# Patient Record
Sex: MALE | Race: WHITE | NOT HISPANIC OR LATINO | Employment: OTHER | URBAN - NONMETROPOLITAN AREA
[De-identification: names, ages, dates, MRNs, and addresses within clinical notes are randomized per-mention and may not be internally consistent; named-entity substitution may affect disease eponyms.]

---

## 2018-04-11 ENCOUNTER — OFFICE VISIT CONVERTED (OUTPATIENT)
Dept: FAMILY MEDICINE CLINIC | Age: 73
End: 2018-04-11
Attending: NURSE PRACTITIONER

## 2018-10-24 ENCOUNTER — OFFICE VISIT CONVERTED (OUTPATIENT)
Dept: FAMILY MEDICINE CLINIC | Age: 73
End: 2018-10-24
Attending: NURSE PRACTITIONER

## 2018-11-09 ENCOUNTER — CONVERSION ENCOUNTER (OUTPATIENT)
Dept: CARDIOLOGY | Facility: CLINIC | Age: 73
End: 2018-11-09

## 2018-11-09 ENCOUNTER — OFFICE VISIT CONVERTED (OUTPATIENT)
Dept: CARDIOLOGY | Facility: CLINIC | Age: 73
End: 2018-11-09
Attending: INTERNAL MEDICINE

## 2018-11-28 ENCOUNTER — CONVERSION ENCOUNTER (OUTPATIENT)
Dept: CARDIOLOGY | Facility: CLINIC | Age: 73
End: 2018-11-28
Attending: INTERNAL MEDICINE

## 2019-03-13 ENCOUNTER — HOSPITAL ENCOUNTER (OUTPATIENT)
Dept: OTHER | Facility: HOSPITAL | Age: 74
Discharge: HOME | End: 2019-03-13
Attending: NURSE PRACTITIONER

## 2019-03-13 LAB
ALBUMIN SERPL-MCNC: 4.2 G/DL (ref 3.5–5)
ALBUMIN/GLOB SERPL: 1.6 {RATIO} (ref 1.4–2.6)
ALP SERPL-CCNC: 75 U/L (ref 56–155)
ALT SERPL-CCNC: 28 U/L (ref 10–40)
ANION GAP SERPL CALC-SCNC: 16 MMOL/L (ref 8–19)
AST SERPL-CCNC: 18 U/L (ref 15–50)
BILIRUB SERPL-MCNC: 0.3 MG/DL (ref 0.2–1.3)
BUN SERPL-MCNC: 14 MG/DL (ref 5–25)
BUN/CREAT SERPL: 13 {RATIO} (ref 6–20)
CALCIUM SERPL-MCNC: 9.7 MG/DL (ref 8.7–10.4)
CHLORIDE SERPL-SCNC: 106 MMOL/L (ref 99–111)
CHOLEST SERPL-MCNC: 110 MG/DL (ref 107–200)
CHOLEST/HDLC SERPL: 3.3 {RATIO} (ref 3–6)
CONV CO2: 26 MMOL/L (ref 22–32)
CONV TOTAL PROTEIN: 6.9 G/DL (ref 6.3–8.2)
CREAT UR-MCNC: 1.05 MG/DL (ref 0.7–1.2)
EST. AVERAGE GLUCOSE BLD GHB EST-MCNC: 180 MG/DL
GFR SERPLBLD BASED ON 1.73 SQ M-ARVRAT: >60 ML/MIN/{1.73_M2}
GLOBULIN UR ELPH-MCNC: 2.7 G/DL (ref 2–3.5)
GLUCOSE SERPL-MCNC: 173 MG/DL (ref 70–99)
HBA1C MFR BLD: 7.9 % (ref 3.5–5.7)
HDLC SERPL-MCNC: 33 MG/DL (ref 40–60)
LDLC SERPL CALC-MCNC: 51 MG/DL (ref 70–100)
OSMOLALITY SERPL CALC.SUM OF ELEC: 301 MOSM/KG (ref 273–304)
POTASSIUM SERPL-SCNC: 4.7 MMOL/L (ref 3.5–5.3)
SODIUM SERPL-SCNC: 143 MMOL/L (ref 135–147)
TRIGL SERPL-MCNC: 131 MG/DL (ref 40–150)
VLDLC SERPL-MCNC: 26 MG/DL (ref 5–37)

## 2019-03-27 ENCOUNTER — OFFICE VISIT CONVERTED (OUTPATIENT)
Dept: FAMILY MEDICINE CLINIC | Age: 74
End: 2019-03-27
Attending: NURSE PRACTITIONER

## 2019-03-27 ENCOUNTER — HOSPITAL ENCOUNTER (OUTPATIENT)
Dept: OTHER | Facility: HOSPITAL | Age: 74
Discharge: HOME | End: 2019-03-27
Attending: NURSE PRACTITIONER

## 2019-03-27 LAB — PSA SERPL-MCNC: 1.05 NG/ML (ref 0–4)

## 2019-07-01 ENCOUNTER — HOSPITAL ENCOUNTER (OUTPATIENT)
Dept: OTHER | Facility: HOSPITAL | Age: 74
Discharge: HOME | End: 2019-07-01
Attending: NURSE PRACTITIONER

## 2019-07-01 LAB
EST. AVERAGE GLUCOSE BLD GHB EST-MCNC: 166 MG/DL
HBA1C MFR BLD: 7.4 % (ref 3.5–5.7)

## 2019-10-02 ENCOUNTER — HOSPITAL ENCOUNTER (OUTPATIENT)
Dept: OTHER | Facility: HOSPITAL | Age: 74
Discharge: HOME | End: 2019-10-02
Attending: NURSE PRACTITIONER

## 2019-10-02 LAB
ALBUMIN SERPL-MCNC: 4.2 G/DL (ref 3.5–5)
ALBUMIN/GLOB SERPL: 1.4 {RATIO} (ref 1.4–2.6)
ALP SERPL-CCNC: 72 U/L (ref 56–155)
ALT SERPL-CCNC: 23 U/L (ref 10–40)
ANION GAP SERPL CALC-SCNC: 17 MMOL/L (ref 8–19)
AST SERPL-CCNC: 20 U/L (ref 15–50)
BILIRUB SERPL-MCNC: 0.36 MG/DL (ref 0.2–1.3)
BUN SERPL-MCNC: 16 MG/DL (ref 5–25)
BUN/CREAT SERPL: 15 {RATIO} (ref 6–20)
CALCIUM SERPL-MCNC: 10.2 MG/DL (ref 8.7–10.4)
CHLORIDE SERPL-SCNC: 106 MMOL/L (ref 99–111)
CHOLEST SERPL-MCNC: 108 MG/DL (ref 107–200)
CHOLEST/HDLC SERPL: 3.2 {RATIO} (ref 3–6)
CONV CO2: 25 MMOL/L (ref 22–32)
CONV CREATININE URINE, RANDOM: 51.5 MG/DL (ref 10–300)
CONV MICROALBUM.,U,RANDOM: <12 MG/L (ref 0–20)
CONV TOTAL PROTEIN: 7.3 G/DL (ref 6.3–8.2)
CREAT UR-MCNC: 1.07 MG/DL (ref 0.7–1.2)
EST. AVERAGE GLUCOSE BLD GHB EST-MCNC: 163 MG/DL
GFR SERPLBLD BASED ON 1.73 SQ M-ARVRAT: >60 ML/MIN/{1.73_M2}
GLOBULIN UR ELPH-MCNC: 3.1 G/DL (ref 2–3.5)
GLUCOSE SERPL-MCNC: 144 MG/DL (ref 70–99)
HBA1C MFR BLD: 7.3 % (ref 3.5–5.7)
HDLC SERPL-MCNC: 34 MG/DL (ref 40–60)
LDLC SERPL CALC-MCNC: 51 MG/DL (ref 70–100)
MICROALBUMIN/CREAT UR: 23.3 MG/G{CRE} (ref 0–25)
OSMOLALITY SERPL CALC.SUM OF ELEC: 300 MOSM/KG (ref 273–304)
POTASSIUM SERPL-SCNC: 5 MMOL/L (ref 3.5–5.3)
SODIUM SERPL-SCNC: 143 MMOL/L (ref 135–147)
TRIGL SERPL-MCNC: 115 MG/DL (ref 40–150)
VLDLC SERPL-MCNC: 23 MG/DL (ref 5–37)

## 2019-10-09 ENCOUNTER — OFFICE VISIT CONVERTED (OUTPATIENT)
Dept: FAMILY MEDICINE CLINIC | Age: 74
End: 2019-10-09
Attending: NURSE PRACTITIONER

## 2019-10-09 ENCOUNTER — HOSPITAL ENCOUNTER (OUTPATIENT)
Dept: OTHER | Facility: HOSPITAL | Age: 74
Discharge: HOME | End: 2019-10-09
Attending: NURSE PRACTITIONER

## 2019-10-09 LAB
BASOPHILS # BLD AUTO: 0.04 10*3/UL (ref 0–0.2)
BASOPHILS NFR BLD AUTO: 0.4 % (ref 0–3)
CONV ABS IMM GRAN: 0.04 10*3/UL (ref 0–0.2)
CONV IMMATURE GRAN: 0.4 % (ref 0–1.8)
DEPRECATED RDW RBC AUTO: 47.8 FL (ref 35.1–43.9)
EOSINOPHIL # BLD AUTO: 0.42 10*3/UL (ref 0–0.7)
EOSINOPHIL # BLD AUTO: 4.1 % (ref 0–7)
ERYTHROCYTE [DISTWIDTH] IN BLOOD BY AUTOMATED COUNT: 14.4 % (ref 11.6–14.4)
HCT VFR BLD AUTO: 44.2 % (ref 42–52)
HGB BLD-MCNC: 13.5 G/DL (ref 14–18)
LYMPHOCYTES # BLD AUTO: 1.8 10*3/UL (ref 1–5)
LYMPHOCYTES NFR BLD AUTO: 17.6 % (ref 20–45)
MCH RBC QN AUTO: 27.8 PG (ref 27–31)
MCHC RBC AUTO-ENTMCNC: 30.5 G/DL (ref 33–37)
MCV RBC AUTO: 91.1 FL (ref 80–96)
MONOCYTES # BLD AUTO: 0.64 10*3/UL (ref 0.2–1.2)
MONOCYTES NFR BLD AUTO: 6.3 % (ref 3–10)
NEUTROPHILS # BLD AUTO: 7.26 10*3/UL (ref 2–8)
NEUTROPHILS NFR BLD AUTO: 71.2 % (ref 30–85)
NRBC CBCN: 0 % (ref 0–0.7)
PLATELET # BLD AUTO: 293 10*3/UL (ref 130–400)
PMV BLD AUTO: 11.8 FL (ref 9.4–12.4)
RBC # BLD AUTO: 4.85 10*6/UL (ref 4.7–6.1)
TSH SERPL-ACNC: 2.12 M[IU]/L (ref 0.27–4.2)
WBC # BLD AUTO: 10.2 10*3/UL (ref 4.8–10.8)

## 2019-10-14 ENCOUNTER — CONVERSION ENCOUNTER (OUTPATIENT)
Dept: CARDIOLOGY | Facility: CLINIC | Age: 74
End: 2019-10-14
Attending: INTERNAL MEDICINE

## 2019-10-23 ENCOUNTER — OFFICE VISIT CONVERTED (OUTPATIENT)
Dept: CARDIOLOGY | Facility: CLINIC | Age: 74
End: 2019-10-23
Attending: INTERNAL MEDICINE

## 2019-11-07 ENCOUNTER — OFFICE VISIT CONVERTED (OUTPATIENT)
Dept: PODIATRY | Facility: CLINIC | Age: 74
End: 2019-11-07
Attending: PODIATRIST

## 2019-12-04 ENCOUNTER — CONVERSION ENCOUNTER (OUTPATIENT)
Dept: OTHER | Facility: HOSPITAL | Age: 74
End: 2019-12-04

## 2019-12-04 ENCOUNTER — OFFICE VISIT CONVERTED (OUTPATIENT)
Dept: CARDIOLOGY | Facility: CLINIC | Age: 74
End: 2019-12-04
Attending: INTERNAL MEDICINE

## 2020-02-06 ENCOUNTER — PROCEDURE VISIT CONVERTED (OUTPATIENT)
Dept: PODIATRY | Facility: CLINIC | Age: 75
End: 2020-02-06
Attending: PODIATRIST

## 2020-04-01 ENCOUNTER — OFFICE VISIT CONVERTED (OUTPATIENT)
Dept: FAMILY MEDICINE CLINIC | Age: 75
End: 2020-04-01
Attending: NURSE PRACTITIONER

## 2020-04-01 ENCOUNTER — HOSPITAL ENCOUNTER (OUTPATIENT)
Dept: OTHER | Facility: HOSPITAL | Age: 75
Discharge: HOME | End: 2020-04-01
Attending: NURSE PRACTITIONER

## 2020-05-04 ENCOUNTER — HOSPITAL ENCOUNTER (OUTPATIENT)
Dept: OTHER | Facility: HOSPITAL | Age: 75
Discharge: HOME | End: 2020-05-04
Attending: FAMILY MEDICINE

## 2020-05-04 ENCOUNTER — OFFICE VISIT CONVERTED (OUTPATIENT)
Dept: FAMILY MEDICINE CLINIC | Age: 75
End: 2020-05-04
Attending: FAMILY MEDICINE

## 2020-05-04 LAB
ALBUMIN SERPL-MCNC: 4.1 G/DL (ref 3.5–5)
ALBUMIN/GLOB SERPL: 1.3 {RATIO} (ref 1.4–2.6)
ALP SERPL-CCNC: 74 U/L (ref 56–155)
ALT SERPL-CCNC: 16 U/L (ref 10–40)
ANION GAP SERPL CALC-SCNC: 20 MMOL/L (ref 8–19)
AST SERPL-CCNC: 11 U/L (ref 15–50)
BASOPHILS # BLD MANUAL: 0.02 10*3/UL (ref 0–0.2)
BASOPHILS NFR BLD MANUAL: 0.2 % (ref 0–3)
BILIRUB SERPL-MCNC: 0.65 MG/DL (ref 0.2–1.3)
BUN SERPL-MCNC: 16 MG/DL (ref 5–25)
BUN/CREAT SERPL: 15 {RATIO} (ref 6–20)
CALCIUM SERPL-MCNC: 9.8 MG/DL (ref 8.7–10.4)
CHLORIDE SERPL-SCNC: 98 MMOL/L (ref 99–111)
CONV CO2: 24 MMOL/L (ref 22–32)
CONV TOTAL PROTEIN: 7.2 G/DL (ref 6.3–8.2)
CREAT UR-MCNC: 1.06 MG/DL (ref 0.7–1.2)
DEPRECATED RDW RBC AUTO: 45.1 FL
EOSINOPHIL # BLD MANUAL: 0.06 10*3/UL (ref 0–0.7)
EOSINOPHIL NFR BLD MANUAL: 0.5 % (ref 0–7)
ERYTHROCYTE [DISTWIDTH] IN BLOOD BY AUTOMATED COUNT: 14 % (ref 11.5–14.5)
EST. AVERAGE GLUCOSE BLD GHB EST-MCNC: 174 MG/DL
GFR SERPLBLD BASED ON 1.73 SQ M-ARVRAT: >60 ML/MIN/{1.73_M2}
GLOBULIN UR ELPH-MCNC: 3.1 G/DL (ref 2–3.5)
GLUCOSE SERPL-MCNC: 181 MG/DL (ref 70–99)
GRANS (ABSOLUTE): 9.08 10*3/UL (ref 2–8)
GRANS: 80.1 % (ref 30–85)
HBA1C MFR BLD: 13.7 G/DL (ref 14–18)
HBA1C MFR BLD: 7.7 % (ref 3.5–5.7)
HCT VFR BLD AUTO: 42 % (ref 42–52)
IMM GRANULOCYTES # BLD: 0.02 10*3/UL (ref 0–0.54)
IMM GRANULOCYTES NFR BLD: 0.2 % (ref 0–0.43)
LYMPHOCYTES # BLD MANUAL: 1.03 10*3/UL (ref 1–5)
LYMPHOCYTES NFR BLD MANUAL: 9.9 % (ref 3–10)
MCH RBC QN AUTO: 28.4 PG (ref 27–31)
MCHC RBC AUTO-ENTMCNC: 32.6 G/DL (ref 33–37)
MCV RBC AUTO: 87 FL (ref 80–96)
MONOCYTES # BLD AUTO: 1.12 10*3/UL (ref 0.2–1.2)
OSMOLALITY SERPL CALC.SUM OF ELEC: 290 MOSM/KG (ref 273–304)
PLATELET # BLD AUTO: 218 10*3/UL (ref 130–400)
PMV BLD AUTO: 10.4 FL (ref 7.4–10.4)
POTASSIUM SERPL-SCNC: 4.6 MMOL/L (ref 3.5–5.3)
RBC # BLD AUTO: 4.83 10*6/UL (ref 4.7–6.1)
SODIUM SERPL-SCNC: 137 MMOL/L (ref 135–147)
TSH SERPL-ACNC: 0.78 M[IU]/L (ref 0.27–4.2)
VARIANT LYMPHS NFR BLD MANUAL: 9.1 % (ref 20–45)
WBC # BLD AUTO: 11.33 10*3/UL (ref 4.8–10.8)

## 2020-05-06 ENCOUNTER — HOSPITAL ENCOUNTER (OUTPATIENT)
Dept: OTHER | Facility: HOSPITAL | Age: 75
Discharge: HOME | End: 2020-05-06
Attending: FAMILY MEDICINE

## 2020-05-06 LAB
B BURGDOR IGG+IGM SER-ACNC: <0.91 ISR (ref 0–0.9)
SARS-COV-2 RNA SPEC QL NAA+PROBE: NOT DETECTED

## 2020-05-07 LAB
ASO AB SERPL-ACNC: 20 [IU]/ML (ref 0–200)
CONV RHEUMATOID FACTOR IGM: 14.7 [IU]/ML (ref 0–14)
CRP SERPL-MCNC: 79 MG/L (ref 0–5)
DSDNA AB SER-ACNC: NEGATIVE [IU]/ML
ENA AB SER IA-ACNC: NEGATIVE {RATIO}
ERYTHROCYTE [SEDIMENTATION RATE] IN BLOOD: 34 MM/H (ref 0–20)
PHOSPHATE SERPL-MCNC: 2.4 MG/DL (ref 2.4–4.5)
R RICKETTSI IGM TITR SER: 0.25 INDEX (ref 0–0.89)
URATE SERPL-MCNC: 4.3 MG/DL (ref 3.5–8.5)

## 2020-05-09 LAB — CONV ANTI GALACTOSE ALPHA 1,3 IGE: 0.16 KU/L

## 2020-06-03 ENCOUNTER — CONVERSION ENCOUNTER (OUTPATIENT)
Dept: CARDIOLOGY | Facility: CLINIC | Age: 75
End: 2020-06-03

## 2020-06-03 ENCOUNTER — OFFICE VISIT CONVERTED (OUTPATIENT)
Dept: CARDIOLOGY | Facility: CLINIC | Age: 75
End: 2020-06-03
Attending: INTERNAL MEDICINE

## 2020-07-10 ENCOUNTER — HOSPITAL ENCOUNTER (OUTPATIENT)
Dept: OTHER | Facility: HOSPITAL | Age: 75
Discharge: HOME | End: 2020-07-10
Attending: INTERNAL MEDICINE

## 2020-07-10 LAB
INR PPP: 1.32 (ref 2–3)
PROTHROMBIN TIME: 13.6 S (ref 9.4–12)

## 2020-07-20 ENCOUNTER — HOSPITAL ENCOUNTER (OUTPATIENT)
Dept: OTHER | Facility: HOSPITAL | Age: 75
Discharge: HOME | End: 2020-07-20
Attending: INTERNAL MEDICINE

## 2020-07-20 LAB
INR PPP: 4.11 (ref 2–3)
PROTHROMBIN TIME: 39.9 S (ref 9.4–12)

## 2020-07-28 ENCOUNTER — HOSPITAL ENCOUNTER (OUTPATIENT)
Dept: OTHER | Facility: HOSPITAL | Age: 75
Discharge: HOME | End: 2020-07-28
Attending: INTERNAL MEDICINE

## 2020-07-28 LAB
INR PPP: 2.48 (ref 2–3)
PROTHROMBIN TIME: 24.2 S (ref 9.4–12)

## 2020-08-12 ENCOUNTER — HOSPITAL ENCOUNTER (OUTPATIENT)
Dept: OTHER | Facility: HOSPITAL | Age: 75
Discharge: HOME | End: 2020-08-12
Attending: INTERNAL MEDICINE

## 2020-08-12 LAB
INR PPP: 4 (ref 2–3)
PROTHROMBIN TIME: 38.7 S (ref 9.4–12)

## 2020-08-19 ENCOUNTER — HOSPITAL ENCOUNTER (OUTPATIENT)
Dept: OTHER | Facility: HOSPITAL | Age: 75
Discharge: HOME | End: 2020-08-19
Attending: INTERNAL MEDICINE

## 2020-08-19 LAB
INR PPP: 1.85 (ref 2–3)
PROTHROMBIN TIME: 18.4 S (ref 9.4–12)

## 2020-08-27 ENCOUNTER — HOSPITAL ENCOUNTER (OUTPATIENT)
Dept: OTHER | Facility: HOSPITAL | Age: 75
Discharge: HOME | End: 2020-08-27
Attending: INTERNAL MEDICINE

## 2020-08-27 LAB
INR PPP: 3.96 (ref 2–3)
PROTHROMBIN TIME: 38.4 S (ref 9.4–12)

## 2020-09-03 ENCOUNTER — HOSPITAL ENCOUNTER (OUTPATIENT)
Dept: OTHER | Facility: HOSPITAL | Age: 75
Discharge: HOME | End: 2020-09-03
Attending: INTERNAL MEDICINE

## 2020-09-03 LAB
INR PPP: 2.25 (ref 2–3)
PROTHROMBIN TIME: 22.1 S (ref 9.4–12)

## 2020-09-14 ENCOUNTER — HOSPITAL ENCOUNTER (OUTPATIENT)
Dept: OTHER | Facility: HOSPITAL | Age: 75
Discharge: HOME | End: 2020-09-14
Attending: NURSE PRACTITIONER

## 2020-09-14 LAB
ALBUMIN SERPL-MCNC: 4 G/DL (ref 3.5–5)
ALBUMIN/GLOB SERPL: 1.4 {RATIO} (ref 1.4–2.6)
ALP SERPL-CCNC: 67 U/L (ref 56–155)
ALT SERPL-CCNC: 19 U/L (ref 10–40)
ANION GAP SERPL CALC-SCNC: 20 MMOL/L (ref 8–19)
AST SERPL-CCNC: 20 U/L (ref 15–50)
BILIRUB SERPL-MCNC: 0.33 MG/DL (ref 0.2–1.3)
BUN SERPL-MCNC: 18 MG/DL (ref 5–25)
BUN/CREAT SERPL: 17 {RATIO} (ref 6–20)
CALCIUM SERPL-MCNC: 9.6 MG/DL (ref 8.7–10.4)
CHLORIDE SERPL-SCNC: 104 MMOL/L (ref 99–111)
CHOLEST SERPL-MCNC: 134 MG/DL (ref 107–200)
CHOLEST/HDLC SERPL: 4.1 {RATIO} (ref 3–6)
CONV CO2: 22 MMOL/L (ref 22–32)
CONV TOTAL PROTEIN: 6.8 G/DL (ref 6.3–8.2)
CREAT UR-MCNC: 1.07 MG/DL (ref 0.7–1.2)
EST. AVERAGE GLUCOSE BLD GHB EST-MCNC: 171 MG/DL
GFR SERPLBLD BASED ON 1.73 SQ M-ARVRAT: >60 ML/MIN/{1.73_M2}
GLOBULIN UR ELPH-MCNC: 2.8 G/DL (ref 2–3.5)
GLUCOSE SERPL-MCNC: 150 MG/DL (ref 70–99)
HBA1C MFR BLD: 7.6 % (ref 3.5–5.7)
HDLC SERPL-MCNC: 33 MG/DL (ref 40–60)
LDLC SERPL CALC-MCNC: 63 MG/DL (ref 70–100)
OSMOLALITY SERPL CALC.SUM OF ELEC: 297 MOSM/KG (ref 273–304)
POTASSIUM SERPL-SCNC: 4.8 MMOL/L (ref 3.5–5.3)
SODIUM SERPL-SCNC: 141 MMOL/L (ref 135–147)
TRIGL SERPL-MCNC: 190 MG/DL (ref 40–150)
VLDLC SERPL-MCNC: 38 MG/DL (ref 5–37)

## 2020-09-17 ENCOUNTER — HOSPITAL ENCOUNTER (OUTPATIENT)
Dept: OTHER | Facility: HOSPITAL | Age: 75
Discharge: HOME | End: 2020-09-17
Attending: INTERNAL MEDICINE

## 2020-09-17 LAB
INR PPP: 3.07 (ref 2–3)
PROTHROMBIN TIME: 29.8 S (ref 9.4–12)

## 2020-09-23 ENCOUNTER — OFFICE VISIT CONVERTED (OUTPATIENT)
Dept: FAMILY MEDICINE CLINIC | Age: 75
End: 2020-09-23
Attending: NURSE PRACTITIONER

## 2020-10-01 ENCOUNTER — HOSPITAL ENCOUNTER (OUTPATIENT)
Dept: OTHER | Facility: HOSPITAL | Age: 75
Discharge: HOME | End: 2020-10-01
Attending: INTERNAL MEDICINE

## 2020-10-01 LAB
INR PPP: 3.05 (ref 2–3)
PROTHROMBIN TIME: 29.6 S (ref 9.4–12)

## 2020-10-08 ENCOUNTER — HOSPITAL ENCOUNTER (OUTPATIENT)
Dept: OTHER | Facility: HOSPITAL | Age: 75
Discharge: HOME | End: 2020-10-08
Attending: INTERNAL MEDICINE

## 2020-10-08 LAB
INR PPP: 2.2 (ref 2–3)
PROTHROMBIN TIME: 21.7 S (ref 9.4–12)

## 2020-10-15 ENCOUNTER — HOSPITAL ENCOUNTER (OUTPATIENT)
Dept: OTHER | Facility: HOSPITAL | Age: 75
Discharge: HOME | End: 2020-10-15
Attending: FAMILY MEDICINE

## 2020-10-15 ENCOUNTER — CONVERSION ENCOUNTER (OUTPATIENT)
Dept: FAMILY MEDICINE CLINIC | Age: 75
End: 2020-10-15

## 2020-10-19 ENCOUNTER — HOSPITAL ENCOUNTER (OUTPATIENT)
Dept: OTHER | Facility: HOSPITAL | Age: 75
Discharge: HOME | End: 2020-10-19
Attending: NURSE PRACTITIONER

## 2020-10-19 ENCOUNTER — OFFICE VISIT CONVERTED (OUTPATIENT)
Dept: FAMILY MEDICINE CLINIC | Age: 75
End: 2020-10-19

## 2020-10-19 LAB — SARS-COV-2 RNA SPEC QL NAA+PROBE: DETECTED

## 2020-10-29 ENCOUNTER — HOSPITAL ENCOUNTER (OUTPATIENT)
Dept: OTHER | Facility: HOSPITAL | Age: 75
Discharge: HOME | End: 2020-10-29
Attending: INTERNAL MEDICINE

## 2020-10-29 LAB
INR PPP: 5.72 (ref 2–3)
PROTHROMBIN TIME: 55.7 S (ref 9.4–12)

## 2020-11-03 ENCOUNTER — OFFICE VISIT CONVERTED (OUTPATIENT)
Dept: FAMILY MEDICINE CLINIC | Age: 75
End: 2020-11-03

## 2020-11-05 ENCOUNTER — HOSPITAL ENCOUNTER (OUTPATIENT)
Dept: OTHER | Facility: HOSPITAL | Age: 75
Discharge: HOME | End: 2020-11-05
Attending: INTERNAL MEDICINE

## 2020-11-05 LAB
INR PPP: 1.57 (ref 2–3)
PROTHROMBIN TIME: 15.9 S (ref 9.4–12)

## 2020-11-12 ENCOUNTER — HOSPITAL ENCOUNTER (OUTPATIENT)
Dept: OTHER | Facility: HOSPITAL | Age: 75
Discharge: HOME | End: 2020-11-12
Attending: INTERNAL MEDICINE

## 2020-11-12 LAB
INR PPP: 1.8 (ref 2–3)
PROTHROMBIN TIME: 18 S (ref 9.4–12)

## 2020-11-18 ENCOUNTER — OFFICE VISIT CONVERTED (OUTPATIENT)
Dept: FAMILY MEDICINE CLINIC | Age: 75
End: 2020-11-18
Attending: NURSE PRACTITIONER

## 2020-11-18 ENCOUNTER — HOSPITAL ENCOUNTER (OUTPATIENT)
Dept: OTHER | Facility: HOSPITAL | Age: 75
Discharge: HOME | End: 2020-11-18
Attending: NURSE PRACTITIONER

## 2020-11-18 LAB
ALBUMIN SERPL-MCNC: 4 G/DL (ref 3.5–5)
ALBUMIN/GLOB SERPL: 1.4 {RATIO} (ref 1.4–2.6)
ALP SERPL-CCNC: 73 U/L (ref 56–155)
ALT SERPL-CCNC: 15 U/L (ref 10–40)
ANION GAP SERPL CALC-SCNC: 22 MMOL/L (ref 8–19)
AST SERPL-CCNC: 14 U/L (ref 15–50)
BASOPHILS # BLD MANUAL: 0.02 10*3/UL (ref 0–0.2)
BASOPHILS NFR BLD MANUAL: 0.3 % (ref 0–3)
BILIRUB SERPL-MCNC: 0.35 MG/DL (ref 0.2–1.3)
BUN SERPL-MCNC: 18 MG/DL (ref 5–25)
BUN/CREAT SERPL: 17 {RATIO} (ref 6–20)
CALCIUM SERPL-MCNC: 9.8 MG/DL (ref 8.7–10.4)
CHLORIDE SERPL-SCNC: 106 MMOL/L (ref 99–111)
CONV CO2: 21 MMOL/L (ref 22–32)
CONV TOTAL PROTEIN: 6.9 G/DL (ref 6.3–8.2)
CREAT UR-MCNC: 1.08 MG/DL (ref 0.7–1.2)
DEPRECATED RDW RBC AUTO: 52.4 FL
EOSINOPHIL # BLD MANUAL: 0.35 10*3/UL (ref 0–0.7)
EOSINOPHIL NFR BLD MANUAL: 5.2 % (ref 0–7)
ERYTHROCYTE [DISTWIDTH] IN BLOOD BY AUTOMATED COUNT: 16.1 % (ref 11.5–14.5)
GFR SERPLBLD BASED ON 1.73 SQ M-ARVRAT: >60 ML/MIN/{1.73_M2}
GLOBULIN UR ELPH-MCNC: 2.9 G/DL (ref 2–3.5)
GLUCOSE SERPL-MCNC: 187 MG/DL (ref 70–99)
GRANS (ABSOLUTE): 4.3 10*3/UL (ref 2–8)
GRANS: 63.6 % (ref 30–85)
HBA1C MFR BLD: 13.3 G/DL (ref 14–18)
HCT VFR BLD AUTO: 41.8 % (ref 42–52)
IMM GRANULOCYTES # BLD: 0.03 10*3/UL (ref 0–0.54)
IMM GRANULOCYTES NFR BLD: 0.4 % (ref 0–0.43)
LYMPHOCYTES # BLD MANUAL: 1.41 10*3/UL (ref 1–5)
LYMPHOCYTES NFR BLD MANUAL: 9.6 % (ref 3–10)
MCH RBC QN AUTO: 28.4 PG (ref 27–31)
MCHC RBC AUTO-ENTMCNC: 31.8 G/DL (ref 33–37)
MCV RBC AUTO: 89.1 FL (ref 80–96)
MONOCYTES # BLD AUTO: 0.65 10*3/UL (ref 0.2–1.2)
OSMOLALITY SERPL CALC.SUM OF ELEC: 305 MOSM/KG (ref 273–304)
PLATELET # BLD AUTO: 240 10*3/UL (ref 130–400)
PMV BLD AUTO: 9.6 FL (ref 7.4–10.4)
POTASSIUM SERPL-SCNC: 5.1 MMOL/L (ref 3.5–5.3)
PSA SERPL-MCNC: 1.34 NG/ML (ref 0–4)
RBC # BLD AUTO: 4.69 10*6/UL (ref 4.7–6.1)
SODIUM SERPL-SCNC: 144 MMOL/L (ref 135–147)
TSH SERPL-ACNC: 0.89 M[IU]/L (ref 0.27–4.2)
VARIANT LYMPHS NFR BLD MANUAL: 20.9 % (ref 20–45)
WBC # BLD AUTO: 6.76 10*3/UL (ref 4.8–10.8)

## 2020-11-24 ENCOUNTER — HOSPITAL ENCOUNTER (OUTPATIENT)
Dept: OTHER | Facility: HOSPITAL | Age: 75
Discharge: HOME | End: 2020-11-24
Attending: INTERNAL MEDICINE

## 2020-11-24 LAB
INR PPP: 2.04 (ref 2–3)
PROTHROMBIN TIME: 20.2 S (ref 9.4–12)

## 2020-12-03 ENCOUNTER — HOSPITAL ENCOUNTER (OUTPATIENT)
Dept: OTHER | Facility: HOSPITAL | Age: 75
Discharge: HOME | End: 2020-12-03
Attending: INTERNAL MEDICINE

## 2020-12-03 LAB
INR PPP: 2.82 (ref 2–3)
PROTHROMBIN TIME: 27.5 S (ref 9.4–12)

## 2020-12-23 ENCOUNTER — HOSPITAL ENCOUNTER (OUTPATIENT)
Dept: OTHER | Facility: HOSPITAL | Age: 75
Discharge: HOME | End: 2020-12-23
Attending: INTERNAL MEDICINE

## 2020-12-23 LAB
ALBUMIN SERPL-MCNC: 3.9 G/DL (ref 3.5–5)
ALBUMIN/GLOB SERPL: 1.4 {RATIO} (ref 1.4–2.6)
ALP SERPL-CCNC: 75 U/L (ref 56–155)
ALT SERPL-CCNC: 12 U/L (ref 10–40)
ANION GAP SERPL CALC-SCNC: 17 MMOL/L (ref 8–19)
AST SERPL-CCNC: 13 U/L (ref 15–50)
BASOPHILS # BLD MANUAL: 0.04 10*3/UL (ref 0–0.2)
BASOPHILS NFR BLD MANUAL: 0.4 % (ref 0–3)
BILIRUB SERPL-MCNC: 0.31 MG/DL (ref 0.2–1.3)
BUN SERPL-MCNC: 20 MG/DL (ref 5–25)
BUN/CREAT SERPL: 15 {RATIO} (ref 6–20)
CALCIUM SERPL-MCNC: 9.9 MG/DL (ref 8.7–10.4)
CHLORIDE SERPL-SCNC: 103 MMOL/L (ref 99–111)
CHOLEST SERPL-MCNC: 125 MG/DL (ref 107–200)
CHOLEST/HDLC SERPL: 3.8 {RATIO} (ref 3–6)
CONV CO2: 23 MMOL/L (ref 22–32)
CONV TOTAL PROTEIN: 6.7 G/DL (ref 6.3–8.2)
CREAT UR-MCNC: 1.35 MG/DL (ref 0.7–1.2)
DEPRECATED RDW RBC AUTO: 46.1 FL
EOSINOPHIL # BLD MANUAL: 0.61 10*3/UL (ref 0–0.7)
EOSINOPHIL NFR BLD MANUAL: 6.7 % (ref 0–7)
ERYTHROCYTE [DISTWIDTH] IN BLOOD BY AUTOMATED COUNT: 14.3 % (ref 11.5–14.5)
EST. AVERAGE GLUCOSE BLD GHB EST-MCNC: 189 MG/DL
GFR SERPLBLD BASED ON 1.73 SQ M-ARVRAT: 51 ML/MIN/{1.73_M2}
GLOBULIN UR ELPH-MCNC: 2.8 G/DL (ref 2–3.5)
GLUCOSE SERPL-MCNC: 194 MG/DL (ref 70–99)
GRANS (ABSOLUTE): 5.75 10*3/UL (ref 2–8)
GRANS: 63.5 % (ref 30–85)
HBA1C MFR BLD: 12.6 G/DL (ref 14–18)
HBA1C MFR BLD: 8.2 % (ref 3.5–5.7)
HCT VFR BLD AUTO: 39.1 % (ref 42–52)
HDLC SERPL-MCNC: 33 MG/DL (ref 40–60)
IMM GRANULOCYTES # BLD: 0.02 10*3/UL (ref 0–0.54)
IMM GRANULOCYTES NFR BLD: 0.2 % (ref 0–0.43)
INR PPP: 2.05 (ref 2–3)
LDLC SERPL CALC-MCNC: 55 MG/DL (ref 70–100)
LYMPHOCYTES # BLD MANUAL: 1.92 10*3/UL (ref 1–5)
LYMPHOCYTES NFR BLD MANUAL: 8 % (ref 3–10)
MCH RBC QN AUTO: 27.7 PG (ref 27–31)
MCHC RBC AUTO-ENTMCNC: 32.2 G/DL (ref 33–37)
MCV RBC AUTO: 85.9 FL (ref 80–96)
MONOCYTES # BLD AUTO: 0.73 10*3/UL (ref 0.2–1.2)
OSMOLALITY SERPL CALC.SUM OF ELEC: 294 MOSM/KG (ref 273–304)
PLATELET # BLD AUTO: 292 10*3/UL (ref 130–400)
PMV BLD AUTO: 10.2 FL (ref 7.4–10.4)
POTASSIUM SERPL-SCNC: 4.7 MMOL/L (ref 3.5–5.3)
PROTHROMBIN TIME: 20.2 S (ref 9.4–12)
RBC # BLD AUTO: 4.55 10*6/UL (ref 4.7–6.1)
SODIUM SERPL-SCNC: 138 MMOL/L (ref 135–147)
TRIGL SERPL-MCNC: 184 MG/DL (ref 40–150)
VARIANT LYMPHS NFR BLD MANUAL: 21.2 % (ref 20–45)
VLDLC SERPL-MCNC: 37 MG/DL (ref 5–37)
WBC # BLD AUTO: 9.07 10*3/UL (ref 4.8–10.8)

## 2020-12-28 ENCOUNTER — HOSPITAL ENCOUNTER (OUTPATIENT)
Dept: OTHER | Facility: HOSPITAL | Age: 75
Discharge: HOME | End: 2020-12-28
Attending: NURSE PRACTITIONER

## 2020-12-28 LAB
CONV CREATININE URINE, RANDOM: 138.2 MG/DL (ref 10–300)
CONV MICROALBUM.,U,RANDOM: 17.1 MG/L (ref 0–20)
MICROALBUMIN/CREAT UR: 12.4 MG/G{CRE} (ref 0–25)

## 2021-01-20 ENCOUNTER — OFFICE VISIT CONVERTED (OUTPATIENT)
Dept: FAMILY MEDICINE CLINIC | Age: 76
End: 2021-01-20
Attending: NURSE PRACTITIONER

## 2021-01-28 ENCOUNTER — HOSPITAL ENCOUNTER (OUTPATIENT)
Dept: OTHER | Facility: HOSPITAL | Age: 76
Discharge: HOME | End: 2021-01-28
Attending: INTERNAL MEDICINE

## 2021-01-28 LAB
INR PPP: 2.48 (ref 2–3)
PROTHROMBIN TIME: 24.2 S (ref 9.4–12)

## 2021-02-25 ENCOUNTER — HOSPITAL ENCOUNTER (OUTPATIENT)
Dept: OTHER | Facility: HOSPITAL | Age: 76
Discharge: HOME | End: 2021-02-25
Attending: INTERNAL MEDICINE

## 2021-02-25 LAB
INR PPP: 1.78 (ref 2–3)
PROTHROMBIN TIME: 18.2 S (ref 9.4–12)

## 2021-03-11 ENCOUNTER — HOSPITAL ENCOUNTER (OUTPATIENT)
Dept: OTHER | Facility: HOSPITAL | Age: 76
Discharge: HOME | End: 2021-03-11
Attending: INTERNAL MEDICINE

## 2021-03-11 LAB
INR PPP: 2.03 (ref 2–3)
PROTHROMBIN TIME: 20.6 S (ref 9.4–12)

## 2021-04-01 ENCOUNTER — HOSPITAL ENCOUNTER (OUTPATIENT)
Dept: OTHER | Facility: HOSPITAL | Age: 76
Discharge: HOME | End: 2021-04-01
Attending: INTERNAL MEDICINE

## 2021-04-01 LAB
INR PPP: 2.25 (ref 2–3)
PROTHROMBIN TIME: 22.7 S (ref 9.4–12)

## 2021-04-23 ENCOUNTER — HOSPITAL ENCOUNTER (OUTPATIENT)
Dept: OTHER | Facility: HOSPITAL | Age: 76
Discharge: HOME | End: 2021-04-23
Attending: NURSE PRACTITIONER

## 2021-04-23 LAB
ALBUMIN SERPL-MCNC: 4.1 G/DL (ref 3.5–5)
ALBUMIN/GLOB SERPL: 1.6 {RATIO} (ref 1.4–2.6)
ALP SERPL-CCNC: 77 U/L (ref 56–155)
ALT SERPL-CCNC: 19 U/L (ref 10–40)
ANION GAP SERPL CALC-SCNC: 16 MMOL/L (ref 8–19)
AST SERPL-CCNC: 15 U/L (ref 15–50)
BASOPHILS # BLD MANUAL: 0.03 10*3/UL (ref 0–0.2)
BASOPHILS NFR BLD MANUAL: 0.4 % (ref 0–3)
BILIRUB SERPL-MCNC: 0.32 MG/DL (ref 0.2–1.3)
BUN SERPL-MCNC: 20 MG/DL (ref 5–25)
BUN/CREAT SERPL: 18 {RATIO} (ref 6–20)
CALCIUM SERPL-MCNC: 9.6 MG/DL (ref 8.7–10.4)
CHLORIDE SERPL-SCNC: 105 MMOL/L (ref 99–111)
CHOLEST SERPL-MCNC: 120 MG/DL (ref 107–200)
CHOLEST/HDLC SERPL: 3.8 {RATIO} (ref 3–6)
CONV CO2: 25 MMOL/L (ref 22–32)
CONV TOTAL PROTEIN: 6.7 G/DL (ref 6.3–8.2)
CREAT UR-MCNC: 1.1 MG/DL (ref 0.7–1.2)
DEPRECATED RDW RBC AUTO: 48.1 FL
EOSINOPHIL # BLD MANUAL: 0.53 10*3/UL (ref 0–0.7)
EOSINOPHIL NFR BLD MANUAL: 6.6 % (ref 0–7)
ERYTHROCYTE [DISTWIDTH] IN BLOOD BY AUTOMATED COUNT: 15.8 % (ref 11.5–14.5)
EST. AVERAGE GLUCOSE BLD GHB EST-MCNC: 203 MG/DL
GFR SERPLBLD BASED ON 1.73 SQ M-ARVRAT: >60 ML/MIN/{1.73_M2}
GLOBULIN UR ELPH-MCNC: 2.6 G/DL (ref 2–3.5)
GLUCOSE SERPL-MCNC: 155 MG/DL (ref 70–99)
GRANS (ABSOLUTE): 5.35 10*3/UL (ref 2–8)
GRANS: 66.2 % (ref 30–85)
HBA1C MFR BLD: 13.1 G/DL (ref 14–18)
HBA1C MFR BLD: 8.7 % (ref 3.5–5.7)
HCT VFR BLD AUTO: 41.9 % (ref 42–52)
HDLC SERPL-MCNC: 32 MG/DL (ref 40–60)
IMM GRANULOCYTES # BLD: 0.02 10*3/UL (ref 0–0.54)
IMM GRANULOCYTES NFR BLD: 0.2 % (ref 0–0.43)
INR PPP: 2.11 (ref 2–3)
LDLC SERPL CALC-MCNC: 43 MG/DL (ref 70–100)
LYMPHOCYTES # BLD MANUAL: 1.49 10*3/UL (ref 1–5)
LYMPHOCYTES NFR BLD MANUAL: 8.1 % (ref 3–10)
MCH RBC QN AUTO: 26.1 PG (ref 27–31)
MCHC RBC AUTO-ENTMCNC: 31.3 G/DL (ref 33–37)
MCV RBC AUTO: 83.5 FL (ref 80–96)
MONOCYTES # BLD AUTO: 0.65 10*3/UL (ref 0.2–1.2)
OSMOLALITY SERPL CALC.SUM OF ELEC: 298 MOSM/KG (ref 273–304)
PLATELET # BLD AUTO: 277 10*3/UL (ref 130–400)
PMV BLD AUTO: 11.4 FL (ref 7.4–10.4)
POTASSIUM SERPL-SCNC: 4.8 MMOL/L (ref 3.5–5.3)
PROTHROMBIN TIME: 21.4 S (ref 9.4–12)
RBC # BLD AUTO: 5.02 10*6/UL (ref 4.7–6.1)
SODIUM SERPL-SCNC: 141 MMOL/L (ref 135–147)
TRIGL SERPL-MCNC: 225 MG/DL (ref 40–150)
VARIANT LYMPHS NFR BLD MANUAL: 18.5 % (ref 20–45)
VLDLC SERPL-MCNC: 45 MG/DL (ref 5–37)
WBC # BLD AUTO: 8.07 10*3/UL (ref 4.8–10.8)

## 2021-05-05 ENCOUNTER — OFFICE VISIT CONVERTED (OUTPATIENT)
Dept: FAMILY MEDICINE CLINIC | Age: 76
End: 2021-05-05
Attending: NURSE PRACTITIONER

## 2021-05-13 NOTE — PROGRESS NOTES
Progress Note      Patient Name: Zachariah Cruz   Patient ID: [id number]   Sex: Male   YOB: 1945    Primary Care Provider: Nelia GRISSOM   Referring Provider: Nelia GRISSOM    Visit Date: Kayla 3, 2020    Provider: Vishal Denton MD   Location: [facility]   Location Address: [address] [place] [address]  [address] [place] [address]  [id number]   Location Phone: [phone]          Chief Complaint     Followup visit for coronary artery disease and atrial flutter.       History Of Present Illness  REFERRING CARE PROVIDER: Nelia GRISSOM   Zachariah Cruz is a 75 year old /White male coronary artery disease, previous CABG, hypertension, hyperlipidemia, diabetes mellitus, and paroxysmal atrial flutter who is here for a followup visit. Today, patient reports feeling fine and denies having any chest pain, shortness of breath, or palpitations. Blood pressure has been high during recent checking at home with systolic mostly in the 180s and 190s.   PAST MEDICAL HISTORY: 1) Coronary artery disease status post 3-vessel coronary artery bypass grafting on 05/21/2010 by Dr. Garcia at [facility] (LIMA graft to the LAD, SVG graft to OM, SVG graft to posterior descending branch of RCA); 2) Diabetes mellitus; 3) Hypertension; 4) Hyperlipidemia; 5) Gout; 6) Obstructive sleep apnea, on CPAP; 7) Paroxysmal atrial flutter, detected on 24-hour Holter monitor study done in October 2019.   PSYCHOSOCIAL HISTORY: Previously smoked, but quit. Moderate alcohol consumption. Denies mood changes or depression. Walks approximately 2 miles daily.   CURRENT MEDICATIONS: Aspirin 81 mg every other day; metformin 500 mg q.i.d.; metoprolol 25 mg b.i.d.; lisinopril 20 mg b.i.d.; amlodipine 5 mg daily; glimepiride 2 mg daily; simvastatin 40 mg daily; allopurinol 100 mg daily; Eliquis 5 mg b.i.d.; amoxicillin 500 mg daily; meloxicam 15 mg every other day. Dosage and frequency  "of the medications reviewed with the patient.       Review of Systems  · Cardiovascular  o Admits  o : swelling (feet, ankles, hands)  o Denies  o : palpitations (fast, fluttering, or skipping beats), shortness of breath while walking or lying flat, chest pain or angina pectoris   · Respiratory  o Denies  o : chronic or frequent cough, asthma or wheezing      Vitals  Date Time BP Position Site L\R Cuff Size HR RR TEMP (F) WT  HT  BMI kg/m2 BSA m2 O2 Sat        06/03/2020 09:42 /95 Sitting    77 - R   233lbs 16oz 5'  10\" 33.58 2.29     06/03/2020 09:42 /106 Sitting                     Physical Examination  · Respiratory  o Auscultation of Lungs  o : Clear to auscultation bilaterally. No crackles or rhonchi.  · Cardiovascular  o Heart  o : S1, S2 normally heard. No S3. No murmur, rubs, or gallops.  · Gastrointestinal  o Abdominal Examination  o : Soft, nontender, nondistended. No free fluid. Bowel sounds heard in all four quadrants.  · Extremities  o Extremities  o : Warm and well perfused. No pitting pedal edema. Distal pulses present.          Assessment     ASSESSMENT & PLAN:    1.  Hypertension, blood pressure uncontrolled with systolic in the 190s at home and also office visit.  Will        increase the amlodipine dose to 10 mg daily and continue lisinopril and metoprolol as they are.  Patient will        keep a blood pressure log and contact us back in the next 2-3 weeks if blood pressure remains high.    2.  Atrial fibrillation, currently no symptoms.  Patient is not able to afford Eliquis anymore.  Pradaxa is even        costlier per his insurance plan.  Will change to warfarin.  Patient has one more month's supply of Eliquis        left and he will call us back a week before he runs out of the medicines.  He will start warfarin 5 mg daily        with INR check one week from then.    3.  Hyperlipidemia, on simvastatin.  We will get the lipid panel from his PCP's office.  Continue the same.  4.  " Follow up in 4 months.      MD SLAVA Dover/vm             Electronically Signed by: Tamara Arias-, Other -Author on Kayla 10, 2020 10:53:01 AM  Electronically Co-signed by: Vishal Denton MD -Reviewer on June 18, 2020 03:25:05 PM

## 2021-05-15 VITALS
SYSTOLIC BLOOD PRESSURE: 162 MMHG | DIASTOLIC BLOOD PRESSURE: 77 MMHG | HEART RATE: 62 BPM | WEIGHT: 240 LBS | HEIGHT: 70 IN | BODY MASS INDEX: 34.36 KG/M2

## 2021-05-15 VITALS
OXYGEN SATURATION: 97 % | SYSTOLIC BLOOD PRESSURE: 153 MMHG | DIASTOLIC BLOOD PRESSURE: 68 MMHG | HEART RATE: 66 BPM | HEIGHT: 70 IN | WEIGHT: 239 LBS | BODY MASS INDEX: 34.22 KG/M2

## 2021-05-15 VITALS
HEART RATE: 86 BPM | SYSTOLIC BLOOD PRESSURE: 173 MMHG | HEIGHT: 70 IN | DIASTOLIC BLOOD PRESSURE: 111 MMHG | WEIGHT: 235 LBS | BODY MASS INDEX: 33.64 KG/M2

## 2021-05-15 VITALS
OXYGEN SATURATION: 97 % | SYSTOLIC BLOOD PRESSURE: 139 MMHG | WEIGHT: 236 LBS | HEIGHT: 70 IN | HEART RATE: 63 BPM | DIASTOLIC BLOOD PRESSURE: 60 MMHG | BODY MASS INDEX: 33.79 KG/M2

## 2021-05-15 VITALS
WEIGHT: 234 LBS | HEART RATE: 77 BPM | HEIGHT: 70 IN | SYSTOLIC BLOOD PRESSURE: 200 MMHG | BODY MASS INDEX: 33.5 KG/M2 | DIASTOLIC BLOOD PRESSURE: 95 MMHG

## 2021-05-16 VITALS
HEART RATE: 75 BPM | DIASTOLIC BLOOD PRESSURE: 84 MMHG | BODY MASS INDEX: 33.64 KG/M2 | HEIGHT: 70 IN | SYSTOLIC BLOOD PRESSURE: 172 MMHG | WEIGHT: 235 LBS

## 2021-05-18 NOTE — PROGRESS NOTES
Zachariah Cruz 1945     Office/Outpatient Visit    Visit Date: Wed, Oct 24, 2018 09:08 am    Provider: Nelia Frost N.P. (Assistant: Ivette Frost MA)    Location: [facility]        Electronically signed by Nelia Frost N.P. on  10/24/2018 12:41:02 PM                             SUBJECTIVE:        CC:     Keith is a 73 year old White male.  Patient is here for routine check up and medication refills.  Wants to see about going off any rx.          HPI:         Patient to be evaluated for type 2 diabetes.  Current meds include an oral hypoglycemic ( Amaryl and Glucophage XR ).  He reports home blood glucose readings have been fairly good, with average fasting glucoses running in the 120-150 mg/dL range.  Most recent lab results include Hemoglobin A1c:  7.5 (%) (10/09/2018), LDL:  64 (mg/dL) (10/09/2018), HDL:  34 (mg/dL) (10/09/2018), Triglycerides:  97 (mg/dL) (10/09/2018), Dilated Eye Exam by date:  12/04/2017 (04/11/2018), Foot Exam (Annual):  04/11/2018 (04/11/2018).          Concerning essential hypertension, benign, his current cardiac medication regimen includes a beta-blocker ( Toprol-XL ), an ACE inhibitor ( Zestril ), and a calcium channel blocker ( Norvasc ).  He is tolerating the medication well without side effects.  Compliance with treatment has been good; he takes his medication as directed.  Concurrent health problems include CAD.          Taking Allopurinol daily, not had a flare in years.          Additionally, he presents with history of hypercholesterolemia.  current treatment includes Zocor.  Compliance with treatment has been good; he takes his medication as directed.  He denies experiencing any hypercholesterolemia related symptoms.      ROS:     CONSTITUTIONAL:  Negative for chills, fatigue, fever, and weight change.      CARDIOVASCULAR:  Negative for chest pain, palpitations, tachycardia, orthopnea, and edema.      RESPIRATORY:  Negative for cough, dyspnea,  and hemoptysis.      NEUROLOGICAL:  Negative for dizziness, headaches, paresthesias, and weakness.          PMH/FMH/SH:     Last Reviewed on 10/24/2018 09:10 AM by Ivette Frost    Past Medical History:             PAST MEDICAL HISTORY         Sleep Apnea: uses CPAP;     Renal Stones: he has undergone ECSW lithotripsy and laser;         CURRENT MEDICAL PROVIDERS:    Orthopedist    Rheumatologist: Dr. Arvizu         PREVENTIVE HEALTH MAINTENANCE             Hepatitis C Medicare Screening: was last done ; negative         Surgical History:         Appendectomy    Coronary Artery Bypass Graft: ;      Transurethral Resection of Prostate: ;     Cataract Removal: right; ;         Family History:     Father:  at age 91; Cause of death was CAD;  Coronary Artery Disease     Mother:  at age 88; Cause of death was CVA;  Hypertension     Paternal Grandfather: Cerebrovascular Accident         Social History:     Occupation:.   (Self-Employed) [employer]     Marital Status:      Children: 3 children         Tobacco/Alcohol/Supplements:     Last Reviewed on 10/24/2018 09:10 AM by Ivette Frost    Tobacco: He has a past history of cigarette smoking; quit date:  .          Alcohol: Frequency:    couple daily;         Substance Abuse History:     Last Reviewed on 10/24/2018 09:10 AM by Ivette Frost        Mental Health History:     Last Reviewed on 10/24/2018 09:10 AM by Ivette Frost        Communicable Diseases (eg STDs):     Last Reviewed on 10/24/2018 09:10 AM by Ivette Frost            Immunizations:     None        Allergies:     Last Reviewed on 10/24/2018 09:10 AM by Ivette Frost      No Known Drug Allergies.         Current Medications:     Last Reviewed on 10/24/2018 09:10 AM by Ivette Frost    Allopurinol 300mg Tablet 1 tab daily     Amlodipine  2.5mg Tablet Take 1 tablet(s) by mouth daily     Glimepiride 2mg Tablet one every am     Glucophage  XR 500mg Tablets, Extended Release Take 4 tablet(s) by mouth daily     Lisinopril 20mg Tablet 1 tab bid     Metoprolol Succinate 25mg Tablets, Extended Release 1 tab PO daily     Simvastatin 40mg Tablet Take 1 tablet(s) by mouth daily     Glucose Reagent Blood Test Strips  Reagent Strips Check 1-2 times daily using One Touch Ultra 2 Meter, DX: E11.9     bee pollen 1 tsp daily     vitamin d     ASA  81 MG QD         OBJECTIVE:        Vitals:         Current: 10/24/2018 9:09:23 AM    Ht:  5 ft, 10 in;  Wt: 235.8 lbs;  BMI: 33.8    T: 98.5 F (oral);  BP: 172/77 mm Hg (left arm, sitting);  P: 70 bpm (left arm (BP Cuff), sitting);  sCr: 1.02 mg/dL;  GFR: 72.57        Repeat:     9:47:22 AM     BP:   142/86mm Hg (left arm, sitting)         Exams:     PHYSICAL EXAM:     GENERAL: Vitals recorded well developed, well nourished;  well groomed;  no apparent distress;     NECK: carotid exam reveals no bruits;     RESPIRATORY: normal respiratory rate and pattern with no distress; normal breath sounds with no rales, rhonchi, wheezes or rubs;     CARDIOVASCULAR: normal rate; rhythm is regular;  no systolic murmur; no edema;     PSYCHIATRIC:  appropriate affect and demeanor; normal speech pattern; grossly normal memory;         ASSESSMENT           250.00   E11.21  Type 2 diabetes              DDx:     401.1   I10  Essential hypertension, benign              DDx:     274.9   M10.9  Gout, unspecified              DDx:     272.0   E78.5  Hypercholesterolemia              DDx:     414.01   I25.810  CAD              DDx:     V76.41   Z12.12  Screening for rectal cancer              DDx:         ORDERS:         Meds Prescribed:       Refill of: Allopurinol 100mg Tablet 1 tab daily  #90 (Ninety) tablet(s) Refills: 1       Refill of: Amlodipine  2.5mg Tablet Take 1 tablet(s) by mouth daily  #90 (Ninety) tablet(s) Refills: 1       Refill of: Glimepiride 2mg Tablet one every am  #90 (Ninety) tablet(s) Refills: 1       Refill of: Glucophage  XR (Metformin HCl) 500mg Tablets, Extended Release Take 4 tablet(s) by mouth daily  #360 ([address]) tablet(s) Refills: 1       Refill of: Lisinopril 20mg Tablet 1 tab bid  #180 ([address]) tablet(s) Refills: 1       Refill of: Metoprolol Succinate 25mg Tablets, Extended Release 1 tab PO daily  #90 (Ninety) tablet(s) Refills: 1       Refill of: Simvastatin 40mg Tablet Take 1 tablet(s) by mouth daily  #90 (Ninety) tablet(s) Refills: 1         Procedures Ordered:       REFER  Referral to Specialist or Other Facility  (Send-Out)           Other Orders:         Calculated BMI above the upper parameter and a follow-up plan was documented in the medical record  (In-House)                   PLAN:          Type 2 diabetes reviewed labs with patient, he will work on diet, weight loss and exercise, he will stay on current rx's, and will call him in 3 months /repeat labs /he declines flu vaccine and hep a vaccines     MIPS     BMI Elevated - Follow-Up Plan: He was provided education on weight loss strategies     RECOMMENDATIONS: work on diet, weight loss and regular exercise, check sugars, continue rx.      FOLLOW-UP: Schedule a follow-up visit in 3 months. labs           Prescriptions:       Refill of: Glimepiride 2mg Tablet one every am  #90 (Ninety) tablet(s) Refills: 1       Refill of: Glucophage XR (Metformin HCl) 500mg Tablets, Extended Release Take 4 tablet(s) by mouth daily  #360 ([address]) tablet(s) Refills: 1           Orders:         Calculated BMI above the upper parameter and a follow-up plan was documented in the medical record  (In-House)            Essential hypertension, benign           Prescriptions:       Refill of: Amlodipine  2.5mg Tablet Take 1 tablet(s) by mouth daily  #90 (Ninety) tablet(s) Refills: 1       Refill of: Lisinopril 20mg Tablet 1 tab bid  #180 ([address]) tablet(s) Refills: 1          Gout, unspecified will try lowering his  dose of rx and see how he does            Prescriptions:       Refill of: Allopurinol 100mg Tablet 1 tab daily  #90 (Ninety) tablet(s) Refills: 1          Hypercholesterolemia           Prescriptions:       Refill of: Simvastatin 40mg Tablet Take 1 tablet(s) by mouth daily  #90 (Ninety) tablet(s) Refills: 1          CAD he has not seen a cardiologist for years, will send him for a consult         REFERRALS:  Referral initiated to a cardiologist ( Dr. Vishal Denton, [facility] ).            Prescriptions:       Refill of: Metoprolol Succinate 25mg Tablets, Extended Release 1 tab PO daily  #90 (Ninety) tablet(s) Refills: 1           Orders:       REFER  Referral to Specialist or Other Facility  (Send-Out)            Screening for rectal cancer ask about cologuard /discussed this, he is considering         RECOMMENDATIONS given include: Screening Colonoscopy declines.            Patient Education Handouts:       Colonoscopy              Patient Recommendations:        For  Type 2 diabetes:     Schedule a follow-up visit in 3 months.              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              Orders:          99119   Office/outpatient visit; established patient, level 4  (In-House)                Calculated BMI above the upper parameter and a follow-up plan was documented in the medical record  (In-House)           401.1 Essential hypertension, benign            I10    Essential (primary) hypertension    274.9 Gout, unspecified            M10.9    Gout, unspecified    272.0 Hypercholesterolemia            E78.5    Hyperlipidemia, unspecified    414.01 CAD            I25.810    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     V76.41 Screening for rectal cancer            Z12.12    Encounter for screening for malignant neoplasm of rectum        ADDENDUMS:      ____________________________________    Addendum: 10/24/2018 04:37 PM - Grecia Cuellar         Visit Note Faxed to:        Vishal Denton  (Cardiology); Number [phone]

## 2021-05-18 NOTE — PROGRESS NOTES
NancyZachariah VESTA  1945     Office/Outpatient Visit    Visit Date: Wed, Jan 20, 2021 09:29 am    Provider: Nelia Frost N.P. (Assistant: Kristy Bean MA)    Location: [facility]        Electronically signed by Nelia Frost N.P. on  01/20/2021 11:43:31 AM                             Subjective:        CC: Keith is a 75 year old White male.  This is a follow-up visit.          HPI:           Patient presents with type 2 diabetes mellitus with unspecified complications.  Compliance with treatment has been fair; he taking his rx but not eating healthy and not exercising.  Current meds include an oral hypoglycemic ( glucophage and amaryl ).  Most recent lab results include Weight (lb):  230.0 (11/18/2020), Hemoglobin A1c:  8.2 (%) (12/23/2020), LDL:  55 (mg/dL) (12/23/2020), HDL:  33 (mg/dL) (12/23/2020), Triglycerides:  184 (mg/dL) (12/23/2020), Microalbuminuria:  12.4 (mg/g creat) (12/28/2020), Dilated Eye Exam by date:  12/14/2020 (11/18/2020), Foot Exam (Annual):  09/23/2020 (09/23/2020).            Other fatigue details; other details: Improving, has had since his COVID dg and hospitalization in October 2020.            Dx with essential (primary) hypertension; his current cardiac medication regimen includes an ACE inhibitor ( Prinivil ) and a calcium channel blocker ( Norvasc ).  Compliance with treatment has been good; he takes his medication as directed.      ROS:     CONSTITUTIONAL:  Negative for fever.      CARDIOVASCULAR:  Negative for chest pain, palpitations, tachycardia, orthopnea, and edema.      RESPIRATORY:  Negative for recent cough and dyspnea.      GASTROINTESTINAL:  Negative for melena.      MUSCULOSKELETAL:  Negative for arthralgias.      INTEGUMENTARY:  Positive for pruritis (a few areas of itchy skin since covid, no rash).      NEUROLOGICAL:  Negative for dizziness, headaches, paresthesias, and weakness.      HEMATOLOGIC/LYMPHATIC:  Negative for easy bruising  and excessive bleeding.          Past Medical History / Family History / Social History:         Last Reviewed on 2021 09:50 AM by Nelia Frost    Past Medical History:             PAST MEDICAL HISTORY         Sleep Apnea: uses CPAP;     Renal Stones: he has undergone ECSW lithotripsy and laser;     Hospitalizations:    Pneumonia admitted once on 10-20-20 (COVID +)         CURRENT MEDICAL PROVIDERS:    Orthopedist    Rheumatologist: Dr. Arvizu         PREVENTIVE HEALTH MAINTENANCE             COLORECTAL CANCER SCREENING: Up to date (colonoscopy q10y; sigmoidoscopy q5y; Cologuard q3y) was last done 2020, Results are in chart; Cologuard normal     EYE EXAM: was last done 17     Hepatitis C Medicare Screening: was last done ; negative     PSA: was last done 14 with normal results         Surgical History:         Appendectomy    Coronary Artery Bypass Graft: ;     Transurethral Resection of Prostate: ;     Cataract Removal: right; ;         Family History:     Father:  at age 91; Cause of death was CAD;  Coronary Artery Disease     Mother:  at age 88; Cause of death was CVA;  Hypertension     Paternal Grandfather: Cerebrovascular Accident         Social History:     Occupation:.   (Self-Employed) [employer]     Marital Status:      Children: 3 children         Tobacco/Alcohol/Supplements:     Last Reviewed on 2021 09:36 AM by Kristy Bean    Tobacco: He has a past history of cigarette smoking; quit date:  .          Alcohol: Frequency:    couple daily;         Substance Abuse History:     Last Reviewed on 10/24/2018 09:10 AM by Ivette Frost        Mental Health History:     Last Reviewed on 10/24/2018 09:10 AM by Ivette Frost        Communicable Diseases (eg STDs):     Last Reviewed on 10/24/2018 09:10 AM by Ivette Frost        Immunizations:     None        Allergies:     Last Reviewed on 2021 09:34 AM by Vikas  Kristy    No Known Allergies.        Current Medications:     Last Reviewed on 1/20/2021 09:36 AM by Kristy Bean    amLODIPine 10 mg oral tablet [take 1 tablet (10 mg) by oral route once daily]    Glucophage  mg oral Tablet, Extended Release 24 hr [Take 4 tablet(s) by mouth daily]    allopurinoL 100 mg oral tablet [Take 1 tablet by mouth once daily]    OneTouch Ultra Blue Test Strip [USE TO CHECK BLOOD SUGAR ONCE TO TWICE DAILY dx. e11.8]    lisinopriL 20 mg oral tablet [Take 1 tablet by mouth twice daily]    ASA  81 MG QD     glimepiride 2 mg oral tablet [TAKE 1  and 1/2 TABLET BY MOUTH ONCE DAILY IN THE MORNING]    Simvastatin 40 mg oral tablet [Take 1 tablet(s) by mouth daily]    vitamin d     bee pollen 1 tsp daily      metoprolol succinate 25mg Tablets, Extended Release [1 tab PO twice daily]    WARFARIN 1MG (ONE)    TAB  [TAKE 1 TABLET BY MOUTH ONCE DAILY AS DIRECTED]    albuterol sulfate 90 mcg/actuation Inhalation HFA Aerosol Inhaler [inhale 1 - 2 puffs (90 - 180 mcg) by inhalation route every 4 hours as needed]        Objective:        Vitals:         Current: 1/20/2021 9:38:04 AM    Ht:  5 ft, 10 in;  Wt: 240.8 lbs;  BMI: 34.6T: 97.5 F (temporal);  BP: 158/74 mm Hg (left arm, sitting);  P: 73 bpm (left arm (BP Cuff), sitting);  sCr: 1.35 mg/dL;  GFR: 53.77        Repeat:     9:58:20 AM  BP:   138/81mm Hg (left arm, sitting, P. 138/81)     Exams:     PHYSICAL EXAM:     GENERAL: Vitals recorded well developed, well nourished;  well groomed;  no apparent distress;     NECK: carotid exam reveals no bruits;     RESPIRATORY: normal respiratory rate and pattern with no distress; normal breath sounds with no rales, rhonchi, wheezes or rubs;     CARDIOVASCULAR: normal rate; rhythm is regular;  no systolic murmur; no edema;     PSYCHIATRIC:  appropriate affect and demeanor; normal speech pattern; grossly normal memory;         Assessment:         E11.8   Type 2 diabetes mellitus with unspecified  complications       R53.83   Other fatigue       I10   Essential (primary) hypertension           ORDERS:         Lab Orders:       FUTURE  Future order to be done at patients convenience  (Send-Out)            56458  DIAB - [facility] LIPID,CMP, A1C: 73311, 95453, 43132  (Send-Out)            FUTURE  Future order to be done at patients convenience  (Send-Out)            50757  [facility] CBC with 3 part diff  (Send-Out)                      Plan:         Type 2 diabetes mellitus with unspecified complicationsdiscussed COVID vaccines, advised he get; he checks his oxygen at home and it it running around 95/ copy of labs given to patient and reviewed, he will work on diet and exercise, will recheck lab in 3 months, continue current rx's        FOLLOW-UP TESTING #1: FOLLOW-UP LABORATORY:  Labs to be scheduled in the future include Diabetes Panel 1; CMP, Lipid, A1C.   Patient to schedule in 3 months.            Orders:       FUTURE  Future order to be done at patients convenience  (Send-Out)            63080  DIAB - [facility] LIPID,CMP, A1C: 62187, 88941, 70975  (Send-Out)              Other fatiguereviewed labs, will have him take OTC vitamins and then recheck CBC 3 months  (cologuard was negative )        FOLLOW-UP TESTING #1: FOLLOW-UP LABORATORY:  Labs to be scheduled in the future include CBC.   Patient to schedule in 3 months.            Orders:       FUTURE  Future order to be done at patients convenience  (Send-Out)            21576  [facility] CBC with 3 part diff  (Send-Out)              Essential (primary) hypertension        RECOMMENDATIONS given include: perform routine monitoring of blood pressure with home blood pressure cuff.              Patient Recommendations:        For  Type 2 diabetes mellitus with unspecified complications:            The following laboratory testing has been ordered: Schedule the above testing in 3 months.          For  Other fatigue:            The following laboratory testing  has been ordered: [facility] Schedule the above testing in 3 months.          For  Essential (primary) hypertension:    Begin monitoring your blood pressure by brief nurse visits at our office, a home blood pressure monitor, or by checking on the machines in pharmacies or stores.  Keep a log of the readings.              Charge Capture:         Primary Diagnosis:     E11.8  Type 2 diabetes mellitus with unspecified complications           Orders:      55290  Office/outpatient visit; established patient, level 4 (19 minutes)  (In-House)              R53.83  Other fatigue     I10  Essential (primary) hypertension

## 2021-05-18 NOTE — PROGRESS NOTES
Zachariah Cruz 1945     Office/Outpatient Visit    Visit Date: Wed, Apr 11, 2018 08:58 am    Provider: Nelia Frost N.P. (Assistant: Telma Vale MA)    Location: [facility]        Electronically signed by Nelia Frost N.P. on  04/11/2018 12:48:36 PM                             SUBJECTIVE:        CC:     Keith is a 73 year old White male.  This is a follow-up visit.  med refills         HPI:         Patient presents with essential hypertension, benign.  His current cardiac medication regimen includes a beta-blocker ( Toprol-XL ), an ACE inhibitor ( Zestril ), and a calcium channel blocker ( Norvasc ).  He did not bring his blood pressure diary, but says that typical readings show systolics in the 140-150's and diastolics in the 70-80 range.  He is tolerating the medication well without side effects.  Compliance with treatment has been good; he takes his medication as directed.          With regard to the type 2 diabetes, current meds include an oral hypoglycemic ( Amaryl and Glucophage XR ).  He reports home blood glucose readings have been fairly good, with average fasting glucoses running in the 120-150 mg/dL range.  Most recent lab results include Weight (lb):  238.8 (04/11/2018), Systolic BP:  164 (04/11/2018), Diastolic BP:  76 (04/11/2018), Hemoglobin A1c:  7.3 (%) (03/30/2018), LDL:  56 (mg/dL) (03/30/2018), HDL:  37 (mg/dL) (03/30/2018), Triglycerides:  113 (mg/dL) (03/30/2018), Microalbuminuria:  16.7 (mg/g creat) (03/30/2018).          Additionally, he presents with history of hypercholesterolemia.  current treatment includes Pravachol.  Compliance with treatment has been good; he takes his medication as directed.  He denies experiencing any hypercholesterolemia related symptoms.      ROS:     CONSTITUTIONAL:  Negative for chills, fatigue, fever, and weight change.      CARDIOVASCULAR:  Negative for chest pain, palpitations, tachycardia, orthopnea, and edema.       RESPIRATORY:  Negative for cough, dyspnea, and hemoptysis.      GASTROINTESTINAL:  Positive for dysphagia ( occ with dry food ) and diarrhea ( intermittent issue ).   Negative for melena.  2 years or more of trouble swallowing, intermittent, seems to be increasing     MUSCULOSKELETAL:  Positive for joint stiffness.  needs a refill of gout rx     NEUROLOGICAL:  Negative for dizziness, headaches, paresthesias, and weakness.          PMH/FMH/SH:     Last Reviewed on 2018 09:36 AM by Nelia Frost    Past Medical History:             PAST MEDICAL HISTORY         Sleep Apnea: uses CPAP;     Renal Stones: he has undergone ECSW lithotripsy and laser;         CURRENT MEDICAL PROVIDERS:    Orthopedist    Rheumatologist: Dr. Arvizu         PREVENTIVE HEALTH MAINTENANCE             Hepatitis C Medicare Screening: was last done ; negative         Surgical History:         Appendectomy    Coronary Artery Bypass Graft: ;      Transurethral Resection of Prostate: ;     Cataract Removal: right; ;         Family History:     Father:  at age 91; Cause of death was CAD;  Coronary Artery Disease     Mother:  at age 88; Cause of death was CVA;  Hypertension     Paternal Grandfather: Cerebrovascular Accident         Social History:     Occupation:.   (Self-Employed) [employer]     Marital Status:      Children: 3 children         Tobacco/Alcohol/Supplements:     Last Reviewed on 2018 09:01 AM by Telma Vale    Tobacco: He has a past history of cigarette smoking; quit date:  .          Alcohol: Frequency:    couple daily;         Substance Abuse History:     Last Reviewed on 2014 10:12 AM by Tyler Marti        Mental Health History:     Last Reviewed on 2014 09:34 AM by Tyler Marti            Immunizations:     None        Allergies:     Last Reviewed on 2018 09:01 AM by Telma Vale      No Known Drug Allergies.          Current Medications:     Last Reviewed on 4/11/2018 09:02 AM by Telma Vale    Allopurinol 300mg Tablet 1 tab daily     Amlodipine  2.5mg Tablet Take 1 tablet(s) by mouth daily     Glimepiride 2mg Tablet one every am     Glucophage XR 500mg Tablets, Extended Release Take 4 tablet(s) by mouth daily     Lisinopril 20mg Tablet 1 tab bid     Metoprolol Succinate 25mg Tablets, Extended Release 1 tab PO daily     Simvastatin 40mg Tablet Take 1 tablet(s) by mouth daily     Glucose Reagent Blood Test Strips  Reagent Strips Check 1-2 times daily using One Touch Ultra 2 Meter, DX: E11.9     bee pollen 1 tsp daily     vitamin d     ASA  81 MG QD         OBJECTIVE:        Vitals:         Current: 4/11/2018 9:00:41 AM    Ht:  5 ft, 10 in;  Wt: 238.8 lbs;  BMI: 34.3    T: 97.2 F (oral);  BP: 164/76 mm Hg (left arm, sitting);  P: 65 bpm (left arm (BP Cuff), sitting);  sCr: 1.04 mg/dL;  GFR: 71.55        Exams:     PHYSICAL EXAM:     GENERAL: Vitals recorded well developed, well nourished;  well groomed;  no apparent distress;     NECK: carotid exam reveals no bruits;     RESPIRATORY: normal respiratory rate and pattern with no distress; normal breath sounds with no rales, rhonchi, wheezes or rubs;     CARDIOVASCULAR: normal rate; rhythm is regular;  no systolic murmur;    Peripheral Pulses: posterior tibial: equal bilaterally;  no edema;     SKIN: skin of feet and toenails intact bilaterally; left great toenail thickened,     NEUROLOGIC: sensation intact to monofilament bilaterally;     PSYCHIATRIC:  appropriate affect and demeanor; normal speech pattern; grossly normal memory;         ASSESSMENT           401.1   I10  Essential hypertension, benign              DDx:     250.00   E11.21  Type 2 diabetes              DDx:     787.29   R13.19  Other dysphagia              DDx:     272.0   E78.5  Hypercholesterolemia              DDx:     274.9   M10.9  Gout, unspecified              DDx:         ORDERS:         Meds  Prescribed:       Refill of: Amlodipine  2.5mg Tablet Take 1 tablet(s) by mouth daily  #90 (Ninety) tablet(s) Refills: 1       Refill of: Lisinopril 20mg Tablet 1 tab bid  #180 (One Warren and Eighty) tablet(s) Refills: 1       Refill of: Metoprolol Succinate 25mg Tablets, Extended Release 1 tab PO daily  #90 (Ninety) tablet(s) Refills: 1       Refill of: Glimepiride 2mg Tablet one every am  #90 (Ninety) tablet(s) Refills: 1       Refill of: Glucophage XR (Metformin HCl) 500mg Tablets, Extended Release Take 4 tablet(s) by mouth daily  #360 (Three Warren and Sixty) tablet(s) Refills: 1       Refill of: Simvastatin 40mg Tablet Take 1 tablet(s) by mouth daily  #90 (Ninety) tablet(s) Refills: 1       Refill of: Allopurinol 300mg Tablet 1 tab daily  #90 (Ninety) tablet(s) Refills: 1                 PLAN:          Essential hypertension, benign reviewed labs, copy to patient, continue current rx and efforts with diet, weight loss and exercise (he actually wants to look into weight loss surgery with a surgeon at [facility])         FOLLOW-UP: Schedule a follow-up visit in 6 months.            Prescriptions:       Refill of: Amlodipine  2.5mg Tablet Take 1 tablet(s) by mouth daily  #90 (Ninety) tablet(s) Refills: 1       Refill of: Lisinopril 20mg Tablet 1 tab bid  #180 (One Warren and Eighty) tablet(s) Refills: 1       Refill of: Metoprolol Succinate 25mg Tablets, Extended Release 1 tab PO daily  #90 (Ninety) tablet(s) Refills: 1          Type 2 diabetes had a recent eye exam at [facility]/send for that exam, updated diabetes flow sheet           Prescriptions:       Refill of: Glimepiride 2mg Tablet one every am  #90 (Ninety) tablet(s) Refills: 1       Refill of: Glucophage XR (Metformin HCl) 500mg Tablets, Extended Release Take 4 tablet(s) by mouth daily  #360 (Three Warren and Sixty) tablet(s) Refills: 1          Other dysphagia I recommend upper endoscopy/ and he needs colon screen /discussed the importance of getting  an evaluation, he will consider and get back with me         RECOMMENDATIONS given include: Screening Colonoscopy declines.           Hypercholesterolemia reviewed labs with patient         RECOMMENDATIONS given include: exercise, low cholesterol/low fat diet, and weight loss.            Prescriptions:       Refill of: Simvastatin 40mg Tablet Take 1 tablet(s) by mouth daily  #90 (Ninety) tablet(s) Refills: 1          Gout, unspecified           Prescriptions:       Refill of: Allopurinol 300mg Tablet 1 tab daily  #90 (Ninety) tablet(s) Refills: 1             Patient Recommendations:        For  Essential hypertension, benign:     Schedule a follow-up visit in 6 months.          For  Hypercholesterolemia:     Maintain a regular exercise program. Reduce the amount of cholesterol and saturated fat in your diet. Try to lose some weight; even modest weight reduction can improve your blood pressure.              

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
## 2021-05-18 NOTE — PROGRESS NOTES
"Zachariah Cruz  1945     Office/Outpatient Visit    Visit Date: Mon, May 4, 2020 10:52 am    Provider: Keith Palencia MD (Assistant: Naty Fernando MA)    Location: [facility]        Electronically signed by Keith Palencia MD on  05/04/2020 06:22:52 PM                             Subjective:        CC: Keith is a 75 year old White male.  He presents with joint pain, cough, fatigue onset 2 days.          HPI:       A1c was 7.3 on 10/2/19. He is on metformin 500 mg 2 tabs BID and glimepiride 2 mg qd.      This morning very fatigued, joints and back hurt, headache, ringing in ears, temp of 101 or 102. 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 No recent tick bites or mosquito bites. He had a problem with his right leg a few weeks ago, x-ray was normal and pt was referred to Dr. Crawford. Sx started 2 days ago, Saturday morning feeling \"swimmy headed\". Very tired and joint aches yesterday. He has a mild cough, no diarrhea, no shortness of breath. He has pain in his right knee (just below and lateral), lower back, fingers with swelling), shoulders (stiffness). He's never had episodes like this before. He does have gout although this has not been a problem for years.     ROS:     CONSTITUTIONAL:  Negative for fatigue and fever.      EYES:  Negative for blurred vision.      E/N/T:  Negative for diminished hearing and nasal congestion.      CARDIOVASCULAR:  Negative for chest pain and palpitations.      RESPIRATORY:  Negative for recent cough and dyspnea.      GASTROINTESTINAL:  Negative for abdominal pain, constipation, diarrhea, nausea and vomiting.      GENITOURINARY:  Negative for dysuria.      MUSCULOSKELETAL:  Negative for arthralgias and myalgias.      INTEGUMENTARY:  Negative for atypical mole(s) and rash.      NEUROLOGICAL:  Negative for paresthesias and weakness.      PSYCHIATRIC:  Negative for anxiety, depression and sleep disturbance.          Past Medical History / " Family History / Social History:         Last Reviewed on 2020 10:11 AM by Nelia Frost    Past Medical History:             PAST MEDICAL HISTORY         Sleep Apnea: uses CPAP;     Renal Stones: he has undergone ECSW lithotripsy and laser;         CURRENT MEDICAL PROVIDERS:    Orthopedist    Rheumatologist: Dr. Arvizu         PREVENTIVE HEALTH MAINTENANCE             COLORECTAL CANCER SCREENING: declines     EYE EXAM: was last done 17     Hepatitis C Medicare Screening: was last done ; negative     PSA: was last done 14 with normal results         Surgical History:         Appendectomy    Coronary Artery Bypass Graft: ;     Transurethral Resection of Prostate: ;     Cataract Removal: right; ;         Family History:     Father:  at age 91; Cause of death was CAD;  Coronary Artery Disease     Mother:  at age 88; Cause of death was CVA;  Hypertension     Paternal Grandfather: Cerebrovascular Accident         Social History:     Occupation:.   (Self-Employed) [employer]     Marital Status:      Children: 3 children         Tobacco/Alcohol/Supplements:     Last Reviewed on 2020 09:36 AM by Naty Fernando    Tobacco: He has a past history of cigarette smoking; quit date:  .          Alcohol: Frequency:    couple daily;         Substance Abuse History:     Last Reviewed on 10/24/2018 09:10 AM by Ivette Frost        Mental Health History:     Last Reviewed on 10/24/2018 09:10 AM by Ivette Frost        Communicable Diseases (eg STDs):     Last Reviewed on 10/24/2018 09:10 AM by Ivette Frost        Current Problems:     Last Reviewed on 10/24/2018 09:10 AM by Ivette Frost    Gout, unspecified    Obstructive sleep apnea (adult) (pediatric)    Essential hypertension, benign    Type 2 diabetes    Type 2 diabetes mellitus with diabetic nephropathy    Essential (primary) hypertension    Gout, unspecified    Hip pain     Hyperlipidemia, unspecified    Hypercholesterolemia    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    CAD    Lipoma, of skin and subcutaneous tissue    Screening for rectal cancer    Encounter for screening for malignant neoplasm of rectum    Dizziness    Other hyperlipidemia    Other hyperlipidemia    Screening for other and unspecified endocrine, nutritional, metabolic and immune disorder    Encounter for screening for other suspected endocrine disorder    Pain in right lower leg        Immunizations:     None        Allergies:     Last Reviewed on 4/01/2020 09:36 AM by Naty Fernando    No Known Allergies.        Current Medications:     Last Reviewed on 4/01/2020 09:36 AM by Naty Fernando    Glucophage  mg oral Tablet, Extended Release 24 hr [Take 4 tablet(s) by mouth daily]    allopurinoL 100 mg oral tablet [Take 1 tablet by mouth once daily]    Glucose Reagent Blood Test Strips  Reagent Strips [Check 1-2 times daily using One Touch Ultra 2 Meter, DX: E11.9]    Lisinopril 20 mg oral tablet [1 tab bid]    ASA  81 MG QD     Amlodipine  2.5mg Tablet [Take 1 tablet(s) by mouth daily]    glimepiride 2 mg oral tablet [TAKE 1 TABLET BY MOUTH ONCE DAILY IN THE MORNING]    Simvastatin 40 mg oral tablet [Take 1 tablet(s) by mouth daily]    simvastatin 40 mg oral tablet [Take 1 tablet by mouth once daily]    vitamin d     bee pollen 1 tsp daily      metoprolol succinate 25mg Tablets, Extended Release [1 tab PO twice daily]    ELIQUIS 5MG         TAB  [TAKE 1 TABLET BY MOUTH TWICE DAILY]        Objective:        Exams:     PHYSICAL EXAM:     GENERAL: well developed, well nourished;  well groomed;  no apparent distress;     EYES: extraocular movements intact;     RESPIRATORY: normal respiratory rate and pattern with no distress;     CARDIOVASCULAR: no cyanosis;     MUSCULOSKELETAL: normal gait;     NEUROLOGIC: mental status: alert and oriented x 3; GROSSLY INTACT     PSYCHIATRIC:   appropriate affect and demeanor; normal speech pattern; grossly normal memory;         Assessment:         E11.21   Type 2 diabetes mellitus with diabetic nephropathy       M12.89   Other specific arthropathies, not elsewhere classified, multiple sites       J15.9   Unspecified bacterial pneumonia           ORDERS:         Meds Prescribed:       [New Rx] Augmentin 875-125 mg oral tablet [take 1 tablet by oral route every 12 hours], #14 (fourteen) tablets, Refills: 0 (zero)       [New Rx] azithromycin 250 mg oral tablet [take 2 tablets today, then 1 tablet (250 mg) by oral route once daily], #6 (six) tablets, Refills: 0 (zero)         Radiology/Test Orders:       [id number]  COVID 19 Testing  (Send-Out)            [id number]  Radiologic exam chest 2 views  (Send-Out)              Lab Orders:       [id number]  [facility] CBC with 3 part diff  (Send-Out)            [id number]  COMP [facility] Comp. Metabolic Panel  (Send-Out)            01363  A1CEG - [facility] Hemoglobin A1C  (Send-Out)            18191  TSH - [facility] TSH  (Send-Out)            83757  RAPII - [facility] Arthritis Profile  (Send-Out)            63494  ABGAL - [facility] ALPHA13 GLACTOSE TEST ALPHA-GAL  (Send-Out)            [id number]  LYSCR - [facility] Lyme Ab/Western Blot Reflex  (Send-Out)            [id number]  RMSFM - [facility] [place] Spotted Fever, IgM  (Send-Out)                      Plan:         Type 2 diabetes mellitus with diabetic nephropathyWill recheck A1c today. For now cont metformin 500 mg 2 tabs BID and glimepiride 2 mg qd. Will adjust if needed or direct patient to f/u with PCP.     LABORATORY:  Labs ordered to be performed today include CBC, Comprehensive metabolic panel, HgbA1C, and TSH.            Orders:       [id number]  [facility] CBC with 3 part diff  (Send-Out)            [id number]  COMP [facility] Comp. Metabolic Panel  (Send-Out)            57668  A1CEG - [facility] Hemoglobin A1C  (Send-Out)            12669  TSH - [facility] TSH  (Send-Out)              Other specific arthropathies, not elsewhere classified,  multiple sitesGiven polyarthropathy an arthritis profile, alpha gal, lyme disease, and RMSF labs are ordered.    LABORATORY:  Labs ordered to be performed today include ALPHA-GAL, Arthritis Profile, and Lyme Ab/Western Blot Reflex.            Orders:       [id number]  RAPII - [facility] Arthritis Profile  (Send-Out)            28891  ABGAL - [facility] ALPHA13 GLACTOSE TEST ALPHA-GAL  (Send-Out)            67186  LYSCR - [facility] Lyme Ab/Western Blot Reflex  (Send-Out)            55006  RMSFM - [facility] [place] Spotted Fever, IgM  (Send-Out)              Unspecified bacterial pneumoniaGiven fever and cough a CXR is ordered today and shows left perihilar density consistent with pneumonia.  Augmentin and azith are prescribed. During video visit patient is overall well appearing.    LABORATORY:  Labs ordered to be performed today include COVID 19 Testing.      RADIOLOGY:  I have ordered a chest x-ray (PA and lateral) to be done today.            Prescriptions:       [New Rx] Augmentin 875-125 mg oral tablet [take 1 tablet by oral route every 12 hours], #14 (fourteen) tablets, Refills: 0 (zero)       [New Rx] azithromycin 250 mg oral tablet [take 2 tablets today, then 1 tablet (250 mg) by oral route once daily], #6 (six) tablets, Refills: 0 (zero)           Orders:       [id number]  COVID 19 Testing  (Send-Out)            78758  Radiologic exam chest 2 views  (Send-Out)                  Patient Recommendations:        For  Other specific arthropathies, not elsewhere classified, multiple sites:    I also recommend ^.              Charge Capture:         Primary Diagnosis:     E11.21  Type 2 diabetes mellitus with diabetic nephropathy           Orders:      [id number]  Office/outpatient visit; established patient, level 4  (In-House)              M12.89  Other specific arthropathies, not elsewhere classified, multiple sites     J15.9  Unspecified bacterial pneumonia         ADDENDUMS:      ____________________________________    Addendum: 05/07/2020  01:35 PM - Keith Palencia        Telehealth: Verbal consent obtained for visit to occur via televideo conferencing; Staff, other than provider, present during telephone visit include Naty Fernando

## 2021-05-18 NOTE — PROGRESS NOTES
Zachariah Cruz  1945     Office/Outpatient Visit    Visit Date: Wed, Nov 18, 2020 08:59 am    Provider: Nelia Frost N.P. (Assistant: Lyudmila Uriarte MA)    Location: [facility]        Electronically signed by Nelia Frost N.P. on  11/18/2020 12:43:47 PM                             Subjective:        CC: Keith is a 75 year old White male.  Medicare wellness         HPI:           Keith is here for a Medicare wellness visit.  The required HRA questions are integrated within this visit note. Family medical history and individual medical/surgical history were reviewed and updated.  A current height, weight, BMI, blood pressure, and pulse were recorded in the vitals section of the note and have been reviewed. Patient's medications, including supplements, were recorded in the chart and reviewed.  Current providers and suppliers were reviewed and updated.          Self-Assessment of Health: He rates his health as good. He rates his confidence of being able to control/manage most of his health problems as very confident. His physical/emotional health has limited his social activites quite a bit.  A review of cognitive impairment was performed, including ability to drive a car, manage finances, and any memory changes, and was found to be negative.  A review of functional ability, including bathing, dressing, walking, and urine/bowel continence as well as level of safety was performed and was found to be negative.  He denies having trouble hearing the TV/radio when others do not, having to strain to hear or understand conversations and wearing hearing aid(s).  Concerning home safety, he reports that at home he DOES have adequate lighting, a skid resistant shower/tub, grab bars in the bath, functioning smoke alarms and absence of throw rugs, but not handrails on stairs.          Immunization Status: unknown last date of tetanus; Physical Activity: He exercises for at least 20 minutes 3  or more days/week.; Type of diet patient normally eats is described as well-balanced with fruits and vegetables Tobacco: Former smoker Preventative Health updated today.            PHQ-9 Depression Screening: Completed form scanned and in chart; Total Score 3           Additionally, he presents with history of type 2 diabetes mellitus with unspecified complications.  current meds include an oral hypoglycemic ( Amaryl ( 3 mg ) and Glucophage XR ( 2000 mg QD ) ).  He reports home blood glucose readings have averaged fasting readings in the 109-140 mg/dL range.  Most recent lab results include Weight (lb):  224.6 (11/03/2020), Hemoglobin A1c:  7.6 (%) (09/14/2020), LDL:  63 (mg/dL) (09/14/2020), HDL:  33 (mg/dL) (09/14/2020), Triglycerides:  190 (mg/dL) (09/14/2020), Microalbuminuria:  23.3 (mg/g creat) (10/02/2019), Foot Exam (Annual):  09/23/2020 (09/23/2020).            Dx with essential (primary) hypertension; his current cardiac medication regimen includes a beta-blocker ( Lopressor ), an ACE inhibitor ( Zestril ), and a calcium channel blocker ( Norvasc ).  He did not bring his blood pressure diary, but says that typical readings show systolics in the 140s and diastolics in the 70s.  He is tolerating the medication well without side effects.  Compliance with treatment has been good; he takes his medication as directed.  He sees cardiology.  When in hospital he was started on norvasc. cardiology recently refilled rx    ROS:     CONSTITUTIONAL:  Positive for fatigue.      EYES:  Negative for blurred vision, eye pain, and photophobia.      E/N/T:  Negative for hearing problems, E/N/T pain, congestion, rhinorrhea, epistaxis, hoarseness, and dental problems.      CARDIOVASCULAR:  Positive for pedal edema ( improving ).   Negative for chest pain.  left lower leg     RESPIRATORY:  Positive for hospitalized for 3 days with COVID 10-20-20.   Negative for recent cough or dyspnea.  did go home with oxygen, no longer needed,  oxygen runs 95-98    GASTROINTESTINAL:  Positive for constipation ( improving ).   Negative for abdominal pain.      GENITOURINARY:  Negative for dysuria, genital lesions, hematuria, impotence, polyuria, and changes in urine stream.      MUSCULOSKELETAL:  Negative for arthralgias, back pain, and myalgias.      INTEGUMENTARY:  Negative for atypical moles, dry skin, pruritis, and rashes.      NEUROLOGICAL:  Positive for imblance issues.      HEMATOLOGIC/LYMPHATIC:  Negative for easy bruising, bleeding, and lymphadenopathy.      ENDOCRINE:  Negative for hair loss, heat/cold intolerance, polydipsia, and polyphagia.      PSYCHIATRIC:  Negative for anxiety, depression, and sleep disturbances.          Past Medical History / Family History / Social History:         Last Reviewed on 2020 09:27 AM by Nelia Frost    Past Medical History:             PAST MEDICAL HISTORY         Sleep Apnea: uses CPAP;     Renal Stones: he has undergone ECSW lithotripsy and laser;     Hospitalizations:    Pneumonia admitted once on 10-20-20 (COVID +)         CURRENT MEDICAL PROVIDERS:    Orthopedist    Rheumatologist: Dr. Arvizu         PREVENTIVE HEALTH MAINTENANCE             COLORECTAL CANCER SCREENING: declines     EYE EXAM: was last done 17     Hepatitis C Medicare Screening: was last done ; negative     PSA: was last done 14 with normal results         Surgical History:         Appendectomy    Coronary Artery Bypass Graft: ;     Transurethral Resection of Prostate: ;     Cataract Removal: right; ;         Family History:     Father:  at age 91; Cause of death was CAD;  Coronary Artery Disease     Mother:  at age 88; Cause of death was CVA;  Hypertension     Paternal Grandfather: Cerebrovascular Accident         Social History:     Occupation:.   (Self-Employed) [employer]     Marital Status:      Children: 3 children         Tobacco/Alcohol/Supplements:     Last  Reviewed on 11/03/2020 09:47 AM by Maureen Cuellar    Tobacco: He has a past history of cigarette smoking; quit date:  1970's.          Alcohol: Frequency:    couple daily;         Substance Abuse History:     Last Reviewed on 10/24/2018 09:10 AM by Ivette Frost        Mental Health History:     Last Reviewed on 10/24/2018 09:10 AM by Ivette Frost        Communicable Diseases (eg STDs):     Last Reviewed on 10/24/2018 09:10 AM by Ivette Frost        Immunizations:     None        Allergies:     Last Reviewed on 11/18/2020 12:32 PM by Nelia Frost    No Known Allergies.        Current Medications:     Last Reviewed on 11/18/2020 12:43 PM by Nelia Frost    Glucophage  mg oral Tablet, Extended Release 24 hr [Take 4 tablet(s) by mouth daily]    allopurinoL 100 mg oral tablet [Take 1 tablet by mouth once daily]    Glucose Reagent Blood Test Strips  Reagent Strips [Check 1-2 times daily using One Touch Ultra 2 Meter, DX: E11.9]    lisinopriL 20 mg oral tablet [Take 1 tablet by mouth twice daily]    ASA  81 MG QD     glimepiride 2 mg oral tablet [TAKE 1  and 1/2 TABLET BY MOUTH ONCE DAILY IN THE MORNING]    Simvastatin 40 mg oral tablet [Take 1 tablet(s) by mouth daily]    vitamin d     bee pollen 1 tsp daily      metoprolol succinate 25mg Tablets, Extended Release [1 tab PO twice daily]    WARFARIN 1MG (ONE)    TAB  [TAKE 1 TABLET BY MOUTH ONCE DAILY AS DIRECTED]    benzonatate 100 mg oral capsule [take 1 capsule (100 mg) by oral route 3 times per day]    albuterol sulfate 90 mcg/actuation Inhalation HFA Aerosol Inhaler [inhale 1 - 2 puffs (90 - 180 mcg) by inhalation route every 4 hours as needed]    METFORMIN ER 500MG  TAB  [TAKE 4 TABLETS BY MOUTH ONCE DAILY]    amLODIPine 10 mg oral tablet [take 1 tablet (10 mg) by oral route once daily]        Objective:        Vitals:         Current: 11/18/2020 9:06:40 AM    Ht:  5 ft, 10 in;  Wt: 230 lbs;  BMI: 33.0T: 97.1 F (temporal);  BP: 153/89  mm Hg (left arm, sitting);  P: 98 bpm (left arm (BP Cuff), sitting);  sCr: 1.07 mg/dL;  GFR: 66.53VA: 20/20 OD, 20/30 OS (near, with correction)        Repeat:     10:4:45 AM  BP:   138/68mm Hg (left arm, sitting, pulse-74)     Exams:     PHYSICAL EXAM:     GENERAL: Vitals recorded well developed, well nourished;  well groomed;  no apparent distress;     EYES: lids and lacrimal system are normal in appearance; extraocular movements intact; conjunctiva and cornea are normal; PERRLA;     E/N/T:  normal EACs, TMs, nasal/oral mucosa, teeth, gingiva, and oropharynx;     NECK:  supple, full ROM; no thyromegaly; no carotid bruits;     RESPIRATORY: normal respiratory rate and pattern with no distress; normal breath sounds with no rales, rhonchi, wheezes or rubs;     CARDIOVASCULAR: normal rate; rhythm is regular;  no systolic murmur; trace pedal edema;     GASTROINTESTINAL: nontender; normal bowel sounds; no organomegaly;     LYMPHATIC: no enlargement of cervical or facial nodes;     SKIN:  no significant rashes or lesions; no suspicious moles;     MUSCULOSKELETAL:  Normal range of motion, strength and tone;     NEUROLOGIC: GROSSLY INTACT     PSYCHIATRIC:  appropriate affect and demeanor; normal speech pattern; grossly normal memory;         Assessment:         Z00.00   Encounter for general adult medical examination without abnormal findings       Z13.31   Encounter for screening for depression       E11.8   Type 2 diabetes mellitus with unspecified complications       Z12.5   Encounter for screening for malignant neoplasm of prostate       Z12.11   Encounter for screening for malignant neoplasm of colon       R53.83   Other fatigue       I10   Essential (primary) hypertension       U07.1   COVID-19       I10   Essential (primary) hypertension           ORDERS:         Lab Orders:       [id number]  [facility] CBC with 3 part diff  (Send-Out)            39662  COMP - [facility] Comp. Metabolic Panel  (Send-Out)            22548  TSH -  [facility] TSH  (Send-Out)            95731  Oncology colorectal screening hang 10 DNA markers  (Send-Out)            *  [facility] Medicare screening PSA  (Send-Out)              Procedures Ordered:         Annual wellness visit, includes a PPPS, subsequent visit  (In-House)              Other Orders:         Depression screen negative  (In-House)            1101F  Pt screen for fall risk; document no falls in past year or only 1 fall w/o injury in past year (MILADSI)  (In-House)                      Plan:         Encounter for general adult medical examination without abnormal findingsadvise flu vaccine, he declines, advised  shingrex and pneumonia vaccines, he is considering pneumonia vaccine and perhaps shingrex, can return here for pneumonia vaccine    ADVANCE DIRECTIVES (Please update in PMH): Living will at home     COUNSELING was provided today regarding the following topics: healthy eating habits, regular exercise, use of seat belts, to bring in a copy of his living will, ADVISED TO SEE AN EYE DOCTOR AND A DENTIST REGULARLY, and Given Home Safety Handout.            Orders:         Annual wellness visit, includes a PPPS, subsequent visit  (In-House)              Encounter for screening for depression    MIPS PHQ-9 Depression Screening: Completed form scanned and in chart; Total Score 3; Negative Depression Screen           Orders:         Depression screen negative  (In-House)            1101F  Pt screen for fall risk; document no falls in past year or only 1 fall w/o injury in past year (MILADIS)  (In-House)              Type 2 diabetes mellitus with unspecified complicationsreviewed labs from 9-2020, to schedule his eye exam, reviewed his diabetes flow sheet /he is due follow up labs next month, will contact him then        Encounter for screening for malignant neoplasm of prostate    LABORATORY:  Labs ordered to be performed today include PSA Screening Medicare patients.            Orders:        *  [facility] Medicare screening PSA  (Send-Out)              Encounter for screening for malignant neoplasm of colon        TESTS/PROCEDURES:  Will proceed with Cologuard to be performed/scheduled now.            Orders:       [id number]  Oncology colorectal screening hang 10 DNA markers  (Send-Out)              Other fatigue    LABORATORY:  Labs ordered to be performed today include CBC, Comprehensive metabolic panel, and TSH.      FOLLOW-UP: pending labs           Orders:       [id number]  [facility] - [facility] CBC with 3 part diff  (Send-Out)            31173  COMP - [facility] Comp. Metabolic Panel  (Send-Out)            41329  TSH - [facility] TSH  (Send-Out)              Essential (primary) hypertensionrecheck BP and pulse         COVID-19reviewed CXR and CT chest /reviewed discharge summary        Essential (primary) hypertension        RECOMMENDATIONS given include: perform routine monitoring of blood pressure with home blood pressure cuff.              Patient Recommendations:        For  Encounter for general adult medical examination without abnormal findings:    I also recommend at home.  Limit dietary intake of fat (especially saturated fat) and cholesterol.  Eat a variety of foods, including plenty of fruits, vegetables, and grain containg fiber, limit fat intake to 30% of total calories. Balance caloric intake with energy expended. Maintaining regular physical activity is advised to help prevent heart disease, hypertension, diabetes, and obesity. Always use shoulder/lap restraints when driving or riding in a vehicle, even those equipped with air bags.          For  Essential (primary) hypertension:    Begin monitoring your blood pressure by brief nurse visits at our office, a home blood pressure monitor, or by checking on the machines in pharmacies or stores.  Keep a log of the readings.              Charge Capture:         Primary Diagnosis:     Z00.00  Encounter for general adult medical examination without abnormal findings            Orders:        Annual wellness visit, includes a PPPS, subsequent visit  (In-House)              Z13.31  Encounter for screening for depression           Orders:        Depression screen negative  (In-House)            1101F  Pt screen for fall risk; document no falls in past year or only 1 fall w/o injury in past year (MILADIS)  (In-House)              E11.8  Type 2 diabetes mellitus with unspecified complications     Z12.5  Encounter for screening for malignant neoplasm of prostate     Z12.11  Encounter for screening for malignant neoplasm of colon     R53.83  Other fatigue     I10  Essential (primary) hypertension     U07.1  COVID-19     I10  Essential (primary) hypertension

## 2021-05-18 NOTE — PROGRESS NOTES
Zachariah Cruz  1945     Office/Outpatient Visit    Visit Date: Wed, Apr 1, 2020 09:32 am    Provider: Nelia Frost N.P. (Assistant: Naty Fernando MA)    Location: [facility]        Electronically signed by Nelia Frost N.P. on  04/01/2020 12:41:22 PM                             Subjective:        CC: Keith is a 74 year old White male.  He presents with right leg pain onset 1-2 weeks, denies redness or swelling.  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          HPI:     HPI: TELEMEDICINE VISIT:    - Keith consented to this telemedicine visit.    - Persons present during the telemedicine consultation include:  Keith - patient, OLY Frost APRN    - This visit is being conducted over FaceTime with audio and video.                    The symptom began 10-14 days ago.  He estimates that the frequency of this symptom is constant.  Aggravating factors include sitting to standing position.  Symptoms are relieved with rest, topical OTC medicaiton, Advil.  Associated symptoms include outside of right knee, lower mid lateral right leg painful.  He denies swelling of leg.  He did twist his right leg yesterday and his leg nearly gave out on him     ROS:     CONSTITUTIONAL:  Negative for fever.      CARDIOVASCULAR:  Positive for BP this week up some, systolic 150-160 and diastolic 60-70.   Negative for chest pain.      RESPIRATORY:  Negative for recent cough.      MUSCULOSKELETAL:  Positive for occ pain in right groin.   Negative for calf pain.      INTEGUMENTARY:  Negative for redness  or warmth to skin of lower leg.      NEUROLOGICAL:  Negative for dizziness, headaches, paresthesias, and weakness.      ENDOCRINE:  Positive for sugars running 113-180.          Past Medical History / Family History / Social History:         Last Reviewed on 4/01/2020 10:11 AM by Nelia Frost    Past Medical History:             PAST MEDICAL HISTORY         Sleep Apnea: uses CPAP;     Renal Stones:  he has undergone ECSW lithotripsy and laser;         CURRENT MEDICAL PROVIDERS:    Orthopedist    Rheumatologist: Dr. Arvizu         PREVENTIVE HEALTH MAINTENANCE             COLORECTAL CANCER SCREENING: declines     EYE EXAM: was last done 17     Hepatitis C Medicare Screening: was last done ; negative     PSA: was last done 14 with normal results         Surgical History:         Appendectomy    Coronary Artery Bypass Graft: ;     Transurethral Resection of Prostate: ;     Cataract Removal: right; ;         Family History:     Father:  at age 91; Cause of death was CAD;  Coronary Artery Disease     Mother:  at age 88; Cause of death was CVA;  Hypertension     Paternal Grandfather: Cerebrovascular Accident         Social History:     Occupation:.   (Self-Employed) [employer]     Marital Status:      Children: 3 children         Tobacco/Alcohol/Supplements:     Last Reviewed on 2020 09:36 AM by Naty Fernando    Tobacco: He has a past history of cigarette smoking; quit date:  .          Alcohol: Frequency:    couple daily;         Substance Abuse History:     Last Reviewed on 10/24/2018 09:10 AM by Ivette Frost        Mental Health History:     Last Reviewed on 10/24/2018 09:10 AM by Ivette Frost        Communicable Diseases (eg STDs):     Last Reviewed on 10/24/2018 09:10 AM by Ivette Frost        Immunizations:     None        Allergies:     Last Reviewed on 2020 09:36 AM by Naty Fernando    No Known Allergies.        Current Medications:     Last Reviewed on 2020 09:36 AM by Naty Fernando    Glucophage  mg oral Tablet, Extended Release 24 hr [Take 4 tablet(s) by mouth daily]    Allopurinol 100 mg oral tablet [1 tab daily ]    Glucose Reagent Blood Test Strips  Reagent Strips [Check 1-2 times daily using One Touch Ultra 2 Meter, DX: E11.9]    Lisinopril 20 mg oral tablet [1 tab bid]    ASA  81 MG QD      Amlodipine  2.5mg Tablet [Take 1 tablet(s) by mouth daily]    Glimepiride 2 mg oral tablet [one every am]    Simvastatin 40 mg oral tablet [Take 1 tablet(s) by mouth daily]    vitamin d     bee pollen 1 tsp daily      metoprolol succinate 25mg Tablets, Extended Release [1 tab PO twice daily]    ELIQUIS 5MG         TAB [TAKE 1 TABLET BY MOUTH TWICE DAILY]        Objective:        Exams:     PHYSICAL EXAM:     GENERAL: Vitals recorded well developed, well nourished;  well groomed;  no apparent distress;     MUSCULOSKELETAL: pain with range of motion in: right knee flexion;  no swelling, redness noted to right lateral lower leg; tender to touch to right lateralarea just below  knee;     PSYCHIATRIC:  appropriate affect and demeanor; normal speech pattern; grossly normal memory;         Assessment:         M79.661   Pain in right lower leg       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      RECOMMENDATIONS given include: elevation of the foot as much as possible and rest.  Telehealth: Verbal consent obtained for visit to occur via televideo conferencing; Total time spent was 12 minutes; 50101--Qnpsjemob E/M 11-20 minutes     FOLLOW-UP:.  :for pending x-ray results           Orders:       [id number]  Right radiologic examination, knee; three views  (Send-Out)            [id number]  Right radiologic examination; tibia and fibula, 2 views  (Send-Out)              Type 2 diabetes mellitus with diabetic nephropathy        RECOMMENDATIONS given include: continue to monitor blood sugars at home.           Essential (primary) hypertension        RECOMMENDATIONS given include: perform routine monitoring of blood pressure with home blood pressure cuff.              Patient Recommendations:        For  Pain in right lower leg:    Keep the foot elevated as much as possible. Get plenty of rest.                  APPOINTMENT INFORMATION:        Monday Tuesday Wednesday Thursday Friday Saturday Sunday            Time:___________________AM  PM   Date:_____________________         For  Essential (primary) hypertension:    Begin monitoring your blood pressure by brief nurse visits at our office, a home blood pressure monitor, or by checking on the machines in pharmacies or stores.  Keep a log of the readings.              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

## 2021-05-18 NOTE — PROGRESS NOTES
Zachariah Cruz 1945     Office/Outpatient Visit    Visit Date: Wed, Oct 9, 2019 01:01 pm    Provider: Nelia Frost N.P. (Assistant: Telma Vale MA)    Location: [facility]        Electronically signed by Nelia Frost N.P. on  10/09/2019 10:35:42 PM                             SUBJECTIVE:        CC:     Keith is a 74 year old White male.  This is a follow-up visit.  med refills; pt says his bp has been elevated and pulse running in 140s         HPI:         Keith presents with type 2 diabetes.  Current meds include an oral hypoglycemic ( Amaryl ( 2 mg QD ) and Glucophage XR ( 2000 mg daily ) ).  Most recent lab results include Weight (lb):  235.0 (10/09/2019), Systolic BP:  164 (10/09/2019), Diastolic BP:  85 (10/09/2019), Hemoglobin A1c:  7.3 (%) (10/02/2019), LDL:  51 (mg/dL) (10/02/2019), HDL:  34 (mg/dL) (10/02/2019), Triglycerides:  115 (mg/dL) (10/02/2019), Microalbuminuria:  23.3 (mg/g creat) (10/02/2019), Dilated Eye Exam by date:  04/17/2019 (03/27/2019), Foot Exam (Annual):  04/11/2018 (04/11/2018).      ROS:     CONSTITUTIONAL:  Positive for fatigue.      CARDIOVASCULAR:  Positive for palpitations ( heart rate has been high/ racing ).  3 days ago  is when this started (better today) seemed to be worse with rest     RESPIRATORY:  Negative for cough, dyspnea, and hemoptysis.      MUSCULOSKELETAL:  Positive for fell two weeks ago while in [place]  / hit knee and chest.  this has also improved     NEUROLOGICAL:  Negative for dizziness, headaches, paresthesias, and weakness.          PMH/FMH/SH:     Last Reviewed on 10/09/2019 02:01 PM by Nelia Frost    Past Medical History:             PAST MEDICAL HISTORY         Sleep Apnea: uses CPAP;     Renal Stones: he has undergone ECSW lithotripsy and laser;         CURRENT MEDICAL PROVIDERS:    Orthopedist    Rheumatologist: Dr. Arvizu         PREVENTIVE HEALTH MAINTENANCE             COLORECTAL CANCER SCREENING: declines     EYE  EXAM: was last done 17     Hepatitis C Medicare Screening: was last done ; negative     PSA: was last done 14 with normal results         Surgical History:         Appendectomy    Coronary Artery Bypass Graft: ;      Transurethral Resection of Prostate: ;     Cataract Removal: right; ;         Family History:     Father:  at age 91; Cause of death was CAD;  Coronary Artery Disease     Mother:  at age 88; Cause of death was CVA;  Hypertension     Paternal Grandfather: Cerebrovascular Accident         Social History:     Occupation:.   (Self-Employed) [employer]      Marital Status:      Children: 3 children         Tobacco/Alcohol/Supplements:     Last Reviewed on 10/09/2019 01:07 PM by Telma Vale    Tobacco: He has a past history of cigarette smoking; quit date:  .          Alcohol: Frequency:    couple daily;         Substance Abuse History:     Last Reviewed on 10/24/2018 09:10 AM by Ivette Frost        Mental Health History:     Last Reviewed on 10/24/2018 09:10 AM by Ivette Frost        Communicable Diseases (eg STDs):     Last Reviewed on 10/24/2018 09:10 AM by Ivette Frost            Immunizations:     None        Allergies:     Last Reviewed on 10/09/2019 01:07 PM by Telma Vale      No Known Drug Allergies.         Current Medications:     Last Reviewed on 10/09/2019 01:07 PM by Telma Vale    Glucophage XR 500mg Tablets, Extended Release Take 4 tablet(s) by mouth daily     Glimepiride 2mg Tablet one every am     Glucose Reagent Blood Test Strips  Reagent Strips Check 1-2 times daily using One Touch Ultra 2 Meter, DX: E11.9     Lisinopril 20mg Tablet 1 tab bid     Metoprolol Succinate 25mg Tablets, Extended Release 1 tab PO daily     Simvastatin 40mg Tablet Take 1 tablet(s) by mouth daily     Amlodipine  2.5mg Tablet Take 1 tablet(s) by mouth daily     bee pollen 1 tsp daily     vitamin d     ASA  81 MG  QD     Allopurinol 100mg Tablet 1 tab daily         OBJECTIVE:        Vitals:         Current: 10/9/2019 1:07:18 PM    Ht:  5 ft, 10 in;  Wt: 235 lbs;  BMI: 33.7    T: 98 F (oral);  BP: 164/85 mm Hg (left arm, sitting);  P: 75 bpm (left arm (BP Cuff), sitting);  sCr: 1.07 mg/dL;  GFR: 68.11        Repeat:     1:24:14 PM     P:   72bpm (finger clip, sitting)         Exams:     PHYSICAL EXAM:     GENERAL: Vitals recorded well developed, well nourished;  well groomed;  no apparent distress;     NECK: carotid exam reveals no bruits;     RESPIRATORY: normal respiratory rate and pattern with no distress; normal breath sounds with no rales, rhonchi, wheezes or rubs;     CARDIOVASCULAR: normal rate; rhythm is occ irr;  no systolic murmur;    Peripheral Pulses: posterior tibial: equal bilaterally;  no edema;     SKIN: skin of feet and toenails intact bilaterally; thickened deformed toenails left great and second and slight in right great toe     NEUROLOGIC: sensation intact to monofilament bilaterally;     PSYCHIATRIC:  appropriate affect and demeanor; normal speech pattern; grossly normal memory;         Lab/Test Results:         LABORATORY RESULTS: EKG performed by tls     ECG INTERPRETATION:     rhythm is sinus arrhythmia;     Some type of AV block as there is a p wave with not every QVRS and rhythm is irregular. ST segments are normal and EKG not suggestive of ischemia. Keith Palencia         Procedures:     Palpitations         Holter Monitor: Holter monitor was hooked up, Keith was given instructions on use.  Patient informed to return with device. 48 hour monitor tls             ASSESSMENT           401.1   I10  Essential hypertension, benign              DDx:     250.00   E11.21  Type 2 diabetes              DDx:     785.1   R00.2  Palpitations              DDx:         ORDERS:         Meds Prescribed:       Refill of: Glucophage XR (Metformin HCl) 500mg Tablets, Extended Release Take 4 tablet(s) by mouth daily  #360  ([place] [address] [place]) tablet(s) Refills: 1       Refill of: Glimepiride 2mg Tablet one every am  #90 (Ninety) tablet(s) Refills: 1         Radiology/Test Orders:       [id number]  Electrocardiogram, routine with at least 12 leads; with interpretation and report  (In-House)         [id number]  Holter monitor connection and disconnection  (In-House)           Lab Orders:       [id number]  [facility] CBC with 3 part diff  (Send-Out)         83097  [facility] TSH  (Send-Out)                   PLAN:          Essential hypertension, benign continue current rx's           Orders:       [id number]  Electrocardiogram, routine with at least 12 leads; with interpretation and report  (In-House)            Type 2 diabetes reviewed  recent labs and updated diabetes flow sheet, send to podiatry to trim toenails           Prescriptions:       Refill of: Glucophage XR (Metformin HCl) 500mg Tablets, Extended Release Take 4 tablet(s) by mouth daily  #360 ([place] [address] [place]) tablet(s) Refills: 1       Refill of: Glimepiride 2mg Tablet one every am  #90 (Ninety) tablet(s) Refills: 1          Palpitations     LABORATORY:  Labs ordered to be performed today include CBC and TSH.      TESTS/PROCEDURES:  Will proceed with Holter Monitor 48 hour to be performed/scheduled now.      FOLLOW-UP:    - pending labs and dg testing     RECOMMENDATIONS:    - monitor symptoms    -  limit caffiene intake           Orders:       [id number]  Holter monitor connection and disconnection  (In-House)         [id number]  [facility] CBC with 3 part diff  (Send-Out)         16448  [facility] TSH  (Send-Out)               Patient Recommendations:        For  Palpitations:         FOLLOW-UP: - pending labs and dg testing     RECOMMENDATIONS:    - Monitor symptoms    -             CHARGE CAPTURE           **Please note: ICD descriptions below are intended for billing purposes only and may not represent clinical diagnoses**        Primary Diagnosis:         401.1 Essential  hypertension, benign            I10    Essential (primary) hypertension              Orders:          [id number]   Office/outpatient visit; established patient, level 4  (In-House)             10756   Electrocardiogram, routine with at least 12 leads; with interpretation and report  (In-House)           250.00 Type 2 diabetes            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

## 2021-05-18 NOTE — PROGRESS NOTES
Zachariah Cruz  1945     Office/Outpatient Visit    Visit Date: Wed, May 5, 2021 10:16 am    Provider: Nelia Frost N.P. (Assistant: Melodie More, )    Location: [facility]        Electronically signed by Nelia Frost N.P. on  05/05/2021 12:34:04 PM                             Subjective:        CC: Keith is a 76 year old White male.  This is a follow-up visit.  med refills         HPI:           Patient to be evaluated for type 2 diabetes mellitus with unspecified complications.  Current meds include an oral hypoglycemic ( Amaryl ( 2 mg QD ) and Glucophage XR ( 2000 mg QD ) ).  He reports home blood glucose readings have been fairly good, with average fasting glucoses running in the 120-150 mg/dL range.  Most recent lab results include Weight (lb):  240.8 (01/20/2021), Hemoglobin A1c:  8.7 (%) (04/23/2021), LDL:  43 (mg/dL) (04/23/2021), HDL:  32 (mg/dL) (04/23/2021), Triglycerides:  225 (mg/dL) (04/23/2021), Microalbuminuria:  12.4 (mg/g creat) (12/28/2020), Dilated Eye Exam by date:  12/14/2020 (11/18/2020), Foot Exam (Annual):  09/23/2020 (09/23/2020).      ROS:     CONSTITUTIONAL:  Negative for fever.  weight up, exercise down /[facility]     E/N/T:  Positive for tinnitus since 1-2021, feels related to COVID from .      CARDIOVASCULAR:  Negative for chest pain, palpitations, tachycardia, orthopnea, and edema.      RESPIRATORY:  Negative for recent cough and dyspnea.      GASTROINTESTINAL:  Negative for abdominal pain, heartburn, constipation, diarrhea, and stool changes.      GENITOURINARY:  Negative for hematuria.      MUSCULOSKELETAL:  Positive for right flank pain, better, but mild, worse with some movements or sitting.  getting better     NEUROLOGICAL:  Negative for dizziness, headaches, paresthesias, and weakness.          Past Medical History / Family History / Social History:         Last Reviewed on 5/05/2021 12:29 PM by  Nelia Frost    Past Medical History:             PAST MEDICAL HISTORY         Sleep Apnea: uses CPAP;     Renal Stones: he has undergone ECSW lithotripsy and laser;     Hospitalizations:    Pneumonia admitted once on 10-20-20 (COVID +)         CURRENT MEDICAL PROVIDERS:    Orthopedist    Rheumatologist: Dr. Arvizu         PREVENTIVE HEALTH MAINTENANCE             COLORECTAL CANCER SCREENING: Up to date (colonoscopy q10y; sigmoidoscopy q5y; Cologuard q3y) was last done 2020, Results are in chart; Cologuard normal     EYE EXAM: was last done 17     Hepatitis C Medicare Screening: was last done ; negative     PSA: was last done 14 with normal results         Surgical History:         Appendectomy    Coronary Artery Bypass Graft: ;     Transurethral Resection of Prostate: ;     Cataract Removal: right; ;         Family History:     Father:  at age 91; Cause of death was CAD;  Coronary Artery Disease     Mother:  at age 88; Cause of death was CVA;  Hypertension     Paternal Grandfather: Cerebrovascular Accident         Social History:     Occupation:.   (Self-Employed) [employer]     Marital Status:      Children: 3 children         Tobacco/Alcohol/Supplements:     Last Reviewed on 2021 10:19 AM by Melodie More    Tobacco: He has a past history of cigarette smoking; quit date:  .          Alcohol: Frequency:    couple daily;         Substance Abuse History:     Last Reviewed on 10/24/2018 09:10 AM by Ivette Frost        Mental Health History:     Last Reviewed on 10/24/2018 09:10 AM by Ivette Frost        Communicable Diseases (eg STDs):     Last Reviewed on 10/24/2018 09:10 AM by Ivette Frost        Immunizations: 2021 both doses, Covid pfizers vaccine    None        Allergies:     Last Reviewed on 2021 10:19 AM by Melodie More    No Known Allergies.        Current Medications:     Last Reviewed on  5/05/2021 10:19 AM by Melodie More    Glucophage  mg oral Tablet, Extended Release 24 hr [TAKE FOUR TABLETS BY MOUTH DAILY]    allopurinoL 100 mg oral tablet [Take 1 tablet by mouth once daily]    OneTouch Ultra Blue Test Strip  [USE TO CHECK BLOOD SUGAR ONCE TO TWICE DAILY dx. e11.8]    lisinopriL 20 mg oral tablet [Take 1 tablet by mouth twice daily]    ASA  81 MG QD     glimepiride 2 mg oral tablet [TAKE 1  and 1/2 TABLET BY MOUTH ONCE DAILY IN THE MORNING]    Simvastatin 40 mg oral tablet [Take 1 tablet(s) by mouth daily]    vitamin d     bee pollen 1 tsp daily      metoprolol succinate 25mg Tablets, Extended Release [1 tab PO twice daily]    WARFARIN 1MG (ONE)    TAB  [TAKE 1 TABLET BY MOUTH ONCE DAILY AS DIRECTED]    albuterol sulfate 90 mcg/actuation Inhalation HFA Aerosol Inhaler [inhale 1 - 2 puffs (90 - 180 mcg) by inhalation route every 4 hours as needed]    AMLODIPINE   TAB 5MG        Objective:        Vitals:         Current: 5/5/2021 10:18:00 AM    Ht:  5 ft, 10 in;  Wt: 236.4 lbs;  BMI: 33.9T: 96.9 F (temporal);  BP: 163/71 mm Hg (left arm, sitting);  P: 61 bpm (left arm (BP Cuff), sitting);  sCr: 1.1 mg/dL;  GFR: 64.53        Repeat:     11:8:23 AM  BP:   144/70mm Hg (left arm, sitting, HR: 62)     Exams:     PHYSICAL EXAM:     GENERAL: Vitals recorded well developed, well nourished;  well groomed;  no apparent distress;     E/N/T: EARS: both TMs are scarring and fluid present;     NECK: carotid exam reveals no bruits;     RESPIRATORY: normal respiratory rate and pattern with no distress; normal breath sounds with no rales, rhonchi, wheezes or rubs;     CARDIOVASCULAR: normal rate; rhythm is regular;  no systolic murmur; no edema;     MUSCULOSKELETAL: pain with range of motion in: back flexion;  No CVA tenderness     PSYCHIATRIC:  appropriate affect and demeanor; normal speech pattern; grossly normal memory;         Assessment:         E11.8   Type 2 diabetes mellitus with unspecified  complications       H93.13   Tinnitus, bilateral       I10   Essential (primary) hypertension           ORDERS:         Meds Prescribed:       [Refilled] glimepiride 4 mg oral tablet [take 1 tablet (4 mg) by oral route once daily], #90 (ninety) tablets, Refills: 0 (zero)         Lab Orders:       FUTURE  Future order to be done at patients convenience  (Send-Out)            [id number]  [address] LIPID,CMP, A1C: 07206, 85449, [address]9721  (Send-Out)                      Plan:         Type 2 diabetes mellitus with unspecified complicationsreviewed labs, diabetes flow sheet, will increase amaryl to 4 mg, has only been taking one 2 mg of amaryl a day  /consider victoza again or similar agent, cost was an issue in the past        RECOMMENDATIONS: work on diet, weight loss and regular exercise.      FOLLOW-UP TESTING #1: FOLLOW-UP LABORATORY:  Labs to be scheduled in the future include Diabetes Panel 1; CMP, Lipid, A1C.   Patient to schedule in 3 months.            Prescriptions:       [Refilled] glimepiride 4 mg oral tablet [take 1 tablet (4 mg) by oral route once daily], #90 (ninety) tablets, Refills: 0 (zero)           Orders:       FUTURE  Future order to be done at patients convenience  (Send-Out)            [id number]  [address] LIPID,CMP, A1C: 68548, 02411, 10856  (Send-Out)              Tinnitus, bilateraloffered ENT referral, would like to wait on this for now /back pain evaluation will follow up if his pain increases or persist         Essential (primary) hypertensionhis bp runs at home around 144 / 70        RECOMMENDATIONS given include: perform routine monitoring of blood pressure with home blood pressure cuff.              Patient Recommendations:        For  Type 2 diabetes mellitus with unspecified complications:            The following laboratory testing has been ordered: Schedule the above testing in 3 months.          For  Essential (primary) hypertension:    Begin monitoring your blood pressure by brief nurse  visits at our office, a home blood pressure monitor, or by checking on the machines in pharmacies or stores.  Keep a log of the readings.              Charge Capture:         Primary Diagnosis:     E11.8  Type 2 diabetes mellitus with unspecified complications           Orders:      [id number]  Office/outpatient visit; established patient, level 4  (In-House)              H93.13  Tinnitus, bilateral     I10  Essential (primary) hypertension         ADDENDUMS:      ____________________________________    Addendum: 05/06/2021 03:43 PM - Nelia Frost        Add [id number]; Remove [id number]

## 2021-05-18 NOTE — PROGRESS NOTES
Zachariah Cruz VESTA  1945     Office/Outpatient Visit    Visit Date: Tue, Nov 3, 2020 09:40 am    Provider: Bronwyn Matta N.P. (Assistant: Maureen Cuellar MA)    Location: [facility]        Electronically signed by Bronwyn Matta N.P. on  11/03/2020 10:54:16 AM                             Subjective:        CC: Keith is a 75 year old White male.  He is here today following a transition of care from an inpatient hospital: [facility]. The patient was admitted on 10/16/20. The patient was admitted for positive covid. Our office called the patient within 48 hours of discharge and scheduled the follow-up appointment. During the patient's hospital stay the patient was treated by Dr. Taylor. Medications have been reviewed and reconciled with discharge summary..          HPI:       Patient reports he was admitted to the hospital on 10/16/2020 for covid pneumonia. Was treated with antibiotics and had improvement throughout his stay. Patient reports he was d/c on antibiotics- z-andreina and medrol dose andreina. He is currently on this regimen. Patient reports he was dc with oxygen as well via [place]. Patient reports he is monitoring his O2 and has never seen it below 95%. Patient is weaning himself off the o2. Using incentive spirometer and being active daily walking at least 0.5 miles a day. Patient feels he is improving with remaining fatigue and dry cough that is occasional. Denies fever.     ROS:     CONSTITUTIONAL:  Positive for fatigue.   Negative for chills or fever.      EYES:  Negative for blurred vision.      E/N/T:  Negative for nasal congestion, frequent rhinorrhea and sinus pressure.      CARDIOVASCULAR:  Negative for chest pain and palpitations.      RESPIRATORY:  Positive for recent cough ( typically dry ).   Negative for dyspnea.      MUSCULOSKELETAL:  Negative for arthralgias and myalgias.      NEUROLOGICAL:  Negative for dizziness, headaches and weakness.      PSYCHIATRIC:  Negative for anxiety and  depression.          Past Medical History / Family History / Social History:         Last Reviewed on 10/19/2020 11:33 AM by Bronwyn Matta    Past Medical History:             PAST MEDICAL HISTORY         Sleep Apnea: uses CPAP;     Renal Stones: he has undergone ECSW lithotripsy and laser;     Hospitalizations:    Pneumonia admitted once on 10-20-20 (COVID +)         CURRENT MEDICAL PROVIDERS:    Orthopedist    Rheumatologist: Dr. Arvizu         PREVENTIVE HEALTH MAINTENANCE             COLORECTAL CANCER SCREENING: declines     EYE EXAM: was last done 17     Hepatitis C Medicare Screening: was last done ; negative     PSA: was last done 14 with normal results         Surgical History:         Appendectomy    Coronary Artery Bypass Graft: ;     Transurethral Resection of Prostate: ;     Cataract Removal: right; ;         Family History:     Father:  at age 91; Cause of death was CAD;  Coronary Artery Disease     Mother:  at age 88; Cause of death was CVA;  Hypertension     Paternal Grandfather: Cerebrovascular Accident         Social History:     Occupation:.   (Self-Employed) [employer]     Marital Status:      Children: 3 children         Tobacco/Alcohol/Supplements:     Last Reviewed on 10/19/2020 11:33 AM by Bronwyn Matta    Tobacco: He has a past history of cigarette smoking; quit date:  .          Alcohol: Frequency:    couple daily;         Substance Abuse History:     Last Reviewed on 10/24/2018 09:10 AM by Ivette Frost        Mental Health History:     Last Reviewed on 10/24/2018 09:10 AM by Ivette Frost        Communicable Diseases (eg STDs):     Last Reviewed on 10/24/2018 09:10 AM by Ivette Frost        Immunizations:     None        Allergies:     Last Reviewed on 2020 09:47 AM by Maureen Cuellar    No Known Allergies.        Current Medications:     Last Reviewed on 2020 09:47 AM by Maureen Cuellar     allopurinoL 100 mg oral tablet [Take 1 tablet by mouth once daily]    Glucophage  mg oral Tablet, Extended Release 24 hr [Take 4 tablet(s) by mouth daily]    Glucose Reagent Blood Test Strips  Reagent Strips [Check 1-2 times daily using One Touch Ultra 2 Meter, DX: E11.9]    lisinopriL 20 mg oral tablet [Take 1 tablet by mouth twice daily]    ASA  81 MG QD     glimepiride 2 mg oral tablet [TAKE 1  and 1/2 TABLET BY MOUTH ONCE DAILY IN THE MORNING]    Simvastatin 40 mg oral tablet [Take 1 tablet(s) by mouth daily]    vitamin d     bee pollen 1 tsp daily      metoprolol succinate 25mg Tablets, Extended Release [1 tab PO twice daily]    WARFARIN 1MG (ONE)    TAB  [TAKE 1 TABLET BY MOUTH ONCE DAILY AS DIRECTED]    benzonatate 100 mg oral capsule [take 1 capsule (100 mg) by oral route 3 times per day]    albuterol sulfate 90 mcg/actuation Inhalation HFA Aerosol Inhaler [inhale 1 - 2 puffs (90 - 180 mcg) by inhalation route every 4 hours as needed]    azithromycin 250 mg oral tablet [take 2 tablets (500mg) x1 day, then 1 tablet (250mg) daily for 4 days. ]    AZITHROMYCIN 250MG TANYA TAB [TAKE 2 TABLETS BY MOUTH ON DAY 1 AND THEN TAKE 1 TABLET BY MOUTH ONCE A DAY ON DAY 2 THROUGH DAY 5]    METHYLPRED 4MG      TANYA [TAKE AS DIRECTED TAPERING DOSE]        Objective:        Vitals:         Current: 11/3/2020 9:45:34 AM    Ht:  5 ft, 10 in;  Wt: 224.6 lbs;  BMI: 32.2T: 96.2 F (temporal);  BP: 168/89 mm Hg (right arm, sitting);  P: 110 bpm (right arm (BP Cuff), sitting);  sCr: 1.07 mg/dL;  GFR: 65.86        Repeat:     10:28:57 AM  BP:   136/67mm Hg (left arm, sitting) 10:29:8 AM  P:   75bpm (left arm (BP Cuff), sitting)     Exams:     PHYSICAL EXAM:     GENERAL: Vitals recorded well developed, well nourished;  no apparent distress;     EYES: extraocular movements intact; conjunctiva and cornea are normal; PERRLA;     E/N/T: EARS:  normal external auditory canals and tympanic membranes;  grossly normal hearing; NOSE:  normal  nasal mucosa, septum, turbinates, and sinuses; OROPHARYNX:  normal mucosa, dentition, gingiva, and posterior pharynx;     RESPIRATORY: normal appearance and symmetric expansion of chest wall; normal respiratory rate and pattern with no distress; normal breath sounds with no rales, rhonchi, wheezes or rubs;     CARDIOVASCULAR: normal rate; rhythm is regular;  no systolic murmur; no edema;     GASTROINTESTINAL: nontender; normal bowel sounds;     LYMPHATIC: no enlargement of cervical or facial nodes;     MUSCULOSKELETAL: normal gait;     NEUROLOGIC: mental status: alert and oriented x 3;     PSYCHIATRIC:  appropriate affect and demeanor; normal speech pattern; grossly normal memory;         Assessment:         U07.1   COVID-19           Plan:         COVID-1911/18/2020 with JACIEL Bliss follow up. Improvement in symptoms. Educate patient to call with any concerns. patient to continue trending oxygen saturations. Follow up as scheduled.             Charge Capture:         Primary Diagnosis:     U07.1  COVID-19           Orders:      [id number]  Office/outpatient visit; established patient, level 3  (In-House)

## 2021-05-18 NOTE — PROGRESS NOTES
Zachariah Cruz  1945     Office/Outpatient Visit    Visit Date: Wed, Sep 23, 2020 09:37 am    Provider: Nelia Frost N.P. (Assistant: Naty Fernando MA)    Location: [facility]        Electronically signed by Nelia Frost N.P. on  09/23/2020 11:36:39 AM                             Subjective:        CC: Keith is a 75 year old White male.  This is a follow-up visit.  check up         HPI:           PHQ-9 Depression Screening: Completed form scanned and in chart; Total Score 0           Concerning type 2 diabetes mellitus with unspecified complications, current meds include an oral hypoglycemic ( Amaryl ( 2 mg QD ) and Glucophage XR ( 2000 mg QD ) ).  He reports home blood glucose readings have averaged fasting readings in the 110-180 mg/dL range.  Most recent lab results include Hemoglobin A1c:  7.6 (%) (09/14/2020), LDL:  63 (mg/dL) (09/14/2020), HDL:  33 (mg/dL) (09/14/2020), Triglycerides:  190 (mg/dL) (09/14/2020), Microalbuminuria:  23.3 (mg/g creat) (10/02/2019), Dilated Eye Exam by date:  04/17/2019 (03/27/2019), Foot Exam (Annual):  04/11/2018 (04/11/2018).            With regard to the essential (primary) hypertension, his current cardiac medication regimen includes an ACE inhibitor ( Zestril ) and a calcium channel blocker ( Norvasc ).  He did not bring his blood pressure diary, but says that typical readings show systolics in the 125-160 and diastolics in the 60-70 range.  He is tolerating the medication well without side effects.  Compliance with treatment has been good; he takes his medication as directed.            Additionally, he presents with history of hyperlipidemia, unspecified.  current treatment includes Zocor.  Compliance with treatment has been good; he takes his medication as directed.  He denies experiencing any hypercholesterolemia related symptoms.  Most recent lab tests include BUN:  18 (mg/dl) (09/14/2020), ALT (SGPT):  19 (U/L) (09/14/2020),  HDL:  33 (mg/dL) (2020), Hemoglobin A1c:  7.6 (%) (2020), LDL:  63 (mg/dL) (2020), Microalbumin, Urine, rand:  <12.0 (mg/L) (10/02/2019), Triglycerides:  190 (mg/dL) (2020).      ROS:     CONSTITUTIONAL:  Negative for fever.  not eating healthy, sister in law living with them,  last week    CARDIOVASCULAR:  Negative for chest pain, palpitations, tachycardia, orthopnea, and edema.  sees cardiology/ coumadin dose is being managed by him     RESPIRATORY:  Negative for recent cough and dyspnea.      NEUROLOGICAL:  Negative for dizziness, headaches, paresthesias, and weakness.          Past Medical History / Family History / Social History:         Last Reviewed on 2020 10:00 AM by Nelia Frost    Past Medical History:             PAST MEDICAL HISTORY         Sleep Apnea: uses CPAP;     Renal Stones: he has undergone ECSW lithotripsy and laser;         CURRENT MEDICAL PROVIDERS:    Orthopedist    Rheumatologist: Dr. Arvizu         PREVENTIVE HEALTH MAINTENANCE             COLORECTAL CANCER SCREENING: declines     EYE EXAM: was last done 17     Hepatitis C Medicare Screening: was last done ; negative     PSA: was last done 14 with normal results         Surgical History:         Appendectomy    Coronary Artery Bypass Graft: ;     Transurethral Resection of Prostate: ;     Cataract Removal: right; ;         Family History:     Father:  at age 91; Cause of death was CAD;  Coronary Artery Disease     Mother:  at age 88; Cause of death was CVA;  Hypertension     Paternal Grandfather: Cerebrovascular Accident         Social History:     Occupation:.   (Self-Employed) appening     Marital Status:      Children: 3 children         Tobacco/Alcohol/Supplements:     Last Reviewed on 2020 09:36 AM by Naty Fernando    Tobacco: He has a past history of cigarette smoking; quit date:  .          Alcohol: Frequency:     couple daily;         Substance Abuse History:     Last Reviewed on 10/24/2018 09:10 AM by Ivette Frost        Mental Health History:     Last Reviewed on 10/24/2018 09:10 AM by Ivette Frost        Communicable Diseases (eg STDs):     Last Reviewed on 10/24/2018 09:10 AM by Ivette Frost        Immunizations:     None        Allergies:     Last Reviewed on 9/23/2020 09:42 AM by Naty Fernando    No Known Allergies.        Current Medications:     Last Reviewed on 9/23/2020 09:43 AM by Naty Fernando    Glucophage  mg oral Tablet, Extended Release 24 hr [Take 4 tablet(s) by mouth daily]    allopurinoL 100 mg oral tablet [Take 1 tablet by mouth once daily]    Glucose Reagent Blood Test Strips  Reagent Strips [Check 1-2 times daily using One Touch Ultra 2 Meter, DX: E11.9]    lisinopriL 20 mg oral tablet [Take 1 tablet by mouth twice daily]    ASA  81 MG QD QOD    glimepiride 2 mg oral tablet [TAKE 1 TABLET BY MOUTH ONCE DAILY IN THE MORNING]    Simvastatin 40 mg oral tablet [Take 1 tablet(s) by mouth daily]    vitamin d     bee pollen 1 tsp daily      metoprolol succinate 25mg Tablets, Extended Release [1 tab PO twice daily]    WARFARIN 1MG (ONE)    TAB  [TAKE 1 TABLET BY MOUTH ONCE DAILY AS DIRECTED]        Objective:        Vitals:         Current: 9/23/2020 9:45:22 AM    Ht:  5 ft, 10 in;  Wt: 243.8 lbs;  BMI: 35.0T: 97.1 F (temporal);  BP: 155/64 mm Hg (right arm, sitting);  P: 66 bpm (right arm (BP Cuff), sitting);  sCr: 1.07 mg/dL;  GFR: 68.20        Exams:     PHYSICAL EXAM:     GENERAL: Vitals recorded well developed, well nourished;  well groomed;  no apparent distress;     NECK: carotid exam reveals no bruits;     RESPIRATORY: normal respiratory rate and pattern with no distress; normal breath sounds with no rales, rhonchi, wheezes or rubs;     CARDIOVASCULAR: normal rate; rhythm is regular;  no systolic murmur;    Peripheral Pulses: posterior tibial: equal bilaterally;  1+ pedal  edema;     SKIN: skin of feet and toenails intact bilaterally / lower ext pink, tiny papules L>R; deformity of right great toenail, slightly tender to palpation, deformity of toenails on left foot, thickened     NEUROLOGIC: sensation intact to monofilament bilaterally;     PSYCHIATRIC:  appropriate affect and demeanor; normal speech pattern; grossly normal memory;         Assessment:         Z13.31   Encounter for screening for depression       E11.8   Type 2 diabetes mellitus with unspecified complications       I10   Essential (primary) hypertension       E78.5   Hyperlipidemia, unspecified       M10.9   Gout, unspecified       I25.810   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       M12.89   Other specific arthropathies, not elsewhere classified, multiple sites           ORDERS:         Meds Prescribed:       [Refilled] Glucophage  mg oral Tablet, Extended Release 24 hr [Take 4 tablet(s) by mouth daily], #360 (three hundred and sixty) tablets, Refills: 1 (one)       [Refilled] glimepiride 2 mg oral tablet [TAKE 1  and 1/2 TABLET BY MOUTH ONCE DAILY IN THE MORNING], #135 (one hundred and thirty five) tablets, Refills: 1 (one)       [Refilled] lisinopriL 20 mg oral tablet [Take 1 tablet by mouth twice daily], #180 (one hundred and eighty) tablets, Refills: 1 (one)       [Refilled] Simvastatin 40 mg oral tablet [Take 1 tablet(s) by mouth daily], #90 (ninety) tablets, Refills: 1 (one)       [Refilled] allopurinoL 100 mg oral tablet [Take 1 tablet by mouth once daily], #90 (ninety) tablets, Refills: 1 (one)         Procedures Ordered:       REFER  Referral to Specialist or Other Facility  (Send-Out)              Other Orders:         Depression screen negative  (In-House)                      Plan:         Encounter for screening for depressiondeclines flu vaccine     MIPS PHQ-9 Depression Screening: Completed form scanned and in chart; Total Score 0; Negative Depression  Screen           Orders:         Depression screen negative  (In-House)              Type 2 diabetes mellitus with unspecified complicationsincrease his amaryl, sent to podiatry all /reviewed labs with pt, copy to patient, updated diabetes flow sheet; he will make his own follow up appt with [facility], he is due exam        REFERRALS:  Referral initiated to a podiatrist ( [facility] Esp[facility]; for evaluation of diabetic exam, toenails trimmed and toenail deformities ).      RECOMMENDATIONS: work on diet, weight loss and exercise.      FOLLOW-UP: 3 months to recheck lab, get a diabetes 2 panel           Prescriptions:       [Refilled] Glucophage  mg oral Tablet, Extended Release 24 hr [Take 4 tablet(s) by mouth daily], #360 (three hundred and sixty) tablets, Refills: 1 (one)       [Refilled] glimepiride 2 mg oral tablet [TAKE 1  and 1/2 TABLET BY MOUTH ONCE DAILY IN THE MORNING], #135 (one hundred and thirty five) tablets, Refills: 1 (one)           Orders:       REFER  Referral to Specialist or Other Facility  (Send-Out)              Essential (primary) hypertensionwill see cardiology and let them decide on the swelling, he will elevate legs and try compression hose in the interim           Prescriptions:       [Refilled] lisinopriL 20 mg oral tablet [Take 1 tablet by mouth twice daily], #180 (one hundred and eighty) tablets, Refills: 1 (one)         Hyperlipidemia, unspecified        RECOMMENDATIONS given include: exercise, low cholesterol/low fat diet, and weight loss.            Prescriptions:       [Refilled] Simvastatin 40 mg oral tablet [Take 1 tablet(s) by mouth daily], #90 (ninety) tablets, Refills: 1 (one)         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send labs to dr MELLO appt in early Oct. he thinks it is on the 6th        Other specific arthropathies, not elsewhere classified, multiple sites          Prescriptions:       [Refilled]  allopurinoL 100 mg oral tablet [Take 1 tablet by mouth once daily], #90 (ninety) tablets, Refills: 1 (one)             Patient Recommendations:        For  Hyperlipidemia, unspecified:    Maintain a regular exercise program. Reduce the amount of cholesterol and saturated fat in your diet. Try to lose some weight; even modest weight reduction can improve your blood pressure.              Charge Capture:         Primary Diagnosis:     Z13.31  Encounter for screening for depression           Orders:      18363  Office/outpatient visit; established patient, level 4  (In-House)              Depression screen negative  (In-House)              E11.8  Type 2 diabetes mellitus with unspecified complications     I10  Essential (primary) hypertension     E78.5  Hyperlipidemia, unspecified     M10.9  Gout, unspecified     I25.810  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     M12.89  Other specific arthropathies, not elsewhere classified, multiple sites

## 2021-05-18 NOTE — PROGRESS NOTES
Zachariah Cruz VESTA  1945     Office/Outpatient Visit    Visit Date: Mon, Oct 19, 2020 11:11 am    Provider: Bronwyn Matta N.P. (Assistant: Lucinda Lee MA)    Location: [facility]        Electronically signed by Bronwyn Matta N.P. on  10/19/2020 01:17:39 PM                             Subjective:        CC: Keith is a 75 year old White male.  Patient presents today with complaints of cough, fever X 4 days, COVID results pending from 10/15/2020         HPI:           Patient to be evaluated for cough.  It is associated with dyspnea with exertion and nasal congestion.  There are no associated symptoms of productive sputum or wheezing.  It seems worse with exertion.  The cough is lessened with dayquil/ nyquil.  No new medications have been initiated recently.   There was no preceeding respiratory infection.  He does not use tobacco products and is not exposed to passive cigarette smoke.  He denies exposure to occupational or environmental factors that may trigger cough.  He does not have any related health problems; in particular, he denies allergies, asthma and COPD.  patient reports he was tested for covid on Thursday, waiting for results. Has been feeling fatigue, poor appetite, chills, sweating, cough that comes and goes,  dry cough, tmax 103.0.      ROS:     CONSTITUTIONAL:  Positive for chills, fatigue and fever.      EYES:  Negative for blurred vision.      E/N/T:  Positive for nasal congestion, frequent rhinorrhea and sinus pressure.   Negative for ear pain or sore throat.      CARDIOVASCULAR:  Negative for chest pain and palpitations.      RESPIRATORY:  Positive for recent cough ( typically dry ) and dyspnea ( with moderate exertion ).   Negative for pleuritic chest pain or frequent wheezing.      GASTROINTESTINAL:  Negative for abdominal pain, diarrhea, nausea and vomiting.      MUSCULOSKELETAL:  Negative for arthralgias and myalgias.      INTEGUMENTARY:  Negative for rash.       NEUROLOGICAL:  Negative for dizziness, headaches and weakness.      PSYCHIATRIC:  Negative for anxiety and depression.          Past Medical History / Family History / Social History:         Last Reviewed on 10/19/2020 11:33 AM by Bronwyn Matta    Past Medical History:             PAST MEDICAL HISTORY         Sleep Apnea: uses CPAP;     Renal Stones: he has undergone ECSW lithotripsy and laser;         CURRENT MEDICAL PROVIDERS:    Orthopedist    Rheumatologist: Dr. Arvizu         PREVENTIVE HEALTH MAINTENANCE             COLORECTAL CANCER SCREENING: declines     EYE EXAM: was last done 17     Hepatitis C Medicare Screening: was last done ; negative     PSA: was last done 14 with normal results         Surgical History:         Appendectomy    Coronary Artery Bypass Graft: ;     Transurethral Resection of Prostate: ;     Cataract Removal: right; ;         Family History:     Father:  at age 91; Cause of death was CAD;  Coronary Artery Disease     Mother:  at age 88; Cause of death was CVA;  Hypertension     Paternal Grandfather: Cerebrovascular Accident         Social History:     Occupation:.   (Self-Employed) [place]      Marital Status:      Children: 3 children         Tobacco/Alcohol/Supplements:     Last Reviewed on 10/19/2020 11:33 AM by Bronwyn Matta    Tobacco: He has a past history of cigarette smoking; quit date:  .          Alcohol: Frequency:    couple daily;         Substance Abuse History:     Last Reviewed on 10/24/2018 09:10 AM by Ivette Frost        Mental Health History:     Last Reviewed on 10/24/2018 09:10 AM by Ivette Frost        Communicable Diseases (eg STDs):     Last Reviewed on 10/24/2018 09:10 AM by Ivette Frost        Immunizations:     None        Allergies:     Last Reviewed on 10/19/2020 11:33 AM by Bronwyn Matta    No Known Allergies.        Current Medications:     Last Reviewed on 10/19/2020 11:33  AM by Bronwyn Matta    allopurinoL 100 mg oral tablet [Take 1 tablet by mouth once daily]    Glucophage  mg oral Tablet, Extended Release 24 hr [Take 4 tablet(s) by mouth daily]    Glucose Reagent Blood Test Strips  Reagent Strips [Check 1-2 times daily using One Touch Ultra 2 Meter, DX: E11.9]    lisinopriL 20 mg oral tablet [Take 1 tablet by mouth twice daily]    ASA  81 MG QD     glimepiride 2 mg oral tablet [TAKE 1  and 1/2 TABLET BY MOUTH ONCE DAILY IN THE MORNING]    Simvastatin 40 mg oral tablet [Take 1 tablet(s) by mouth daily]    vitamin d     bee pollen 1 tsp daily      metoprolol succinate 25mg Tablets, Extended Release [1 tab PO twice daily]    WARFARIN 1MG (ONE)    TAB  [TAKE 1 TABLET BY MOUTH ONCE DAILY AS DIRECTED]        Objective:        Vitals:         Current: 10/19/2020 11:11:54 AM    Ht:  5 ft, 10 in;  Wt: 235.2 lbs;  BMI: 33.7T: 96.8 F (temporal);  BP: 151/73 mm Hg (right arm, sitting);  P: 94 bpm (right arm (BP Cuff), sitting);  sCr: 1.07 mg/dL;  GFR: 67.17        Exams:     PHYSICAL EXAM:     GENERAL: Vitals recorded well developed, well nourished;  no apparent distress;     EYES: extraocular movements intact; conjunctiva and cornea are normal; PERRLA;     E/N/T: EARS: both TMs are have fluid behind them;  NOSE: nasal mucosa is erythematous;  OROPHARYNX:  normal mucosa, dentition, gingiva, and posterior pharynx;     RESPIRATORY: normal appearance and symmetric expansion of chest wall; normal respiratory rate and pattern with no distress; rhonchi heard in the right mid-lung field;  expiratory wheezes in the bases;     CARDIOVASCULAR: normal rate; rhythm is regular;  no systolic murmur; no edema;     GASTROINTESTINAL: nontender; normal bowel sounds;     LYMPHATIC: no enlargement of cervical or facial nodes;     MUSCULOSKELETAL: normal gait;     NEUROLOGIC: mental status: alert and oriented x 3;     PSYCHIATRIC:  appropriate affect and demeanor; normal speech pattern; grossly normal  memory;         Lab/Test Results:         Influenza A and B: Negative (10/19/2020),     Performed by:: tls (10/19/2020),             Assessment:         J18.9   Pneumonia, unspecified organism       R05   Cough       R06.2   Wheezing           ORDERS:         Meds Prescribed:       [New Rx] albuterol sulfate 90 mcg/actuation Inhalation HFA Aerosol Inhaler [inhale 1 - 2 puffs (90 - 180 mcg) by inhalation route every 4 hours as needed], #18 (eighteen) grams, Refills: 0 (zero)       [New Rx] benzonatate 100 mg oral capsule [take 1 capsule (100 mg) by oral route 3 times per day], #15 (fifteen) capsules, Refills: 0 (zero)       [New Rx] azithromycin 250 mg oral tablet [take 2 tablets (500mg) x1 day, then 1 tablet (250mg) daily for 4 days. ], #6 (six) tablets, Refills: 0 (zero)         Radiology/Test Orders:       [id number]  Radiologic exam chest 2 views  (Send-Out)              Lab Orders:       [id number]  Infectious agent antigen detection by immunoassay; Influenza  (In-House)            [id number]  Infectious agent antigen detection by immunoassay; Influenza  (In-House)                      Plan:         Pneumonia, unspecified organismCxray ordered in office. Will prescribe albuterol and cough medication due to wheezing on assessment. Awaiting covid test results. Negative for flu. Discussed supportive care. Patient to call with any new or worsening symptoms or report to the ER. Chest xray displays pneumonia, prescribed antibiotics. Patient to follow up within 1 month for repeat imaging.          Prescriptions:       [New Rx] azithromycin 250 mg oral tablet [take 2 tablets (500mg) x1 day, then 1 tablet (250mg) daily for 4 days. ], #6 (six) tablets, Refills: 0 (zero)         Cough        RADIOLOGY:  I have ordered a chest x-ray (PA and lateral) to be done today.      RECOMMENDATIONS given include: begin oral fluid replacement with a glucose and electrolyte containing solution,  Push Fluids, Rest, Follow up if no improvement or  worsening symptoms like high fevers, vomiting, weakness, or increasing shortness of air.    , Reduce fever with acetaminophen or ibuprofen., and rest.            Prescriptions:       [New Rx] albuterol sulfate 90 mcg/actuation Inhalation HFA Aerosol Inhaler [inhale 1 - 2 puffs (90 - 180 mcg) by inhalation route every 4 hours as needed], #18 (eighteen) grams, Refills: 0 (zero)       [New Rx] benzonatate 100 mg oral capsule [take 1 capsule (100 mg) by oral route 3 times per day], #15 (fifteen) capsules, Refills: 0 (zero)           Orders:       [id number]  Infectious agent antigen detection by immunoassay; Influenza  (In-House)            [id number]  Infectious agent antigen detection by immunoassay; Influenza  (In-House)            [id number]  Radiologic exam chest 2 views  (Send-Out)                Patient Education Handouts:       Cough              Patient Recommendations:        For  Cough:    You should replace fluid losses by drinking frequent, small amounts of a glucose and electrolyte containing solution. There are several commercially available products. Reduce fever as needed with acetaminophen or ibuprofen. Get plenty of rest.              Charge Capture:         Primary Diagnosis:     J18.9  Pneumonia, unspecified organism           Orders:      [id number]  Office/outpatient visit; established patient, level 4  (In-House)              R05  Cough           Orders:      [id number]  Infectious agent antigen detection by immunoassay; Influenza  (In-House)            [id number]  Infectious agent antigen detection by immunoassay; Influenza  (In-House)              R06.2  Wheezing

## 2021-05-26 ENCOUNTER — HOSPITAL ENCOUNTER (OUTPATIENT)
Dept: OTHER | Facility: HOSPITAL | Age: 76
Discharge: HOME | End: 2021-05-26
Attending: INTERNAL MEDICINE

## 2021-05-26 ENCOUNTER — ANTICOAGULATION VISIT (OUTPATIENT)
Dept: CARDIOLOGY | Facility: CLINIC | Age: 76
End: 2021-05-26

## 2021-05-26 DIAGNOSIS — I48.91 ATRIAL FIBRILLATION, UNSPECIFIED TYPE (HCC): Primary | ICD-10-CM

## 2021-05-26 LAB
INR PPP: 2.15 (ref 2–3)
PROTHROMBIN TIME: 22 S (ref 9.4–12)

## 2021-06-11 PROBLEM — I48.91 ATRIAL FIBRILLATION: Status: ACTIVE | Noted: 2021-06-11

## 2021-06-15 RX ORDER — METOPROLOL SUCCINATE 25 MG/1
TABLET, EXTENDED RELEASE ORAL
Qty: 60 TABLET | Refills: 0 | Status: SHIPPED | OUTPATIENT
Start: 2021-06-15 | End: 2021-06-22 | Stop reason: SDUPTHER

## 2021-06-15 NOTE — TELEPHONE ENCOUNTER
Patient last seen 6/3/2020. No further ov. Advised pharmacy that patient needs an appointment to get refills

## 2021-06-18 RX ORDER — WARFARIN SODIUM 4 MG/1
TABLET ORAL
Qty: 30 TABLET | Refills: 0 | Status: SHIPPED | OUTPATIENT
Start: 2021-06-18 | End: 2021-06-30 | Stop reason: SDUPTHER

## 2021-06-18 RX ORDER — WARFARIN SODIUM 4 MG/1
TABLET ORAL
COMMUNITY
Start: 2021-04-05 | End: 2021-06-18 | Stop reason: SDUPTHER

## 2021-06-18 NOTE — TELEPHONE ENCOUNTER
Patient last inr 5/26/2021. Patient was last seen 6/3/2020. No further appointment. Advised patient to make an appointment

## 2021-06-18 NOTE — TELEPHONE ENCOUNTER
Caller: Zachariah Cruz    Relationship: Self    Best call back number: [phone]    Medication needed:   metoprolol succinate XL (TOPROL-XL) 25 MG 24 hr tablet  ALLIPURINOL   When do you need the refill by:ASAP    What additional details did the patient provide when requesting the medication: PATIENT REQUESTED 90 DAY SUPPLY FOR BOTH MEDICATIONS  Does the patient have less than a 3 day supply:  [x] Yes  [] No    What is the patient's preferred pharmacy: [facility] [address] [place] [address] [place] - [facility]HEL [facility]AUHAN  - [phone] [facility] [phone] FX

## 2021-06-21 ENCOUNTER — TELEPHONE (OUTPATIENT)
Dept: FAMILY MEDICINE CLINIC | Age: 76
End: 2021-06-21

## 2021-06-21 NOTE — TELEPHONE ENCOUNTER
Caller: Zachariah Cruz    Relationship to patient: Self    Best call back number: [phone]    Patient is needing: Pt states that he is not receiving refills from pharmacy. He stated that he was told by pharmacy that no refill has been sent. He said that they gave him a limited supply to last until we send refill request to them.

## 2021-06-22 RX ORDER — METFORMIN HYDROCHLORIDE 500 MG/1
TABLET, EXTENDED RELEASE ORAL
Qty: 360 TABLET | Refills: 0 | Status: SHIPPED | OUTPATIENT
Start: 2021-06-22 | End: 2021-09-20

## 2021-06-22 RX ORDER — METOPROLOL SUCCINATE 25 MG/1
25 TABLET, EXTENDED RELEASE ORAL 2 TIMES DAILY
Qty: 180 TABLET | Refills: 0 | Status: SHIPPED | OUTPATIENT
Start: 2021-06-22 | End: 2021-10-14

## 2021-06-22 RX ORDER — ALLOPURINOL 100 MG/1
TABLET ORAL
COMMUNITY
End: 2021-06-25 | Stop reason: SDUPTHER

## 2021-06-22 NOTE — TELEPHONE ENCOUNTER
Pt is also requesting Metoprolol Succinate XL 25mg BID. LV- 05/05/21, LF- 10/29/19, #90 by TIFFANY Frost, LF- 06/15/21, #60 by EPYTON Fuchs at cardiology office. Pt said he wants a #90 day supply sent by you.

## 2021-06-22 NOTE — TELEPHONE ENCOUNTER
LAST OV: 5/5/21  NEXT OV: None  LAST LAB: 4/23/21 DM panel 1    PLEASE SIGN OR ADVISE    Lab Results   Component Value Date    BUN 20 04/23/2021    CREATININE 1.10 04/23/2021    BCR 18 04/23/2021    K 4.8 04/23/2021    CO2 25 04/23/2021    CALCIUM 9.6 04/23/2021    ALBUMIN 4.1 04/23/2021    LABIL2 1.6 04/23/2021    AST 15 04/23/2021    ALT 19 04/23/2021

## 2021-06-22 NOTE — TELEPHONE ENCOUNTER
LAST OV: 5/5/21  NEXT OV: None scheduled  LAST LAB: CBC on 4/23/21    WBC   Date Value Ref Range Status   04/23/2021 8.07 4.80 - 10.80 10*3/uL Final     RBC   Date Value Ref Range Status   04/23/2021 5.02 4.70 - 6.10 10*6/uL Final     Hemoglobin   Date Value Ref Range Status   10/09/2019 13.5 (L) 14.0 - 18.0 g/dL Final     Hgb   Date Value Ref Range Status   04/23/2021 13.10 (L) 14.00 - 18.00 g/dL Final     Hematocrit   Date Value Ref Range Status   04/23/2021 41.9 (L) 42.0 - 52.0 % Final     MCV   Date Value Ref Range Status   04/23/2021 83.5 80.0 - 96.0 fL Final     MCH   Date Value Ref Range Status   04/23/2021 26.1 (L) 27.0 - 31.0 pg Final     MCHC   Date Value Ref Range Status   04/23/2021 31.3 (L) 33.0 - 37.0 Final     RDW   Date Value Ref Range Status   04/23/2021 15.8 (H) 11.5 - 14.5 % Final     RDW-SD   Date Value Ref Range Status   04/23/2021 48.1 NA Final     MPV   Date Value Ref Range Status   04/23/2021 11.4 (H) 7.4 - 10.4 fL Final     Comment:     Testing performed by:  [facility]  CLIA#[id number]  [id number] [facility]. [address]  [place] [address]       Platelets   Date Value Ref Range Status   04/23/2021 277.00 130.00 - 400.00 10*3/uL Final     Neutrophil Rel %   Date Value Ref Range Status   10/09/2019 71.2 30.0 - 85.0 % Final     Lymphocyte Rel %   Date Value Ref Range Status   10/09/2019 17.6 (L) 20.0 - 45.0 % Final     Monocyte Rel %   Date Value Ref Range Status   10/09/2019 6.3 3.0 - 10.0 % Final     Eosinophil Rel %   Date Value Ref Range Status   10/09/2019 4.1 0.0 - 7.0 % Final     Basophil Rel %   Date Value Ref Range Status   10/09/2019 0.4 0.0 - 3.0 % Final     Neutrophils Absolute   Date Value Ref Range Status   10/09/2019 7.26 2.00 - 8.00 10*3/uL Final     Lymphocytes Absolute   Date Value Ref Range Status   10/09/2019 1.80 1.00 - 5.00 10*3/uL Final     Monocytes Absolute   Date Value Ref Range Status   04/23/2021 0.65 0.20 - 1.20 10*3/uL Final     Eosinophils  Absolute   Date Value Ref Range Status   04/23/2021 0.53 0.00 - 0.70 10*3/uL Final     Basophils Absolute   Date Value Ref Range Status   04/23/2021 0.03 0.00 - 0.20 10*3/uL Final     NRBC   Date Value Ref Range Status   10/09/2019 0.00 0.00 - 0.70 % Final         PLEASE SIGN OR ADVISE

## 2021-06-24 ENCOUNTER — TELEPHONE (OUTPATIENT)
Dept: CARDIOLOGY | Facility: CLINIC | Age: 76
End: 2021-06-24

## 2021-06-24 NOTE — TELEPHONE ENCOUNTER
Called pt and reminded him to get his PT/INR checked, he said he is scheduled to get it checked next Wednesday.

## 2021-06-25 NOTE — TELEPHONE ENCOUNTER
Caller: SOY     Relationship: Spouse    Best call back number: [phone]    Medication needed:   Requested Prescriptions     Pending Prescriptions Disp Refills   • allopurinol (ZYLOPRIM) 100 MG tablet         When do you need the refill by:ASAP    What additional details did the patient provide when requesting the medication: PATIENT IS COMPLETELY OUT OF THE MEDICATION AND HAS BEEN TRYING TO RECEIVE A REFILL FOR ALMOST 10 DAYS.      PLEASE CALL PATIENT AND ADVISE IF HE IS NEEDING AN APPOINTMENT TO REFILL.     Does the patient have less than a 3 day supply:  [x] Yes  [] No    What is the patient's preferred pharmacy: [facility] [address] [place] [address], [place] - [address]  - [phone]  - [phone] FX

## 2021-06-26 RX ORDER — ALLOPURINOL 100 MG/1
100 TABLET ORAL DAILY
Qty: 90 TABLET | Refills: 0 | Status: SHIPPED | OUTPATIENT
Start: 2021-06-26 | End: 2021-09-24

## 2021-06-26 RX ORDER — ALLOPURINOL 100 MG/1
100 TABLET ORAL DAILY
Qty: 90 TABLET | Refills: 0 | Status: SHIPPED | OUTPATIENT
Start: 2021-06-26 | End: 2021-06-26 | Stop reason: SDUPTHER

## 2021-06-26 RX ORDER — ALLOPURINOL 100 MG/1
100 TABLET ORAL DAILY
Qty: 30 TABLET | Refills: 0 | Status: SHIPPED | OUTPATIENT
Start: 2021-06-26 | End: 2021-06-26 | Stop reason: SDUPTHER

## 2021-06-26 NOTE — TELEPHONE ENCOUNTER
I sent allopurinol for 30 days, then pt requested 90 days, I resent and told him he is due diabetes lab panel 90 days from last lab

## 2021-06-30 ENCOUNTER — LAB (OUTPATIENT)
Dept: LAB | Facility: HOSPITAL | Age: 76
End: 2021-06-30

## 2021-06-30 ENCOUNTER — OFFICE VISIT (OUTPATIENT)
Dept: CARDIOLOGY | Facility: CLINIC | Age: 76
End: 2021-06-30

## 2021-06-30 ENCOUNTER — TRANSCRIBE ORDERS (OUTPATIENT)
Dept: ADMINISTRATIVE | Facility: HOSPITAL | Age: 76
End: 2021-06-30

## 2021-06-30 ENCOUNTER — ANTICOAGULATION VISIT (OUTPATIENT)
Dept: CARDIOLOGY | Facility: CLINIC | Age: 76
End: 2021-06-30

## 2021-06-30 VITALS
DIASTOLIC BLOOD PRESSURE: 78 MMHG | HEIGHT: 70 IN | SYSTOLIC BLOOD PRESSURE: 150 MMHG | HEART RATE: 64 BPM | WEIGHT: 239 LBS | BODY MASS INDEX: 34.22 KG/M2

## 2021-06-30 DIAGNOSIS — I48.91 ATRIAL FIBRILLATION, UNSPECIFIED TYPE (HCC): Primary | ICD-10-CM

## 2021-06-30 DIAGNOSIS — Z51.81 MONITORING FOR ANTICOAGULANT USE: ICD-10-CM

## 2021-06-30 DIAGNOSIS — Z51.81 MONITORING FOR LONG-TERM ANTICOAGULANT USE: ICD-10-CM

## 2021-06-30 DIAGNOSIS — I25.10 CORONARY ARTERY DISEASE INVOLVING NATIVE CORONARY ARTERY OF NATIVE HEART WITHOUT ANGINA PECTORIS: Primary | ICD-10-CM

## 2021-06-30 DIAGNOSIS — E78.2 MIXED HYPERLIPIDEMIA: ICD-10-CM

## 2021-06-30 DIAGNOSIS — Z79.01 MONITORING FOR LONG-TERM ANTICOAGULANT USE: ICD-10-CM

## 2021-06-30 DIAGNOSIS — Z79.01 MONITORING FOR ANTICOAGULANT USE: ICD-10-CM

## 2021-06-30 DIAGNOSIS — I10 ESSENTIAL HYPERTENSION: ICD-10-CM

## 2021-06-30 DIAGNOSIS — I48.92 PAROXYSMAL ATRIAL FLUTTER (HCC): ICD-10-CM

## 2021-06-30 DIAGNOSIS — I48.91 ATRIAL FIBRILLATION, UNSPECIFIED TYPE (HCC): ICD-10-CM

## 2021-06-30 LAB
INR PPP: 1.89 (ref 2–3)
PROTHROMBIN TIME: 19.4 SECONDS (ref 9.4–12)

## 2021-06-30 PROCEDURE — 99214 OFFICE O/P EST MOD 30 MIN: CPT | Performed by: INTERNAL MEDICINE

## 2021-06-30 PROCEDURE — 36415 COLL VENOUS BLD VENIPUNCTURE: CPT

## 2021-06-30 PROCEDURE — 85610 PROTHROMBIN TIME: CPT

## 2021-06-30 RX ORDER — WARFARIN SODIUM 5 MG/1
5 TABLET ORAL DAILY
COMMUNITY
Start: 2020-12-28 | End: 2021-07-29 | Stop reason: SDUPTHER

## 2021-06-30 RX ORDER — ASPIRIN 81 MG/1
81 TABLET ORAL EVERY OTHER DAY
Status: ON HOLD | COMMUNITY
End: 2023-03-17 | Stop reason: SDUPTHER

## 2021-06-30 RX ORDER — WARFARIN SODIUM 4 MG/1
TABLET ORAL
Qty: 30 TABLET | Refills: 3 | Status: SHIPPED | OUTPATIENT
Start: 2021-06-30 | End: 2021-07-01 | Stop reason: ALTCHOICE

## 2021-06-30 RX ORDER — GLIMEPIRIDE 4 MG/1
4 TABLET ORAL DAILY
COMMUNITY
Start: 2021-05-05 | End: 2021-09-07

## 2021-06-30 RX ORDER — AMLODIPINE BESYLATE 5 MG/1
5 TABLET ORAL DAILY
COMMUNITY
Start: 2021-04-05 | End: 2021-10-22

## 2021-06-30 RX ORDER — SIMVASTATIN 40 MG
40 TABLET ORAL DAILY
COMMUNITY
End: 2021-08-05

## 2021-06-30 RX ORDER — LISINOPRIL 20 MG/1
20 TABLET ORAL 2 TIMES DAILY
COMMUNITY
End: 2021-08-18

## 2021-06-30 NOTE — PROGRESS NOTES
INR - 1.89    Patient taking 4/5mg rotation.  Last checked 5/26/2021 with a result of 2.15  Testing done at [facility].

## 2021-06-30 NOTE — PROGRESS NOTES
CARDIOLOGY FOLLOW-UP PROGRESS NOTE        Chief Complaint  Follow-up, Coronary Artery Disease, and Atrial Flutter    Subjective            Zachariah Cruz presents to [facility] CARDIOLOGY  History of Present Illness    This is a 76-year-old male with coronary disease with previous CABG, hypertension, hyperlipidemia, diabetes mellitus proximal atrial flutter.  He is here for follow-up visit he was last seen in the office in June 2020.  During last visit blood pressure was on the higher side hence the dose of amlodipine was increased.  However patient developed significant swelling of the feet and the medication was cut back to 5 mg once daily.  He checks his blood pressure at home on a regular basis and systolic blood pressure usually between 1 30-1 50.  He has some swelling of the feet which is stable.  He is stopped using compression stockings during summer months.  Denies any chest pain or shortness of breath.  No dizziness or syncopal episodes.  No recent hospitalizations or ER visits.       Past History:    1) Coronary artery disease status post 3-vessel coronary artery bypass grafting on 05/21/2010 by Dr. Garcia at [facility] (LIMA graft to the LAD, SVG graft to OM, SVG graft to posterior descending branch of RCA); 2) Diabetes mellitus; 3) Hypertension; 4) Hyperlipidemia; 5) Gout; 6) Obstructive sleep apnea, on CPAP; 7) Paroxysmal atrial flutter, detected on 24-hour Holter monitor study done in October 2019.     Medical History: has a past medical history of Diabetes (CMS/[facility]), Hyperlipidemia, and Hypertension.     Surgical History: has a past surgical history that includes Appendectomy; Cataract extraction; Coronary artery bypass graft; Prostate surgery; and Coronary artery bypass graft (05/21/2010).     Family History: family history includes Coronary artery disease in his father and another family member; Hypertension in his mother; Stroke in his mother and paternal grandfather.  "    Social History: reports that he has quit smoking. His smoking use included cigarettes. He has never used smokeless tobacco. He reports current alcohol use. Drug use questions deferred to the physician.    Allergies: Patient has no known allergies.    Current Outpatient Medications on File Prior to Visit   Medication Sig   • allopurinol (ZYLOPRIM) 100 MG tablet Take 1 tablet by mouth Daily for 90 days.   • amLODIPine (NORVASC) 5 MG tablet Take 5 mg by mouth Daily.   • aspirin (aspirin) 81 MG EC tablet Take 81 mg by mouth Every Other Day.   • glimepiride (AMARYL) 4 MG tablet Take 4 mg by mouth Daily.   • lisinopril (PRINIVIL,ZESTRIL) 20 MG tablet Take 20 mg by mouth 2 (two) times a day.   • metFORMIN ER (GLUCOPHAGE-XR) 500 MG 24 hr tablet TAKE FOUR TABLETS BY MOUTH DAILY   • metoprolol succinate XL (TOPROL-XL) 25 MG 24 hr tablet Take 1 tablet by mouth 2 (Two) Times a Day for 90 days.   • simvastatin (ZOCOR) 40 MG tablet Take 40 mg by mouth Daily.   • warfarin (COUMADIN) 5 MG tablet Take 5 mg by mouth Daily.   • [DISCONTINUED] warfarin (COUMADIN) 4 MG tablet Take 1 tab once daily or as directed     No current facility-administered medications on file prior to visit.          Review of Systems   Respiratory: Negative for cough, shortness of breath and wheezing.    Cardiovascular: Positive for leg swelling. Negative for chest pain and palpitations.   Gastrointestinal: Negative for nausea and vomiting.   Neurological: Negative for dizziness and syncope.        Objective     /78   Pulse 64   Ht 177.8 cm (70\")   Wt 108 kg (239 lb)   BMI 34.29 kg/m²       Physical Exam  General : Alert, awake, no acute distress  CVS : Regular rate and rhythm, no murmur, rubs or gallops  Lungs: Clear to auscultation bilaterally, no crackles or rhonchi  Abdomen: Soft, nontender, bowel sounds heard in all 4 quadrants  Extremities: Warm, well-perfused, 1+ edema bilaterally, chronic venous stasis changes pigmentation present, " engorged veins noted bilaterally on lower extremities    Result Review :     The following data was reviewed by: Vishal Denton MD on 06/30/2021:    CMP    CMP 11/18/20 12/23/20 4/23/21   Glucose 187 (A) 194 (A) 155 (A)   BUN 18 20 20   Creatinine 1.08 1.35 (A) 1.10   Sodium 144 138 141   Potassium 5.1 4.7 4.8   Chloride 106 103 105   Calcium 9.8 9.9 9.6   Albumin 4.0 3.9 4.1   Total Bilirubin 0.35 0.31 0.32   Alkaline Phosphatase 73 75 77   AST (SGOT) 14 (A) 13 (A) 15   ALT (SGPT) 15 12 19   (A) Abnormal value       Comments are available for some flowsheets but are not being displayed.           CBC    CBC 11/18/20 12/23/20 4/23/21   WBC 6.76 9.07 8.07   RBC 4.69 (A) 4.55 (A) 5.02   Hemoglobin 13.30 (A) 12.60 (A) 13.10 (A)   Hematocrit 41.8 (A) 39.1 (A) 41.9 (A)   MCV 89.1 85.9 83.5   MCH 28.4 27.7 26.1 (A)   MCHC 31.8 (A) 32.2 (A) 31.3 (A)   RDW 16.1 (A) 14.3 15.8 (A)   Platelets 240.00 292.00 277.00   (A) Abnormal value            TSH    TSH 11/18/20   TSH 0.890           Lipid Panel    Lipid Panel 9/14/20 12/23/20 4/23/21   Total Cholesterol 134 125 120   Triglycerides 190 (A) 184 (A) 225 (A)   HDL Cholesterol 33 (A) 33 (A) 32 (A)   VLDL Cholesterol 38 (A) 37 45 (A)   LDL Cholesterol  63 (A) 55 (A) 43 (A)   (A) Abnormal value       Comments are available for some flowsheets but are not being displayed.                       Assessment and Plan        Diagnoses and all orders for this visit:    1. Coronary artery disease involving native coronary artery of native heart without angina pectoris (Primary)    Currently stable with no angina, LV function is preserved.  Continue aspirin statin beta-blocker at the current dose.    2. Paroxysmal atrial flutter (CMS/HCC)    Currently in sinus rhythm.  On warfarin for anticoagulation    3. Mixed hyperlipidemia    LDL at goal.  Continue simvastatin    4. Essential hypertension    Blood pressure reasonably well controlled per office reading and also home blood pressure  log.  He is unable to tolerate higher dose of amlodipine.  Continue amlodipine 5 mg daily along with lisinopril metoprolol.  In the future if blood pressure gets uncontrolled, lisinopril dose may be increased.    Other orders  -     warfarin (COUMADIN) 4 MG tablet; Take 1 tab once daily or as directed  Dispense: 30 tablet; Refill: 3      Follow Up     Return in about 6 months (around 12/30/2021) for Recheck, Next scheduled follow up.    Patient was given instructions and counseling regarding his condition or for health maintenance advice. Please see specific information pulled into the AVS if appropriate.

## 2021-07-01 ENCOUNTER — HOSPITAL ENCOUNTER (OUTPATIENT)
Dept: GENERAL RADIOLOGY | Facility: HOSPITAL | Age: 76
Discharge: HOME | End: 2021-07-01

## 2021-07-01 ENCOUNTER — LAB (OUTPATIENT)
Dept: LAB | Facility: HOSPITAL | Age: 76
End: 2021-07-01

## 2021-07-01 ENCOUNTER — OFFICE VISIT (OUTPATIENT)
Dept: FAMILY MEDICINE CLINIC | Age: 76
End: 2021-07-01

## 2021-07-01 VITALS
TEMPERATURE: 98.5 F | DIASTOLIC BLOOD PRESSURE: 86 MMHG | WEIGHT: 235.8 LBS | SYSTOLIC BLOOD PRESSURE: 142 MMHG | BODY MASS INDEX: 33.76 KG/M2 | HEART RATE: 70 BPM | HEIGHT: 70 IN

## 2021-07-01 VITALS
SYSTOLIC BLOOD PRESSURE: 153 MMHG | DIASTOLIC BLOOD PRESSURE: 73 MMHG | BODY MASS INDEX: 34.36 KG/M2 | WEIGHT: 240 LBS | TEMPERATURE: 98 F | HEART RATE: 70 BPM | HEIGHT: 70 IN

## 2021-07-01 VITALS
TEMPERATURE: 97.2 F | SYSTOLIC BLOOD PRESSURE: 164 MMHG | HEART RATE: 65 BPM | HEIGHT: 70 IN | BODY MASS INDEX: 34.19 KG/M2 | WEIGHT: 238.8 LBS | DIASTOLIC BLOOD PRESSURE: 76 MMHG

## 2021-07-01 VITALS
HEIGHT: 70 IN | TEMPERATURE: 98.4 F | BODY MASS INDEX: 34.07 KG/M2 | WEIGHT: 238 LBS | DIASTOLIC BLOOD PRESSURE: 76 MMHG | HEART RATE: 68 BPM | SYSTOLIC BLOOD PRESSURE: 164 MMHG

## 2021-07-01 VITALS
TEMPERATURE: 98 F | HEART RATE: 72 BPM | DIASTOLIC BLOOD PRESSURE: 85 MMHG | SYSTOLIC BLOOD PRESSURE: 164 MMHG | BODY MASS INDEX: 33.64 KG/M2 | WEIGHT: 235 LBS | HEIGHT: 70 IN

## 2021-07-01 DIAGNOSIS — M79.89 LEFT LEG SWELLING: ICD-10-CM

## 2021-07-01 DIAGNOSIS — M79.605 LEFT LEG PAIN: Primary | ICD-10-CM

## 2021-07-01 DIAGNOSIS — M79.605 LEFT LEG PAIN: ICD-10-CM

## 2021-07-01 PROBLEM — I10 HTN (HYPERTENSION): Status: ACTIVE | Noted: 2021-07-01

## 2021-07-01 PROBLEM — E78.5 HYPERLIPEMIA: Status: ACTIVE | Noted: 2021-07-01

## 2021-07-01 PROBLEM — E11.9 DIABETES MELLITUS (HCC): Status: ACTIVE | Noted: 2021-07-01

## 2021-07-01 LAB — D DIMER PPP FEU-MCNC: <0.17 MG/L (FEU) (ref 0–0.59)

## 2021-07-01 PROCEDURE — 85379 FIBRIN DEGRADATION QUANT: CPT

## 2021-07-01 PROCEDURE — 99213 OFFICE O/P EST LOW 20 MIN: CPT | Performed by: FAMILY MEDICINE

## 2021-07-01 PROCEDURE — 36415 COLL VENOUS BLD VENIPUNCTURE: CPT

## 2021-07-01 PROCEDURE — 73562 X-RAY EXAM OF KNEE 3: CPT

## 2021-07-01 PROCEDURE — 73552 X-RAY EXAM OF FEMUR 2/>: CPT

## 2021-07-01 RX ORDER — TIZANIDINE 4 MG/1
4 TABLET ORAL 2 TIMES DAILY PRN
Qty: 60 TABLET | Refills: 0 | Status: SHIPPED | OUTPATIENT
Start: 2021-07-01 | End: 2022-06-08

## 2021-07-01 NOTE — PROGRESS NOTES
Chief Complaint  Leg Pain (L hip pain radiating down to knee X 2 days)    Subjective          Zachariah Cruz presents to [facility] today for acute complaint of pain starting in the L thigh and radiating down to the knee for the past 2 days.  Occasionally the pain radiates up as well (never reaching the hip itself though).  Pain is sharp.  It has been progressively worsening, especially today.  He is having difficulty waking.  Worst in the lateral L mid-thigh.  Initially he thought it might have been coming from the knee, so he put a brace on but that did not help.  He has tried Tylenol and topical salve, both of which gave him some temporary relief but nothing permanent.  Worse with walking and bearing weight.  Better with sitting down -- as long as he sits still, the pain almost goes away completely.  No swelling or redness or warmth.  No known injury or trauma.      No fevers or chills.  No CP or SOB.  No N/V.  He does have swelling of the LLE which is asymmetrical and mild, but he states this has been longstanding for him.      Current Outpatient Medications:   •  allopurinol (ZYLOPRIM) 100 MG tablet, Take 1 tablet by mouth Daily for 90 days., Disp: 90 tablet, Rfl: 0  •  amLODIPine (NORVASC) 5 MG tablet, Take 5 mg by mouth Daily., Disp: , Rfl:   •  aspirin (aspirin) 81 MG EC tablet, Take 81 mg by mouth Every Other Day., Disp: , Rfl:   •  glimepiride (AMARYL) 4 MG tablet, Take 4 mg by mouth Daily., Disp: , Rfl:   •  lisinopril (PRINIVIL,ZESTRIL) 20 MG tablet, Take 20 mg by mouth 2 (two) times a day., Disp: , Rfl:   •  metFORMIN ER (GLUCOPHAGE-XR) 500 MG 24 hr tablet, TAKE FOUR TABLETS BY MOUTH DAILY, Disp: 360 tablet, Rfl: 0  •  metoprolol succinate XL (TOPROL-XL) 25 MG 24 hr tablet, Take 1 tablet by mouth 2 (Two) Times a Day for 90 days., Disp: 180 tablet, Rfl: 0  •  simvastatin (ZOCOR) 40 MG tablet, Take 40 mg by mouth Daily., Disp: , Rfl:   •  warfarin (COUMADIN) 5 MG  "tablet, Take 5 mg by mouth Daily., Disp: , Rfl:   •  tiZANidine (ZANAFLEX) 4 MG tablet, Take 1 tablet by mouth 2 (Two) Times a Day As Needed for Muscle Spasms., Disp: 60 tablet, Rfl: 0    Allergies:  Patient has no known allergies.      Objective   Vital Signs:   /76 (BP Location: Left arm, Patient Position: Sitting)   Pulse 68   Temp 98.4 °F (36.9 °C) (Oral)   Ht 177.8 cm (70\")   Wt 108 kg (238 lb)   BMI 34.15 kg/m²     Physical Exam  Vitals reviewed.   Constitutional:       General: He is not in acute distress.     Appearance: Normal appearance. He is well-developed.   HENT:      Head: Normocephalic and atraumatic.      Right Ear: External ear normal.      Left Ear: External ear normal.   Eyes:      Extraocular Movements: Extraocular movements intact.      Conjunctiva/sclera: Conjunctivae normal.      Pupils: Pupils are equal, round, and reactive to light.   Cardiovascular:      Rate and Rhythm: Normal rate. Rhythm irregularly irregular.      Heart sounds: No murmur heard.     Pulmonary:      Effort: Pulmonary effort is normal.      Breath sounds: Normal breath sounds. No wheezing, rhonchi or rales.   Abdominal:      General: Bowel sounds are normal. There is no distension.      Palpations: Abdomen is soft.      Tenderness: There is no abdominal tenderness.   Musculoskeletal:         General: Tenderness (Left lateral thigh) present. Normal range of motion.      Left lower leg: Edema (1+ pitting, mild chronic skin changes) present.   Neurological:      Mental Status: He is alert.   Psychiatric:         Mood and Affect: Affect normal.             Assessment and Plan    Diagnoses and all orders for this visit:    1. Left leg pain (Primary)  Assessment & Plan:  Keith presents today with a 2-day acute history of left thigh pain worst in the lateral thigh and radiating primarily down to the knee but also somewhat upwards, although it never quite reaches the left hip.  He has never had anything like this " before and denies injury or trauma to the area at the present time.  I am going to have him start on a muscle relaxer with tizanidine as below and send him down for x-rays of the femur and left knee.  I think would also be prudent at that time to check for a D-dimer to rule out DVT, so I have placed that as well.  Otherwise continue conservative management with alternating ice and heat to the area, massage and gentle stretching.  We will contact him with results when these are available.  I do want him to follow-up in a month or so with his PCP Nelia as he has some other chronic issues that he would like to discuss with her today.    Orders:  -     XR Knee 3 View Left; Future  -     XR Femur 2 View Left; Future  -     tiZANidine (ZANAFLEX) 4 MG tablet; Take 1 tablet by mouth 2 (Two) Times a Day As Needed for Muscle Spasms.  Dispense: 60 tablet; Refill: 0    2. Left leg swelling  -     D-dimer, Quantitative; Future      Follow Up   Return in 4 weeks (on 7/29/2021) for Recheck leg and back pain.  Patient was given instructions and counseling regarding his condition or for health maintenance advice. Please see specific information pulled into the AVS if appropriate.

## 2021-07-01 NOTE — ASSESSMENT & PLAN NOTE
Keith presents today with a 2-day acute history of left thigh pain worst in the lateral thigh and radiating primarily down to the knee but also somewhat upwards, although it never quite reaches the left hip.  He has never had anything like this before and denies injury or trauma to the area at the present time.  I am going to have him start on a muscle relaxer with tizanidine as below and send him down for x-rays of the femur and left knee.  I think would also be prudent at that time to check for a D-dimer to rule out DVT, so I have placed that as well.  Otherwise continue conservative management with alternating ice and heat to the area, massage and gentle stretching.  We will contact him with results when these are available.  I do want him to follow-up in a month or so with his PCP Nelia as he has some other chronic issues that he would like to discuss with her today.

## 2021-07-02 VITALS
HEIGHT: 70 IN | DIASTOLIC BLOOD PRESSURE: 70 MMHG | WEIGHT: 236.4 LBS | TEMPERATURE: 96.9 F | HEART RATE: 61 BPM | BODY MASS INDEX: 33.84 KG/M2 | SYSTOLIC BLOOD PRESSURE: 144 MMHG

## 2021-07-02 VITALS
DIASTOLIC BLOOD PRESSURE: 67 MMHG | HEIGHT: 70 IN | SYSTOLIC BLOOD PRESSURE: 136 MMHG | HEART RATE: 75 BPM | BODY MASS INDEX: 32.16 KG/M2 | TEMPERATURE: 96.2 F | WEIGHT: 224.6 LBS

## 2021-07-02 VITALS
WEIGHT: 235.2 LBS | HEIGHT: 70 IN | SYSTOLIC BLOOD PRESSURE: 151 MMHG | HEART RATE: 94 BPM | BODY MASS INDEX: 33.67 KG/M2 | DIASTOLIC BLOOD PRESSURE: 73 MMHG | TEMPERATURE: 96.8 F

## 2021-07-02 VITALS
HEIGHT: 70 IN | WEIGHT: 240.8 LBS | TEMPERATURE: 97.5 F | BODY MASS INDEX: 34.47 KG/M2 | HEART RATE: 73 BPM | SYSTOLIC BLOOD PRESSURE: 138 MMHG | DIASTOLIC BLOOD PRESSURE: 81 MMHG

## 2021-07-02 VITALS
WEIGHT: 243.8 LBS | TEMPERATURE: 97.1 F | SYSTOLIC BLOOD PRESSURE: 155 MMHG | DIASTOLIC BLOOD PRESSURE: 64 MMHG | BODY MASS INDEX: 34.9 KG/M2 | HEIGHT: 70 IN | HEART RATE: 66 BPM

## 2021-07-02 VITALS
BODY MASS INDEX: 32.93 KG/M2 | DIASTOLIC BLOOD PRESSURE: 68 MMHG | TEMPERATURE: 97.1 F | HEART RATE: 98 BPM | HEIGHT: 70 IN | SYSTOLIC BLOOD PRESSURE: 138 MMHG | WEIGHT: 230 LBS

## 2021-07-02 VITALS — HEIGHT: 70 IN | TEMPERATURE: 98.8 F | BODY MASS INDEX: 34.98 KG/M2

## 2021-07-06 DIAGNOSIS — R60.0 LOCALIZED EDEMA: ICD-10-CM

## 2021-07-06 DIAGNOSIS — M79.89 LEFT LEG SWELLING: Primary | ICD-10-CM

## 2021-07-06 DIAGNOSIS — M79.605 LEFT LEG PAIN: ICD-10-CM

## 2021-07-07 ENCOUNTER — LAB (OUTPATIENT)
Dept: LAB | Facility: HOSPITAL | Age: 76
End: 2021-07-07

## 2021-07-07 ENCOUNTER — TRANSCRIBE ORDERS (OUTPATIENT)
Dept: ADMINISTRATIVE | Facility: HOSPITAL | Age: 76
End: 2021-07-07

## 2021-07-07 ENCOUNTER — ANTICOAGULATION VISIT (OUTPATIENT)
Dept: CARDIOLOGY | Facility: CLINIC | Age: 76
End: 2021-07-07

## 2021-07-07 ENCOUNTER — TELEPHONE (OUTPATIENT)
Dept: FAMILY MEDICINE CLINIC | Age: 76
End: 2021-07-07

## 2021-07-07 DIAGNOSIS — Z79.01 LONG TERM (CURRENT) USE OF ANTICOAGULANTS: Primary | ICD-10-CM

## 2021-07-07 DIAGNOSIS — Z51.81 MONITORING FOR ANTICOAGULANT USE: ICD-10-CM

## 2021-07-07 DIAGNOSIS — Z79.01 MONITORING FOR ANTICOAGULANT USE: ICD-10-CM

## 2021-07-07 DIAGNOSIS — I48.91 ATRIAL FIBRILLATION, UNSPECIFIED TYPE (HCC): Primary | ICD-10-CM

## 2021-07-07 DIAGNOSIS — Z79.01 LONG TERM (CURRENT) USE OF ANTICOAGULANTS: ICD-10-CM

## 2021-07-07 DIAGNOSIS — I48.91 ATRIAL FIBRILLATION, UNSPECIFIED TYPE (HCC): ICD-10-CM

## 2021-07-07 LAB
INR PPP: 2.44 (ref 2–3)
PROTHROMBIN TIME: 24.6 SECONDS (ref 9.4–12)

## 2021-07-07 PROCEDURE — 36415 COLL VENOUS BLD VENIPUNCTURE: CPT

## 2021-07-07 PROCEDURE — 85610 PROTHROMBIN TIME: CPT

## 2021-07-07 NOTE — TELEPHONE ENCOUNTER
Caller: Zachariah Cruz    Relationship: Self    Best call back number: [phone]    Caller requesting test results: PATIENT     What test was performed: ULTRA SOUND     When was the test performed: 7-6-21    Where was the test performed: [facility]     Additional notes:

## 2021-07-07 NOTE — PROGRESS NOTES
Last INR - 1.89 - 6/30/21  Pt was told to take 5mg QD and to recheck in 1 week    Todays INR - 2.44  Range - 2.0-3.0  [facility]

## 2021-07-07 NOTE — TELEPHONE ENCOUNTER
PATIENT CALLED AGAIN TO CHECK ON STATUS TEST RESULTS. PATIENT STATED THAT HE IS IN A LOT OF PAIN. PLEASE ADVISE.

## 2021-07-08 ENCOUNTER — TELEPHONE (OUTPATIENT)
Dept: FAMILY MEDICINE CLINIC | Age: 76
End: 2021-07-08

## 2021-07-08 DIAGNOSIS — M79.605 LEFT LEG PAIN: Primary | ICD-10-CM

## 2021-07-08 NOTE — TELEPHONE ENCOUNTER
Looks like I actually put in an order for Ortho 2 days ago, so they should be working on that.  Thanks, MICHAEL

## 2021-07-08 NOTE — TELEPHONE ENCOUNTER
Pt calling for Doppler results.  States something has to be done.  He can not walk.  Please advise.

## 2021-07-08 NOTE — TELEPHONE ENCOUNTER
His ultrasound came back negative for blood clot.  If he is unable to walk and symptoms are not improving with the treatment we have been trying, then we need to get him in with an orthopedic surgeon for further evaluation and treatment.  Who would he like to see?  We have Dr. Arvizu and Dr. Crawford here in [facility] or I can send him to [facility] or [place] as he desires.  Thanks, MICHAEL

## 2021-07-14 ENCOUNTER — TRANSCRIBE ORDERS (OUTPATIENT)
Dept: ADMINISTRATIVE | Facility: HOSPITAL | Age: 76
End: 2021-07-14

## 2021-07-14 ENCOUNTER — TELEPHONE (OUTPATIENT)
Dept: CARDIOLOGY | Facility: CLINIC | Age: 76
End: 2021-07-14

## 2021-07-14 ENCOUNTER — LAB (OUTPATIENT)
Dept: LAB | Facility: HOSPITAL | Age: 76
End: 2021-07-14

## 2021-07-14 ENCOUNTER — ANTICOAGULATION VISIT (OUTPATIENT)
Dept: CARDIOLOGY | Facility: CLINIC | Age: 76
End: 2021-07-14

## 2021-07-14 DIAGNOSIS — I48.91 ATRIAL FIBRILLATION, UNSPECIFIED TYPE (HCC): Primary | ICD-10-CM

## 2021-07-14 DIAGNOSIS — I48.91 ATRIAL FIBRILLATION, UNSPECIFIED TYPE (HCC): ICD-10-CM

## 2021-07-14 DIAGNOSIS — Z79.01 LONG TERM (CURRENT) USE OF ANTICOAGULANTS: ICD-10-CM

## 2021-07-14 NOTE — TELEPHONE ENCOUNTER
Received VM from patient asking if it was ok for him to take Meloxicam.     Returned call and advised that we would not recommend taking Meloxicam with warfarin. Patient verbalized understanding.

## 2021-07-28 ENCOUNTER — ANTICOAGULATION VISIT (OUTPATIENT)
Dept: CARDIOLOGY | Facility: CLINIC | Age: 76
End: 2021-07-28

## 2021-07-28 ENCOUNTER — LAB (OUTPATIENT)
Dept: LAB | Facility: HOSPITAL | Age: 76
End: 2021-07-28

## 2021-07-28 DIAGNOSIS — Z51.81 MONITORING FOR ANTICOAGULANT USE: ICD-10-CM

## 2021-07-28 DIAGNOSIS — Z79.01 MONITORING FOR ANTICOAGULANT USE: ICD-10-CM

## 2021-07-28 DIAGNOSIS — I48.91 ATRIAL FIBRILLATION, UNSPECIFIED TYPE (HCC): ICD-10-CM

## 2021-07-28 DIAGNOSIS — I48.91 ATRIAL FIBRILLATION, UNSPECIFIED TYPE (HCC): Primary | ICD-10-CM

## 2021-07-28 LAB
INR PPP: 2.64 (ref 2–3)
PROTHROMBIN TIME: 26.7 SECONDS (ref 9.4–12)

## 2021-07-28 PROCEDURE — 36415 COLL VENOUS BLD VENIPUNCTURE: CPT

## 2021-07-28 PROCEDURE — 85610 PROTHROMBIN TIME: CPT

## 2021-07-28 NOTE — PROGRESS NOTES
Last INR - 2.65 - 7/14/2021  Pt was told to told to continue taking 5mg QD and recheck in 2 weeks.    Todays INR - 2.64  Range - 2.0 - 3.0  [facility]

## 2021-07-29 RX ORDER — WARFARIN SODIUM 5 MG/1
5 TABLET ORAL DAILY
Qty: 90 TABLET | Refills: 2 | Status: SHIPPED | OUTPATIENT
Start: 2021-07-29 | End: 2022-07-19 | Stop reason: SDUPTHER

## 2021-07-29 NOTE — PROGRESS NOTES
Called pt and made aware of the new instructions.       Pt stated he needed a refill on warfarin!! I pended it to his pharmacy!

## 2021-08-02 ENCOUNTER — OFFICE VISIT (OUTPATIENT)
Dept: FAMILY MEDICINE CLINIC | Age: 76
End: 2021-08-02

## 2021-08-02 ENCOUNTER — HOSPITAL ENCOUNTER (OUTPATIENT)
Dept: GENERAL RADIOLOGY | Facility: HOSPITAL | Age: 76
Discharge: HOME | End: 2021-08-02

## 2021-08-02 ENCOUNTER — LAB (OUTPATIENT)
Dept: LAB | Facility: HOSPITAL | Age: 76
End: 2021-08-02

## 2021-08-02 VITALS
DIASTOLIC BLOOD PRESSURE: 81 MMHG | SYSTOLIC BLOOD PRESSURE: 167 MMHG | WEIGHT: 222 LBS | BODY MASS INDEX: 31.85 KG/M2 | HEART RATE: 64 BPM | TEMPERATURE: 98.3 F

## 2021-08-02 DIAGNOSIS — M54.42 ACUTE BILATERAL LOW BACK PAIN WITH LEFT-SIDED SCIATICA: ICD-10-CM

## 2021-08-02 DIAGNOSIS — I10 ESSENTIAL HYPERTENSION: Primary | ICD-10-CM

## 2021-08-02 DIAGNOSIS — M79.605 LEFT LEG PAIN: ICD-10-CM

## 2021-08-02 DIAGNOSIS — I10 ESSENTIAL HYPERTENSION: ICD-10-CM

## 2021-08-02 LAB
ANION GAP SERPL CALCULATED.3IONS-SCNC: 8.5 MMOL/L (ref 5–15)
BACTERIA UR QL AUTO: NORMAL /HPF
BILIRUB UR QL STRIP: NEGATIVE
BUN SERPL-MCNC: 17 MG/DL (ref 8–23)
BUN/CREAT SERPL: 16.3 (ref 7–25)
CALCIUM SPEC-SCNC: 10 MG/DL (ref 8.6–10.5)
CHLORIDE SERPL-SCNC: 102 MMOL/L (ref 98–107)
CLARITY UR: CLEAR
CO2 SERPL-SCNC: 27.5 MMOL/L (ref 22–29)
COLOR UR: YELLOW
CREAT SERPL-MCNC: 1.04 MG/DL (ref 0.76–1.27)
GFR SERPL CREATININE-BSD FRML MDRD: 70 ML/MIN/1.73
GLUCOSE SERPL-MCNC: 127 MG/DL (ref 65–99)
GLUCOSE UR STRIP-MCNC: NEGATIVE MG/DL
HGB UR QL STRIP.AUTO: ABNORMAL
KETONES UR QL STRIP: NEGATIVE
LEUKOCYTE ESTERASE UR QL STRIP.AUTO: NEGATIVE
NITRITE UR QL STRIP: NEGATIVE
PH UR STRIP.AUTO: 6 [PH] (ref 5–8)
POTASSIUM SERPL-SCNC: 4.6 MMOL/L (ref 3.5–5.2)
PROT UR QL STRIP: NEGATIVE
RBC # UR: NORMAL /HPF
REF LAB TEST METHOD: NORMAL
SODIUM SERPL-SCNC: 138 MMOL/L (ref 136–145)
SP GR UR STRIP: 1.02 (ref 1–1.03)
SQUAMOUS #/AREA URNS HPF: NORMAL /HPF
URATE SERPL-MCNC: 5 MG/DL (ref 3.4–7)
UROBILINOGEN UR QL STRIP: ABNORMAL
WBC UR QL AUTO: NORMAL /HPF

## 2021-08-02 PROCEDURE — 36415 COLL VENOUS BLD VENIPUNCTURE: CPT

## 2021-08-02 PROCEDURE — 81001 URINALYSIS AUTO W/SCOPE: CPT

## 2021-08-02 PROCEDURE — 84550 ASSAY OF BLOOD/URIC ACID: CPT

## 2021-08-02 PROCEDURE — 99214 OFFICE O/P EST MOD 30 MIN: CPT | Performed by: NURSE PRACTITIONER

## 2021-08-02 PROCEDURE — 80048 BASIC METABOLIC PNL TOTAL CA: CPT

## 2021-08-02 PROCEDURE — 72100 X-RAY EXAM L-S SPINE 2/3 VWS: CPT

## 2021-08-02 RX ORDER — MELOXICAM 15 MG/1
15 TABLET ORAL DAILY
COMMUNITY
Start: 2021-07-14 | End: 2021-08-02

## 2021-08-02 RX ORDER — BLOOD SUGAR DIAGNOSTIC
1 STRIP MISCELLANEOUS 2 TIMES DAILY
COMMUNITY
Start: 2021-07-16 | End: 2022-08-08

## 2021-08-02 NOTE — ASSESSMENT & PLAN NOTE
BP up,  Continue to monitor BP at home. Continue current meds. Continue to modify diet and lifestyle. If BP staying up after pain has improved, let me know

## 2021-08-05 DIAGNOSIS — M54.42 ACUTE BILATERAL LOW BACK PAIN WITH LEFT-SIDED SCIATICA: ICD-10-CM

## 2021-08-05 DIAGNOSIS — M79.605 LEFT LEG PAIN: Primary | ICD-10-CM

## 2021-08-05 RX ORDER — SIMVASTATIN 40 MG
TABLET ORAL
Qty: 90 TABLET | Refills: 0 | Status: SHIPPED | OUTPATIENT
Start: 2021-08-05 | End: 2021-11-01

## 2021-08-18 RX ORDER — LISINOPRIL 20 MG/1
TABLET ORAL
Qty: 180 TABLET | Refills: 0 | Status: SHIPPED | OUTPATIENT
Start: 2021-08-18 | End: 2021-11-15

## 2021-08-25 ENCOUNTER — LAB (OUTPATIENT)
Dept: LAB | Facility: HOSPITAL | Age: 76
End: 2021-08-25

## 2021-08-25 ENCOUNTER — ANTICOAGULATION VISIT (OUTPATIENT)
Dept: CARDIOLOGY | Facility: CLINIC | Age: 76
End: 2021-08-25

## 2021-08-25 DIAGNOSIS — I48.91 ATRIAL FIBRILLATION, UNSPECIFIED TYPE (HCC): ICD-10-CM

## 2021-08-25 DIAGNOSIS — Z79.01 MONITORING FOR ANTICOAGULANT USE: ICD-10-CM

## 2021-08-25 DIAGNOSIS — Z51.81 MONITORING FOR ANTICOAGULANT USE: ICD-10-CM

## 2021-08-25 DIAGNOSIS — I48.91 ATRIAL FIBRILLATION, UNSPECIFIED TYPE (HCC): Primary | ICD-10-CM

## 2021-08-25 LAB
INR PPP: 3.85 (ref 2–3)
PROTHROMBIN TIME: 39.1 SECONDS (ref 9.4–12)

## 2021-08-25 PROCEDURE — 36415 COLL VENOUS BLD VENIPUNCTURE: CPT

## 2021-08-25 PROCEDURE — 85610 PROTHROMBIN TIME: CPT

## 2021-08-25 NOTE — PROGRESS NOTES
Last INR - 2.64 - 7/28/2021  Pt was told to take 5 mg QD and recheck in 4 weeks.    Todays INR - 3.85  Range  - 2.0 - 3.0  [facility]

## 2021-09-01 ENCOUNTER — ANTICOAGULATION VISIT (OUTPATIENT)
Dept: CARDIOLOGY | Facility: CLINIC | Age: 76
End: 2021-09-01

## 2021-09-01 ENCOUNTER — LAB (OUTPATIENT)
Dept: LAB | Facility: HOSPITAL | Age: 76
End: 2021-09-01

## 2021-09-01 DIAGNOSIS — Z79.01 MONITORING FOR ANTICOAGULANT USE: ICD-10-CM

## 2021-09-01 DIAGNOSIS — I48.91 ATRIAL FIBRILLATION, UNSPECIFIED TYPE (HCC): ICD-10-CM

## 2021-09-01 DIAGNOSIS — Z51.81 MONITORING FOR ANTICOAGULANT USE: ICD-10-CM

## 2021-09-01 DIAGNOSIS — I48.91 ATRIAL FIBRILLATION, UNSPECIFIED TYPE (HCC): Primary | ICD-10-CM

## 2021-09-01 LAB
INR PPP: 2.87 (ref 2–3)
PROTHROMBIN TIME: 29.1 SECONDS (ref 9.4–12)

## 2021-09-01 PROCEDURE — 85610 PROTHROMBIN TIME: CPT

## 2021-09-01 PROCEDURE — 36415 COLL VENOUS BLD VENIPUNCTURE: CPT

## 2021-09-01 NOTE — PROGRESS NOTES
INR today is 2.87    Patient is taking 5/5/2.5    Last INR was 3.85 on Aug 25  Dose decreased at that time

## 2021-09-07 RX ORDER — GLIMEPIRIDE 4 MG/1
TABLET ORAL
Qty: 90 TABLET | Refills: 0 | Status: SHIPPED | OUTPATIENT
Start: 2021-09-07 | End: 2021-12-06

## 2021-09-10 ENCOUNTER — LAB (OUTPATIENT)
Dept: LAB | Facility: HOSPITAL | Age: 76
End: 2021-09-10

## 2021-09-10 ENCOUNTER — ANTICOAGULATION VISIT (OUTPATIENT)
Dept: CARDIOLOGY | Facility: CLINIC | Age: 76
End: 2021-09-10

## 2021-09-10 DIAGNOSIS — Z51.81 MONITORING FOR ANTICOAGULANT USE: ICD-10-CM

## 2021-09-10 DIAGNOSIS — I48.91 ATRIAL FIBRILLATION, UNSPECIFIED TYPE (HCC): Primary | ICD-10-CM

## 2021-09-10 DIAGNOSIS — I48.91 ATRIAL FIBRILLATION, UNSPECIFIED TYPE (HCC): ICD-10-CM

## 2021-09-10 DIAGNOSIS — Z79.01 MONITORING FOR ANTICOAGULANT USE: ICD-10-CM

## 2021-09-10 LAB
INR PPP: 2.07 (ref 2–3)
PROTHROMBIN TIME: 21.2 SECONDS (ref 9.4–12)

## 2021-09-10 PROCEDURE — 36415 COLL VENOUS BLD VENIPUNCTURE: CPT

## 2021-09-10 PROCEDURE — 85610 PROTHROMBIN TIME: CPT

## 2021-09-20 RX ORDER — METFORMIN HYDROCHLORIDE 500 MG/1
TABLET, EXTENDED RELEASE ORAL
Qty: 360 TABLET | Refills: 0 | Status: SHIPPED | OUTPATIENT
Start: 2021-09-20 | End: 2021-12-20

## 2021-09-24 ENCOUNTER — LAB (OUTPATIENT)
Dept: LAB | Facility: HOSPITAL | Age: 76
End: 2021-09-24

## 2021-09-24 ENCOUNTER — ANTICOAGULATION VISIT (OUTPATIENT)
Dept: CARDIOLOGY | Facility: CLINIC | Age: 76
End: 2021-09-24

## 2021-09-24 ENCOUNTER — TELEPHONE (OUTPATIENT)
Dept: FAMILY MEDICINE CLINIC | Age: 76
End: 2021-09-24

## 2021-09-24 DIAGNOSIS — Z51.81 MONITORING FOR ANTICOAGULANT USE: ICD-10-CM

## 2021-09-24 DIAGNOSIS — I10 ESSENTIAL HYPERTENSION: Primary | ICD-10-CM

## 2021-09-24 DIAGNOSIS — Z79.01 MONITORING FOR ANTICOAGULANT USE: ICD-10-CM

## 2021-09-24 DIAGNOSIS — I48.91 ATRIAL FIBRILLATION, UNSPECIFIED TYPE (HCC): Primary | ICD-10-CM

## 2021-09-24 DIAGNOSIS — I48.91 ATRIAL FIBRILLATION, UNSPECIFIED TYPE (HCC): ICD-10-CM

## 2021-09-24 DIAGNOSIS — IMO0002 UNCONTROLLED DIABETES MELLITUS WITH COMPLICATIONS: ICD-10-CM

## 2021-09-24 LAB
INR PPP: 2.32 (ref 2–3)
PROTHROMBIN TIME: 23.4 SECONDS (ref 9.4–12)

## 2021-09-24 PROCEDURE — 85610 PROTHROMBIN TIME: CPT

## 2021-09-24 PROCEDURE — 36415 COLL VENOUS BLD VENIPUNCTURE: CPT

## 2021-09-24 NOTE — TELEPHONE ENCOUNTER
Caller: Zachariah Cruz    Relationship: Self    Best call back number: [phone]    What is the best time to reach you: ANYTIME     Who are you requesting to speak with (clinical staff, provider,  specific staff member): CLINICAL    Do you know the name of the person who called: KASHMIR    What was the call regarding: PATIENT WOULD LIKE TO SPEAK TO ALEJANDRINA AGUAYO'S NURSE REGARDING HIS MEDICATION     Do you require a callback: YES

## 2021-09-24 NOTE — PROGRESS NOTES
INR - 2.32    Currently taking 5/5/2.5mg rotation.  Last checked 9/10/2021 with a result of 2.07.  Testing done at [facility].

## 2021-09-24 NOTE — PROGRESS NOTES
Continue current regimen of Coumadin with no changes.    Recheck in 3 weeks.      Electronically signed by Vishal Denton MD, 09/24/21, 4:03 PM EDT.

## 2021-09-26 PROBLEM — IMO0002 UNCONTROLLED DIABETES MELLITUS WITH COMPLICATIONS: Status: ACTIVE | Noted: 2021-09-26

## 2021-10-08 ENCOUNTER — TELEPHONE (OUTPATIENT)
Dept: FAMILY MEDICINE CLINIC | Age: 76
End: 2021-10-08

## 2021-10-08 NOTE — TELEPHONE ENCOUNTER
Caller: Zachariah Cruz    Relationship: Self    Best call back number: [phone]    What orders are you requesting (i.e. lab or imaging) : LABS     In what timeframe would the patient need to come in:  ASAP     Where will you receive your lab/imaging services: [facility]     Additional notes: PATIENT WANTS TO GET SOME LABS DRAWN AND ALSO REQUESTING A PHONE CALL FROM THE OFFICE TO DISCUSS MEDICATION.     PLEASE ADVISE PATIENT.    HUB WAS UNABLE TO WARM TRANSFER TO THE CLINICAL NUMBER.

## 2021-10-08 NOTE — TELEPHONE ENCOUNTER
Look at phone note 9-24-21, he called about rx and labs then, I thought my team nurse already place those

## 2021-10-11 ENCOUNTER — ANTICOAGULATION VISIT (OUTPATIENT)
Dept: CARDIOLOGY | Facility: CLINIC | Age: 76
End: 2021-10-11

## 2021-10-11 ENCOUNTER — LAB (OUTPATIENT)
Dept: LAB | Facility: HOSPITAL | Age: 76
End: 2021-10-11

## 2021-10-11 DIAGNOSIS — I48.91 ATRIAL FIBRILLATION, UNSPECIFIED TYPE (HCC): ICD-10-CM

## 2021-10-11 DIAGNOSIS — I48.91 ATRIAL FIBRILLATION, UNSPECIFIED TYPE (HCC): Primary | ICD-10-CM

## 2021-10-11 DIAGNOSIS — Z79.01 MONITORING FOR ANTICOAGULANT USE: ICD-10-CM

## 2021-10-11 DIAGNOSIS — IMO0002 UNCONTROLLED DIABETES MELLITUS WITH COMPLICATIONS: ICD-10-CM

## 2021-10-11 DIAGNOSIS — Z51.81 MONITORING FOR ANTICOAGULANT USE: ICD-10-CM

## 2021-10-11 DIAGNOSIS — I10 ESSENTIAL HYPERTENSION: ICD-10-CM

## 2021-10-11 LAB
ALBUMIN SERPL-MCNC: 4.4 G/DL (ref 3.5–5.2)
ALBUMIN/GLOB SERPL: 1.8 G/DL
ALP SERPL-CCNC: 77 U/L (ref 39–117)
ALT SERPL W P-5'-P-CCNC: 11 U/L (ref 1–41)
ANION GAP SERPL CALCULATED.3IONS-SCNC: 10 MMOL/L (ref 5–15)
AST SERPL-CCNC: 9 U/L (ref 1–40)
BILIRUB SERPL-MCNC: 0.2 MG/DL (ref 0–1.2)
BUN SERPL-MCNC: 18 MG/DL (ref 8–23)
BUN/CREAT SERPL: 19.8 (ref 7–25)
CALCIUM SPEC-SCNC: 9.4 MG/DL (ref 8.6–10.5)
CHLORIDE SERPL-SCNC: 104 MMOL/L (ref 98–107)
CHOLEST SERPL-MCNC: 139 MG/DL (ref 0–200)
CO2 SERPL-SCNC: 26 MMOL/L (ref 22–29)
CREAT SERPL-MCNC: 0.91 MG/DL (ref 0.76–1.27)
GFR SERPL CREATININE-BSD FRML MDRD: 81 ML/MIN/1.73
GLOBULIN UR ELPH-MCNC: 2.5 GM/DL
GLUCOSE SERPL-MCNC: 111 MG/DL (ref 65–99)
HBA1C MFR BLD: 7.05 % (ref 4.8–5.6)
HDLC SERPL-MCNC: 38 MG/DL (ref 40–60)
INR PPP: 1.93 (ref 2–3)
LDLC SERPL CALC-MCNC: 73 MG/DL (ref 0–100)
LDLC/HDLC SERPL: 1.81 {RATIO}
POTASSIUM SERPL-SCNC: 4.5 MMOL/L (ref 3.5–5.2)
PROT SERPL-MCNC: 6.9 G/DL (ref 6–8.5)
PROTHROMBIN TIME: 19.8 SECONDS (ref 9.4–12)
SODIUM SERPL-SCNC: 140 MMOL/L (ref 136–145)
TRIGL SERPL-MCNC: 161 MG/DL (ref 0–150)
VLDLC SERPL-MCNC: 28 MG/DL (ref 5–40)

## 2021-10-11 PROCEDURE — 83036 HEMOGLOBIN GLYCOSYLATED A1C: CPT

## 2021-10-11 PROCEDURE — 80053 COMPREHEN METABOLIC PANEL: CPT

## 2021-10-11 PROCEDURE — 36415 COLL VENOUS BLD VENIPUNCTURE: CPT

## 2021-10-11 PROCEDURE — 80061 LIPID PANEL: CPT

## 2021-10-11 PROCEDURE — 85610 PROTHROMBIN TIME: CPT

## 2021-10-11 NOTE — PROGRESS NOTES
Last INR - 2.32 - 9/24/21  Pt was told to continue taking 5/5/2.5mg alternating and recheck in 3 weeks.      Todays INR - 1.93  Range - 20. - 3.0  [facility]

## 2021-10-14 RX ORDER — METOPROLOL SUCCINATE 25 MG/1
TABLET, EXTENDED RELEASE ORAL
Qty: 180 TABLET | Refills: 0 | Status: SHIPPED | OUTPATIENT
Start: 2021-10-14 | End: 2022-01-10

## 2021-10-25 ENCOUNTER — ANTICOAGULATION VISIT (OUTPATIENT)
Dept: CARDIOLOGY | Facility: CLINIC | Age: 76
End: 2021-10-25

## 2021-10-25 ENCOUNTER — LAB (OUTPATIENT)
Dept: LAB | Facility: HOSPITAL | Age: 76
End: 2021-10-25

## 2021-10-25 DIAGNOSIS — I48.91 ATRIAL FIBRILLATION, UNSPECIFIED TYPE (HCC): Primary | ICD-10-CM

## 2021-10-25 DIAGNOSIS — I48.91 ATRIAL FIBRILLATION, UNSPECIFIED TYPE (HCC): ICD-10-CM

## 2021-10-25 DIAGNOSIS — Z51.81 MONITORING FOR ANTICOAGULANT USE: ICD-10-CM

## 2021-10-25 DIAGNOSIS — Z79.01 MONITORING FOR ANTICOAGULANT USE: ICD-10-CM

## 2021-10-25 LAB
INR PPP: 1.96 (ref 2–3)
PROTHROMBIN TIME: 19.9 SECONDS (ref 9.4–12)

## 2021-10-25 PROCEDURE — 36415 COLL VENOUS BLD VENIPUNCTURE: CPT

## 2021-10-25 PROCEDURE — 85610 PROTHROMBIN TIME: CPT

## 2021-10-25 RX ORDER — AMLODIPINE BESYLATE 5 MG/1
TABLET ORAL
Qty: 90 TABLET | Refills: 2 | Status: SHIPPED | OUTPATIENT
Start: 2021-10-25 | End: 2021-12-03 | Stop reason: DRUGHIGH

## 2021-10-25 NOTE — PROGRESS NOTES
Last INR - 1.93 - 10/11/21  Pt was told to continue taking 5/5/2.5mg alternating and recheck in 10 days.      Todays INR - 1.96  Range - 2.0 - 3.0   [facility]

## 2021-10-26 NOTE — PROGRESS NOTES
Take 7.5 mg today.      Then continue with the regimen of 5/5/2.5 milligrams pattern.     Recheck in 2 weeks

## 2021-10-27 ENCOUNTER — TELEPHONE (OUTPATIENT)
Dept: FAMILY MEDICINE CLINIC | Age: 76
End: 2021-10-27

## 2021-10-27 ENCOUNTER — OFFICE VISIT (OUTPATIENT)
Dept: FAMILY MEDICINE CLINIC | Age: 76
End: 2021-10-27

## 2021-10-27 VITALS
BODY MASS INDEX: 33.82 KG/M2 | SYSTOLIC BLOOD PRESSURE: 177 MMHG | HEIGHT: 70 IN | DIASTOLIC BLOOD PRESSURE: 73 MMHG | HEART RATE: 66 BPM | WEIGHT: 236.2 LBS

## 2021-10-27 DIAGNOSIS — M54.50 CHRONIC RIGHT-SIDED LOW BACK PAIN WITHOUT SCIATICA: Primary | ICD-10-CM

## 2021-10-27 DIAGNOSIS — M1A.9XX0 CHRONIC GOUT INVOLVING TOE WITHOUT TOPHUS, UNSPECIFIED CAUSE, UNSPECIFIED LATERALITY: ICD-10-CM

## 2021-10-27 DIAGNOSIS — E11.9 TYPE 2 DIABETES MELLITUS WITHOUT COMPLICATION, WITHOUT LONG-TERM CURRENT USE OF INSULIN (HCC): ICD-10-CM

## 2021-10-27 DIAGNOSIS — I48.91 ATRIAL FIBRILLATION, UNSPECIFIED TYPE (HCC): ICD-10-CM

## 2021-10-27 DIAGNOSIS — I10 PRIMARY HYPERTENSION: ICD-10-CM

## 2021-10-27 DIAGNOSIS — Z12.5 SCREENING FOR PROSTATE CANCER: ICD-10-CM

## 2021-10-27 DIAGNOSIS — G89.29 CHRONIC RIGHT-SIDED LOW BACK PAIN WITHOUT SCIATICA: Primary | ICD-10-CM

## 2021-10-27 DIAGNOSIS — Z86.16 PERSONAL HISTORY OF COVID-19: ICD-10-CM

## 2021-10-27 DIAGNOSIS — E78.49 OTHER HYPERLIPIDEMIA: ICD-10-CM

## 2021-10-27 PROBLEM — M12.89 OTHER SPECIFIC ARTHROPATHIES, NOT ELSEWHERE CLASSIFIED, MULTIPLE SITES: Status: ACTIVE | Noted: 2021-10-27

## 2021-10-27 LAB
BACTERIA UR QL AUTO: ABNORMAL /HPF
BILIRUB UR QL STRIP: NEGATIVE
CLARITY UR: CLEAR
COLOR UR: YELLOW
GLUCOSE UR STRIP-MCNC: ABNORMAL MG/DL
HGB UR QL STRIP.AUTO: ABNORMAL
HYALINE CASTS UR QL AUTO: ABNORMAL /LPF
KETONES UR QL STRIP: NEGATIVE
LEUKOCYTE ESTERASE UR QL STRIP.AUTO: NEGATIVE
NITRITE UR QL STRIP: NEGATIVE
PH UR STRIP.AUTO: 6.5 [PH] (ref 5–8)
PROT UR QL STRIP: ABNORMAL
RBC # UR: ABNORMAL /HPF
REF LAB TEST METHOD: ABNORMAL
SP GR UR STRIP: 1.02 (ref 1–1.03)
SQUAMOUS #/AREA URNS HPF: ABNORMAL /HPF
UROBILINOGEN UR QL STRIP: ABNORMAL
WBC UR QL AUTO: ABNORMAL /HPF

## 2021-10-27 PROCEDURE — 99214 OFFICE O/P EST MOD 30 MIN: CPT | Performed by: NURSE PRACTITIONER

## 2021-10-27 PROCEDURE — 81001 URINALYSIS AUTO W/SCOPE: CPT | Performed by: NURSE PRACTITIONER

## 2021-10-27 RX ORDER — ALLOPURINOL 100 MG/1
1 TABLET ORAL DAILY
COMMUNITY
Start: 2021-10-19 | End: 2021-12-29

## 2021-10-27 NOTE — ASSESSMENT & PLAN NOTE
Continue his beta blocker and ACE, Increase his norvasc to 10 mg and see how his BP does, discuss with cardiology if not improving

## 2021-10-27 NOTE — ASSESSMENT & PLAN NOTE
Reviewed last lumbar x-ray, check a u/a today / he would like a lift chair, will check on coverage of this

## 2021-10-27 NOTE — TELEPHONE ENCOUNTER
Left message for Elsiha Scott at [facility] in [place] to inquire about a Lift Chair for a pt. [phone].  Dx: Arthropathies multiple sites- M12.89.

## 2021-10-27 NOTE — PROGRESS NOTES
Zachariah Cruz presents to [facility].    Chief Complaint:  Follow-up (Would like to discuss lab results) and Hypertension (Reports readings have been elevated)         History of Present Illness:  Hypertension:  Current medication metoprolol, 25mg  BID norvasc 5 and lisinopril 20 BID   Tolerating Medication: Yes/ sees [facility] cardiology that comes here   Checking BP at home and it is 140-150 now 160 -180  Needs refills: No  Labs   Lab Results       Component                Value               Date                       GLUCOSE                  111 (H)             10/11/2021                 BUN                      18                  10/11/2021                 CREATININE               0.91                10/11/2021                 EGFRIFNONA               81                  10/11/2021                 BCR                      19.8                10/11/2021                 K                        4.5                 10/11/2021                 CO2                      26.0                10/11/2021                 CALCIUM                  9.4                 10/11/2021                 ALBUMIN                  4.40                10/11/2021                 LABIL2                   1.6                 04/23/2021                 AST                      9                   10/11/2021                 ALT                      11                  10/11/2021           Hyperlipidemia  Current medication zocor  Tolerating medication: Yes  Needs Refill: No    Lab Results       Component                Value               Date                       CHOL                     139                 10/11/2021                 CHLPL                    120                 04/23/2021                 TRIG                     161 (H)             10/11/2021                 HDL                      38 (L)              10/11/2021                 LDL                      73                  10/11/2021         "        Diabetes:  Current medication metformin, amayrl  Tolerating medication: Yes  At home BS ranges:   Lab Results       Component                Value               Date                       HGBA1C                   7.05 (H)            10/11/2021              Joint pain:   History of Gout;  2-3 months  flare of joint pain, right low back, worse in hard chair sitting, left thigh pain, then knee swelling and lower leg, then saw belinda/got in inj, then saw Olman at PT /eases with a bowel movement or urination / fell two to three weeks ago at home and hit his right elbow and low back, walks with out difficulty   associated symptoms: no numbness   Treatment tried:allopurinol /injection  Previous ortho : Duber   Dg testing : lumbar x-ray in 8-2021  Would like an order for an lift chair     Symptoms seem  Worse since having covid/fatigue, joints and ringing in his ears   Oxygen runs 94-98                   Review of Systems:  Review of Systems   Constitutional: Negative for fatigue and fever.   HENT: Positive for tinnitus (bilaterally ).    Eyes:        Left eye cataract needs surgery    Respiratory: Negative for cough and shortness of breath.    Cardiovascular: Positive for leg swelling (Left greater than right ). Negative for chest pain and palpitations.   Genitourinary: Negative for hematuria (history of renal stones ).   Neurological: Negative for numbness.          Vital Signs:   /73 (BP Location: Right arm, Patient Position: Sitting, Cuff Size: Large Adult)   Pulse 66   Ht 177.8 cm (70\")   Wt 107 kg (236 lb 3.2 oz)   BMI 33.89 kg/m²       Physical Exam:  Physical Exam  Vitals reviewed.   Constitutional:       General: He is not in acute distress.     Appearance: Normal appearance.   Neck:      Vascular: No carotid bruit.   Cardiovascular:      Rate and Rhythm: Normal rate and regular rhythm.      Heart sounds: Normal heart sounds. No murmur heard.      Pulmonary:      Effort: Pulmonary effort is " normal. No respiratory distress.      Breath sounds: Normal breath sounds.   Abdominal:      Tenderness: There is no right CVA tenderness or left CVA tenderness.   Musculoskeletal:      Right lower leg: Edema (trace) present.      Left lower leg: Edema (1+ ) present.      Comments: Full ROM of back    Neurological:      Mental Status: He is alert.   Psychiatric:         Mood and Affect: Mood normal.         Behavior: Behavior normal.         Result Review      The following data was reviewed by: JACIEL Tovar on 10/27/2021:    Results for orders placed or performed in visit on 10/27/21   Urinalysis With Culture If Indicated - Urine, Clean Catch    Specimen: Urine, Clean Catch   Result Value Ref Range    Color, UA Yellow Yellow, Straw    Appearance, UA Clear Clear    pH, UA 6.5 5.0 - 8.0    Specific Gravity, UA 1.025 1.005 - 1.030    Glucose,  mg/dL (1+) (A) Negative    Ketones, UA Negative Negative    Bilirubin, UA Negative Negative    Blood, UA Trace (A) Negative    Protein, UA Trace (A) Negative    Leuk Esterase, UA Negative Negative    Nitrite, UA Negative Negative    Urobilinogen, UA 1.0 E.U./dL 0.2 - 1.0 E.U./dL   Urinalysis, Microscopic Only - Urine, Clean Catch    Specimen: Urine, Clean Catch   Result Value Ref Range    RBC, UA 0-2 (A) None Seen /HPF    WBC, UA 0-2 (A) None Seen /HPF    Bacteria, UA None Seen None Seen /HPF    Squamous Epithelial Cells, UA None Seen None Seen, 0-2 /HPF    Hyaline Casts, UA None Seen None Seen /LPF    Methodology Manual Light Microscopy                Assessment and Plan:          Diagnoses and all orders for this visit:    1. Chronic right-sided low back pain without sciatica (Primary)  Assessment & Plan:  Reviewed last lumbar x-ray, check a u/a today / he would like a lift chair, will check on coverage of this     Orders:  -     Urinalysis With Culture If Indicated -; Future  -     Urinalysis With Culture If Indicated - Urine, Clean Catch  -      Urinalysis, Microscopic Only - Urine, Clean Catch    2. Primary hypertension  Assessment & Plan:  Continue his beta blocker and ACE, Increase his norvasc to 10 mg and see how his BP does, discuss with cardiology if not improving       3. Type 2 diabetes mellitus without complication, without long-term current use of insulin (HCC)  Assessment & Plan:  Reviewed labs with pt, a1C has improved, continue current rx's and efforts with diet       4. Screening for prostate cancer  Assessment & Plan:  Due PSA next month, worried about cancer with his back pain        5. Atrial fibrillation, unspecified type (HCC)  Assessment & Plan:  Continue to follow with cardiology       6. Other hyperlipidemia  Assessment & Plan:  Continue current medication and efforts with diet and exercise.         7. 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 Chronic gout involving toe without tophus, unspecified cause, unspecified laterality  Assessment & Plan:  Continue allopurinol           Follow Up   Return for in  for PSA , followup pending lab results.  Patient was given instructions and counseling regarding his condition or for health maintenance advice. Please see specific information pulled into the AVS if appropriate.

## 2021-10-27 NOTE — TELEPHONE ENCOUNTER
----- Message from JACIEL Tovar sent at 10/27/2021  1:52 PM EDT -----  He asked me after he left, he texted me. He wants a lift chair, can you send an order for one to supply place,

## 2021-10-29 ENCOUNTER — TELEPHONE (OUTPATIENT)
Dept: FAMILY MEDICINE CLINIC | Age: 76
End: 2021-10-29

## 2021-10-29 NOTE — TELEPHONE ENCOUNTER
:SEA STATED SHE WAS RETURNING PRASHANTH'S CALL,  THAT SHE SPOKE WITH PATIENT AND HE IS LOOKING INTO GETTING A LIFT CHAIR AND HE WILL LOOK INTO THIS NEXT WEEK, SHE WILL SEND ALL INFO REQUIRED TO PRACTICE FAX NUMBER IF HE DECIDES TO GET A CHAIR AND THAT THERE IS NOTHING THAT PRACTICE NEEDS TO DO AT THIS TIME

## 2021-10-29 NOTE — TELEPHONE ENCOUNTER
Hub staff attempted to follow warm transfer process and was unsuccessful     Caller: SEA     Relationship to patient: [facility]    Best call back number: . [phone]    Patient is needing:SEA STATED SHE WAS RETURNING PRASHANTH'S CALL,  THAT SHE SPOKE WITH PATIENT AND HE IS LOOKING INTO GETTING A LIFT CHAIR AND HE WILL LOOK INTO THIS NEXT WEEK, SHE WILL SEND ALL INFO REQUIRED TO PRACTICE FAX NUMBER IF HE DECIDES TO GET A CHAIR AND THAT THERE IS NOTHING THAT PRACTICE NEEDS TO DO AT THIS TIME

## 2021-10-30 NOTE — TELEPHONE ENCOUNTER
I authorized and order for him to get a lift chair.  Are you asking me anything or telling me something here?

## 2021-11-01 RX ORDER — SIMVASTATIN 40 MG
TABLET ORAL
Qty: 90 TABLET | Refills: 0 | Status: SHIPPED | OUTPATIENT
Start: 2021-11-01 | End: 2022-01-28

## 2021-11-08 ENCOUNTER — ANTICOAGULATION VISIT (OUTPATIENT)
Dept: CARDIOLOGY | Facility: CLINIC | Age: 76
End: 2021-11-08

## 2021-11-08 ENCOUNTER — LAB (OUTPATIENT)
Dept: LAB | Facility: HOSPITAL | Age: 76
End: 2021-11-08

## 2021-11-08 DIAGNOSIS — I48.91 ATRIAL FIBRILLATION, UNSPECIFIED TYPE (HCC): Primary | ICD-10-CM

## 2021-11-08 DIAGNOSIS — Z79.01 MONITORING FOR ANTICOAGULANT USE: ICD-10-CM

## 2021-11-08 DIAGNOSIS — I48.91 ATRIAL FIBRILLATION, UNSPECIFIED TYPE (HCC): ICD-10-CM

## 2021-11-08 DIAGNOSIS — Z51.81 MONITORING FOR ANTICOAGULANT USE: ICD-10-CM

## 2021-11-08 LAB
INR PPP: 1.86 (ref 2–3)
PROTHROMBIN TIME: 18.2 SECONDS (ref 9.4–12)

## 2021-11-08 PROCEDURE — 36415 COLL VENOUS BLD VENIPUNCTURE: CPT

## 2021-11-08 PROCEDURE — 85610 PROTHROMBIN TIME: CPT

## 2021-11-08 NOTE — PROGRESS NOTES
Last INR - 1.96 - 10/25/2021  Pt was told to Take 7.5 mg that day, then continue with the regimen of 5/5/2.5mg and recheck in 2 weeks    Todays INR - 1.86  Range - 2.0 - 3.0  [facility]

## 2021-11-15 RX ORDER — LISINOPRIL 20 MG/1
TABLET ORAL
Qty: 180 TABLET | Refills: 0 | Status: SHIPPED | OUTPATIENT
Start: 2021-11-15 | End: 2022-02-11

## 2021-11-16 ENCOUNTER — ANTICOAGULATION VISIT (OUTPATIENT)
Dept: CARDIOLOGY | Facility: CLINIC | Age: 76
End: 2021-11-16

## 2021-11-16 ENCOUNTER — LAB (OUTPATIENT)
Dept: LAB | Facility: HOSPITAL | Age: 76
End: 2021-11-16

## 2021-11-16 DIAGNOSIS — I48.91 ATRIAL FIBRILLATION, UNSPECIFIED TYPE (HCC): Primary | ICD-10-CM

## 2021-11-16 DIAGNOSIS — I48.91 ATRIAL FIBRILLATION, UNSPECIFIED TYPE (HCC): ICD-10-CM

## 2021-11-16 DIAGNOSIS — Z79.01 MONITORING FOR ANTICOAGULANT USE: ICD-10-CM

## 2021-11-16 DIAGNOSIS — Z51.81 MONITORING FOR ANTICOAGULANT USE: ICD-10-CM

## 2021-11-16 LAB
INR PPP: 3.53 (ref 2–3)
PROTHROMBIN TIME: 34.1 SECONDS (ref 9.4–12)

## 2021-11-16 PROCEDURE — 36415 COLL VENOUS BLD VENIPUNCTURE: CPT

## 2021-11-16 PROCEDURE — 85610 PROTHROMBIN TIME: CPT

## 2021-11-16 NOTE — PROGRESS NOTES
Last INR - 1.86 - 11/8/2021  Pt was told to take 7.5 mgs that day and then 5 mg every day and check in 1 week    Todays INR - 3.53  Range - 2.0 - 3.0  [facility]

## 2021-11-16 NOTE — PROGRESS NOTES
PT/INR keeps .  Does he drink alcohol?  If so how much and how often?  Hold x1 day and then 5 mg every day and check in 1 week

## 2021-11-17 ENCOUNTER — TELEPHONE (OUTPATIENT)
Dept: FAMILY MEDICINE CLINIC | Age: 76
End: 2021-11-17

## 2021-11-17 ENCOUNTER — LAB (OUTPATIENT)
Dept: LAB | Facility: HOSPITAL | Age: 76
End: 2021-11-17

## 2021-11-17 ENCOUNTER — TELEPHONE (OUTPATIENT)
Dept: CARDIOLOGY | Facility: CLINIC | Age: 76
End: 2021-11-17

## 2021-11-17 ENCOUNTER — OFFICE VISIT (OUTPATIENT)
Dept: FAMILY MEDICINE CLINIC | Age: 76
End: 2021-11-17

## 2021-11-17 VITALS
WEIGHT: 238.4 LBS | TEMPERATURE: 98.4 F | SYSTOLIC BLOOD PRESSURE: 143 MMHG | BODY MASS INDEX: 34.13 KG/M2 | DIASTOLIC BLOOD PRESSURE: 72 MMHG | HEIGHT: 70 IN | HEART RATE: 72 BPM

## 2021-11-17 DIAGNOSIS — R31.0 GROSS HEMATURIA: Primary | ICD-10-CM

## 2021-11-17 DIAGNOSIS — R79.9 ABNORMAL FINDING OF BLOOD CHEMISTRY, UNSPECIFIED: ICD-10-CM

## 2021-11-17 DIAGNOSIS — R31.0 GROSS HEMATURIA: ICD-10-CM

## 2021-11-17 LAB
BILIRUB BLD-MCNC: NEGATIVE MG/DL
CLARITY, POC: ABNORMAL
COLOR UR: ABNORMAL
DEPRECATED RDW RBC AUTO: 45.5 FL (ref 37–54)
ERYTHROCYTE [DISTWIDTH] IN BLOOD BY AUTOMATED COUNT: 14.4 % (ref 12.3–15.4)
EXPIRATION DATE: ABNORMAL
GLUCOSE UR STRIP-MCNC: ABNORMAL MG/DL
HCT VFR BLD AUTO: 39.7 % (ref 37.5–51)
HGB BLD-MCNC: 12.8 G/DL (ref 13–17.7)
IRON 24H UR-MRATE: 59 MCG/DL (ref 59–158)
IRON SATN MFR SERPL: 13 % (ref 20–50)
KETONES UR QL: NEGATIVE
LEUKOCYTE EST, POC: NEGATIVE
Lab: ABNORMAL
MCH RBC QN AUTO: 27.4 PG (ref 26.6–33)
MCHC RBC AUTO-ENTMCNC: 32.2 G/DL (ref 31.5–35.7)
MCV RBC AUTO: 84.8 FL (ref 79–97)
NITRITE UR-MCNC: NEGATIVE MG/ML
PH UR: 5.5 [PH] (ref 5–8)
PLATELET # BLD AUTO: 278 10*3/MM3 (ref 140–450)
PMV BLD AUTO: 10.1 FL (ref 6–12)
PROT UR STRIP-MCNC: ABNORMAL MG/DL
RBC # BLD AUTO: 4.68 10*6/MM3 (ref 4.14–5.8)
RBC # UR STRIP: ABNORMAL /UL
SP GR UR: 1.02 (ref 1–1.03)
TIBC SERPL-MCNC: 465 MCG/DL (ref 298–536)
TRANSFERRIN SERPL-MCNC: 312 MG/DL (ref 200–360)
UROBILINOGEN UR QL: NORMAL
WBC NRBC COR # BLD: 9.47 10*3/MM3 (ref 3.4–10.8)

## 2021-11-17 PROCEDURE — 36415 COLL VENOUS BLD VENIPUNCTURE: CPT

## 2021-11-17 PROCEDURE — 85027 COMPLETE CBC AUTOMATED: CPT

## 2021-11-17 PROCEDURE — G0103 PSA SCREENING: HCPCS | Performed by: NURSE PRACTITIONER

## 2021-11-17 PROCEDURE — 87086 URINE CULTURE/COLONY COUNT: CPT | Performed by: NURSE PRACTITIONER

## 2021-11-17 PROCEDURE — 84466 ASSAY OF TRANSFERRIN: CPT

## 2021-11-17 PROCEDURE — 81003 URINALYSIS AUTO W/O SCOPE: CPT | Performed by: NURSE PRACTITIONER

## 2021-11-17 PROCEDURE — 83540 ASSAY OF IRON: CPT

## 2021-11-17 PROCEDURE — 99213 OFFICE O/P EST LOW 20 MIN: CPT | Performed by: NURSE PRACTITIONER

## 2021-11-17 NOTE — TELEPHONE ENCOUNTER
Caller: Zachariah Cruz    Relationship: Self    Best call back number: [phone]     What is the best time to reach you: ANYTIME    Who are you requesting to speak with (clinical staff, provider,  specific staff member): CLINICAL    Do you know the name of the person who called: KASHMIR    What was the call regarding: PATIENT WANTS TO SPEAK WITH HIS PCP'S NURSE. WOULD NOT DISCLOSE WHAT ABOUT.     Do you require a callback: YES

## 2021-11-17 NOTE — TELEPHONE ENCOUNTER
Recommend to hold warfarin tonight as well due to blood in urine.  Restart 5 mg from tomorrow      Electronically signed by Vishal Denton MD, 11/17/21, 12:48 PM EST.

## 2021-11-17 NOTE — ASSESSMENT & PLAN NOTE
Will order CT abdomen renal stone protocol and refer to urology.  Will check CBC and iron profile.  Last INR yesterday was 3.53.  Pt has been showing slight anemia for a couple of years.

## 2021-11-17 NOTE — TELEPHONE ENCOUNTER
Received VM from patient.    Returned call. Patient stated that he started urinating blood last night but it has cleared up this morning. Stated that it was pink tinged but then progressed to bright red blood. Denied clots.     Patient asking if should restart warfarin at 5 mg today. Was instructed to hold last night for INR 3.53.

## 2021-11-17 NOTE — PROGRESS NOTES
"Chief Complaint  Blood in urine    Subjective          Zachariah Cruz presents to [facility]  Had TURP around 2008.  Has had h/o kidney stones.  He is under coumadin therapy. Last INR was 3.53 yesterday.  Former smoker; quitting in the 1960s.    Blood in Urine  This is a new problem. The current episode started yesterday. The problem is unchanged. He describes the hematuria as gross hematuria. The hematuria occurs throughout his entire urinary stream. He reports no clotting in his urine stream. He is experiencing no pain. Urine color: red. Irritative symptoms do not include frequency, nocturia or urgency. Obstructive symptoms include a weak stream (occasoinal). Associated symptoms include flank pain. Pertinent negatives include no abdominal pain, chills, dysuria, fever, genital pain, inability to urinate, nausea or vomiting. His past medical history is significant for BPH and kidney stones. There is no history of prostatitis.       Objective   Vital Signs:   /72 (BP Location: Left arm, Patient Position: Sitting)   Pulse 72   Temp 98.4 °F (36.9 °C) (Oral)   Ht 177.8 cm (70\")   Wt 108 kg (238 lb 6.4 oz)   BMI 34.21 kg/m²     Physical Exam  Constitutional:       General: He is not in acute distress.     Appearance: Normal appearance. He is normal weight.   HENT:      Head: Normocephalic.   Eyes:      Pupils: Pupils are equal, round, and reactive to light.      Visual Fields: Right eye visual fields normal and left eye visual fields normal.   Neck:      Trachea: Trachea normal.   Cardiovascular:      Rate and Rhythm: Normal rate and regular rhythm.      Heart sounds: Normal heart sounds.   Pulmonary:      Effort: Pulmonary effort is normal.      Breath sounds: Normal breath sounds and air entry.   Abdominal:      Tenderness: There is no right CVA tenderness or left CVA tenderness.   Musculoskeletal:      Right lower leg: No edema.      Left lower leg: No edema.   Skin:    "  General: Skin is warm and dry.   Neurological:      Mental Status: He is alert and oriented to person, place, and time.   Psychiatric:         Mood and Affect: Mood and affect normal.         Behavior: Behavior normal.         Thought Content: Thought content normal.        Result Review :   The following data was reviewed by: JACIEL Hickman on 11/17/2021:                  Assessment and Plan    Diagnoses and all orders for this visit:    1. Gross hematuria (Primary)  Assessment & Plan:  Will order CT abdomen renal stone protocol and refer to urology.  Will check CBC and iron profile.  Last INR yesterday was 3.53.  Pt has been showing slight anemia for a couple of years.    Orders:  -     POCT urinalysis dipstick, automated  -     Cancel: Urine Culture - Urine, Urine, Clean Catch; Future  -     Urine Culture - Urine, Urine, Clean Catch  -     CT Abdomen Pelvis Stone Protocol; Future  -     Ambulatory Referral to Urology  -     CBC (No Diff); Future  -     Iron Profile; Future    2. Abnormal finding of blood chemistry, unspecified   -     Iron Profile; Future        Follow Up   Return if symptoms worsen or fail to improve.  Patient was given instructions and counseling regarding his condition or for health maintenance advice. Please see specific information pulled into the AVS if appropriate.

## 2021-11-18 DIAGNOSIS — Z12.5 SCREENING FOR PROSTATE CANCER: Primary | ICD-10-CM

## 2021-11-18 LAB
BACTERIA SPEC AEROBE CULT: NO GROWTH
PSA SERPL-MCNC: 1.51 NG/ML (ref 0–4)

## 2021-11-24 ENCOUNTER — LAB (OUTPATIENT)
Dept: LAB | Facility: HOSPITAL | Age: 76
End: 2021-11-24

## 2021-11-24 ENCOUNTER — ANTICOAGULATION VISIT (OUTPATIENT)
Dept: CARDIOLOGY | Facility: CLINIC | Age: 76
End: 2021-11-24

## 2021-11-24 DIAGNOSIS — Z51.81 MONITORING FOR ANTICOAGULANT USE: ICD-10-CM

## 2021-11-24 DIAGNOSIS — Z79.01 MONITORING FOR ANTICOAGULANT USE: ICD-10-CM

## 2021-11-24 DIAGNOSIS — I48.91 ATRIAL FIBRILLATION, UNSPECIFIED TYPE (HCC): ICD-10-CM

## 2021-11-24 DIAGNOSIS — I48.91 ATRIAL FIBRILLATION, UNSPECIFIED TYPE (HCC): Primary | ICD-10-CM

## 2021-11-24 LAB
INR PPP: 1.61 (ref 2–3)
PROTHROMBIN TIME: 15.9 SECONDS (ref 9.4–12)

## 2021-11-24 PROCEDURE — 85610 PROTHROMBIN TIME: CPT

## 2021-11-24 PROCEDURE — 36415 COLL VENOUS BLD VENIPUNCTURE: CPT

## 2021-11-24 NOTE — PROGRESS NOTES
Last INR - 3.53 - 11/16/2021  Pt was told to Hold x1 day and then 5 mg every day and check in 1 week    Todays INR - 1.61  Range - 2.0 - 3.0  [facility]

## 2021-11-29 ENCOUNTER — LAB (OUTPATIENT)
Dept: LAB | Facility: HOSPITAL | Age: 76
End: 2021-11-29

## 2021-11-29 ENCOUNTER — ANTICOAGULATION VISIT (OUTPATIENT)
Dept: CARDIOLOGY | Facility: CLINIC | Age: 76
End: 2021-11-29

## 2021-11-29 ENCOUNTER — TELEPHONE (OUTPATIENT)
Dept: UROLOGY | Facility: CLINIC | Age: 76
End: 2021-11-29

## 2021-11-29 DIAGNOSIS — I48.91 ATRIAL FIBRILLATION, UNSPECIFIED TYPE (HCC): ICD-10-CM

## 2021-11-29 DIAGNOSIS — Z51.81 MONITORING FOR ANTICOAGULANT USE: ICD-10-CM

## 2021-11-29 DIAGNOSIS — Z79.01 MONITORING FOR ANTICOAGULANT USE: ICD-10-CM

## 2021-11-29 DIAGNOSIS — I48.91 ATRIAL FIBRILLATION, UNSPECIFIED TYPE (HCC): Primary | ICD-10-CM

## 2021-11-29 LAB
INR PPP: 3.02 (ref 2–3)
PROTHROMBIN TIME: 29.2 SECONDS (ref 9.4–12)

## 2021-11-29 PROCEDURE — 36415 COLL VENOUS BLD VENIPUNCTURE: CPT

## 2021-11-29 PROCEDURE — 85610 PROTHROMBIN TIME: CPT

## 2021-11-29 RX ORDER — WARFARIN SODIUM 1 MG/1
TABLET ORAL
Qty: 90 TABLET | Refills: 1 | Status: SHIPPED | OUTPATIENT
Start: 2021-11-29 | End: 2023-03-13

## 2021-11-29 NOTE — PROGRESS NOTES
Last INR - 1.61 - 11/24/2021  Pt was told to Take 8 mg today then 5/6, recheck Monday.    Todays INR - 3.02  Range - 2.0 - 3.0   [facility]

## 2021-12-02 ENCOUNTER — HOSPITAL ENCOUNTER (OUTPATIENT)
Dept: CT IMAGING | Facility: HOSPITAL | Age: 76
Discharge: HOME | End: 2021-12-02
Admitting: NURSE PRACTITIONER

## 2021-12-02 DIAGNOSIS — R31.0 GROSS HEMATURIA: ICD-10-CM

## 2021-12-02 PROCEDURE — 74176 CT ABD & PELVIS W/O CONTRAST: CPT

## 2021-12-03 DIAGNOSIS — I10 PRIMARY HYPERTENSION: Primary | ICD-10-CM

## 2021-12-03 RX ORDER — AMLODIPINE BESYLATE 10 MG/1
10 TABLET ORAL DAILY
Qty: 90 TABLET | Refills: 0 | Status: SHIPPED | OUTPATIENT
Start: 2021-12-03 | End: 2022-06-08 | Stop reason: DRUGHIGH

## 2021-12-03 NOTE — TELEPHONE ENCOUNTER
Rx Refill Note  Requested Prescriptions     Pending Prescriptions Disp Refills   • amLODIPine (NORVASC) 10 MG tablet 90 tablet 0     Sig: Take 1 tablet by mouth Daily.      Last office visit with prescribing clinician: 10/27/2021    TIFFANY Frost  Next office visit with prescribing clinician: 12/08/21 TIFFANY Sandoval   Lab:  Comprehensive metabolic panel (10/11/2021 07:15)    Marissa Oliveira LPN  12/03/21, 14:15 EST

## 2021-12-06 ENCOUNTER — LAB (OUTPATIENT)
Dept: LAB | Facility: HOSPITAL | Age: 76
End: 2021-12-06

## 2021-12-06 ENCOUNTER — ANTICOAGULATION VISIT (OUTPATIENT)
Dept: CARDIOLOGY | Facility: CLINIC | Age: 76
End: 2021-12-06

## 2021-12-06 ENCOUNTER — OFFICE VISIT (OUTPATIENT)
Dept: UROLOGY | Facility: CLINIC | Age: 76
End: 2021-12-06

## 2021-12-06 VITALS — RESPIRATION RATE: 19 BRPM | HEIGHT: 70 IN | BODY MASS INDEX: 33.93 KG/M2 | WEIGHT: 237 LBS

## 2021-12-06 DIAGNOSIS — Z51.81 MONITORING FOR ANTICOAGULANT USE: ICD-10-CM

## 2021-12-06 DIAGNOSIS — R31.0 GROSS HEMATURIA: Primary | ICD-10-CM

## 2021-12-06 DIAGNOSIS — Z79.01 MONITORING FOR ANTICOAGULANT USE: ICD-10-CM

## 2021-12-06 DIAGNOSIS — I48.91 ATRIAL FIBRILLATION, UNSPECIFIED TYPE (HCC): ICD-10-CM

## 2021-12-06 LAB
BILIRUB BLD-MCNC: NEGATIVE MG/DL
CLARITY, POC: CLEAR
COLOR UR: YELLOW
EXPIRATION DATE: NORMAL
GLUCOSE UR STRIP-MCNC: NEGATIVE MG/DL
INR PPP: 2.57 (ref 2–3)
KETONES UR QL: NEGATIVE
LEUKOCYTE EST, POC: NEGATIVE
Lab: NORMAL
NITRITE UR-MCNC: NEGATIVE MG/ML
PH UR: 6 [PH] (ref 5–8)
PROT UR STRIP-MCNC: NEGATIVE MG/DL
PROTHROMBIN TIME: 24.9 SECONDS (ref 9.4–12)
RBC # UR STRIP: NEGATIVE /UL
SP GR UR: 1.01 (ref 1–1.03)
SPECIMEN VOL 24H UR: NORMAL L
UROBILINOGEN UR QL: NORMAL

## 2021-12-06 PROCEDURE — 36415 COLL VENOUS BLD VENIPUNCTURE: CPT

## 2021-12-06 PROCEDURE — 85610 PROTHROMBIN TIME: CPT

## 2021-12-06 PROCEDURE — 99203 OFFICE O/P NEW LOW 30 MIN: CPT | Performed by: UROLOGY

## 2021-12-06 PROCEDURE — 51798 US URINE CAPACITY MEASURE: CPT | Performed by: UROLOGY

## 2021-12-06 PROCEDURE — 81003 URINALYSIS AUTO W/O SCOPE: CPT | Performed by: UROLOGY

## 2021-12-06 RX ORDER — GLIMEPIRIDE 4 MG/1
TABLET ORAL
Qty: 90 TABLET | Refills: 0 | Status: SHIPPED | OUTPATIENT
Start: 2021-12-06 | End: 2022-03-07

## 2021-12-06 NOTE — PROGRESS NOTES
Lab Results   Component Value Date    INR 2.57 12/06/2021    INR 3.02 (H) 11/29/2021    INR 1.61 (L) 11/24/2021    PROTIME 24.9 (H) 12/06/2021    PROTIME 29.2 (H) 11/29/2021    PROTIME 15.9 (H) 11/24/2021       INR: 2.57  Range: 2-3  Dose 5mg/6mg  [facility]

## 2021-12-06 NOTE — PROGRESS NOTES
Chief Complaint: Urologic complaint    Subjective         History of Present Illness  Zachariah Cruz is a 76 y.o. male presents to [facility] to be seen for gross hematuria    Patient had 3 episodes of gross hematuria.  No symptoms when he had this.    Patient did fall a week or 2 before he saw the blood.    12/21 CT stone protocol - negative  11/21 urine culture-negative  10/21 creatinine 0.9, GFR 81    No urologic family history    Patient with acute kidney stones in the past, lithotripsy x1.    2010, CABG, patient does not smoke.  Patient is on Coumadin and aspirin 81    PVR    12/21   52    PSA    11/21 1.5  11/20 1.3  11/19 1.0     Results for orders placed or performed in visit on 12/06/21   Bladder Scan   Result Value Ref Range    Volume 52ML    POC Urinalysis Dipstick, Automated    Specimen: Urine   Result Value Ref Range    Color Yellow Yellow, Straw, Dark Yellow, Natasha    Clarity, UA Clear Clear    Specific Gravity  1.015 1.005 - 1.030    pH, Urine 6.0 5.0 - 8.0    Leukocytes Negative Negative    Nitrite, UA Negative Negative    Protein, POC Negative Negative mg/dL    Glucose, UA Negative Negative, 1000 mg/dL (3+) mg/dL    Ketones, UA Negative Negative    Urobilinogen, UA Normal Normal    Bilirubin Negative Negative    Blood, UA Negative Negative    Lot Number [id number]     Expiration Date 10/2,022          Objective     Past Medical History:   Diagnosis Date   • Diabetes (HCC)    • Hyperlipidemia    • Hypertension        Past Surgical History:   Procedure Laterality Date   • APPENDECTOMY     • CATARACT EXTRACTION     • CORONARY ARTERY BYPASS GRAFT     • CORONARY ARTERY BYPASS GRAFT  05/21/2010    LIMA to LAD, SVG to OM, SVG to PDA   • PROSTATE SURGERY      TRANSURETHRAL RESECTION OF THE PROSTATE         Current Outpatient Medications:   •  allopurinol (ZYLOPRIM) 100 MG tablet, Take 1 tablet by mouth Daily., Disp: , Rfl:   •  amLODIPine (NORVASC) 10 MG tablet, Take 1 tablet by  "mouth Daily., Disp: 90 tablet, Rfl: 0  •  aspirin (aspirin) 81 MG EC tablet, Take 81 mg by mouth Every Other Day., Disp: , Rfl:   •  glimepiride (AMARYL) 4 MG tablet, TAKE ONE TABLET BY MOUTH DAILY, Disp: 90 tablet, Rfl: 0  •  lisinopril (PRINIVIL,ZESTRIL) 20 MG tablet, TAKE ONE TABLET BY MOUTH TWICE A DAY, Disp: 180 tablet, Rfl: 0  •  metFORMIN ER (GLUCOPHAGE-XR) 500 MG 24 hr tablet, TAKE FOUR TABLETS BY MOUTH DAILY, Disp: 360 tablet, Rfl: 0  •  metoprolol succinate XL (TOPROL-XL) 25 MG 24 hr tablet, TAKE ONE TABLET BY MOUTH TWICE A DAY, Disp: 180 tablet, Rfl: 0  •  OneTouch Ultra test strip, 1 each by Other route 2 (two) times a day., Disp: , Rfl:   •  simvastatin (ZOCOR) 40 MG tablet, TAKE ONE TABLET BY MOUTH DAILY, Disp: 90 tablet, Rfl: 0  •  tiZANidine (ZANAFLEX) 4 MG tablet, Take 1 tablet by mouth 2 (Two) Times a Day As Needed for Muscle Spasms., Disp: 60 tablet, Rfl: 0  •  warfarin (Coumadin) 1 MG tablet, Take as directed, Disp: 90 tablet, Rfl: 1  •  warfarin (COUMADIN) 5 MG tablet, Take 1 tablet by mouth Daily., Disp: 90 tablet, Rfl: 2    No Known Allergies     Family History   Problem Relation Age of Onset   • Hypertension Mother    • Stroke Mother    • Coronary artery disease Father    • Stroke Paternal Grandfather    • Coronary artery disease Other        Social History     Socioeconomic History   • Marital status:    Tobacco Use   • Smoking status: Former Smoker     Types: Cigarettes   • Smokeless tobacco: Never Used   Vaping Use   • Vaping Use: Never used   Substance and Sexual Activity   • Alcohol use: Yes   • Drug use: Defer   • Sexual activity: Defer       Vital Signs:   Resp 19   Ht 177.8 cm (70\")   Wt 108 kg (237 lb)   BMI 34.01 kg/m²      Physical exam    Alert and orient x3  Well appearing, well developed, in no acute distress   Unlabored respirations  Nontender/nondistended      Grossly oriented to person, place and time, judgment is intact, normal mood and affect    Results for orders " placed or performed in visit on 12/06/21   Bladder Scan   Result Value Ref Range    Volume 52ML              Assessment and Plan    Diagnoses and all orders for this visit:    1. Gross hematuria (Primary)  -     POC Urinalysis Dipstick, Automated  -     Bladder Scan      Records reviewed today and summarized in the chart.      I did recommend patient go ahead with a hematuria work-up.  I recommend either CT urology protocol and cystoscopy versus cystoscopy with bilateral retrograde pyelograms in the operating room.  Risks and benefits were discussed including bleeding, infection and damage to the urinary system.  We also discussed the risk of anesthesia up to and including death.  Patient voiced understanding.    Patient at this time does not want to go ahead with outpatient surgery he understands the risk he also does not want to repeat a CT scan.  We discussed risk and benefits today.  Patient accepts these risk.    We will follow-up for office cystoscopy in a few weeks.  Patient understands we are ruling out a malignancy and he must follow-up

## 2021-12-06 NOTE — PROGRESS NOTES
Spoke with the pt about his INR results. Pt understands and verbalized he would stay on 6/5 rotation. Rechecking in 3 wks

## 2021-12-08 ENCOUNTER — OFFICE VISIT (OUTPATIENT)
Dept: FAMILY MEDICINE CLINIC | Age: 76
End: 2021-12-08

## 2021-12-08 VITALS — HEIGHT: 70 IN | WEIGHT: 236.2 LBS | BODY MASS INDEX: 33.82 KG/M2 | TEMPERATURE: 98.1 F

## 2021-12-08 DIAGNOSIS — I10 PRIMARY HYPERTENSION: ICD-10-CM

## 2021-12-08 DIAGNOSIS — Z00.00 WELLNESS EXAMINATION: Primary | ICD-10-CM

## 2021-12-08 DIAGNOSIS — M79.89 LEFT LEG SWELLING: ICD-10-CM

## 2021-12-08 PROCEDURE — 1159F MED LIST DOCD IN RCRD: CPT | Performed by: NURSE PRACTITIONER

## 2021-12-08 PROCEDURE — 1170F FXNL STATUS ASSESSED: CPT | Performed by: NURSE PRACTITIONER

## 2021-12-08 PROCEDURE — G0439 PPPS, SUBSEQ VISIT: HCPCS | Performed by: NURSE PRACTITIONER

## 2021-12-08 NOTE — ASSESSMENT & PLAN NOTE
Hypertension is worsening.  Continue current treatment regimen.  Dietary sodium restriction.  Weight loss.  Regular aerobic exercise.  Continue current medications.  Ambulatory blood pressure monitoring.  Blood pressure will be reassessed in 2 weeks.

## 2021-12-08 NOTE — PATIENT INSTRUCTIONS
"Budget-Friendly Healthy Eating  There are many ways to save money at the grocery store and continue to eat healthy. You can be successful if you:  · Plan meals according to your budget.  · Make a grocery list and only purchase food according to your grocery list.  · Prepare food yourself.  What are tips for following this plan?    Reading food labels  · Compare food labels between brand name foods and the store brand. Often the nutritional value is the same, but the store brand is lower cost.  · Look for products that do not have added sugar, fat, or salt (sodium). These often cost the same but are healthier for you. Products may be labeled as:  ? Sugar-free.  ? Nonfat.  ? Low-fat.  ? Sodium-free.  ? Low-sodium.  · Look for lean ground beef labeled as at least 92% lean and 8% fat.  Shopping  · Buy only the items on your grocery list and go only to the areas of the store that have the items on your list.  · Use coupons only for foods and brands you normally buy. Avoid buying items you wouldn't normally buy simply because they are on sale.  · Check online and in newspapers for weekly deals.  · Buy healthy items from the bulk bins when available, such as herbs, spices, flour, pasta, nuts, and dried fruit.  · Buy fruits and vegetables that are in season. Prices are usually lower on in-season produce.  · Look at the unit price on the price tag. Use it to compare different brands and sizes to find out which item is the best deal.  · Choose healthy items that are often low-cost, such as carrots, potatoes, apples, bananas, and oranges. Dried or canned beans are a low-cost protein source.  · Buy in bulk and freeze extra food. Items you can buy in bulk include meats, fish, poultry, frozen fruits, and frozen vegetables.  · Avoid buying \"ready-to-eat\" foods, such as pre-cut fruits and vegetables and pre-made salads.  · If possible, shop around to discover where you can find the best prices. Consider other retailers such as " "dollar stores, larger wholesale stores, local fruit and vegetable [employer], and farmers markets.  · Do not shop when you are hungry. If you shop while hungry, it may be hard to stick to your list and budget.  · Resist impulse buying. Use your grocery list as your official plan for the week.  · Buy a variety of vegetables and fruits by purchasing fresh, frozen, and canned items.  · Look at the top and bottom shelves for deals. Foods at eye level (eye level of an adult or child) are usually more expensive.  · Be efficient with your time when shopping. The more time you spend at the store, the more money you are likely to spend.  · To save money when choosing more expensive foods like meats and dairy:  ? Choose cheaper cuts of meat, such as bone-in chicken thighs and drumsticks instead of skinless and boneless chicken. When you are ready to prepare the chicken, you can remove the skin yourself to make it healthier.  ? Choose lean meats like chicken or turkey instead of beef.  ? Choose canned seafood, such as tuna, salmon, or sardines.  ? Buy eggs as a low-cost source of protein.  ? Buy dried beans and peas, such as lentils, split peas, or kidney beans instead of meats. Dried beans and peas are a good alternative source of protein.  ? Buy the larger tubs of yogurt instead of individual-sized containers.  · Choose water instead of sodas and other sweetened beverages.  · Avoid buying chips, cookies, and other \"junk food.\" These items are usually expensive and not healthy.  Cooking  · Make extra food and freeze the extras in meal-sized containers or in individual portions for fast meals and snacks.  · Pre-cook on days when you have extra time to prepare meals in advance. You can keep these meals in the fridge or freezer and reheat for a quick meal.  · When you come home from the grocery store, wash, peel, and cut fruits and vegetables so they are ready to use and eat. This will help reduce food waste.  Meal planning  · Do " "not eat out or get fast food. Prepare food at home.  · Make a grocery list and make sure to bring it with you to the store. If you have a smart phone, you could use your phone to create your shopping list.  · Plan meals and snacks according to a grocery list and budget you create.  · Use leftovers in your meal plan for the week.  · Look for recipes where you can cook once and make enough food for two meals.  · Include budget-friendly meals like stews, casseroles, and stir-tran dishes.  · Try some meatless meals or try \"no cook\" meals like salads.  · Make sure that half your plate is filled with fruits or vegetables. Choose from fresh, frozen, or canned fruits and vegetables. If eating canned, remember to rinse them before eating. This will remove any excess salt added for packaging.  Summary  · Eating healthy on a budget is possible if you plan your meals according to your budget, purchase according to your budget and grocery list, and prepare food yourself.  · Tips for buying more food on a limited budget include buying generic brands, using coupons only for foods you normally buy, and buying healthy items from the bulk bins when available.  · Tips for buying cheaper food to replace expensive food include choosing cheaper, lean cuts of meat, and buying dried beans and peas.  This information is not intended to replace advice given to you by your health care provider. Make sure you discuss any questions you have with your health care provider.  Document Revised: 12/19/2018 Document Reviewed: 12/19/2018  [employer] Patient Education © 2021 [employer]      Exercising to Stay Healthy  To become healthy and stay healthy, it is recommended that you do moderate-intensity and vigorous-intensity exercise. You can tell that you are exercising at a moderate intensity if your heart starts beating faster and you start breathing faster but can still hold a conversation. You can tell that you are exercising at a vigorous " intensity if you are breathing much harder and faster and cannot hold a conversation while exercising.  Exercising regularly is important. It has many health benefits, such as:  · Improving overall fitness, flexibility, and endurance.  · Increasing bone density.  · Helping with weight control.  · Decreasing body fat.  · Increasing muscle strength.  · Reducing stress and tension.  · Improving overall health.  How often should I exercise?  Choose an activity that you enjoy, and set realistic goals. Your health care provider can help you make an activity plan that works for you.  Exercise regularly as told by your health care provider. This may include:  · Doing strength training two times a week, such as:  ? Lifting weights.  ? Using resistance bands.  ? Push-ups.  ? Sit-ups.  ? Yoga.  · Doing a certain intensity of exercise for a given amount of time. Choose from these options:  ? A total of 150 minutes of moderate-intensity exercise every week.  ? A total of 75 minutes of vigorous-intensity exercise every week.  ? A mix of moderate-intensity and vigorous-intensity exercise every week.  Children, pregnant women, people who have not exercised regularly, people who are overweight, and older adults may need to talk with a health care provider about what activities are safe to do. If you have a medical condition, be sure to talk with your health care provider before you start a new exercise program.  What are some exercise ideas?  Moderate-intensity exercise ideas include:  · Walking 1 mile (1.6 km) in about 15 minutes.  · Biking.  · Hiking.  · Golfing.  · Dancing.  · Water aerobics.  Vigorous-intensity exercise ideas include:  · Walking 4.5 miles (7.2 km) or more in about 1 hour.  · Jogging or running 5 miles (8 km) in about 1 hour.  · Biking 10 miles (16.1 km) or more in about 1 hour.  · Lap swimming.  · Roller-skating or in-line skating.  · Cross-country skiing.  · Vigorous competitive sports, such as football,  basketball, and soccer.  · Jumping rope.  · Aerobic dancing.  What are some everyday activities that can help me to get exercise?  · Yard work, such as:  ? Pushing a .  ? Raking and bagging leaves.  · Washing your car.  · Pushing a stroller.  · Shoveling snow.  · Gardening.  · Washing windows or floors.  How can I be more active in my day-to-day activities?  · Use stairs instead of an elevator.  · Take a walk during your lunch break.  · If you drive, park your car farther away from your work or school.  · If you take public transportation, get off one stop early and walk the rest of the way.  · Stand up or walk around during all of your indoor phone calls.  · Get up, stretch, and walk around every 30 minutes throughout the day.  · Enjoy exercise with a friend. Support to continue exercising will help you keep a regular routine of activity.  What guidelines can I follow while exercising?  · Before you start a new exercise program, talk with your health care provider.  · Do not exercise so much that you hurt yourself, feel dizzy, or get very short of breath.  · Wear comfortable clothes and wear shoes with good support.  · Drink plenty of water while you exercise to prevent dehydration or heat stroke.  · Work out until your breathing and your heartbeat get faster.  Where to find more information  · [place] Department of Health and Human Services: www.hhs.gov  · Centers for Disease Control and Prevention (CDC): www.cdc.gov  Summary  · Exercising regularly is important. It will improve your overall fitness, flexibility, and endurance.  · Regular exercise also will improve your overall health. It can help you control your weight, reduce stress, and improve your bone density.  · Do not exercise so much that you hurt yourself, feel dizzy, or get very short of breath.  · Before you start a new exercise program, talk with your health care provider.  This information is not intended to replace advice given to you by  your health care provider. Make sure you discuss any questions you have with your health care provider.  Document Revised: 11/30/2018 Document Reviewed: 11/08/2018  [employer] Patient Education © 2021 [employer]

## 2021-12-13 ENCOUNTER — OFFICE VISIT (OUTPATIENT)
Dept: FAMILY MEDICINE CLINIC | Age: 76
End: 2021-12-13

## 2021-12-13 VITALS
BODY MASS INDEX: 33.95 KG/M2 | WEIGHT: 236.6 LBS | DIASTOLIC BLOOD PRESSURE: 77 MMHG | SYSTOLIC BLOOD PRESSURE: 153 MMHG | HEART RATE: 69 BPM

## 2021-12-13 DIAGNOSIS — M1A.0790 CHRONIC IDIOPATHIC GOUT INVOLVING TOE WITHOUT TOPHUS, UNSPECIFIED LATERALITY: ICD-10-CM

## 2021-12-13 DIAGNOSIS — G89.29 CHRONIC RIGHT-SIDED LOW BACK PAIN WITHOUT SCIATICA: ICD-10-CM

## 2021-12-13 DIAGNOSIS — M54.50 CHRONIC RIGHT-SIDED LOW BACK PAIN WITHOUT SCIATICA: ICD-10-CM

## 2021-12-13 DIAGNOSIS — I10 ESSENTIAL HYPERTENSION: ICD-10-CM

## 2021-12-13 DIAGNOSIS — M79.89 LEFT LEG SWELLING: Primary | ICD-10-CM

## 2021-12-13 DIAGNOSIS — R31.0 GROSS HEMATURIA: ICD-10-CM

## 2021-12-13 PROCEDURE — 99214 OFFICE O/P EST MOD 30 MIN: CPT | Performed by: NURSE PRACTITIONER

## 2021-12-13 RX ORDER — FUROSEMIDE 20 MG/1
20 TABLET ORAL DAILY PRN
Qty: 30 TABLET | Refills: 1 | Status: SHIPPED | OUTPATIENT
Start: 2021-12-13 | End: 2022-10-19 | Stop reason: ALTCHOICE

## 2021-12-13 NOTE — ASSESSMENT & PLAN NOTE
Discussed his back symptoms, he did PT and continues to do some exercises, his back is much better, follow up if anything changes

## 2021-12-20 RX ORDER — METFORMIN HYDROCHLORIDE 500 MG/1
TABLET, EXTENDED RELEASE ORAL
Qty: 360 TABLET | Refills: 0 | Status: SHIPPED | OUTPATIENT
Start: 2021-12-20 | End: 2022-03-21

## 2021-12-27 ENCOUNTER — ANTICOAGULATION VISIT (OUTPATIENT)
Dept: CARDIOLOGY | Facility: CLINIC | Age: 76
End: 2021-12-27

## 2021-12-27 ENCOUNTER — LAB (OUTPATIENT)
Dept: LAB | Facility: HOSPITAL | Age: 76
End: 2021-12-27

## 2021-12-27 DIAGNOSIS — I48.91 ATRIAL FIBRILLATION, UNSPECIFIED TYPE (HCC): Primary | ICD-10-CM

## 2021-12-27 DIAGNOSIS — Z51.81 MONITORING FOR ANTICOAGULANT USE: ICD-10-CM

## 2021-12-27 DIAGNOSIS — I48.91 ATRIAL FIBRILLATION, UNSPECIFIED TYPE (HCC): ICD-10-CM

## 2021-12-27 DIAGNOSIS — Z79.01 MONITORING FOR ANTICOAGULANT USE: ICD-10-CM

## 2021-12-27 LAB
INR PPP: 3.57 (ref 2–3)
PROTHROMBIN TIME: 34.5 SECONDS (ref 9.4–12)

## 2021-12-27 PROCEDURE — 36415 COLL VENOUS BLD VENIPUNCTURE: CPT

## 2021-12-27 PROCEDURE — 85610 PROTHROMBIN TIME: CPT

## 2021-12-27 NOTE — PROGRESS NOTES
Lab Results   Component Value Date    INR 3.57 (H) 12/27/2021    INR 2.57 12/06/2021    INR 3.02 (H) 11/29/2021    PROTIME 34.5 (H) 12/27/2021    PROTIME 24.9 (H) 12/06/2021    PROTIME 29.2 (H) 11/29/2021       INR: 3.57  Range 2.0-3.0  Dose 5mg/6mg  AFIB

## 2021-12-29 RX ORDER — ALLOPURINOL 100 MG/1
TABLET ORAL
Qty: 90 TABLET | Refills: 0 | Status: SHIPPED | OUTPATIENT
Start: 2021-12-29 | End: 2022-04-11

## 2022-01-03 ENCOUNTER — ANTICOAGULATION VISIT (OUTPATIENT)
Dept: CARDIOLOGY | Facility: CLINIC | Age: 77
End: 2022-01-03

## 2022-01-03 ENCOUNTER — LAB (OUTPATIENT)
Dept: LAB | Facility: HOSPITAL | Age: 77
End: 2022-01-03

## 2022-01-03 DIAGNOSIS — I48.91 ATRIAL FIBRILLATION, UNSPECIFIED TYPE: ICD-10-CM

## 2022-01-03 DIAGNOSIS — I48.91 ATRIAL FIBRILLATION, UNSPECIFIED TYPE: Primary | ICD-10-CM

## 2022-01-03 DIAGNOSIS — Z51.81 MONITORING FOR ANTICOAGULANT USE: ICD-10-CM

## 2022-01-03 DIAGNOSIS — Z79.01 MONITORING FOR ANTICOAGULANT USE: ICD-10-CM

## 2022-01-03 LAB
INR PPP: 2.7 (ref 2–3)
PROTHROMBIN TIME: 26 SECONDS (ref 9.4–12)

## 2022-01-03 PROCEDURE — 85610 PROTHROMBIN TIME: CPT

## 2022-01-03 PROCEDURE — 36415 COLL VENOUS BLD VENIPUNCTURE: CPT

## 2022-01-03 NOTE — PROGRESS NOTES
Lab Results   Component Value Date    INR 2.70 01/03/2022    INR 3.57 (H) 12/27/2021    INR 2.57 12/06/2021    PROTIME 26.0 (H) 01/03/2022    PROTIME 34.5 (H) 12/27/2021    PROTIME 24.9 (H) 12/06/2021       INR: 2.70  Range: 2.0-3.0  Dose 5mg QD  AFIB

## 2022-01-03 NOTE — PROGRESS NOTES
Spoke to the pt about his INR results. Pt verbalized understanding and knows to recheck on 1 weeks.

## 2022-01-10 RX ORDER — METOPROLOL SUCCINATE 25 MG/1
TABLET, EXTENDED RELEASE ORAL
Qty: 180 TABLET | Refills: 0 | Status: SHIPPED | OUTPATIENT
Start: 2022-01-10 | End: 2022-04-11

## 2022-01-10 NOTE — TELEPHONE ENCOUNTER
LV:12/13/21 LF:10/14/21 #180    Lab Results   Component Value Date    GLUCOSE 111 (H) 10/11/2021    BUN 18 10/11/2021    CREATININE 0.91 10/11/2021    EGFRIFNONA 81 10/11/2021    BCR 19.8 10/11/2021    K 4.5 10/11/2021    CO2 26.0 10/11/2021    CALCIUM 9.4 10/11/2021    ALBUMIN 4.40 10/11/2021    LABIL2 1.6 04/23/2021    AST 9 10/11/2021    ALT 11 10/11/2021

## 2022-01-12 ENCOUNTER — OFFICE VISIT (OUTPATIENT)
Dept: CARDIOLOGY | Facility: CLINIC | Age: 77
End: 2022-01-12

## 2022-01-12 VITALS
BODY MASS INDEX: 33.79 KG/M2 | SYSTOLIC BLOOD PRESSURE: 154 MMHG | HEART RATE: 68 BPM | HEIGHT: 70 IN | WEIGHT: 236 LBS | DIASTOLIC BLOOD PRESSURE: 71 MMHG

## 2022-01-12 DIAGNOSIS — I10 ESSENTIAL HYPERTENSION: ICD-10-CM

## 2022-01-12 DIAGNOSIS — I25.10 CORONARY ARTERY DISEASE INVOLVING NATIVE CORONARY ARTERY OF NATIVE HEART WITHOUT ANGINA PECTORIS: ICD-10-CM

## 2022-01-12 DIAGNOSIS — I48.0 PAROXYSMAL ATRIAL FIBRILLATION: Primary | ICD-10-CM

## 2022-01-12 DIAGNOSIS — E78.2 MIXED HYPERLIPIDEMIA: ICD-10-CM

## 2022-01-12 PROCEDURE — 99214 OFFICE O/P EST MOD 30 MIN: CPT | Performed by: INTERNAL MEDICINE

## 2022-01-13 DIAGNOSIS — I10 ESSENTIAL HYPERTENSION: Primary | ICD-10-CM

## 2022-01-15 PROBLEM — I25.10 CORONARY ARTERY DISEASE INVOLVING NATIVE CORONARY ARTERY OF NATIVE HEART WITHOUT ANGINA PECTORIS: Status: ACTIVE | Noted: 2022-01-15

## 2022-01-15 NOTE — PROGRESS NOTES
CARDIOLOGY FOLLOW-UP PROGRESS NOTE        Chief Complaint  Atrial Fibrillation, Hypertension, and Coronary Artery Disease (Follow-up)    Subjective            Zachariah Cruz presents to [facility] CARDIOLOGY  History of Present Illness    Mr Cruz is here for 6-month follow-up visit.  He denies any palpitations.  Any chest pain or increasing shortness of breath.  He was noted to have blood in urine November.  Symptoms subsided currently scheduled for a cystoscopy.  He is taking all the medicines as prescribed.       Past History:    1) Coronary artery disease status post 3-vessel coronary artery bypass grafting on 05/21/2010 by Dr. Garcia at [facility] (LIMA graft to the LAD, SVG graft to OM, SVG graft to posterior descending branch of RCA); 2) Diabetes mellitus; 3) Hypertension; 4) Hyperlipidemia; 5) Gout; 6) Obstructive sleep apnea, on CPAP; 7) Paroxysmal atrial flutter, detected on 24-hour Holter monitor study done in October 2019.     Medical History:  Past Medical History:   Diagnosis Date   • Diabetes (HCC)    • Hyperlipidemia    • Hypertension        Surgical History: has a past surgical history that includes Appendectomy; Cataract extraction; Coronary artery bypass graft; Prostate surgery; and Coronary artery bypass graft (05/21/2010).     Family History: family history includes Coronary artery disease in his father and another family member; Hypertension in his mother; Stroke in his mother and paternal grandfather.     Social History: reports that he has quit smoking. His smoking use included cigarettes. He has never used smokeless tobacco. He reports previous alcohol use. Drug use questions deferred to the physician.    Allergies: Patient has no known allergies.    Current Outpatient Medications on File Prior to Visit   Medication Sig   • allopurinol (ZYLOPRIM) 100 MG tablet TAKE ONE TABLET BY MOUTH DAILY   • amLODIPine (NORVASC) 10 MG tablet Take 1 tablet by mouth Daily.  "(Patient taking differently: Take 10 mg by mouth Daily. Pt taking 5 mg)   • aspirin (aspirin) 81 MG EC tablet Take 81 mg by mouth Every Other Day.   • furosemide (Lasix) 20 MG tablet Take 1 tablet by mouth Daily As Needed (swelling).   • glimepiride (AMARYL) 4 MG tablet TAKE ONE TABLET BY MOUTH DAILY   • lisinopril (PRINIVIL,ZESTRIL) 20 MG tablet TAKE ONE TABLET BY MOUTH TWICE A DAY   • metFORMIN ER (GLUCOPHAGE-XR) 500 MG 24 hr tablet TAKE FOUR TABLETS BY MOUTH DAILY   • metoprolol succinate XL (TOPROL-XL) 25 MG 24 hr tablet TAKE ONE TABLET BY MOUTH TWICE A DAY   • OneTouch Ultra test strip 1 each by Other route 2 (two) times a day.   • simvastatin (ZOCOR) 40 MG tablet TAKE ONE TABLET BY MOUTH DAILY   • tiZANidine (ZANAFLEX) 4 MG tablet Take 1 tablet by mouth 2 (Two) Times a Day As Needed for Muscle Spasms.   • warfarin (Coumadin) 1 MG tablet Take as directed   • warfarin (COUMADIN) 5 MG tablet Take 1 tablet by mouth Daily.     No current facility-administered medications on file prior to visit.          Review of Systems   Respiratory: Negative for cough, shortness of breath and wheezing.    Cardiovascular: Negative for chest pain, palpitations and leg swelling.   Gastrointestinal: Negative for nausea and vomiting.   Genitourinary: Positive for hematuria (Currently subsided).   Neurological: Negative for dizziness and syncope.        Objective     /71 (BP Location: Left arm, Patient Position: Sitting)   Pulse 68   Ht 177.8 cm (70\")   Wt 107 kg (236 lb)   BMI 33.86 kg/m²       Physical Exam    General : Alert, awake, no acute distress  Neck : Supple, no carotid bruit, no jugular venous distention  CVS : Regular rate and rhythm, no murmur, rubs or gallops  Lungs: Clear to auscultation bilaterally, no crackles or rhonchi  Abdomen: Soft, nontender, bowel sounds heard in all 4 quadrants  Extremities: Warm, well-perfused, no pedal edema    Result Review :     The following data was reviewed by: Vishal DOYLE" MD Bertin on 01/12/2022:    CMP    CMP 4/23/21 8/2/21 10/11/21   Glucose 155 (A) 127 (A) 111 (A)   BUN 20 17 18   Creatinine 1.10 1.04 0.91   eGFR Non African Am  70 81   Sodium 141 138 140   Potassium 4.8 4.6 4.5   Chloride 105 102 104   Calcium 9.6 10.0 9.4   Albumin 4.1  4.40   Total Bilirubin 0.32  0.2   Alkaline Phosphatase 77  77   AST (SGOT) 15  9   ALT (SGPT) 19  11   (A) Abnormal value            CBC    CBC 4/23/21 11/17/21   WBC 8.07 9.47   RBC 5.02 4.68   Hemoglobin 13.10 (A) 12.8 (A)   Hematocrit 41.9 (A) 39.7   MCV 83.5 84.8   MCH 26.1 (A) 27.4   MCHC 31.3 (A) 32.2   RDW 15.8 (A) 14.4   Platelets 277.00 278   (A) Abnormal value              Lipid Panel    Lipid Panel 4/23/21 10/11/21   Total Cholesterol  139   Total Cholesterol 120    Triglycerides 225 (A) 161 (A)   HDL Cholesterol 32 (A) 38 (A)   VLDL Cholesterol 45 (A) 28   LDL Cholesterol  43 (A) 73   LDL/HDL Ratio  1.81   (A) Abnormal value       Comments are available for some flowsheets but are not being displayed.                       Assessment and Plan        Diagnoses and all orders for this visit:    1. Paroxysmal atrial fibrillation (HCC) (Primary)  Assessment & Plan:  He is currently in sinus rhythm.  Denies palpitations.  Metoprolol for rate and rhythm management along with warfarin for anticoagulation.      2. Essential hypertension  Assessment & Plan:  Higher side in the office today.  Controlled feeling amlodipine dose.  Continue amlodipine, lisinopril and metoprolol at the current dose.  Recommend to keep home blood pressure log.      3. Mixed hyperlipidemia  Assessment & Plan:  LDL is 73, which is close to goal.  Continue simvastatin.  We will check panel before next follow-up visit.      4. Coronary artery disease involving native coronary artery of native heart without angina pectoris  Assessment & Plan:  He is currently stable with no angina.  LV function is preserved.,  Continue aspirin, simvastatin and metoprolol at current  dose.              Follow Up     Return in about 9 months (around 10/12/2022) for Next scheduled follow up, Recheck.    Patient was given instructions and counseling regarding his condition or for health maintenance advice. Please see specific information pulled into the AVS if appropriate.

## 2022-01-15 NOTE — ASSESSMENT & PLAN NOTE
LDL is 73, which is close to goal.  Continue simvastatin.  We will check panel before next follow-up visit.

## 2022-01-15 NOTE — ASSESSMENT & PLAN NOTE
Higher side in the office today.  Controlled feeling amlodipine dose.  Continue amlodipine, lisinopril and metoprolol at the current dose.  Recommend to keep home blood pressure log.

## 2022-01-15 NOTE — ASSESSMENT & PLAN NOTE
He is currently stable with no angina.  LV function is preserved.,  Continue aspirin, simvastatin and metoprolol at current dose.

## 2022-01-15 NOTE — ASSESSMENT & PLAN NOTE
He is currently in sinus rhythm.  Denies palpitations.  Metoprolol for rate and rhythm management along with warfarin for anticoagulation.

## 2022-01-24 ENCOUNTER — LAB (OUTPATIENT)
Dept: LAB | Facility: HOSPITAL | Age: 77
End: 2022-01-24

## 2022-01-24 DIAGNOSIS — I48.91 ATRIAL FIBRILLATION, UNSPECIFIED TYPE: ICD-10-CM

## 2022-01-24 DIAGNOSIS — I10 ESSENTIAL HYPERTENSION: ICD-10-CM

## 2022-01-24 DIAGNOSIS — Z79.01 MONITORING FOR ANTICOAGULANT USE: ICD-10-CM

## 2022-01-24 DIAGNOSIS — Z51.81 MONITORING FOR ANTICOAGULANT USE: ICD-10-CM

## 2022-01-24 LAB
ANION GAP SERPL CALCULATED.3IONS-SCNC: 12 MMOL/L (ref 5–15)
BUN SERPL-MCNC: 11 MG/DL (ref 8–23)
BUN/CREAT SERPL: 10.7 (ref 7–25)
CALCIUM SPEC-SCNC: 9.5 MG/DL (ref 8.6–10.5)
CHLORIDE SERPL-SCNC: 103 MMOL/L (ref 98–107)
CO2 SERPL-SCNC: 25 MMOL/L (ref 22–29)
CREAT SERPL-MCNC: 1.03 MG/DL (ref 0.76–1.27)
GFR SERPL CREATININE-BSD FRML MDRD: 71 ML/MIN/1.73
GLUCOSE SERPL-MCNC: 148 MG/DL (ref 65–99)
INR PPP: 1.24 (ref 2–3)
POTASSIUM SERPL-SCNC: 4.6 MMOL/L (ref 3.5–5.2)
PROTHROMBIN TIME: 12.6 SECONDS (ref 9.4–12)
SODIUM SERPL-SCNC: 140 MMOL/L (ref 136–145)

## 2022-01-24 PROCEDURE — 85610 PROTHROMBIN TIME: CPT

## 2022-01-24 PROCEDURE — 80048 BASIC METABOLIC PNL TOTAL CA: CPT

## 2022-01-24 PROCEDURE — 36415 COLL VENOUS BLD VENIPUNCTURE: CPT

## 2022-01-25 ENCOUNTER — ANTICOAGULATION VISIT (OUTPATIENT)
Dept: CARDIOLOGY | Facility: CLINIC | Age: 77
End: 2022-01-25

## 2022-01-25 DIAGNOSIS — I48.91 ATRIAL FIBRILLATION, UNSPECIFIED TYPE: Primary | ICD-10-CM

## 2022-01-25 NOTE — PROGRESS NOTES
Lab Results   Component Value Date    INR 1.24 (L) 01/24/2022    INR 2.70 01/03/2022    INR 3.57 (H) 12/27/2021    PROTIME 12.6 (H) 01/24/2022    PROTIME 26.0 (H) 01/03/2022    PROTIME 34.5 (H) 12/27/2021     INR: 1.24  Dose: 5mg  Range: 2.0-3.0  AFIB

## 2022-01-25 NOTE — PROGRESS NOTES
Pt was off warfarin from the 13-16 due to having procedure but was taking 5mg daily since the 17th.     Pt is aware of new changes and knows to recheck on 2/1

## 2022-01-28 RX ORDER — SIMVASTATIN 40 MG
TABLET ORAL
Qty: 90 TABLET | Refills: 0 | Status: SHIPPED | OUTPATIENT
Start: 2022-01-28 | End: 2022-04-29

## 2022-01-30 NOTE — PROGRESS NOTES
Recent labs showed normal kidney functions sodium and potassium.  Blood pressure log, forwarded by primary care provider also reviewed.  Blood pressure remains persistently elevated.    He is already on maximum dose of lisinopril and amlodipine, which will be continued.  Metoprolol 25 twice daily as well.  Recommend to start taking hydrochlorothiazide 25 mg p.o. daily for adequate blood pressure control.    Need a repeat BMP approximately 1 month after starting hydrochlorothiazide.    Electronically signed by Vishal Denton MD, 01/30/22, 6:13 PM EST.

## 2022-02-01 ENCOUNTER — TELEPHONE (OUTPATIENT)
Dept: CARDIOLOGY | Facility: CLINIC | Age: 77
End: 2022-02-01

## 2022-02-01 ENCOUNTER — LAB (OUTPATIENT)
Dept: LAB | Facility: HOSPITAL | Age: 77
End: 2022-02-01

## 2022-02-01 ENCOUNTER — ANTICOAGULATION VISIT (OUTPATIENT)
Dept: CARDIOLOGY | Facility: CLINIC | Age: 77
End: 2022-02-01

## 2022-02-01 DIAGNOSIS — Z79.01 MONITORING FOR ANTICOAGULANT USE: ICD-10-CM

## 2022-02-01 DIAGNOSIS — Z51.81 MONITORING FOR ANTICOAGULANT USE: ICD-10-CM

## 2022-02-01 DIAGNOSIS — I10 ESSENTIAL HYPERTENSION: Primary | ICD-10-CM

## 2022-02-01 DIAGNOSIS — I48.91 ATRIAL FIBRILLATION, UNSPECIFIED TYPE: Primary | ICD-10-CM

## 2022-02-01 DIAGNOSIS — I48.91 ATRIAL FIBRILLATION, UNSPECIFIED TYPE: ICD-10-CM

## 2022-02-01 LAB
INR PPP: 2.86 (ref 2–3)
PROTHROMBIN TIME: 27.6 SECONDS (ref 9.4–12)

## 2022-02-01 PROCEDURE — 36415 COLL VENOUS BLD VENIPUNCTURE: CPT

## 2022-02-01 PROCEDURE — 85610 PROTHROMBIN TIME: CPT

## 2022-02-01 RX ORDER — HYDROCHLOROTHIAZIDE 25 MG/1
25 TABLET ORAL DAILY
Qty: 90 TABLET | Refills: 3 | Status: SHIPPED | OUTPATIENT
Start: 2022-02-01 | End: 2022-12-07

## 2022-02-01 NOTE — PROGRESS NOTES
INR is 2.86    Patient is taking 6/5/5    Last INR was 1.24 on Jan 24    Patient took 7.5mg one day then increased to 6/5 at that time

## 2022-02-01 NOTE — TELEPHONE ENCOUNTER
----- Message from Vishal Denton MD sent at 1/30/2022  6:14 PM EST -----  Recent labs showed normal kidney functions sodium and potassium.  Blood pressure log, forwarded by primary care provider also reviewed.  Blood pressure remains persistently elevated.    He is already on maximum dose of lisinopril and amlodipine, which will be continued.  Metoprolol 25 twice daily as well.  Recommend to start taking hydrochlorothiazide 25 mg p.o. daily for adequate blood pressure control.    Need a repeat BMP approximately 1 month after starting hydrochlorothiazide.    Electronically signed by Vishal Denton MD, 01/30/22, 6:13 PM EST.

## 2022-02-11 ENCOUNTER — TELEPHONE (OUTPATIENT)
Dept: FAMILY MEDICINE CLINIC | Age: 77
End: 2022-02-11

## 2022-02-11 DIAGNOSIS — G47.33 OSA (OBSTRUCTIVE SLEEP APNEA): Primary | ICD-10-CM

## 2022-02-11 RX ORDER — LISINOPRIL 20 MG/1
TABLET ORAL
Qty: 180 TABLET | Refills: 0 | Status: SHIPPED | OUTPATIENT
Start: 2022-02-11 | End: 2022-05-12

## 2022-02-11 NOTE — TELEPHONE ENCOUNTER
[facility] faxed a request for CPAP supplies.   Called pt to see what he needs and who he see for his obstructive sleep apnea?  Pt said he saw [facility] in 2017 and had his sleep study done at that time at [facility]. Requested that report from [facility]. He has not seen anyone since then. He said he probably should get back in to see them. Is it ok for a referral?

## 2022-02-13 NOTE — PROGRESS NOTES
Procedures       Urinalysis was checked today and was negative for signs of infection      Cytoscopy Procedure:     Procedure: Flexible cytoscope was passed per urethra into the bladder without difficulty after proper consent. The bladder was inspected in a systematic meridian fashion.     4 cm prostate, previous TUR with some regrowth on the median and right lateral lobe.    Minor trabeculations throughout, no pathology    There were no tumors, lesions, stones, or other abnormalities noted within the bladder. Of note, there was no increased vascularity as well. Both ureteral orifices were identified and were normal in appearance. The flexible cytoscope was removed. The patient tolerated the procedure well.       Plan      Follow-up with nurse practitioner 1 year for follow-up on hematuria and BPH    This document has been electronically signed by Douglas Chawla MD  February 13, 2022 12:48 EST

## 2022-02-14 ENCOUNTER — OFFICE VISIT (OUTPATIENT)
Dept: UROLOGY | Facility: CLINIC | Age: 77
End: 2022-02-14

## 2022-02-14 VITALS — WEIGHT: 236 LBS | HEIGHT: 70 IN | RESPIRATION RATE: 16 BRPM | BODY MASS INDEX: 33.79 KG/M2

## 2022-02-14 DIAGNOSIS — G47.30 SLEEP APNEA, UNSPECIFIED TYPE: Primary | ICD-10-CM

## 2022-02-14 DIAGNOSIS — R31.0 GROSS HEMATURIA: Primary | ICD-10-CM

## 2022-02-14 PROCEDURE — 52000 CYSTOURETHROSCOPY: CPT | Performed by: UROLOGY

## 2022-02-22 ENCOUNTER — LAB (OUTPATIENT)
Dept: LAB | Facility: HOSPITAL | Age: 77
End: 2022-02-22

## 2022-02-22 ENCOUNTER — ANTICOAGULATION VISIT (OUTPATIENT)
Dept: CARDIOLOGY | Facility: CLINIC | Age: 77
End: 2022-02-22

## 2022-02-22 DIAGNOSIS — Z79.01 MONITORING FOR ANTICOAGULANT USE: ICD-10-CM

## 2022-02-22 DIAGNOSIS — I48.91 ATRIAL FIBRILLATION, UNSPECIFIED TYPE: Primary | ICD-10-CM

## 2022-02-22 DIAGNOSIS — Z51.81 MONITORING FOR ANTICOAGULANT USE: ICD-10-CM

## 2022-02-22 DIAGNOSIS — I48.91 ATRIAL FIBRILLATION, UNSPECIFIED TYPE: ICD-10-CM

## 2022-02-22 DIAGNOSIS — I10 ESSENTIAL HYPERTENSION: ICD-10-CM

## 2022-02-22 LAB
ANION GAP SERPL CALCULATED.3IONS-SCNC: 12.8 MMOL/L (ref 5–15)
BUN SERPL-MCNC: 16 MG/DL (ref 8–23)
BUN/CREAT SERPL: 16.3 (ref 7–25)
CALCIUM SPEC-SCNC: 9.8 MG/DL (ref 8.6–10.5)
CHLORIDE SERPL-SCNC: 99 MMOL/L (ref 98–107)
CO2 SERPL-SCNC: 26.2 MMOL/L (ref 22–29)
CREAT SERPL-MCNC: 0.98 MG/DL (ref 0.76–1.27)
GFR SERPL CREATININE-BSD FRML MDRD: 75 ML/MIN/1.73
GLUCOSE SERPL-MCNC: 156 MG/DL (ref 65–99)
INR PPP: 2.02 (ref 2–3)
POTASSIUM SERPL-SCNC: 4.4 MMOL/L (ref 3.5–5.2)
PROTHROMBIN TIME: 19.7 SECONDS (ref 9.4–12)
SODIUM SERPL-SCNC: 138 MMOL/L (ref 136–145)

## 2022-02-22 PROCEDURE — 36415 COLL VENOUS BLD VENIPUNCTURE: CPT

## 2022-02-22 PROCEDURE — 80048 BASIC METABOLIC PNL TOTAL CA: CPT

## 2022-02-22 PROCEDURE — 85610 PROTHROMBIN TIME: CPT

## 2022-02-22 NOTE — PROGRESS NOTES
Last INR - 2.86 - 02/22/2022  Pt was told to to continue taking 6/5/5mg alternating and recheck in 2 weeks.     Todays INR - 2.02  Range - 2.0 - 3.0  [facility]

## 2022-02-23 ENCOUNTER — TELEPHONE (OUTPATIENT)
Dept: CARDIOLOGY | Facility: CLINIC | Age: 77
End: 2022-02-23

## 2022-02-23 NOTE — PROGRESS NOTES
Continue taking warfarin 6/5/5 milligrams patent without changes.  Recheck INR in 2 weeks      Electronically signed by Vishal Denton MD, 02/23/22, 8:16 AM EST.

## 2022-02-23 NOTE — TELEPHONE ENCOUNTER
----- Message from JACIEL Lee sent at 2/23/2022  7:26 AM EST -----  Labs are normal except elevated blood sugars.

## 2022-03-07 RX ORDER — GLIMEPIRIDE 4 MG/1
TABLET ORAL
Qty: 90 TABLET | Refills: 0 | Status: SHIPPED | OUTPATIENT
Start: 2022-03-07 | End: 2022-06-06

## 2022-03-15 ENCOUNTER — LAB (OUTPATIENT)
Dept: LAB | Facility: HOSPITAL | Age: 77
End: 2022-03-15

## 2022-03-15 ENCOUNTER — ANTICOAGULATION VISIT (OUTPATIENT)
Dept: CARDIOLOGY | Facility: CLINIC | Age: 77
End: 2022-03-15

## 2022-03-15 DIAGNOSIS — I48.91 ATRIAL FIBRILLATION, UNSPECIFIED TYPE: Primary | ICD-10-CM

## 2022-03-15 DIAGNOSIS — Z79.01 MONITORING FOR ANTICOAGULANT USE: ICD-10-CM

## 2022-03-15 DIAGNOSIS — Z51.81 MONITORING FOR ANTICOAGULANT USE: ICD-10-CM

## 2022-03-15 DIAGNOSIS — I48.91 ATRIAL FIBRILLATION, UNSPECIFIED TYPE: ICD-10-CM

## 2022-03-15 LAB
INR PPP: 2.31 (ref 2–3)
PROTHROMBIN TIME: 22.3 SECONDS (ref 9.4–12)

## 2022-03-15 PROCEDURE — 85610 PROTHROMBIN TIME: CPT

## 2022-03-15 PROCEDURE — 36415 COLL VENOUS BLD VENIPUNCTURE: CPT

## 2022-03-15 NOTE — PROGRESS NOTES
Spoke to the pt. He is aware knows to recheck 4/12.    While on the phone pt states he has to have cataract surgery 3/28. If that Dr wants him to stop warfarin for procedure pt knows to call our office.

## 2022-03-15 NOTE — PROGRESS NOTES
Lab Results   Component Value Date    INR 2.31 03/15/2022    INR 2.02 02/22/2022    INR 2.86 02/01/2022    PROTIME 22.3 (H) 03/15/2022    PROTIME 19.7 (H) 02/22/2022    PROTIME 27.6 (H) 02/01/2022       INR: 2.31  Dose: 6mg/5mg/5mg  Range: 2.0-3.0  AFIB

## 2022-03-21 RX ORDER — METFORMIN HYDROCHLORIDE 500 MG/1
TABLET, EXTENDED RELEASE ORAL
Qty: 360 TABLET | Refills: 0 | Status: SHIPPED | OUTPATIENT
Start: 2022-03-21 | End: 2022-06-21 | Stop reason: SDUPTHER

## 2022-04-05 ENCOUNTER — TELEPHONE (OUTPATIENT)
Dept: FAMILY MEDICINE CLINIC | Age: 77
End: 2022-04-05

## 2022-04-05 NOTE — TELEPHONE ENCOUNTER
HUB TO READ  Left voicemail to schedule pt for medicare wellness appt after 12.10.22 with Nelia Frost

## 2022-04-11 RX ORDER — METOPROLOL SUCCINATE 25 MG/1
TABLET, EXTENDED RELEASE ORAL
Qty: 180 TABLET | Refills: 0 | Status: SHIPPED | OUTPATIENT
Start: 2022-04-11 | End: 2022-07-11

## 2022-04-11 RX ORDER — ALLOPURINOL 100 MG/1
TABLET ORAL
Qty: 90 TABLET | Refills: 0 | Status: SHIPPED | OUTPATIENT
Start: 2022-04-11 | End: 2022-07-15

## 2022-04-12 ENCOUNTER — LAB (OUTPATIENT)
Dept: LAB | Facility: HOSPITAL | Age: 77
End: 2022-04-12

## 2022-04-12 ENCOUNTER — ANTICOAGULATION VISIT (OUTPATIENT)
Dept: CARDIOLOGY | Facility: CLINIC | Age: 77
End: 2022-04-12

## 2022-04-12 DIAGNOSIS — Z51.81 MONITORING FOR ANTICOAGULANT USE: ICD-10-CM

## 2022-04-12 DIAGNOSIS — I48.91 ATRIAL FIBRILLATION, UNSPECIFIED TYPE: ICD-10-CM

## 2022-04-12 DIAGNOSIS — Z79.01 MONITORING FOR ANTICOAGULANT USE: ICD-10-CM

## 2022-04-12 DIAGNOSIS — I48.91 ATRIAL FIBRILLATION, UNSPECIFIED TYPE: Primary | ICD-10-CM

## 2022-04-12 LAB
INR PPP: 3 (ref 2–3)
PROTHROMBIN TIME: 28.9 SECONDS (ref 9.4–12)

## 2022-04-12 PROCEDURE — 85610 PROTHROMBIN TIME: CPT

## 2022-04-12 PROCEDURE — 36415 COLL VENOUS BLD VENIPUNCTURE: CPT

## 2022-04-12 NOTE — PROGRESS NOTES
Lab Results   Component Value Date    INR 3.00 04/12/2022    INR 2.31 03/15/2022    INR 2.02 02/22/2022    PROTIME 28.9 (H) 04/12/2022    PROTIME 22.3 (H) 03/15/2022    PROTIME 19.7 (H) 02/22/2022     Taking 6/5/5  2.0-3.0  Afib

## 2022-04-26 ENCOUNTER — ANTICOAGULATION VISIT (OUTPATIENT)
Dept: CARDIOLOGY | Facility: CLINIC | Age: 77
End: 2022-04-26

## 2022-04-26 ENCOUNTER — LAB (OUTPATIENT)
Dept: LAB | Facility: HOSPITAL | Age: 77
End: 2022-04-26

## 2022-04-26 DIAGNOSIS — I48.91 ATRIAL FIBRILLATION, UNSPECIFIED TYPE: Primary | ICD-10-CM

## 2022-04-26 DIAGNOSIS — I48.91 ATRIAL FIBRILLATION, UNSPECIFIED TYPE: ICD-10-CM

## 2022-04-26 DIAGNOSIS — Z51.81 MONITORING FOR ANTICOAGULANT USE: ICD-10-CM

## 2022-04-26 DIAGNOSIS — Z79.01 MONITORING FOR ANTICOAGULANT USE: ICD-10-CM

## 2022-04-26 LAB
INR PPP: 2.65 (ref 0.86–1.15)
PROTHROMBIN TIME: 28.8 SECONDS (ref 11.8–14.9)

## 2022-04-26 PROCEDURE — 85610 PROTHROMBIN TIME: CPT

## 2022-04-26 PROCEDURE — 36415 COLL VENOUS BLD VENIPUNCTURE: CPT

## 2022-04-26 NOTE — PROGRESS NOTES
Lab Results   Component Value Date    INR 2.65 (H) 04/26/2022    INR 3.00 04/12/2022    INR 2.31 03/15/2022    PROTIME 28.8 (H) 04/26/2022    PROTIME 28.9 (H) 04/12/2022    PROTIME 22.3 (H) 03/15/2022       INR: 2.65  Dose: 6mg/5mg/5mg  Range: 2.0-3.0  AFIB

## 2022-04-26 NOTE — PROGRESS NOTES
Left detailed message for pt on working number. Asked pt to call back and let us know he received instructions

## 2022-04-29 RX ORDER — SIMVASTATIN 40 MG
TABLET ORAL
Qty: 90 TABLET | Refills: 0 | Status: SHIPPED | OUTPATIENT
Start: 2022-04-29 | End: 2022-07-29

## 2022-05-12 RX ORDER — LISINOPRIL 20 MG/1
TABLET ORAL
Qty: 180 TABLET | Refills: 0 | Status: SHIPPED | OUTPATIENT
Start: 2022-05-12 | End: 2022-08-11

## 2022-05-17 ENCOUNTER — LAB (OUTPATIENT)
Dept: LAB | Facility: HOSPITAL | Age: 77
End: 2022-05-17

## 2022-05-17 ENCOUNTER — ANTICOAGULATION VISIT (OUTPATIENT)
Dept: CARDIOLOGY | Facility: CLINIC | Age: 77
End: 2022-05-17

## 2022-05-17 DIAGNOSIS — I48.91 ATRIAL FIBRILLATION, UNSPECIFIED TYPE: ICD-10-CM

## 2022-05-17 DIAGNOSIS — Z51.81 MONITORING FOR ANTICOAGULANT USE: ICD-10-CM

## 2022-05-17 DIAGNOSIS — I48.91 ATRIAL FIBRILLATION, UNSPECIFIED TYPE: Primary | ICD-10-CM

## 2022-05-17 DIAGNOSIS — Z79.01 MONITORING FOR ANTICOAGULANT USE: ICD-10-CM

## 2022-05-17 LAB
INR PPP: 2.55 (ref 0.86–1.15)
PROTHROMBIN TIME: 27.9 SECONDS (ref 11.8–14.9)

## 2022-05-17 PROCEDURE — 85610 PROTHROMBIN TIME: CPT

## 2022-05-17 PROCEDURE — 36415 COLL VENOUS BLD VENIPUNCTURE: CPT

## 2022-05-17 NOTE — PROGRESS NOTES
Lab Results   Component Value Date    INR 2.55 (H) 05/17/2022    INR 2.65 (H) 04/26/2022    INR 3.00 04/12/2022    PROTIME 27.9 (H) 05/17/2022    PROTIME 28.8 (H) 04/26/2022    PROTIME 28.9 (H) 04/12/2022     Pt taking  6mg/ 5mg/ 5mg    Range 2.0-3.0    Dx AFIB

## 2022-06-03 DIAGNOSIS — I10 PRIMARY HYPERTENSION: ICD-10-CM

## 2022-06-03 RX ORDER — AMLODIPINE BESYLATE 10 MG/1
TABLET ORAL
Qty: 90 TABLET | Refills: 0 | OUTPATIENT
Start: 2022-06-03

## 2022-06-03 RX ORDER — AMLODIPINE BESYLATE 10 MG/1
10 TABLET ORAL DAILY
Qty: 90 TABLET | Refills: 0 | Status: CANCELLED | OUTPATIENT
Start: 2022-06-03

## 2022-06-03 NOTE — TELEPHONE ENCOUNTER
Caller: Zachariah Cruz    Relationship: Self    Best call back number: [phone]    Requested Prescriptions:   Requested Prescriptions     Pending Prescriptions Disp Refills   • amLODIPine (NORVASC) 10 MG tablet 90 tablet 0     Sig: Take 1 tablet by mouth Daily.        Pharmacy where request should be sent: [facility] - [address]  - [phone]  - [phone] FX     Additional details provided by patient: PATIENT WOULD LIKE TO GET 5 MG TABLETS SO HE DOES NOT HAVE TO SPLIT THE TABLETS.    Does the patient have less than a 3 day supply:  [] Yes  [x] No    Sabino Del Rio Rep   06/03/22 09:23 EDT

## 2022-06-03 NOTE — TELEPHONE ENCOUNTER
Rx Refill Note  Requested Prescriptions     Refused Prescriptions Disp Refills   • amLODIPine (NORVASC) 10 MG tablet [Pharmacy Med Name: amLODIPine BESYLATE 10 MG TAB] 90 tablet 0     Sig: TAKE ONE TABLET BY MOUTH DAILY     Refused By: PRASHANTH SHOEMAKER     Reason for Refusal: Other      Last office visit with prescribing clinician: 12/13/2021      Next office visit with prescribing clinician: Visit date not found    Duplicate request.       Marissa Shoemaker LPN  06/03/22, 16:22 EDT

## 2022-06-03 NOTE — TELEPHONE ENCOUNTER
Pt said he is taking 1/2 of a 10 mg tablet and would like to get just a 5 mg so he doesn't have to split it.

## 2022-06-06 ENCOUNTER — TELEPHONE (OUTPATIENT)
Dept: FAMILY MEDICINE CLINIC | Age: 77
End: 2022-06-06

## 2022-06-06 DIAGNOSIS — I10 PRIMARY HYPERTENSION: ICD-10-CM

## 2022-06-06 RX ORDER — AMLODIPINE BESYLATE 10 MG/1
TABLET ORAL
Qty: 90 TABLET | Refills: 0 | OUTPATIENT
Start: 2022-06-06

## 2022-06-06 RX ORDER — AMLODIPINE BESYLATE 5 MG/1
5 TABLET ORAL DAILY
Qty: 90 TABLET | Refills: 0 | Status: SHIPPED | OUTPATIENT
Start: 2022-06-06 | End: 2022-09-02

## 2022-06-06 RX ORDER — GLIMEPIRIDE 4 MG/1
TABLET ORAL
Qty: 90 TABLET | Refills: 0 | Status: SHIPPED | OUTPATIENT
Start: 2022-06-06 | End: 2022-09-02

## 2022-06-06 NOTE — TELEPHONE ENCOUNTER
I last saw him 12-13-21 and said:   Monitor his bp, drop his norvasc back to 5 mg, to take lasix prn, recheck BMP one month   I think I sent this to maybe florencia that he needed to be seen since that was his last visit and then she could send the rx in, so send in to his pharmacy, but due a follow up

## 2022-06-06 NOTE — TELEPHONE ENCOUNTER
Rx Refill Note  Requested Prescriptions     Pending Prescriptions Disp Refills   • glimepiride (AMARYL) 4 MG tablet [Pharmacy Med Name: GLIMEPIRIDE 4 MG TABLET] 90 tablet 0     Sig: TAKE ONE TABLET BY MOUTH DAILY   • amLODIPine (NORVASC) 10 MG tablet [Pharmacy Med Name: amLODIPine BESYLATE 10 MG TAB] 90 tablet 0     Sig: TAKE ONE TABLET BY MOUTH DAILY   • amLODIPine (NORVASC) 5 MG tablet 90 tablet 0     Sig: Take 1 tablet by mouth Daily.      Last office visit with prescribing clinician: 12/13/2021  Next office visit with prescribing clinician: Visit date not found.   NEEDS APPT, MESSAGE SENT  Pt said he was told to take 1/2 of a 10mg amlodipine. He would like it called in as 5mg so he doesn't have to break it in half.   TIFFANY Frost approved.       Marissa Oliveira LPN  06/06/22, 11:08 EDT

## 2022-06-07 ENCOUNTER — ANTICOAGULATION VISIT (OUTPATIENT)
Dept: CARDIOLOGY | Facility: CLINIC | Age: 77
End: 2022-06-07

## 2022-06-07 ENCOUNTER — LAB (OUTPATIENT)
Dept: LAB | Facility: HOSPITAL | Age: 77
End: 2022-06-07

## 2022-06-07 DIAGNOSIS — I48.91 ATRIAL FIBRILLATION, UNSPECIFIED TYPE: ICD-10-CM

## 2022-06-07 DIAGNOSIS — Z79.01 MONITORING FOR ANTICOAGULANT USE: ICD-10-CM

## 2022-06-07 DIAGNOSIS — Z51.81 MONITORING FOR ANTICOAGULANT USE: ICD-10-CM

## 2022-06-07 LAB
INR PPP: 2.82 (ref 0.86–1.15)
PROTHROMBIN TIME: 30.3 SECONDS (ref 11.8–14.9)

## 2022-06-07 PROCEDURE — 85610 PROTHROMBIN TIME: CPT

## 2022-06-07 PROCEDURE — 36415 COLL VENOUS BLD VENIPUNCTURE: CPT

## 2022-06-08 ENCOUNTER — OFFICE VISIT (OUTPATIENT)
Dept: FAMILY MEDICINE CLINIC | Age: 77
End: 2022-06-08

## 2022-06-08 VITALS
SYSTOLIC BLOOD PRESSURE: 132 MMHG | BODY MASS INDEX: 33.27 KG/M2 | TEMPERATURE: 98.3 F | HEIGHT: 70 IN | DIASTOLIC BLOOD PRESSURE: 69 MMHG | HEART RATE: 83 BPM | WEIGHT: 232.4 LBS

## 2022-06-08 DIAGNOSIS — M1A.0790 CHRONIC IDIOPATHIC GOUT INVOLVING TOE WITHOUT TOPHUS, UNSPECIFIED LATERALITY: ICD-10-CM

## 2022-06-08 DIAGNOSIS — H93.19 TINNITUS, UNSPECIFIED LATERALITY: ICD-10-CM

## 2022-06-08 DIAGNOSIS — E11.9 TYPE 2 DIABETES MELLITUS WITHOUT COMPLICATION, WITHOUT LONG-TERM CURRENT USE OF INSULIN: ICD-10-CM

## 2022-06-08 DIAGNOSIS — I10 ESSENTIAL HYPERTENSION: Primary | ICD-10-CM

## 2022-06-08 DIAGNOSIS — M12.89 OTHER SPECIFIC ARTHROPATHIES, NOT ELSEWHERE CLASSIFIED, MULTIPLE SITES: ICD-10-CM

## 2022-06-08 DIAGNOSIS — E78.5 HYPERLIPIDEMIA, UNSPECIFIED HYPERLIPIDEMIA TYPE: ICD-10-CM

## 2022-06-08 PROCEDURE — 99214 OFFICE O/P EST MOD 30 MIN: CPT | Performed by: NURSE PRACTITIONER

## 2022-06-08 NOTE — ASSESSMENT & PLAN NOTE
Hypertension is stable.  to monitor BP at home. Continue current meds. Continue to modify diet and lifestyle.

## 2022-06-08 NOTE — PROGRESS NOTES
Zachariah Cruz presents to [facility].    Chief Complaint:  Hypertension (6 month f/u)         History of Present Illness:  Hypertension:  Current medication: metoprolol, norvasc and lisinopril   Tolerating Medication: Yes  Needs refills: no, the norvasc 5 mg has been sent in and his swelling improved on lower dose   Labs:  Lab Results       Component                Value               Date                       GLUCOSE                  156 (H)             02/22/2022                 BUN                      16                  02/22/2022                 CREATININE               0.98                02/22/2022                 EGFRIFNONA               75                  02/22/2022                 BCR                      16.3                02/22/2022                 K                        4.4                 02/22/2022                 CO2                      26.2                02/22/2022                 CALCIUM                  9.8                 02/22/2022                 ALBUMIN                  4.40                10/11/2021                 LABIL2                   1.6                 04/23/2021                 AST                      9                   10/11/2021                 ALT                      11                  10/11/2021        Diabetes:  Current medication: amaryl, metformin   Tolerating medication: Yes  Last eye exam: May 2022 [place]  Last foot exam: 9-2020  At home FBS ranges: 140's  Lab Results       Component                Value               Date                       HGBA1C                   7.05 (H)            10/11/2021               Hyperlipidemia  Current medication: zocor  Tolerating medication: Yes  Needs Refill: No    Lab Results       Component                Value               Date                       CHOL                     139                 10/11/2021                 CHLPL                    120                 04/23/2021                  TRIG                     161 (H)             10/11/2021                 HDL                      38 (L)              10/11/2021                 LDL                      73                  10/11/2021              History of Gout:not currently with a flare     PAST MEDICAL HISTORY changes since       covid +     AF, sees Dr Moffett    Sleep Apnea: uses CPAP;     Renal Stones: he has undergone ECSW lithotripsy and laser;     Hospitalizations:    Pneumonia admitted once on 10-20-20 (COVID +)         CURRENT MEDICAL PROVIDERS:    Orthopedist    Rheumatologist: Dr. Arvizu         PREVENTIVE HEALTH MAINTENANCE             COLORECTAL CANCER SCREENING: Up to date (colonoscopy q10y; sigmoidoscopy q5y; Cologuard q3y) was last done 2020, Results are in chart; Cologuard normal       Hepatitis C Medicare Screening: was last done ; negative     PSA: was last done 14 with normal results         Surgical History:     Left cataract     Appendectomy    Coronary Artery Bypass Graft: ;     Transurethral Resection of Prostate: ;     Cataract Removal: right; ;         Family History:     Father:  at age 91; Cause of death was CAD;  Coronary Artery Disease     Mother:  at age 88; Cause of death was CVA;  Hypertension     Paternal Grandfather: Cerebrovascular Accident         Social History:     Occupation:.   (Self-Employed) [employer]     Marital Status:      Children: 3 children                    Review of Systems:  Review of Systems   Constitutional: Negative for fatigue and fever.   HENT: Positive for tinnitus.    Respiratory: Negative for cough and shortness of breath.    Cardiovascular: Negative for chest pain, palpitations and leg swelling.   Musculoskeletal: Positive for arthralgias and back pain (intermittent pain ). Negative for joint swelling.   Neurological: Negative for numbness.          Current Outpatient Medications:   •  allopurinol  "(ZYLOPRIM) 100 MG tablet, TAKE ONE TABLET BY MOUTH DAILY, Disp: 90 tablet, Rfl: 0  •  amLODIPine (NORVASC) 5 MG tablet, Take 1 tablet by mouth Daily., Disp: 90 tablet, Rfl: 0  •  aspirin 81 MG EC tablet, Take 81 mg by mouth Every Other Day., Disp: , Rfl:   •  furosemide (Lasix) 20 MG tablet, Take 1 tablet by mouth Daily As Needed (swelling)., Disp: 30 tablet, Rfl: 1  •  glimepiride (AMARYL) 4 MG tablet, TAKE ONE TABLET BY MOUTH DAILY, Disp: 90 tablet, Rfl: 0  •  hydroCHLOROthiazide (HYDRODIURIL) 25 MG tablet, Take 1 tablet by mouth Daily., Disp: 90 tablet, Rfl: 3  •  lisinopril (PRINIVIL,ZESTRIL) 20 MG tablet, TAKE ONE TABLET BY MOUTH TWICE A DAY, Disp: 180 tablet, Rfl: 0  •  metFORMIN ER (GLUCOPHAGE-XR) 500 MG 24 hr tablet, TAKE FOUR TABLETS BY MOUTH DAILY, Disp: 360 tablet, Rfl: 0  •  metoprolol succinate XL (TOPROL-XL) 25 MG 24 hr tablet, TAKE ONE TABLET BY MOUTH TWICE A DAY, Disp: 180 tablet, Rfl: 0  •  OneTouch Ultra test strip, 1 each by Other route 2 (two) times a day., Disp: , Rfl:   •  simvastatin (ZOCOR) 40 MG tablet, TAKE ONE TABLET BY MOUTH DAILY, Disp: 90 tablet, Rfl: 0  •  warfarin (Coumadin) 1 MG tablet, Take as directed, Disp: 90 tablet, Rfl: 1  •  warfarin (COUMADIN) 5 MG tablet, Take 1 tablet by mouth Daily., Disp: 90 tablet, Rfl: 2    Vital Signs:   Vitals:    06/08/22 0928   BP: 132/69   BP Location: Right arm   Patient Position: Sitting   Pulse: 83   Temp: 98.3 °F (36.8 °C)   TempSrc: Oral   Weight: 105 kg (232 lb 6.4 oz)   Height: 177.8 cm (70\")         Physical Exam:  Physical Exam  Constitutional:       Appearance: Normal appearance.   HENT:      Right Ear: Tympanic membrane and external ear normal.      Left Ear: Tympanic membrane and external ear normal.   Neck:      Vascular: No carotid bruit.   Cardiovascular:      Rate and Rhythm: Normal rate and regular rhythm.      Pulses:           Posterior tibial pulses are 2+ on the right side and 2+ on the left side.      Heart sounds: No murmur " heard.  Pulmonary:      Effort: No respiratory distress.      Breath sounds: Normal breath sounds.   Musculoskeletal:         General: No swelling.   Feet:      Right foot:      Protective Sensation: 3 sites tested. 3 sites sensed.      Skin integrity: Skin integrity normal. No ulcer or blister.      Toenail Condition: Right toenails are normal.      Left foot:      Protective Sensation: 3 sites tested. 3 sites sensed.      Skin integrity: Skin integrity normal. No ulcer or blister.      Toenail Condition: Left toenails are normal.      Comments: Diabetic Foot Exam Performed and Monofilament Test Performed     Neurological:      General: No focal deficit present.      Mental Status: He is alert.   Psychiatric:         Mood and Affect: Mood normal.         Thought Content: Thought content normal.         Result Review      The following data was reviewed by: JACIEL Tovar on 06/08/2022:    Results for orders placed or performed in visit on 06/07/22   Protime-INR    Specimen: Blood   Result Value Ref Range    Protime 30.3 (H) 11.8 - 14.9 Seconds    INR 2.82 (H) 0.86 - 1.15               Assessment and Plan:          Diagnoses and all orders for this visit:    1. Essential hypertension (Primary)  Assessment & Plan:  Hypertension is stable.  to monitor BP at home. Continue current meds. Continue to modify diet and lifestyle.     Orders:  -     Comprehensive Metabolic Panel; Future    2. Hyperlipidemia, unspecified hyperlipidemia type  Assessment & Plan:  Continue current medication and efforts with diet and exercise.       Orders:  -     Comprehensive Metabolic Panel; Future  -     Lipid Panel; Future    3. Type 2 diabetes mellitus without complication, without long-term current use of insulin (HCC)  Assessment & Plan:  To work on diet, regular exercise, monitor sugars, check feet daily, see optometrist yearly or as directed.      Orders:  -     Comprehensive Metabolic Panel; Future  -     Lipid Panel;  Future  -     Hemoglobin A1c; Future    4. Chronic idiopathic gout involving toe without tophus, unspecified laterality    5. Other specific arthropathies, not elsewhere classified, multiple sites  Assessment & Plan:  Reviewed labs and dg x-ray with patient       6. Tinnitus, unspecified laterality  Assessment & Plan:  Discussed condition, he is going to try Allegra, consider ENT consult           Follow Up   Return for fasting for labs.  Patient was given instructions and counseling regarding his condition or for health maintenance advice. Please see specific information pulled into the AVS if appropriate.

## 2022-06-10 ENCOUNTER — LAB (OUTPATIENT)
Dept: LAB | Facility: HOSPITAL | Age: 77
End: 2022-06-10

## 2022-06-10 DIAGNOSIS — I10 ESSENTIAL HYPERTENSION: ICD-10-CM

## 2022-06-10 DIAGNOSIS — Z79.01 MONITORING FOR ANTICOAGULANT USE: ICD-10-CM

## 2022-06-10 DIAGNOSIS — I48.91 ATRIAL FIBRILLATION, UNSPECIFIED TYPE: ICD-10-CM

## 2022-06-10 DIAGNOSIS — E78.5 HYPERLIPIDEMIA, UNSPECIFIED HYPERLIPIDEMIA TYPE: ICD-10-CM

## 2022-06-10 DIAGNOSIS — E11.9 TYPE 2 DIABETES MELLITUS WITHOUT COMPLICATION, WITHOUT LONG-TERM CURRENT USE OF INSULIN: ICD-10-CM

## 2022-06-10 DIAGNOSIS — Z51.81 MONITORING FOR ANTICOAGULANT USE: ICD-10-CM

## 2022-06-10 LAB
ALBUMIN SERPL-MCNC: 4.2 G/DL (ref 3.5–5.2)
ALBUMIN/GLOB SERPL: 1.8 G/DL
ALP SERPL-CCNC: 80 U/L (ref 39–117)
ALT SERPL W P-5'-P-CCNC: 25 U/L (ref 1–41)
ANION GAP SERPL CALCULATED.3IONS-SCNC: 11.3 MMOL/L (ref 5–15)
AST SERPL-CCNC: 20 U/L (ref 1–40)
BILIRUB SERPL-MCNC: 0.3 MG/DL (ref 0–1.2)
BUN SERPL-MCNC: 19 MG/DL (ref 8–23)
BUN/CREAT SERPL: 16 (ref 7–25)
CALCIUM SPEC-SCNC: 9.7 MG/DL (ref 8.6–10.5)
CHLORIDE SERPL-SCNC: 101 MMOL/L (ref 98–107)
CHOLEST SERPL-MCNC: 116 MG/DL (ref 0–200)
CO2 SERPL-SCNC: 23.7 MMOL/L (ref 22–29)
CREAT SERPL-MCNC: 1.19 MG/DL (ref 0.76–1.27)
EGFRCR SERPLBLD CKD-EPI 2021: 63.7 ML/MIN/1.73
GLOBULIN UR ELPH-MCNC: 2.3 GM/DL
GLUCOSE SERPL-MCNC: 164 MG/DL (ref 65–99)
HBA1C MFR BLD: 8.3 % (ref 4.8–5.6)
HDLC SERPL-MCNC: 30 MG/DL (ref 40–60)
INR PPP: 2.85 (ref 0.86–1.15)
LDLC SERPL CALC-MCNC: 51 MG/DL (ref 0–100)
LDLC/HDLC SERPL: 1.44 {RATIO}
POTASSIUM SERPL-SCNC: 4.5 MMOL/L (ref 3.5–5.2)
PROT SERPL-MCNC: 6.5 G/DL (ref 6–8.5)
PROTHROMBIN TIME: 30.5 SECONDS (ref 11.8–14.9)
SODIUM SERPL-SCNC: 136 MMOL/L (ref 136–145)
TRIGL SERPL-MCNC: 214 MG/DL (ref 0–150)
VLDLC SERPL-MCNC: 35 MG/DL (ref 5–40)

## 2022-06-10 PROCEDURE — 80061 LIPID PANEL: CPT

## 2022-06-10 PROCEDURE — 36415 COLL VENOUS BLD VENIPUNCTURE: CPT

## 2022-06-10 PROCEDURE — 85610 PROTHROMBIN TIME: CPT

## 2022-06-10 PROCEDURE — 80053 COMPREHEN METABOLIC PANEL: CPT

## 2022-06-10 PROCEDURE — 83036 HEMOGLOBIN GLYCOSYLATED A1C: CPT

## 2022-06-13 DIAGNOSIS — E11.9 TYPE 2 DIABETES MELLITUS WITHOUT COMPLICATION, WITHOUT LONG-TERM CURRENT USE OF INSULIN: Primary | ICD-10-CM

## 2022-06-21 DIAGNOSIS — E11.9 TYPE 2 DIABETES MELLITUS WITHOUT COMPLICATION, WITHOUT LONG-TERM CURRENT USE OF INSULIN: Primary | ICD-10-CM

## 2022-06-21 RX ORDER — METFORMIN HYDROCHLORIDE 500 MG/1
2000 TABLET, EXTENDED RELEASE ORAL DAILY
Qty: 360 TABLET | Refills: 1 | Status: SHIPPED | OUTPATIENT
Start: 2022-06-21 | End: 2022-12-19

## 2022-06-21 NOTE — TELEPHONE ENCOUNTER
Rx Refill Note  Requested Prescriptions     Pending Prescriptions Disp Refills   • metFORMIN ER (GLUCOPHAGE-XR) 500 MG 24 hr tablet 360 tablet 0     Sig: Take 4 tablets by mouth Daily.      Last office visit with prescribing clinician: 6/8/2022      Next office visit with prescribing clinician: 12/7/2022  Lab: Hemoglobin A1c (06/10/2022 07:16)    Marissa Oliveira LPN  06/21/22, 11:20 EDT

## 2022-06-28 ENCOUNTER — ANTICOAGULATION VISIT (OUTPATIENT)
Dept: CARDIOLOGY | Facility: CLINIC | Age: 77
End: 2022-06-28

## 2022-06-28 ENCOUNTER — LAB (OUTPATIENT)
Dept: LAB | Facility: HOSPITAL | Age: 77
End: 2022-06-28

## 2022-06-28 DIAGNOSIS — Z79.01 MONITORING FOR ANTICOAGULANT USE: ICD-10-CM

## 2022-06-28 DIAGNOSIS — Z51.81 MONITORING FOR ANTICOAGULANT USE: ICD-10-CM

## 2022-06-28 DIAGNOSIS — I48.91 ATRIAL FIBRILLATION, UNSPECIFIED TYPE: Primary | ICD-10-CM

## 2022-06-28 DIAGNOSIS — I48.91 ATRIAL FIBRILLATION, UNSPECIFIED TYPE: ICD-10-CM

## 2022-06-28 LAB
INR PPP: 2.59 (ref 0.86–1.15)
PROTHROMBIN TIME: 28.3 SECONDS (ref 11.8–14.9)

## 2022-06-28 PROCEDURE — 36415 COLL VENOUS BLD VENIPUNCTURE: CPT

## 2022-06-28 PROCEDURE — 85610 PROTHROMBIN TIME: CPT

## 2022-06-28 NOTE — PROGRESS NOTES
Lab Results   Component Value Date    INR 2.59 (H) 06/28/2022    INR 2.85 (H) 06/10/2022    INR 2.82 (H) 06/07/2022    PROTIME 28.3 (H) 06/28/2022    PROTIME 30.5 (H) 06/10/2022    PROTIME 30.3 (H) 06/07/2022      Taking 5/5/6  2.0-3.0  Afib

## 2022-07-11 RX ORDER — METOPROLOL SUCCINATE 25 MG/1
TABLET, EXTENDED RELEASE ORAL
Qty: 180 TABLET | Refills: 0 | Status: SHIPPED | OUTPATIENT
Start: 2022-07-11 | End: 2022-10-10

## 2022-07-15 RX ORDER — ALLOPURINOL 100 MG/1
TABLET ORAL
Qty: 90 TABLET | Refills: 0 | Status: SHIPPED | OUTPATIENT
Start: 2022-07-15 | End: 2022-10-14

## 2022-07-18 ENCOUNTER — HOSPITAL ENCOUNTER (OUTPATIENT)
Dept: GENERAL RADIOLOGY | Facility: HOSPITAL | Age: 77
Discharge: HOME | End: 2022-07-18

## 2022-07-18 ENCOUNTER — OFFICE VISIT (OUTPATIENT)
Dept: FAMILY MEDICINE CLINIC | Age: 77
End: 2022-07-18

## 2022-07-18 VITALS
SYSTOLIC BLOOD PRESSURE: 158 MMHG | HEART RATE: 69 BPM | BODY MASS INDEX: 32.93 KG/M2 | WEIGHT: 230 LBS | DIASTOLIC BLOOD PRESSURE: 73 MMHG | HEIGHT: 70 IN | OXYGEN SATURATION: 98 %

## 2022-07-18 DIAGNOSIS — R93.7 ABNORMAL X-RAY OF THORACIC SPINE: Primary | ICD-10-CM

## 2022-07-18 DIAGNOSIS — M54.6 ACUTE RIGHT-SIDED THORACIC BACK PAIN: Primary | ICD-10-CM

## 2022-07-18 DIAGNOSIS — M54.6 ACUTE RIGHT-SIDED THORACIC BACK PAIN: ICD-10-CM

## 2022-07-18 DIAGNOSIS — M43.24 ANKYLOSIS OF THORACIC SPINE: ICD-10-CM

## 2022-07-18 PROCEDURE — 99213 OFFICE O/P EST LOW 20 MIN: CPT | Performed by: NURSE PRACTITIONER

## 2022-07-18 PROCEDURE — 72072 X-RAY EXAM THORAC SPINE 3VWS: CPT

## 2022-07-18 PROCEDURE — 71046 X-RAY EXAM CHEST 2 VIEWS: CPT

## 2022-07-18 RX ORDER — METHYLPREDNISOLONE 4 MG/1
TABLET ORAL
Qty: 21 EACH | Refills: 0 | Status: SHIPPED | OUTPATIENT
Start: 2022-07-18 | End: 2022-10-19

## 2022-07-18 NOTE — PROGRESS NOTES
"Chief Complaint  Back Pain (Pt c/o pain in his (R) side of his upper back./Sharp pain when he takes a deep breath. Ongoing for about a week.)    Subjective          Zachariah Cruz presents to [facility]  Back Pain  This is a new problem. The current episode started in the past 7 days. The problem has been gradually improving since onset. The pain is present in the thoracic spine. Quality: sharp. The pain does not radiate. The pain is at a severity of 8/10. The pain is severe. Exacerbated by: deep breathing. Pertinent negatives include no bladder incontinence, bowel incontinence, chest pain, fever, numbness or tingling. He has tried analgesics for the symptoms. The treatment provided no relief.       Objective   Vital Signs:   /73 (BP Location: Right arm, Patient Position: Sitting)   Pulse 69   Ht 177.8 cm (70\")   Wt 104 kg (230 lb)   SpO2 98% Comment: room air  BMI 33.00 kg/m²     Physical Exam  Constitutional:       General: He is not in acute distress.     Appearance: Normal appearance. He is obese.   HENT:      Head: Normocephalic.   Eyes:      Pupils: Pupils are equal, round, and reactive to light.      Visual Fields: Right eye visual fields normal and left eye visual fields normal.   Neck:      Trachea: Trachea normal.   Cardiovascular:      Rate and Rhythm: Normal rate and regular rhythm.      Heart sounds: Normal heart sounds.   Pulmonary:      Effort: Pulmonary effort is normal.      Breath sounds: Normal breath sounds and air entry.   Musculoskeletal:      Right lower leg: No edema.      Left lower leg: No edema.   Skin:     General: Skin is warm and dry.   Neurological:      Mental Status: He is alert and oriented to person, place, and time.   Psychiatric:         Mood and Affect: Mood and affect normal.         Behavior: Behavior normal.         Thought Content: Thought content normal.        Result Review :   The following data was reviewed by: Serene" JACIEL Roy on 07/18/2022:                  Assessment and Plan    Diagnoses and all orders for this visit:    1. Acute right-sided thoracic back pain (Primary)  -     XR Chest PA & Lateral; Future  -     XR Spine Thoracic 3 View; Future  -     methylPREDNISolone (MEDROL) 4 MG dose pack; Take as directed on package instructions.  Dispense: 21 each; Refill: 0    Will send in a steroid pack.  We have discussed that he needs to monitor his blood sugar while taking the steroid pack.      Follow Up   Return if symptoms worsen or fail to improve.  Patient was given instructions and counseling regarding his condition or for health maintenance advice. Please see specific information pulled into the AVS if appropriate.

## 2022-07-19 ENCOUNTER — LAB (OUTPATIENT)
Dept: LAB | Facility: HOSPITAL | Age: 77
End: 2022-07-19

## 2022-07-19 DIAGNOSIS — M43.24 ANKYLOSIS OF THORACIC SPINE: ICD-10-CM

## 2022-07-19 DIAGNOSIS — I48.91 ATRIAL FIBRILLATION, UNSPECIFIED TYPE: Primary | ICD-10-CM

## 2022-07-19 DIAGNOSIS — R93.7 ABNORMAL X-RAY OF THORACIC SPINE: ICD-10-CM

## 2022-07-19 LAB
CHROMATIN AB SERPL-ACNC: <10 IU/ML (ref 0–14)
CRP SERPL-MCNC: <0.3 MG/DL (ref 0–0.5)

## 2022-07-19 PROCEDURE — 86225 DNA ANTIBODY NATIVE: CPT

## 2022-07-19 PROCEDURE — 86431 RHEUMATOID FACTOR QUANT: CPT

## 2022-07-19 PROCEDURE — 36415 COLL VENOUS BLD VENIPUNCTURE: CPT

## 2022-07-19 PROCEDURE — 86140 C-REACTIVE PROTEIN: CPT

## 2022-07-19 PROCEDURE — 86038 ANTINUCLEAR ANTIBODIES: CPT

## 2022-07-20 LAB
DSDNA IGG SERPL IA-ACNC: NEGATIVE [IU]/ML
NUCLEAR IGG SER IA-RTO: NEGATIVE

## 2022-07-20 RX ORDER — WARFARIN SODIUM 5 MG/1
5 TABLET ORAL DAILY
Qty: 90 TABLET | Refills: 2 | Status: SHIPPED | OUTPATIENT
Start: 2022-07-20 | End: 2023-03-13

## 2022-07-21 ENCOUNTER — ANTICOAGULATION VISIT (OUTPATIENT)
Dept: CARDIOLOGY | Facility: CLINIC | Age: 77
End: 2022-07-21

## 2022-07-21 ENCOUNTER — LAB (OUTPATIENT)
Dept: LAB | Facility: HOSPITAL | Age: 77
End: 2022-07-21

## 2022-07-21 DIAGNOSIS — I48.91 ATRIAL FIBRILLATION, UNSPECIFIED TYPE: ICD-10-CM

## 2022-07-21 LAB
INR PPP: 2.51 (ref 0.86–1.15)
PROTHROMBIN TIME: 27.6 SECONDS (ref 11.8–14.9)

## 2022-07-21 PROCEDURE — 36415 COLL VENOUS BLD VENIPUNCTURE: CPT

## 2022-07-21 PROCEDURE — 85610 PROTHROMBIN TIME: CPT

## 2022-07-21 NOTE — PROGRESS NOTES
Lab Results   Component Value Date    INR 2.51 (H) 07/21/2022    INR 2.59 (H) 06/28/2022    INR 2.85 (H) 06/10/2022    PROTIME 27.6 (H) 07/21/2022    PROTIME 28.3 (H) 06/28/2022    PROTIME 30.5 (H) 06/10/2022     Taking 5/5/6  2.0-3.0  Afib

## 2022-07-29 RX ORDER — SIMVASTATIN 40 MG
TABLET ORAL
Qty: 90 TABLET | Refills: 0 | Status: SHIPPED | OUTPATIENT
Start: 2022-07-29 | End: 2022-10-21

## 2022-08-08 ENCOUNTER — TELEPHONE (OUTPATIENT)
Dept: CARDIOLOGY | Facility: CLINIC | Age: 77
End: 2022-08-08

## 2022-08-08 RX ORDER — BLOOD SUGAR DIAGNOSTIC
STRIP MISCELLANEOUS
Qty: 100 EACH | Refills: 0 | Status: SHIPPED | OUTPATIENT
Start: 2022-08-08 | End: 2022-08-09 | Stop reason: SDUPTHER

## 2022-08-08 NOTE — TELEPHONE ENCOUNTER
Spoke with pt to remind him that his INR is overdue. He stated that he would have his INR checked 9-11-22.

## 2022-08-09 ENCOUNTER — ANTICOAGULATION VISIT (OUTPATIENT)
Dept: CARDIOLOGY | Facility: CLINIC | Age: 77
End: 2022-08-09

## 2022-08-09 ENCOUNTER — LAB (OUTPATIENT)
Dept: LAB | Facility: HOSPITAL | Age: 77
End: 2022-08-09

## 2022-08-09 DIAGNOSIS — I48.91 ATRIAL FIBRILLATION, UNSPECIFIED TYPE: Primary | ICD-10-CM

## 2022-08-09 DIAGNOSIS — I48.91 ATRIAL FIBRILLATION, UNSPECIFIED TYPE: ICD-10-CM

## 2022-08-09 LAB
INR PPP: 3.52 (ref 0.86–1.15)
PROTHROMBIN TIME: 36.1 SECONDS (ref 11.8–14.9)

## 2022-08-09 PROCEDURE — 36415 COLL VENOUS BLD VENIPUNCTURE: CPT

## 2022-08-09 PROCEDURE — 85610 PROTHROMBIN TIME: CPT

## 2022-08-09 NOTE — PROGRESS NOTES
Lab Results   Component Value Date    INR 3.52 (H) 08/09/2022    INR 2.51 (H) 07/21/2022    INR 2.59 (H) 06/28/2022    PROTIME 36.1 (H) 08/09/2022    PROTIME 27.6 (H) 07/21/2022    PROTIME 28.3 (H) 06/28/2022      Taking 5/5/6  2.0-3.0  Afib

## 2022-08-09 NOTE — PROGRESS NOTES
INR is 3.5, which is above goal.  He had well-controlled INR for the past several months on same dose of Coumadin      Recommend to continue with same dose of Coumadin 5/5/6 mg.  Needs INR to be rechecked in 1 week.      Electronically signed by Vishal Denton MD, 08/09/22, 2:18 PM EDT.

## 2022-08-11 RX ORDER — LISINOPRIL 20 MG/1
TABLET ORAL
Qty: 180 TABLET | Refills: 0 | Status: SHIPPED | OUTPATIENT
Start: 2022-08-11 | End: 2022-11-10

## 2022-08-16 ENCOUNTER — LAB (OUTPATIENT)
Dept: LAB | Facility: HOSPITAL | Age: 77
End: 2022-08-16

## 2022-08-16 ENCOUNTER — ANTICOAGULATION VISIT (OUTPATIENT)
Dept: CARDIOLOGY | Facility: CLINIC | Age: 77
End: 2022-08-16

## 2022-08-16 DIAGNOSIS — I48.91 ATRIAL FIBRILLATION, UNSPECIFIED TYPE: ICD-10-CM

## 2022-08-16 DIAGNOSIS — I48.0 PAROXYSMAL ATRIAL FIBRILLATION: Primary | ICD-10-CM

## 2022-08-16 LAB
INR PPP: 3.51 (ref 0.86–1.15)
PROTHROMBIN TIME: 36 SECONDS (ref 11.8–14.9)

## 2022-08-16 PROCEDURE — 85610 PROTHROMBIN TIME: CPT

## 2022-08-16 PROCEDURE — 36415 COLL VENOUS BLD VENIPUNCTURE: CPT

## 2022-08-16 NOTE — PROGRESS NOTES
INR is 3.1, which still remains elevated.    Take only 3 mg for tonight then changed to 5 mg daily.    Repeat INR again in 1 week.      Electronically signed by Vishal Denton MD, 08/16/22, 3:44 PM EDT.

## 2022-08-16 NOTE — PROGRESS NOTES
Lab Results   Component Value Date    INR 3.51 (H) 08/16/2022    INR 3.52 (H) 08/09/2022    INR 2.51 (H) 07/21/2022    PROTIME 36.0 (H) 08/16/2022    PROTIME 36.1 (H) 08/09/2022    PROTIME 27.6 (H) 07/21/2022     Pt taking 5/5/6    Range 2.0-3.0      Dx Afib

## 2022-08-23 ENCOUNTER — ANTICOAGULATION VISIT (OUTPATIENT)
Dept: CARDIOLOGY | Facility: CLINIC | Age: 77
End: 2022-08-23

## 2022-08-23 ENCOUNTER — LAB (OUTPATIENT)
Dept: LAB | Facility: HOSPITAL | Age: 77
End: 2022-08-23

## 2022-08-23 DIAGNOSIS — I48.0 PAROXYSMAL ATRIAL FIBRILLATION: Primary | ICD-10-CM

## 2022-08-23 DIAGNOSIS — I48.91 ATRIAL FIBRILLATION, UNSPECIFIED TYPE: ICD-10-CM

## 2022-08-23 LAB
INR PPP: 2.43 (ref 0.86–1.15)
PROTHROMBIN TIME: 26.9 SECONDS (ref 11.8–14.9)

## 2022-08-23 PROCEDURE — 85610 PROTHROMBIN TIME: CPT

## 2022-08-23 PROCEDURE — 36415 COLL VENOUS BLD VENIPUNCTURE: CPT

## 2022-08-23 NOTE — PROGRESS NOTES
.  Lab Results   Component Value Date    INR 2.43 (H) 08/23/2022    INR 3.51 (H) 08/16/2022    INR 3.52 (H) 08/09/2022    PROTIME 26.9 (H) 08/23/2022    PROTIME 36.0 (H) 08/16/2022    PROTIME 36.1 (H) 08/09/2022     Pt taking 5mg qd    Range 2.0-3.0    Dx Afib

## 2022-09-02 RX ORDER — GLIMEPIRIDE 4 MG/1
TABLET ORAL
Qty: 90 TABLET | Refills: 0 | Status: SHIPPED | OUTPATIENT
Start: 2022-09-02 | End: 2022-10-19 | Stop reason: SDUPTHER

## 2022-09-02 RX ORDER — AMLODIPINE BESYLATE 5 MG/1
TABLET ORAL
Qty: 90 TABLET | Refills: 0 | Status: SHIPPED | OUTPATIENT
Start: 2022-09-02 | End: 2022-10-19 | Stop reason: SDUPTHER

## 2022-09-06 ENCOUNTER — LAB (OUTPATIENT)
Dept: LAB | Facility: HOSPITAL | Age: 77
End: 2022-09-06

## 2022-09-06 ENCOUNTER — ANTICOAGULATION VISIT (OUTPATIENT)
Dept: CARDIOLOGY | Facility: CLINIC | Age: 77
End: 2022-09-06

## 2022-09-06 DIAGNOSIS — I48.91 ATRIAL FIBRILLATION, UNSPECIFIED TYPE: Primary | ICD-10-CM

## 2022-09-06 DIAGNOSIS — I48.91 ATRIAL FIBRILLATION, UNSPECIFIED TYPE: ICD-10-CM

## 2022-09-06 DIAGNOSIS — E11.9 TYPE 2 DIABETES MELLITUS WITHOUT COMPLICATION, WITHOUT LONG-TERM CURRENT USE OF INSULIN: ICD-10-CM

## 2022-09-06 LAB
HBA1C MFR BLD: 7.6 % (ref 4.8–5.6)
INR PPP: 1.9 (ref 0.86–1.15)
PROTHROMBIN TIME: 22.1 SECONDS (ref 11.8–14.9)

## 2022-09-06 PROCEDURE — 83036 HEMOGLOBIN GLYCOSYLATED A1C: CPT

## 2022-09-06 PROCEDURE — 85610 PROTHROMBIN TIME: CPT

## 2022-09-06 PROCEDURE — 36415 COLL VENOUS BLD VENIPUNCTURE: CPT

## 2022-09-06 NOTE — PROGRESS NOTES
Please have pt take 7.5 mg/5 mg/5mg   (take 1.5 tablets of  5 mg every 3rd day), Recheck on Friday

## 2022-09-06 NOTE — PROGRESS NOTES
Lab Results   Component Value Date    INR 1.90 (H) 09/06/2022    INR 2.43 (H) 08/23/2022    INR 3.51 (H) 08/16/2022    PROTIME 22.1 (H) 09/06/2022    PROTIME 26.9 (H) 08/23/2022    PROTIME 36.0 (H) 08/16/2022     Pt taking 5mg qd    Range 2.0-3.0    Dx afib    Per pt no changes in diet, medications or alcohol intake.

## 2022-09-09 ENCOUNTER — ANTICOAGULATION VISIT (OUTPATIENT)
Dept: CARDIOLOGY | Facility: CLINIC | Age: 77
End: 2022-09-09

## 2022-09-09 ENCOUNTER — LAB (OUTPATIENT)
Dept: LAB | Facility: HOSPITAL | Age: 77
End: 2022-09-09

## 2022-09-09 DIAGNOSIS — I48.91 ATRIAL FIBRILLATION, UNSPECIFIED TYPE: ICD-10-CM

## 2022-09-09 DIAGNOSIS — I48.91 ATRIAL FIBRILLATION, UNSPECIFIED TYPE: Primary | ICD-10-CM

## 2022-09-09 LAB
INR PPP: 4.16 (ref 0.86–1.15)
PROTHROMBIN TIME: 41.3 SECONDS (ref 11.8–14.9)

## 2022-09-09 PROCEDURE — 36415 COLL VENOUS BLD VENIPUNCTURE: CPT

## 2022-09-09 PROCEDURE — 85610 PROTHROMBIN TIME: CPT

## 2022-09-09 NOTE — PROGRESS NOTES
Lab Results   Component Value Date    INR 4.16 (H) 09/09/2022    INR 1.90 (H) 09/06/2022    INR 2.43 (H) 08/23/2022    PROTIME 41.3 (H) 09/09/2022    PROTIME 22.1 (H) 09/06/2022    PROTIME 26.9 (H) 08/23/2022     Pt taking 7.5mg /5 mg/ 5 mg    Range 2.0-3.0    Dx Afib     LAB

## 2022-09-16 ENCOUNTER — ANTICOAGULATION VISIT (OUTPATIENT)
Dept: CARDIOLOGY | Facility: CLINIC | Age: 77
End: 2022-09-16

## 2022-09-16 ENCOUNTER — LAB (OUTPATIENT)
Dept: LAB | Facility: HOSPITAL | Age: 77
End: 2022-09-16

## 2022-09-16 ENCOUNTER — TELEPHONE (OUTPATIENT)
Dept: CARDIOLOGY | Facility: CLINIC | Age: 77
End: 2022-09-16

## 2022-09-16 DIAGNOSIS — I48.91 ATRIAL FIBRILLATION, UNSPECIFIED TYPE: ICD-10-CM

## 2022-09-16 DIAGNOSIS — I48.19 PERSISTENT ATRIAL FIBRILLATION: Primary | ICD-10-CM

## 2022-09-16 LAB
INR PPP: 2.94 (ref 0.86–1.15)
PROTHROMBIN TIME: 31.3 SECONDS (ref 11.8–14.9)

## 2022-09-16 PROCEDURE — 85610 PROTHROMBIN TIME: CPT

## 2022-09-16 PROCEDURE — 36415 COLL VENOUS BLD VENIPUNCTURE: CPT

## 2022-09-16 NOTE — TELEPHONE ENCOUNTER
Received VM from patient stating BP up and down    Attempted to call patient. No answer. Detailed VM left. Advised to do 1 week BP log. Check BP 2 hours after medications and right before bed.

## 2022-09-16 NOTE — PROGRESS NOTES
Lab Results   Component Value Date    INR 2.94 (H) 09/16/2022    INR 4.16 (H) 09/09/2022    INR 1.90 (H) 09/06/2022    PROTIME 31.3 (H) 09/16/2022    PROTIME 41.3 (H) 09/09/2022    PROTIME 22.1 (H) 09/06/2022     Pt taking 6mg/ 5 mg    Dx Afib    Range 2.0-3.0     LAB

## 2022-09-23 ENCOUNTER — LAB (OUTPATIENT)
Dept: LAB | Facility: HOSPITAL | Age: 77
End: 2022-09-23

## 2022-09-23 ENCOUNTER — ANTICOAGULATION VISIT (OUTPATIENT)
Dept: CARDIOLOGY | Facility: CLINIC | Age: 77
End: 2022-09-23

## 2022-09-23 DIAGNOSIS — I48.91 ATRIAL FIBRILLATION, UNSPECIFIED TYPE: Primary | ICD-10-CM

## 2022-09-23 DIAGNOSIS — I48.91 ATRIAL FIBRILLATION, UNSPECIFIED TYPE: ICD-10-CM

## 2022-09-23 LAB
INR PPP: 2.96 (ref 0.86–1.15)
PROTHROMBIN TIME: 31.4 SECONDS (ref 11.8–14.9)

## 2022-09-23 PROCEDURE — 85610 PROTHROMBIN TIME: CPT

## 2022-09-23 PROCEDURE — 36415 COLL VENOUS BLD VENIPUNCTURE: CPT

## 2022-09-23 NOTE — PROGRESS NOTES
Please let patient know his level is therapeutic.  He will continue his current dosing of warfarin.  Recheck in 1 month.

## 2022-09-23 NOTE — PROGRESS NOTES
Lab Results   Component Value Date    INR 2.96 (H) 09/23/2022    INR 2.94 (H) 09/16/2022    INR 4.16 (H) 09/09/2022    PROTIME 31.4 (H) 09/23/2022    PROTIME 31.3 (H) 09/16/2022    PROTIME 41.3 (H) 09/09/2022     Pt taking 6mg/ 5 mg    Dx Afib    Range 2.0-3.0

## 2022-10-10 RX ORDER — METOPROLOL SUCCINATE 25 MG/1
TABLET, EXTENDED RELEASE ORAL
Qty: 180 TABLET | Refills: 0 | Status: SHIPPED | OUTPATIENT
Start: 2022-10-10 | End: 2023-01-09

## 2022-10-10 NOTE — TELEPHONE ENCOUNTER
Rx Refill Note  Requested Prescriptions     Pending Prescriptions Disp Refills   • metoprolol succinate XL (TOPROL-XL) 25 MG 24 hr tablet [Pharmacy Med Name: METOPROLOL SUCC ER 25 MG TAB] 180 tablet 0     Sig: TAKE ONE TABLET BY MOUTH TWICE A DAY      Last office visit with prescribing clinician: 6/8/2022      Next office visit with prescribing clinician: 12/7/2022  Comprehensive Metabolic Panel (06/10/2022 07:16)    Marissa Oliveira LPN  10/10/22, 12:23 EDT

## 2022-10-14 RX ORDER — ALLOPURINOL 100 MG/1
TABLET ORAL
Qty: 90 TABLET | Refills: 0 | Status: SHIPPED | OUTPATIENT
Start: 2022-10-14 | End: 2023-01-09

## 2022-10-14 NOTE — TELEPHONE ENCOUNTER
Rx Refill Note  Requested Prescriptions     Pending Prescriptions Disp Refills   • allopurinol (ZYLOPRIM) 100 MG tablet [Pharmacy Med Name: ALLOPURINOL 100 MG TABLET] 90 tablet 0     Sig: TAKE ONE TABLET BY MOUTH DAILY      Last office visit with prescribing clinician: 6/8/2022      Next office visit with prescribing clinician: 12/7/2022     Zahra Bruner LPN  10/14/22, 11:12 EDT     -7/15/22 #90    DID NOT MEET PROTOCOL:    Uric acid in past 12 months

## 2022-10-19 ENCOUNTER — OFFICE VISIT (OUTPATIENT)
Dept: CARDIOLOGY | Facility: CLINIC | Age: 77
End: 2022-10-19

## 2022-10-19 VITALS
BODY MASS INDEX: 33.5 KG/M2 | HEART RATE: 64 BPM | WEIGHT: 234 LBS | DIASTOLIC BLOOD PRESSURE: 74 MMHG | HEIGHT: 70 IN | SYSTOLIC BLOOD PRESSURE: 169 MMHG

## 2022-10-19 DIAGNOSIS — E78.2 MIXED HYPERLIPIDEMIA: ICD-10-CM

## 2022-10-19 DIAGNOSIS — I25.10 CORONARY ARTERY DISEASE INVOLVING NATIVE CORONARY ARTERY OF NATIVE HEART WITHOUT ANGINA PECTORIS: ICD-10-CM

## 2022-10-19 DIAGNOSIS — I10 ESSENTIAL HYPERTENSION: Primary | ICD-10-CM

## 2022-10-19 DIAGNOSIS — I48.0 PAROXYSMAL ATRIAL FIBRILLATION: ICD-10-CM

## 2022-10-19 PROCEDURE — 99214 OFFICE O/P EST MOD 30 MIN: CPT | Performed by: INTERNAL MEDICINE

## 2022-10-19 RX ORDER — AMLODIPINE BESYLATE 10 MG/1
10 TABLET ORAL DAILY
Qty: 90 TABLET | Refills: 3 | Status: SHIPPED | OUTPATIENT
Start: 2022-10-19

## 2022-10-20 NOTE — ASSESSMENT & PLAN NOTE
Paroxysmal atrial flutter noted on previous Holter monitor study.  Currently in sinus rhythm.  Denies any palpitations.  Continue metoprolol for rate and rhythm management along with warfarin for anticoagulation.  INR is therapeutic.

## 2022-10-20 NOTE — PROGRESS NOTES
CARDIOLOGY FOLLOW-UP PROGRESS NOTE        Chief Complaint  Coronary Artery Disease, paroxysmal afib, and Hypertension    Subjective            Zachariah Cruz presents to [facility] CARDIOLOGY  History of Present Illness      Mr Cruz is here for routine 6-month follow-up visit.  He denies having any episodes of chest pain, shortness of breath or palpitations.  For the past 2 to 3 months he is noticing high blood pressures at home.  He brought a log of his home blood pressure readings and it shows systolic blood pressures consistently in the 150s and 160s.    Past History:    1) Coronary artery disease status post 3-vessel coronary artery bypass grafting on 05/21/2010 by Dr. Garcia at [facility] (LIMA graft to the LAD, SVG graft to OM, SVG graft to posterior descending branch of RCA); 2) Diabetes mellitus; 3) Hypertension; 4) Hyperlipidemia; 5) Gout; 6) Obstructive sleep apnea, on CPAP; 7) Paroxysmal atrial flutter, detected on 24-hour Holter monitor study done in October 2019.     Medical History:  Past Medical History:   Diagnosis Date   • Diabetes (HCC)    • Hyperlipidemia    • Hypertension        Surgical History: has a past surgical history that includes Appendectomy; Cataract extraction; Coronary artery bypass graft; Prostate surgery; and Coronary artery bypass graft (05/21/2010).     Family History: family history includes Coronary artery disease in his father and another family member; Hypertension in his mother; Stroke in his mother and paternal grandfather.     Social History: reports that he quit smoking about 53 years ago. His smoking use included cigarettes. He has a 5.00 pack-year smoking history. He has never used smokeless tobacco. He reports that he does not currently use alcohol. Drug use questions deferred to the physician.    Allergies: Patient has no known allergies.    Current Outpatient Medications on File Prior to Visit   Medication Sig   • allopurinol  "(ZYLOPRIM) 100 MG tablet TAKE ONE TABLET BY MOUTH DAILY   • aspirin 81 MG EC tablet Take 81 mg by mouth Every Other Day.   • hydroCHLOROthiazide (HYDRODIURIL) 25 MG tablet Take 1 tablet by mouth Daily.   • lisinopril (PRINIVIL,ZESTRIL) 20 MG tablet TAKE ONE TABLET BY MOUTH TWICE A DAY   • metFORMIN ER (GLUCOPHAGE-XR) 500 MG 24 hr tablet Take 4 tablets by mouth Daily.   • metoprolol succinate XL (TOPROL-XL) 25 MG 24 hr tablet TAKE ONE TABLET BY MOUTH TWICE A DAY   • simvastatin (ZOCOR) 40 MG tablet TAKE ONE TABLET BY MOUTH DAILY   • warfarin (Coumadin) 1 MG tablet Take as directed   • warfarin (COUMADIN) 5 MG tablet Take 1 tablet by mouth Daily. (Patient taking differently: Take 1 tablet by mouth Daily. Currently taking 6mg 5mg alternate)     No current facility-administered medications on file prior to visit.          Review of Systems   Respiratory: Negative for cough, shortness of breath and wheezing.    Cardiovascular: Negative for chest pain, palpitations and leg swelling.   Gastrointestinal: Negative for nausea and vomiting.   Neurological: Negative for dizziness and syncope.        Objective     /74   Pulse 64   Ht 177.8 cm (70\")   Wt 106 kg (234 lb)   BMI 33.58 kg/m²       Physical Exam    General : Alert, awake, no acute distress  Neck : Supple, no carotid bruit, no jugular venous distention  CVS : Regular rate and rhythm, no murmur, rubs or gallops  Lungs: Clear to auscultation bilaterally, no crackles or rhonchi  Abdomen: Soft, nontender, bowel sounds heard in all 4 quadrants  Extremities: Warm, well-perfused, no pedal edema    Result Review :     The following data was reviewed by: Vishal Denton MD on 10/19/2022:    CMP    CMP 1/24/22 2/22/22 6/10/22   Glucose 148 (A) 156 (A) 164 (A)   BUN 11 16 19   Creatinine 1.03 0.98 1.19   eGFR Non African Am 71 75    Sodium 140 138 136   Potassium 4.6 4.4 4.5   Chloride 103 99 101   Calcium 9.5 9.8 9.7   Albumin   4.20   Total Bilirubin   0.3 "   Alkaline Phosphatase   80   AST (SGOT)   20   ALT (SGPT)   25   (A) Abnormal value            CBC    CBC 11/17/21   WBC 9.47   RBC 4.68   Hemoglobin 12.8 (A)   Hematocrit 39.7   MCV 84.8   MCH 27.4   MCHC 32.2   RDW 14.4   Platelets 278   (A) Abnormal value              Lipid Panel    Lipid Panel 6/10/22   Total Cholesterol 116   Triglycerides 214 (A)   HDL Cholesterol 30 (A)   VLDL Cholesterol 35   LDL Cholesterol  51   LDL/HDL Ratio 1.44   (A) Abnormal value                 Data reviewed: Cardiology studies        Echocardiogram done on 11/28/2018 showed    1.  Normal left ventricular systolic function with an estimated LV ejection fraction of 60%.  2.  Mild concentric left ventricular hypertrophy.  3.  Grade I diastolic dysfunction of the left ventricle.  4.  Aortic valvular sclerosis with no stenosis, which is likely etiology of heart murmur in this patient.                 Assessment and Plan        Diagnoses and all orders for this visit:    1. Essential hypertension (Primary)  Assessment & Plan:  Blood pressure is consistently elevated per home blood pressure log for the past several weeks.  It is high in the office as well.  We will increase dose of amlodipine to 10 mg daily.  Continue hydrochlorothiazide, lisinopril and metoprolol the current dose.  Encouraged to continue doing home blood pressure log.  We will give a close follow-up to make further adjustments if needed.    Orders:  -     amLODIPine (NORVASC) 10 MG tablet; Take 1 tablet by mouth Daily.  Dispense: 90 tablet; Refill: 3    2. Coronary artery disease involving native coronary artery of native heart without angina pectoris  Assessment & Plan:  He is currently stable with no angina-like symptoms.  LV systolic function is preserved.  Continue aspirin, statin beta-blocker at the current dose.      3. Paroxysmal atrial fibrillation (HCC)  Assessment & Plan:  Paroxysmal atrial flutter noted on previous Holter monitor study.  Currently in sinus  rhythm.  Denies any palpitations.  Continue metoprolol for rate and rhythm management along with warfarin for anticoagulation.  INR is therapeutic.      4. Mixed hyperlipidemia  Assessment & Plan:  Most recent LDL is 51, which is at goal.  Continue simvastatin 40 mg nightly.  We will check lipid panel before next visit.              Follow Up     Return in about 2 months (around 12/19/2022) for Recheck.    Patient was given instructions and counseling regarding his condition or for health maintenance advice. Please see specific information pulled into the AVS if appropriate.

## 2022-10-20 NOTE — ASSESSMENT & PLAN NOTE
Most recent LDL is 51, which is at goal.  Continue simvastatin 40 mg nightly.  We will check lipid panel before next visit.

## 2022-10-20 NOTE — ASSESSMENT & PLAN NOTE
Blood pressure is consistently elevated per home blood pressure log for the past several weeks.  It is high in the office as well.  We will increase dose of amlodipine to 10 mg daily.  Continue hydrochlorothiazide, lisinopril and metoprolol the current dose.  Encouraged to continue doing home blood pressure log.  We will give a close follow-up to make further adjustments if needed.

## 2022-10-20 NOTE — ASSESSMENT & PLAN NOTE
He is currently stable with no angina-like symptoms.  LV systolic function is preserved.  Continue aspirin, statin beta-blocker at the current dose.

## 2022-10-21 ENCOUNTER — LAB (OUTPATIENT)
Dept: LAB | Facility: HOSPITAL | Age: 77
End: 2022-10-21

## 2022-10-21 DIAGNOSIS — I48.91 ATRIAL FIBRILLATION, UNSPECIFIED TYPE: ICD-10-CM

## 2022-10-21 LAB
INR PPP: 3.27 (ref 0.86–1.15)
PROTHROMBIN TIME: 34 SECONDS (ref 11.8–14.9)

## 2022-10-21 PROCEDURE — 36415 COLL VENOUS BLD VENIPUNCTURE: CPT

## 2022-10-21 PROCEDURE — 85610 PROTHROMBIN TIME: CPT

## 2022-10-21 RX ORDER — SIMVASTATIN 40 MG
TABLET ORAL
Qty: 90 TABLET | Refills: 0 | Status: SHIPPED | OUTPATIENT
Start: 2022-10-21 | End: 2023-01-18

## 2022-10-24 ENCOUNTER — ANTICOAGULATION VISIT (OUTPATIENT)
Dept: CARDIOLOGY | Facility: CLINIC | Age: 77
End: 2022-10-24

## 2022-10-24 DIAGNOSIS — I48.91 ATRIAL FIBRILLATION, UNSPECIFIED TYPE: Primary | ICD-10-CM

## 2022-10-24 NOTE — PROGRESS NOTES
Lab Results   Component Value Date    INR 3.27 (H) 10/21/2022    INR 2.96 (H) 09/23/2022    INR 2.94 (H) 09/16/2022    PROTIME 34.0 (H) 10/21/2022    PROTIME 31.4 (H) 09/23/2022    PROTIME 31.3 (H) 09/16/2022     Pt taking 6 mg / 5 mg    Dx Afib    Range 2-3    [facility]

## 2022-10-27 ENCOUNTER — ANTICOAGULATION VISIT (OUTPATIENT)
Dept: CARDIOLOGY | Facility: CLINIC | Age: 77
End: 2022-10-27

## 2022-10-27 ENCOUNTER — LAB (OUTPATIENT)
Dept: LAB | Facility: HOSPITAL | Age: 77
End: 2022-10-27

## 2022-10-27 DIAGNOSIS — I48.91 ATRIAL FIBRILLATION, UNSPECIFIED TYPE: ICD-10-CM

## 2022-10-27 DIAGNOSIS — I48.91 ATRIAL FIBRILLATION, UNSPECIFIED TYPE: Primary | ICD-10-CM

## 2022-10-27 LAB
INR PPP: 3.76 (ref 0.86–1.15)
PROTHROMBIN TIME: 38.1 SECONDS (ref 11.8–14.9)

## 2022-10-27 PROCEDURE — 36415 COLL VENOUS BLD VENIPUNCTURE: CPT

## 2022-10-27 PROCEDURE — 85610 PROTHROMBIN TIME: CPT

## 2022-10-27 NOTE — PROGRESS NOTES
Please let him know his level is too high.  I would like for him to hold his warfarin today, and then decrease to 5 mg daily.  Recheck INR level on Monday.

## 2022-10-27 NOTE — PROGRESS NOTES
Lab Results   Component Value Date    INR 3.76 (H) 10/27/2022    INR 3.27 (H) 10/21/2022    INR 2.96 (H) 09/23/2022    PROTIME 38.1 (H) 10/27/2022    PROTIME 34.0 (H) 10/21/2022    PROTIME 31.4 (H) 09/23/2022     Pt taking 5mg / 5mg / 6 mg    Dx Afib    Range 2-3      [facility]

## 2022-10-31 ENCOUNTER — ANTICOAGULATION VISIT (OUTPATIENT)
Dept: CARDIOLOGY | Facility: CLINIC | Age: 77
End: 2022-10-31

## 2022-10-31 ENCOUNTER — LAB (OUTPATIENT)
Dept: LAB | Facility: HOSPITAL | Age: 77
End: 2022-10-31

## 2022-10-31 DIAGNOSIS — I48.91 ATRIAL FIBRILLATION, UNSPECIFIED TYPE: Primary | ICD-10-CM

## 2022-10-31 DIAGNOSIS — I48.91 ATRIAL FIBRILLATION, UNSPECIFIED TYPE: ICD-10-CM

## 2022-10-31 LAB
INR PPP: 2.92 (ref 0.86–1.15)
PROTHROMBIN TIME: 31.1 SECONDS (ref 11.8–14.9)

## 2022-10-31 PROCEDURE — 85610 PROTHROMBIN TIME: CPT

## 2022-10-31 PROCEDURE — 36415 COLL VENOUS BLD VENIPUNCTURE: CPT

## 2022-10-31 NOTE — PROGRESS NOTES
Lab Results   Component Value Date    INR 2.92 (H) 10/31/2022    INR 3.76 (H) 10/27/2022    INR 3.27 (H) 10/21/2022    PROTIME 31.1 (H) 10/31/2022    PROTIME 38.1 (H) 10/27/2022    PROTIME 34.0 (H) 10/21/2022       Dx: A-fib  Pt. Taking 5mg daily  Range: 2.0-3.0  [facility] LAB

## 2022-11-07 ENCOUNTER — LAB (OUTPATIENT)
Dept: LAB | Facility: HOSPITAL | Age: 77
End: 2022-11-07

## 2022-11-07 ENCOUNTER — ANTICOAGULATION VISIT (OUTPATIENT)
Dept: CARDIOLOGY | Facility: CLINIC | Age: 77
End: 2022-11-07

## 2022-11-07 DIAGNOSIS — I48.91 ATRIAL FIBRILLATION, UNSPECIFIED TYPE: Primary | ICD-10-CM

## 2022-11-07 DIAGNOSIS — I48.91 ATRIAL FIBRILLATION, UNSPECIFIED TYPE: ICD-10-CM

## 2022-11-07 LAB
INR PPP: 2.88 (ref 0.86–1.15)
PROTHROMBIN TIME: 30.8 SECONDS (ref 11.8–14.9)

## 2022-11-07 PROCEDURE — 85610 PROTHROMBIN TIME: CPT

## 2022-11-07 PROCEDURE — 36415 COLL VENOUS BLD VENIPUNCTURE: CPT

## 2022-11-07 NOTE — PROGRESS NOTES
Lab Results   Component Value Date    INR 2.88 (H) 11/07/2022    INR 2.92 (H) 10/31/2022    INR 3.76 (H) 10/27/2022    PROTIME 30.8 (H) 11/07/2022    PROTIME 31.1 (H) 10/31/2022    PROTIME 38.1 (H) 10/27/2022     Pt taking 5 mg qd    Dx Afib    Range 2-3     LAB

## 2022-11-10 RX ORDER — LISINOPRIL 20 MG/1
TABLET ORAL
Qty: 180 TABLET | Refills: 0 | Status: SHIPPED | OUTPATIENT
Start: 2022-11-10 | End: 2023-02-06

## 2022-11-21 ENCOUNTER — ANTICOAGULATION VISIT (OUTPATIENT)
Dept: CARDIOLOGY | Facility: CLINIC | Age: 77
End: 2022-11-21

## 2022-11-21 ENCOUNTER — LAB (OUTPATIENT)
Dept: LAB | Facility: HOSPITAL | Age: 77
End: 2022-11-21

## 2022-11-21 DIAGNOSIS — I48.91 ATRIAL FIBRILLATION, UNSPECIFIED TYPE: ICD-10-CM

## 2022-11-21 DIAGNOSIS — I48.91 ATRIAL FIBRILLATION, UNSPECIFIED TYPE: Primary | ICD-10-CM

## 2022-11-21 LAB
INR PPP: 4.23 (ref 0.86–1.15)
PROTHROMBIN TIME: 41.8 SECONDS (ref 11.8–14.9)

## 2022-11-21 PROCEDURE — 85610 PROTHROMBIN TIME: CPT

## 2022-11-21 PROCEDURE — 36415 COLL VENOUS BLD VENIPUNCTURE: CPT

## 2022-11-21 NOTE — PROGRESS NOTES
Lab Results   Component Value Date    INR 4.23 (H) 11/21/2022    INR 2.88 (H) 11/07/2022    INR 2.92 (H) 10/31/2022    PROTIME 41.8 (H) 11/21/2022    PROTIME 30.8 (H) 11/07/2022    PROTIME 31.1 (H) 10/31/2022     Pt taking 5 mg qd     Dx Afib     Range 2-3      LAB

## 2022-11-22 ENCOUNTER — TELEPHONE (OUTPATIENT)
Dept: CARDIOLOGY | Facility: CLINIC | Age: 77
End: 2022-11-22

## 2022-11-22 ENCOUNTER — OFFICE VISIT (OUTPATIENT)
Dept: FAMILY MEDICINE CLINIC | Age: 77
End: 2022-11-22

## 2022-11-22 ENCOUNTER — LAB (OUTPATIENT)
Dept: LAB | Facility: HOSPITAL | Age: 77
End: 2022-11-22

## 2022-11-22 VITALS
OXYGEN SATURATION: 96 % | HEIGHT: 70 IN | BODY MASS INDEX: 33.58 KG/M2 | TEMPERATURE: 99.2 F | HEART RATE: 75 BPM | WEIGHT: 234.6 LBS | SYSTOLIC BLOOD PRESSURE: 177 MMHG | DIASTOLIC BLOOD PRESSURE: 79 MMHG

## 2022-11-22 DIAGNOSIS — R80.9 PROTEINURIA, UNSPECIFIED TYPE: ICD-10-CM

## 2022-11-22 DIAGNOSIS — R31.0 GROSS HEMATURIA: Primary | ICD-10-CM

## 2022-11-22 DIAGNOSIS — R30.0 DYSURIA: ICD-10-CM

## 2022-11-22 DIAGNOSIS — R31.0 GROSS HEMATURIA: ICD-10-CM

## 2022-11-22 LAB
ANION GAP SERPL CALCULATED.3IONS-SCNC: 9.7 MMOL/L (ref 5–15)
BACTERIA UR QL AUTO: ABNORMAL /HPF
BILIRUB UR QL STRIP: NEGATIVE
BUN SERPL-MCNC: 13 MG/DL (ref 8–23)
BUN/CREAT SERPL: 13.1 (ref 7–25)
CALCIUM SPEC-SCNC: 9.4 MG/DL (ref 8.6–10.5)
CHLORIDE SERPL-SCNC: 105 MMOL/L (ref 98–107)
CLARITY UR: ABNORMAL
CO2 SERPL-SCNC: 25.3 MMOL/L (ref 22–29)
COLOR UR: ABNORMAL
CREAT SERPL-MCNC: 0.99 MG/DL (ref 0.76–1.27)
DEPRECATED RDW RBC AUTO: 49.6 FL (ref 37–54)
EGFRCR SERPLBLD CKD-EPI 2021: 79.4 ML/MIN/1.73
ERYTHROCYTE [DISTWIDTH] IN BLOOD BY AUTOMATED COUNT: 16.2 % (ref 12.3–15.4)
GLUCOSE SERPL-MCNC: 82 MG/DL (ref 65–99)
GLUCOSE UR STRIP-MCNC: NEGATIVE MG/DL
HCT VFR BLD AUTO: 35.9 % (ref 37.5–51)
HGB BLD-MCNC: 10.8 G/DL (ref 13–17.7)
HGB UR QL STRIP.AUTO: ABNORMAL
KETONES UR QL STRIP: NEGATIVE
LEUKOCYTE ESTERASE UR QL STRIP.AUTO: NEGATIVE
MCH RBC QN AUTO: 24.8 PG (ref 26.6–33)
MCHC RBC AUTO-ENTMCNC: 30.1 G/DL (ref 31.5–35.7)
MCV RBC AUTO: 82.5 FL (ref 79–97)
NITRITE UR QL STRIP: NEGATIVE
PH UR STRIP.AUTO: 6 [PH] (ref 5–8)
PLATELET # BLD AUTO: 258 10*3/MM3 (ref 140–450)
PMV BLD AUTO: 10 FL (ref 6–12)
POTASSIUM SERPL-SCNC: 4.5 MMOL/L (ref 3.5–5.2)
PROT UR QL STRIP: ABNORMAL
RBC # BLD AUTO: 4.35 10*6/MM3 (ref 4.14–5.8)
RBC # UR STRIP: ABNORMAL /HPF
REF LAB TEST METHOD: ABNORMAL
SODIUM SERPL-SCNC: 140 MMOL/L (ref 136–145)
SP GR UR STRIP: >=1.03 (ref 1–1.03)
SQUAMOUS #/AREA URNS HPF: ABNORMAL /HPF
UROBILINOGEN UR QL STRIP: ABNORMAL
WBC # UR STRIP: ABNORMAL /HPF
WBC NRBC COR # BLD: 5.67 10*3/MM3 (ref 3.4–10.8)

## 2022-11-22 PROCEDURE — 99214 OFFICE O/P EST MOD 30 MIN: CPT | Performed by: PHYSICIAN ASSISTANT

## 2022-11-22 PROCEDURE — 36415 COLL VENOUS BLD VENIPUNCTURE: CPT

## 2022-11-22 PROCEDURE — 85027 COMPLETE CBC AUTOMATED: CPT

## 2022-11-22 PROCEDURE — 81001 URINALYSIS AUTO W/SCOPE: CPT | Performed by: PHYSICIAN ASSISTANT

## 2022-11-22 PROCEDURE — 80048 BASIC METABOLIC PNL TOTAL CA: CPT

## 2022-11-22 RX ORDER — WARFARIN SODIUM 4 MG/1
TABLET ORAL
Qty: 90 TABLET | Refills: 1 | Status: SHIPPED | OUTPATIENT
Start: 2022-11-22

## 2022-11-22 NOTE — PROGRESS NOTES
Subjective     CHIEF COMPLAINT    Chief Complaint   Patient presents with   • Blood in Urine     Ongoing for 2 days.            History of Present Illness  This is a 77-year-old male presenting to clinic complaining of hematuria since last night.  He reports that last night his urine was brown and this morning it was red.  It has cleared up a little bit since then.  He does report a history of similar symptoms about 1 year ago after falling onto his back.  He was seen by surgical specialist and ultimately cleared.  They thought it was related to the fall and the fact that he takes warfarin.  His INR was elevated this week at 4.23.  He states that it has been very difficult for him to keep this stable.            Review of Systems   Constitutional: Negative for chills and fever.   Gastrointestinal: Negative for nausea and vomiting.   Genitourinary: Positive for hematuria. Negative for dysuria, frequency, penile discharge, penile pain, testicular pain and urgency.            Past Medical History:   Diagnosis Date   • Diabetes (HCC)    • Hyperlipidemia    • Hypertension             Past Surgical History:   Procedure Laterality Date   • APPENDECTOMY     • CATARACT EXTRACTION     • CORONARY ARTERY BYPASS GRAFT     • CORONARY ARTERY BYPASS GRAFT  2010    LIMA to LAD, SVG to OM, SVG to PDA   • PROSTATE SURGERY      TRANSURETHRAL RESECTION OF THE PROSTATE            Family History   Problem Relation Age of Onset   • Hypertension Mother    • Stroke Mother    • Coronary artery disease Father    • Stroke Paternal Grandfather    • Coronary artery disease Other             Social History     Socioeconomic History   • Marital status:    Tobacco Use   • Smoking status: Former     Packs/day: 1.00     Years: 5.00     Pack years: 5.00     Types: Cigarettes     Quit date:      Years since quittin.9   • Smokeless tobacco: Never   Vaping Use   • Vaping Use: Never used   Substance and Sexual Activity   • Alcohol  "use: Not Currently   • Drug use: Defer   • Sexual activity: Defer            No Known Allergies         Current Outpatient Medications on File Prior to Visit   Medication Sig Dispense Refill   • allopurinol (ZYLOPRIM) 100 MG tablet TAKE ONE TABLET BY MOUTH DAILY 90 tablet 0   • amLODIPine (NORVASC) 10 MG tablet Take 1 tablet by mouth Daily. 90 tablet 3   • aspirin 81 MG EC tablet Take 81 mg by mouth Every Other Day.     • hydroCHLOROthiazide (HYDRODIURIL) 25 MG tablet Take 1 tablet by mouth Daily. 90 tablet 3   • lisinopril (PRINIVIL,ZESTRIL) 20 MG tablet TAKE ONE TABLET BY MOUTH TWICE A  tablet 0   • metFORMIN ER (GLUCOPHAGE-XR) 500 MG 24 hr tablet Take 4 tablets by mouth Daily. 360 tablet 1   • metoprolol succinate XL (TOPROL-XL) 25 MG 24 hr tablet TAKE ONE TABLET BY MOUTH TWICE A  tablet 0   • simvastatin (ZOCOR) 40 MG tablet TAKE ONE TABLET BY MOUTH DAILY 90 tablet 0   • warfarin (Coumadin) 1 MG tablet Take as directed 90 tablet 1   • warfarin (COUMADIN) 5 MG tablet Take 1 tablet by mouth Daily. (Patient taking differently: Take 5 mg by mouth Daily. Currently taking 6mg 5mg alternate) 90 tablet 2     No current facility-administered medications on file prior to visit.            /79 (BP Location: Right arm, Patient Position: Sitting, Cuff Size: Small Adult)   Pulse 75   Temp 99.2 °F (37.3 °C) (Oral)   Ht 177.8 cm (70\")   Wt 106 kg (234 lb 9.6 oz)   SpO2 96% Comment: room air  BMI 33.66 kg/m²          Objective     Physical Exam  Vitals and nursing note reviewed.   Constitutional:       General: He is not in acute distress.  Cardiovascular:      Rate and Rhythm: Normal rate and regular rhythm.      Heart sounds: Normal heart sounds.   Pulmonary:      Effort: Pulmonary effort is normal. No respiratory distress.      Breath sounds: Normal breath sounds.   Abdominal:      Tenderness: There is no right CVA tenderness or left CVA tenderness.   Skin:     General: Skin is warm and dry.      " Findings: No rash.   Neurological:      Mental Status: He is alert and oriented to person, place, and time.   Psychiatric:         Mood and Affect: Mood normal.         Behavior: Behavior normal.              Procedures                    Lab Results (last 24 hours)     Procedure Component Value Units Date/Time    Urinalysis With Microscopic - Urine, Clean Catch [[id number]]  (Abnormal) Collected: 11/22/22 1325    Specimen: Urine, Clean Catch Updated: 11/22/22 1410    Narrative:      The following orders were created for panel order Urinalysis With Microscopic - Urine, Clean Catch.  Procedure                               Abnormality         Status                     ---------                               -----------         ------                     Urinalysis without micro...[[id number]]  Abnormal            Final result               Urinalysis, Microscopic ...[[id number]]  Abnormal            Final result                 Please view results for these tests on the individual orders.    Urinalysis without microscopic (no culture) - Urine, Clean Catch [[id number]]  (Abnormal) Collected: 11/22/22 1325    Specimen: Urine, Clean Catch Updated: 11/22/22 1400     Color, UA Dark Yellow     Appearance, UA Cloudy     pH, UA 6.0     Specific Gravity, UA >=1.030     Glucose, UA Negative     Ketones, UA Negative     Bilirubin, UA Negative     Blood, UA Large (3+)     Protein, UA 30 mg/dL (1+)     Leuk Esterase, UA Negative     Nitrite, UA Negative     Urobilinogen, UA 1.0 E.U./dL    Urinalysis, Microscopic Only - Urine, Clean Catch [[id number]]  (Abnormal) Collected: 11/22/22 1325    Specimen: Urine, Clean Catch Updated: 11/22/22 1410     RBC, UA Too Numerous to Count /HPF      WBC, UA 0-2 /HPF      Bacteria, UA Trace /HPF      Squamous Epithelial Cells, UA None Seen /HPF      Methodology Manual Light Microscopy                No Radiology Exams Resulted Within Past 24 Hours                    Diagnoses and all orders for this  visit:    1. Gross hematuria (Primary)  -     Basic metabolic panel; Future  -     CBC No Differential; Future  -     Ambulatory Referral to Urology    2. Dysuria  -     Urinalysis With Microscopic - Urine, Clean Catch; Future  -     Urinalysis With Microscopic - Urine, Clean Catch    3. Proteinuria, unspecified type  -     Ambulatory Referral to Urology             Additional Instructions for the Follow-ups that You Need to Schedule     Basic metabolic panel    Nov 27, 2022 (Approximate)      Release to patient: Routine Release         CBC No Differential    Nov 27, 2022 (Approximate)      Release to patient: Routine Release                         FOR FULL DISCHARGE INSTRUCTIONS/COMMENTS/HANDOUTS please see the   AVS

## 2022-11-23 ENCOUNTER — LAB (OUTPATIENT)
Dept: LAB | Facility: HOSPITAL | Age: 77
End: 2022-11-23

## 2022-11-23 DIAGNOSIS — R31.9 HEMATURIA, UNSPECIFIED TYPE: Primary | ICD-10-CM

## 2022-11-23 DIAGNOSIS — R31.9 HEMATURIA, UNSPECIFIED TYPE: ICD-10-CM

## 2022-11-23 PROCEDURE — 87086 URINE CULTURE/COLONY COUNT: CPT

## 2022-11-24 LAB — BACTERIA SPEC AEROBE CULT: NO GROWTH

## 2022-11-28 ENCOUNTER — LAB (OUTPATIENT)
Dept: LAB | Facility: HOSPITAL | Age: 77
End: 2022-11-28

## 2022-11-28 ENCOUNTER — TELEPHONE (OUTPATIENT)
Dept: UROLOGY | Facility: CLINIC | Age: 77
End: 2022-11-28

## 2022-11-28 ENCOUNTER — ANTICOAGULATION VISIT (OUTPATIENT)
Dept: CARDIOLOGY | Facility: CLINIC | Age: 77
End: 2022-11-28

## 2022-11-28 DIAGNOSIS — I48.91 ATRIAL FIBRILLATION, UNSPECIFIED TYPE: ICD-10-CM

## 2022-11-28 DIAGNOSIS — I48.91 ATRIAL FIBRILLATION, UNSPECIFIED TYPE: Primary | ICD-10-CM

## 2022-11-28 LAB
INR PPP: 2.2 (ref 0.86–1.15)
PROTHROMBIN TIME: 24.9 SECONDS (ref 11.8–14.9)

## 2022-11-28 PROCEDURE — 85610 PROTHROMBIN TIME: CPT

## 2022-11-28 PROCEDURE — 36415 COLL VENOUS BLD VENIPUNCTURE: CPT

## 2022-11-28 NOTE — PROGRESS NOTES
.  Lab Results   Component Value Date    INR 2.20 (H) 11/28/2022    INR 4.23 (H) 11/21/2022    INR 2.88 (H) 11/07/2022    PROTIME 24.9 (H) 11/28/2022    PROTIME 41.8 (H) 11/21/2022    PROTIME 30.8 (H) 11/07/2022     Taking 4 mg daily    Dx Afib     Range 2-3      LAB

## 2022-11-29 NOTE — PROGRESS NOTES
Patient states had bleeding in his urine. States he went to the ER on the 27th and they said he had passed a kidney stone but does not believe that is what caused it. Wants to know if he should change warfarin dose?

## 2022-11-30 NOTE — PROGRESS NOTES
For now continue dose, and see if the blood in is urine resolves.  If he has ricardo bleeding he should be evaluated, but if he had stone, he may have trace blood in urine for a few days.

## 2022-12-01 NOTE — TELEPHONE ENCOUNTER
Called patient to see how he is feeling. He said he is considerably better after taking oral Cephalexin for a few days that [facility] prescribed. He agrees to leave appt on 12/07 with NP.

## 2022-12-05 RX ORDER — GLIMEPIRIDE 4 MG/1
TABLET ORAL
Qty: 90 TABLET | Refills: 0 | Status: SHIPPED | OUTPATIENT
Start: 2022-12-05 | End: 2023-02-15

## 2022-12-05 NOTE — TELEPHONE ENCOUNTER
Rx Refill Note  Requested Prescriptions     Pending Prescriptions Disp Refills   • glimepiride (AMARYL) 4 MG tablet [Pharmacy Med Name: GLIMEPIRIDE 4 MG TABLET] 90 tablet      Sig: TAKE ONE TABLET BY MOUTH DAILY      Last office visit with prescribing clinician: 6/8/2022      Next office visit with prescribing clinician: 12/7/2022                       Mili Reyes LPN  12/05/22, 11:35 EST

## 2022-12-07 ENCOUNTER — LAB (OUTPATIENT)
Dept: LAB | Facility: HOSPITAL | Age: 77
End: 2022-12-07

## 2022-12-07 ENCOUNTER — OFFICE VISIT (OUTPATIENT)
Dept: UROLOGY | Facility: CLINIC | Age: 77
End: 2022-12-07

## 2022-12-07 ENCOUNTER — OFFICE VISIT (OUTPATIENT)
Dept: FAMILY MEDICINE CLINIC | Age: 77
End: 2022-12-07

## 2022-12-07 VITALS
HEART RATE: 67 BPM | SYSTOLIC BLOOD PRESSURE: 127 MMHG | HEIGHT: 70 IN | WEIGHT: 234.2 LBS | OXYGEN SATURATION: 98 % | BODY MASS INDEX: 33.53 KG/M2 | DIASTOLIC BLOOD PRESSURE: 78 MMHG | RESPIRATION RATE: 16 BRPM

## 2022-12-07 VITALS — WEIGHT: 233.8 LBS | HEIGHT: 70 IN | BODY MASS INDEX: 33.47 KG/M2

## 2022-12-07 DIAGNOSIS — I48.91 ATRIAL FIBRILLATION, UNSPECIFIED TYPE: ICD-10-CM

## 2022-12-07 DIAGNOSIS — Z12.5 SCREENING FOR MALIGNANT NEOPLASM OF PROSTATE: ICD-10-CM

## 2022-12-07 DIAGNOSIS — Z23 IMMUNIZATION DUE: ICD-10-CM

## 2022-12-07 DIAGNOSIS — R31.0 GROSS HEMATURIA: ICD-10-CM

## 2022-12-07 DIAGNOSIS — R31.0 GROSS HEMATURIA: Primary | ICD-10-CM

## 2022-12-07 DIAGNOSIS — E11.9 TYPE 2 DIABETES MELLITUS WITHOUT COMPLICATION, WITHOUT LONG-TERM CURRENT USE OF INSULIN: ICD-10-CM

## 2022-12-07 DIAGNOSIS — N13.30 HYDRONEPHROSIS, UNSPECIFIED HYDRONEPHROSIS TYPE: ICD-10-CM

## 2022-12-07 DIAGNOSIS — Z00.00 ROUTINE GENERAL MEDICAL EXAMINATION AT A HEALTH CARE FACILITY: Primary | ICD-10-CM

## 2022-12-07 LAB
BACTERIA UR QL AUTO: ABNORMAL /HPF
BILIRUB UR QL STRIP: NEGATIVE
CLARITY UR: CLEAR
COLOR UR: YELLOW
GLUCOSE UR STRIP-MCNC: NEGATIVE MG/DL
HGB UR QL STRIP.AUTO: NEGATIVE
INR PPP: 2.9 (ref 0.86–1.15)
KETONES UR QL STRIP: NEGATIVE
LEUKOCYTE ESTERASE UR QL STRIP.AUTO: NEGATIVE
NITRITE UR QL STRIP: NEGATIVE
PH UR STRIP.AUTO: 6.5 [PH] (ref 5–8)
PROT UR QL STRIP: NEGATIVE
PROTHROMBIN TIME: 31 SECONDS (ref 11.8–14.9)
RBC # UR STRIP: ABNORMAL /HPF
REF LAB TEST METHOD: ABNORMAL
SP GR UR STRIP: 1.02 (ref 1–1.03)
SQUAMOUS #/AREA URNS HPF: ABNORMAL /HPF
UROBILINOGEN UR QL STRIP: NORMAL
WBC # UR STRIP: ABNORMAL /HPF

## 2022-12-07 PROCEDURE — 51798 US URINE CAPACITY MEASURE: CPT | Performed by: NURSE PRACTITIONER

## 2022-12-07 PROCEDURE — G0439 PPPS, SUBSEQ VISIT: HCPCS | Performed by: NURSE PRACTITIONER

## 2022-12-07 PROCEDURE — 36415 COLL VENOUS BLD VENIPUNCTURE: CPT

## 2022-12-07 PROCEDURE — 0124A PR ADM SARSCOV2 30MCG/0.3ML BST: CPT | Performed by: NURSE PRACTITIONER

## 2022-12-07 PROCEDURE — 99213 OFFICE O/P EST LOW 20 MIN: CPT | Performed by: NURSE PRACTITIONER

## 2022-12-07 PROCEDURE — 85610 PROTHROMBIN TIME: CPT

## 2022-12-07 PROCEDURE — 1125F AMNT PAIN NOTED PAIN PRSNT: CPT | Performed by: NURSE PRACTITIONER

## 2022-12-07 PROCEDURE — 1159F MED LIST DOCD IN RCRD: CPT | Performed by: NURSE PRACTITIONER

## 2022-12-07 PROCEDURE — 1170F FXNL STATUS ASSESSED: CPT | Performed by: NURSE PRACTITIONER

## 2022-12-07 PROCEDURE — 91312 COVID-19 (PFIZER) BIVALENT BOOSTER 12+YRS: CPT | Performed by: NURSE PRACTITIONER

## 2022-12-07 PROCEDURE — 81001 URINALYSIS AUTO W/SCOPE: CPT

## 2022-12-07 NOTE — PROGRESS NOTES
Chief Complaint: Blood in Urine    Subjective         History of Present Illness  Zachariah Cruz is a 77 y.o. male presents to [facility] to be seen for gross hematuria.    Patient began having gross hematuria on 11/21/2022.    The patient states that the blood began before the pain began.  He began having right-sided flank pain on 11/27/22 and presented to the ED at [facility].     He had a CT scan without contrast which revealed small hiatal hernia, uncomplicated cholelithiasis, perinephric stranding around the right kidney with mild right hydronephrosis.  There is mild hydroureter to the mid pelvis.  The distal ureter is normal in caliber.  There are no stones identified.  There are no bladder stones.  Findings could be due to a recently passed stone or infection.  There are no stones in left kidney.  The GI tract demonstrates no obstruction.  There is moderate sigmoid diverticulosis without diverticulitis.    The patient's urine culture from that visit revealed no growth.    Patient last saw Dr. Douglas Chawla on 2/13/2022 for a cystoscopy.  Cystoscopy revealed 4 cm prostate previous with some regrowth on the median lobe and lateral lobe.  Moderate trabeculations throughout, no pathology.  There were no tumors lesions stones or other abnormalities noted within the bladder.  There was no increased vascularity.  Both ureteral orifices were identified and were normal in appearance.  The flexible cystoscope was removed.  Patient tolerated the procedure well.    He denies any straining to void, no dysuria, no urgency or frequency with urination.     Objective     Past Medical History:   Diagnosis Date   • Diabetes (HCC)    • Hematuria    • Hyperlipidemia    • Hypertension        Past Surgical History:   Procedure Laterality Date   • APPENDECTOMY     • CATARACT EXTRACTION     • CORONARY ARTERY BYPASS GRAFT     • CORONARY ARTERY BYPASS GRAFT  05/21/2010    LIMA to LAD, SVG to OM, SVG  to PDA   • PROSTATE SURGERY      TRANSURETHRAL RESECTION OF THE PROSTATE         Current Outpatient Medications:   •  allopurinol (ZYLOPRIM) 100 MG tablet, TAKE ONE TABLET BY MOUTH DAILY, Disp: 90 tablet, Rfl: 0  •  amLODIPine (NORVASC) 10 MG tablet, Take 1 tablet by mouth Daily., Disp: 90 tablet, Rfl: 3  •  aspirin 81 MG EC tablet, Take 81 mg by mouth Every Other Day., Disp: , Rfl:   •  glimepiride (AMARYL) 4 MG tablet, TAKE ONE TABLET BY MOUTH DAILY, Disp: 90 tablet, Rfl: 0  •  hydroCHLOROthiazide (HYDRODIURIL) 25 MG tablet, Take 1 tablet by mouth Daily., Disp: 90 tablet, Rfl: 3  •  lisinopril (PRINIVIL,ZESTRIL) 20 MG tablet, TAKE ONE TABLET BY MOUTH TWICE A DAY, Disp: 180 tablet, Rfl: 0  •  metFORMIN ER (GLUCOPHAGE-XR) 500 MG 24 hr tablet, Take 4 tablets by mouth Daily., Disp: 360 tablet, Rfl: 1  •  metoprolol succinate XL (TOPROL-XL) 25 MG 24 hr tablet, TAKE ONE TABLET BY MOUTH TWICE A DAY, Disp: 180 tablet, Rfl: 0  •  simvastatin (ZOCOR) 40 MG tablet, TAKE ONE TABLET BY MOUTH DAILY, Disp: 90 tablet, Rfl: 0  •  warfarin (Coumadin) 1 MG tablet, Take as directed, Disp: 90 tablet, Rfl: 1  •  warfarin (Coumadin) 4 MG tablet, Take 1 tablet by mouth daily or as directed, Disp: 90 tablet, Rfl: 1  •  warfarin (COUMADIN) 5 MG tablet, Take 1 tablet by mouth Daily. (Patient taking differently: Take 5 mg by mouth Daily. Currently taking 6mg 5mg alternate), Disp: 90 tablet, Rfl: 2    No Known Allergies     Family History   Problem Relation Age of Onset   • Hypertension Mother    • Stroke Mother    • Coronary artery disease Father    • Stroke Paternal Grandfather    • Coronary artery disease Other        Social History     Socioeconomic History   • Marital status:    Tobacco Use   • Smoking status: Former     Packs/day: 1.00     Years: 5.00     Pack years: 5.00     Types: Cigarettes     Quit date:      Years since quittin.9   • Smokeless tobacco: Never   Vaping Use   • Vaping Use: Never used   Substance and  "Sexual Activity   • Alcohol use: Not Currently   • Drug use: Defer   • Sexual activity: Defer       Vital Signs:   Ht 177.8 cm (70\")   Wt 106 kg (233 lb 12.8 oz)   BMI 33.55 kg/m²      Physical Exam     Result Review :   The following data was reviewed by: JACIEL Munoz on 12/07/2022:  Results for orders placed or performed in visit on 11/28/22   Protime-INR    Specimen: Blood   Result Value Ref Range    Protime 24.9 (H) 11.8 - 14.9 Seconds    INR 2.20 (H) 0.86 - 1.15        Bladder Scan interpretation 12/07/2022    Estimation of residual urine via [employer] 3000 [employer] Bladder Scan  MA/nurse performing: Ashlee CARPENTER MA   Residual Urine: 0 ml  Indication: Gross hematuria    Hydronephrosis, unspecified hydronephrosis type   Position: Supine  Examination: Incremental scanning of the suprapubic area using 2.0 MHz transducer using copious amounts of acoustic gel.   Findings: An anechoic area was demonstrated which represented the bladder, with measurement of residual urine as noted. I inspected this myself. In that the residual urine was insignificant, refer to plan for treatment and plan necessary at this time.         Procedures        Assessment and Plan    Diagnoses and all orders for this visit:    1. Gross hematuria (Primary)  -     Urinalysis With Microscopic - Urine, Clean Catch; Future    2. Hydronephrosis, unspecified hydronephrosis type  -     US Renal Bilateral; Future        Discussed with the patient at this point in time I believe that his gross hematuria may have been related to a renal stone as his urine culture was negative for any infection.  We we will set him up for a renal ultrasound in 1 month to evaluate for resolution of the hydronephrosis.  He is unable to urinate while in office today his PVR is 0 and he will give us a urine specimen in the lab.  We we will follow-up with him in 5 weeks or sooner if needed    I spent 10  minutes caring for Zachariah on this date of service. This time includes " time spent by me in the following activities:reviewing tests, obtaining and/or reviewing a separately obtained history, performing a medically appropriate examination and/or evaluation , counseling and educating the patient/family/caregiver, ordering medications, tests, or procedures, and documenting information in the medical record  Follow Up   Return in about 5 weeks (around 1/11/2023) for Follow-up hydronephrosis.  Patient was given instructions and counseling regarding his condition or for health maintenance advice. Please see specific information pulled into the AVS if appropriate.         This document has been electronically signed by JACIEL Munoz  December 7, 2022 09:24 EST

## 2022-12-07 NOTE — ASSESSMENT & PLAN NOTE
Advise regular exercise, healthy eating, to start always wearing seat belts. Living will discussed a copy not found, to bring one in, fall prevention discussed.  Immunizations discussed. He will take his covid booster today.    To continue yearly optometry and dental exams.

## 2022-12-07 NOTE — PROGRESS NOTES
The ABCs of the Annual Wellness Visit  Subsequent Medicare Wellness Visit    Subjective    Zachariah Cruz is a 77 y.o. male who presents for a Subsequent Medicare Wellness Visit.    Medicare wellness HPI  Exercises regularly: daily   Eats healthy:tries   Last  PSA: 21 1.510  Wears seatbelts: no  Living will: has one and reports it is on file   Optometrist: [place]   Dentist: went in the spring 2022 here in [place]/ Dr Escobedo    Tobacco:quit years ago   Alcohol intake:none   Drugs: none   Falls:fell last year, slipped on water on floor   Colonoscopy: cologuard  neg  Immunizations: 2 covid vaccines     A1C 7.6 22, CMP at [facility] late last month, lipid 6-10-22  Checks his Glucose usually less than 120     PAST MEDICAL HISTORY changes since        covid +     AF, sees Dr Moffett    Sleep Apnea: uses CPAP;     Renal Stones: he has undergone ECSW lithotripsy and laser      Hospitalizations:    Pneumonia admitted once on 10-20-20 (COVID +)         CURRENT MEDICAL PROVIDERS:    Orthopedist    Rheumatologist: Dr. Arvizu     urology : [facility]       PREVENTIVE HEALTH MAINTENANCE             COLORECTAL CANCER SCREENING: Up to date (colonoscopy q10y; sigmoidoscopy q5y; Cologuard q3y) was last done 2020, Results are in chart; Cologuard normal       Hepatitis C Medicare Screening: was last done ; negative            Surgical History:     Left cataract     Appendectomy    Coronary Artery Bypass Graft: ;     Transurethral Resection of Prostate: ;     Cataract Removal: right; ;         Family History:     Father:  at age 91; Cause of death was CAD;  Coronary Artery Disease     Mother:  at age 88; Cause of death was CVA;  Hypertension     Paternal Grandfather: Cerebrovascular Accident         Social History:     Occupation:.   (Self-Employed) [place]      Marital Status:      Children: 3 children      The following portions of the  patient's history were reviewed and   updated as appropriate: allergies, current medications, past family history, past medical history, past social history, past surgical history and problem list.    Compared to one year ago, the patient feels his physical   health is worse.    Compared to one year ago, the patient feels his mental   health is the same.    Recent Hospitalizations:  He was not admitted to the hospital during the last year.       Current Medical Providers:  Patient Care Team:  Nelia Frost APRN as PCP - General (Family Medicine)    Outpatient Medications Prior to Visit   Medication Sig Dispense Refill   • allopurinol (ZYLOPRIM) 100 MG tablet TAKE ONE TABLET BY MOUTH DAILY 90 tablet 0   • amLODIPine (NORVASC) 10 MG tablet Take 1 tablet by mouth Daily. 90 tablet 3   • aspirin 81 MG EC tablet Take 81 mg by mouth Every Other Day.     • Cephalexin (KEFLEX PO) Take  by mouth.     • glimepiride (AMARYL) 4 MG tablet TAKE ONE TABLET BY MOUTH DAILY 90 tablet 0   • lisinopril (PRINIVIL,ZESTRIL) 20 MG tablet TAKE ONE TABLET BY MOUTH TWICE A  tablet 0   • metFORMIN ER (GLUCOPHAGE-XR) 500 MG 24 hr tablet Take 4 tablets by mouth Daily. 360 tablet 1   • metoprolol succinate XL (TOPROL-XL) 25 MG 24 hr tablet TAKE ONE TABLET BY MOUTH TWICE A  tablet 0   • simvastatin (ZOCOR) 40 MG tablet TAKE ONE TABLET BY MOUTH DAILY 90 tablet 0   • warfarin (Coumadin) 1 MG tablet Take as directed 90 tablet 1   • warfarin (Coumadin) 4 MG tablet Take 1 tablet by mouth daily or as directed 90 tablet 1   • warfarin (COUMADIN) 5 MG tablet Take 1 tablet by mouth Daily. (Patient taking differently: Take 5 mg by mouth Daily. Currently taking 6mg 5mg alternate) 90 tablet 2   • hydroCHLOROthiazide (HYDRODIURIL) 25 MG tablet Take 1 tablet by mouth Daily. 90 tablet 3     No facility-administered medications prior to visit.       No opioid medication identified on active medication list. I have reviewed chart for other  "potential  high risk medication/s and harmful drug interactions in the elderly.          Aspirin is on active medication list. Aspirin use is indicated based on review of current medical condition/s. Pros and cons of this therapy have been discussed today. Benefits of this medication outweigh potential harm.  Patient has been encouraged to continue taking this medication.  .      Patient Active Problem List   Diagnosis   • Atrial fibrillation (HCC)   • Diabetes mellitus (HCC)   • Essential hypertension   • Hyperlipemia   • Left leg swelling   • Left leg pain   • Acute bilateral low back pain with left-sided sciatica   • Uncontrolled diabetes mellitus with complications (HCC)   • Screening for prostate cancer   • Chronic right-sided low back pain without sciatica   • Personal history of COVID-19   • Chronic gout involving toe without tophus   • Other specific arthropathies, not elsewhere classified, multiple sites   • Gross hematuria   • Coronary artery disease involving native coronary artery of native heart without angina pectoris   • Type 2 diabetes mellitus without complication, without long-term current use of insulin (HCC)   • Tinnitus   • Routine general medical examination at a health care facility   • Screening for malignant neoplasm of prostate     Advance Care Planning  Advance Directive is not on file.  ACP discussion was held with the patient during this visit. Patient has an advance directive (not in EMR), copy requested.     Objective    Vitals:    12/07/22 0935   BP: 127/78   BP Location: Left arm   Patient Position: Sitting   Cuff Size: Large Adult   Pulse: 67   Resp: 16   SpO2: 98%  Comment: room air   Weight: 106 kg (234 lb 3.2 oz)   Height: 177.8 cm (70\")   PainSc:   3   PainLoc: Knee     Estimated body mass index is 33.6 kg/m² as calculated from the following:    Height as of this encounter: 177.8 cm (70\").    Weight as of this encounter: 106 kg (234 lb 3.2 oz).    BMI is >= 30 and <35. (Class 1 " Obesity). The following options were offered after discussion;: exercise counseling/recommendations and nutrition counseling/recommendations      Does the patient have evidence of cognitive impairment? No          HEALTH RISK ASSESSMENT    Smoking Status:  Social History     Tobacco Use   Smoking Status Former   • Packs/day: 1.00   • Years: 5.00   • Pack years: 5.00   • Types: Cigarettes   • Quit date:    • Years since quittin.9   Smokeless Tobacco Never     Alcohol Consumption:  Social History     Substance and Sexual Activity   Alcohol Use Not Currently     Fall Risk Screen:    STEADI Fall Risk Assessment was completed, and patient is at HIGH risk for falls. Assessment completed on:2022    Depression Screening:  PHQ-2/PHQ-9 Depression Screening 2022   Retired PHQ-9 Total Score -   Retired Total Score -   Little Interest or Pleasure in Doing Things 0-->not at all   Feeling Down, Depressed or Hopeless 0-->not at all   PHQ-9: Brief Depression Severity Measure Score 0       Health Habits and Functional and Cognitive Screening:  Functional & Cognitive Status 2022   Do you have difficulty preparing food and eating? No   Do you have difficulty bathing yourself, getting dressed or grooming yourself? No   Do you have difficulty using the toilet? No   Do you have difficulty moving around from place to place? Yes   Do you have trouble with steps or getting out of a bed or a chair? Yes   Current Diet Diabetic Diet   Dental Exam Up to date   Eye Exam Up to date   Exercise (times per week) 7 times per week   Current Exercises Include Walking   Do you need help using the phone?  No   Are you deaf or do you have serious difficulty hearing?  No   Do you need help with transportation? No   Do you need help shopping? No   Do you need help preparing meals?  No   Do you need help with housework?  No   Do you need help with laundry? No   Do you need help taking your medications? No   Do you need help managing  money? No   Do you ever drive or ride in a car without wearing a seat belt? Yes   Have you felt unusual stress, anger or loneliness in the last month? No   Who do you live with? Spouse   If you need help, do you have trouble finding someone available to you? No   Have you been bothered in the last four weeks by sexual problems? No   Do you have difficulty concentrating, remembering or making decisions? No       Age-appropriate Screening Schedule:  Refer to the list below for future screening recommendations based on patient's age, sex and/or medical conditions. Orders for these recommended tests are listed in the plan section. The patient has been provided with a written plan.    Health Maintenance   Topic Date Due   • TDAP/TD VACCINES (1 - Tdap) Never done   • ZOSTER VACCINE (1 of 2) Never done   • URINE MICROALBUMIN  12/28/2021   • INFLUENZA VACCINE  Never done   • DIABETIC EYE EXAM  12/20/2022   • HEMOGLOBIN A1C  03/06/2023   • DIABETIC FOOT EXAM  06/08/2023   • LIPID PANEL  06/10/2023                CMS Preventative Services Quick Reference  Risk Factors Identified During Encounter  Immunizations Discussed/Encouraged (specific Immunizations; Tdap, Influenza, Prevnar 20 (Pneumococcal 20-valent conjugate), Shingrix and COVID19  The above risks/problems have been discussed with the patient.  Pertinent information has been shared with the patient in the After Visit Summary.  An After Visit Summary and PPPS were made available to the patient.    Follow Up:   Next Medicare Wellness visit to be scheduled in 1 year.       Additional E&M Note during same encounter follows:  Patient has multiple medical problems which are significant and separately identifiable that require additional work above and beyond the Medicare Wellness Visit.      Chief Complaint  Medicare Wellness-subsequent    Subjective        Zachariah Cruz is also being seen today for medicare wellness     Review of Systems   Constitutional: Negative for  "fatigue and fever.   HENT: Positive for tinnitus (since covid). Negative for sore throat.    Eyes: Negative for visual disturbance.   Respiratory: Negative for cough and shortness of breath.    Cardiovascular: Negative for chest pain, palpitations and leg swelling.   Gastrointestinal: Negative for abdominal pain.   Genitourinary: Positive for hematuria (went to ER 11-27-22, then was seen today by urology ).   Musculoskeletal: Negative for arthralgias.   Skin: Negative for rash.   Hematological: Negative for adenopathy.   Psychiatric/Behavioral: Negative for sleep disturbance.       Objective   Vital Signs:  /78 (BP Location: Left arm, Patient Position: Sitting, Cuff Size: Large Adult)   Pulse 67   Resp 16   Ht 177.8 cm (70\")   Wt 106 kg (234 lb 3.2 oz)   SpO2 98% Comment: room air  BMI 33.60 kg/m²     Physical Exam  Vitals reviewed.   Constitutional:       Appearance: Normal appearance. He is well-developed.   HENT:      Head: Normocephalic and atraumatic.      Right Ear: External ear normal.      Left Ear: External ear normal.      Mouth/Throat:      Pharynx: No oropharyngeal exudate.   Eyes:      Conjunctiva/sclera: Conjunctivae normal.      Pupils: Pupils are equal, round, and reactive to light.   Cardiovascular:      Rate and Rhythm: Normal rate and regular rhythm.      Heart sounds: No murmur heard.    No friction rub. No gallop.   Pulmonary:      Effort: Pulmonary effort is normal.      Breath sounds: Normal breath sounds. No wheezing or rhonchi.   Abdominal:      General: There is no distension.      Palpations: Abdomen is soft.      Tenderness: There is no abdominal tenderness.   Musculoskeletal:      Right lower leg: Edema (trace) present.      Left lower leg: Edema (trace) present.   Skin:     General: Skin is warm and dry.   Neurological:      Mental Status: He is alert and oriented to person, place, and time.      Cranial Nerves: No cranial nerve deficit.   Psychiatric:         Mood and " Affect: Mood and affect normal.         Behavior: Behavior normal.         Thought Content: Thought content normal.         Judgment: Judgment normal.                         Assessment and Plan   Diagnoses and all orders for this visit:    1. Routine general medical examination at a health care facility (Primary)  Assessment & Plan:  Advise regular exercise, healthy eating, to start always wearing seat belts. Living will discussed a copy not found, to bring one in, fall prevention discussed.  Immunizations discussed. He will take his covid booster today.    To continue yearly optometry and dental exams.          2. Screening for malignant neoplasm of prostate  Assessment & Plan:  He will return for labs and will get his PSA screening lab then     Orders:  -     PSA SCREENING; Future    3. Type 2 diabetes mellitus without complication, without long-term current use of insulin (HCC)  -     Hemoglobin A1c; Future  -     Lipid panel; Future    4. Immunization due  -     COVID-19 Bivalent Booster (Pfizer) 12+yrs           Follow Up   Return for fasting for labs.  Patient was given instructions and counseling regarding his condition or for health maintenance advice. Please see specific information pulled into the AVS if appropriate.

## 2022-12-08 ENCOUNTER — ANTICOAGULATION VISIT (OUTPATIENT)
Dept: CARDIOLOGY | Facility: CLINIC | Age: 77
End: 2022-12-08

## 2022-12-08 DIAGNOSIS — I48.91 ATRIAL FIBRILLATION, UNSPECIFIED TYPE: Primary | ICD-10-CM

## 2022-12-08 NOTE — PROGRESS NOTES
Lab Results   Component Value Date    INR 2.90 (H) 12/07/2022    INR 2.20 (H) 11/28/2022    INR 4.23 (H) 11/21/2022    PROTIME 31.0 (H) 12/07/2022    PROTIME 24.9 (H) 11/28/2022    PROTIME 41.8 (H) 11/21/2022     Taking 4 mg daily     Dx Afib     Range 2-3      LAB

## 2022-12-12 ENCOUNTER — TELEPHONE (OUTPATIENT)
Dept: UROLOGY | Facility: CLINIC | Age: 77
End: 2022-12-12

## 2022-12-12 NOTE — TELEPHONE ENCOUNTER
Provider: MARLEY COOLEY  Caller: IRASEMA LAUREN  Relationship to Patient: SELF     Phone Number: [phone]  Reason for Call: PT WAS LAST SEEN BY MARLEY ON 12-7-22 AND HAS A 5 WK FOLLOW UP SCHEDULED FOR 1-11-23.    PT RECEIVED A LETTER WITH RECALL DATE OF 2-14-23. PT JUST VERIFYING WHETHER OR NOT HE NEEDS TO SCHEDULE  ANOTHER APPT.     PLEASE GIVE PT A CALL BACK TO VERIFY.  When was the patient last seen: 12-7-22

## 2022-12-14 ENCOUNTER — OFFICE VISIT (OUTPATIENT)
Dept: CARDIOLOGY | Facility: CLINIC | Age: 77
End: 2022-12-14

## 2022-12-14 VITALS
HEART RATE: 62 BPM | DIASTOLIC BLOOD PRESSURE: 67 MMHG | BODY MASS INDEX: 33.36 KG/M2 | SYSTOLIC BLOOD PRESSURE: 148 MMHG | WEIGHT: 233 LBS | HEIGHT: 70 IN

## 2022-12-14 DIAGNOSIS — I25.10 CORONARY ARTERY DISEASE INVOLVING NATIVE CORONARY ARTERY OF NATIVE HEART WITHOUT ANGINA PECTORIS: ICD-10-CM

## 2022-12-14 DIAGNOSIS — I10 ESSENTIAL HYPERTENSION: Primary | ICD-10-CM

## 2022-12-14 PROCEDURE — 99213 OFFICE O/P EST LOW 20 MIN: CPT | Performed by: INTERNAL MEDICINE

## 2022-12-14 RX ORDER — SPIRONOLACTONE 25 MG/1
25 TABLET ORAL DAILY
Qty: 30 TABLET | Refills: 11 | Status: SHIPPED | OUTPATIENT
Start: 2022-12-14

## 2022-12-14 NOTE — ASSESSMENT & PLAN NOTE
Blood pressure remains persistently elevated per home blood pressure log.  He also has pedal edema.  He is mildly elevated in the office today as well.  He is already on the maximum dose of amlodipine and lisinopril which will be continued.  We will add spironolactone 25 mg daily for better blood pressure control.  Continue metoprolol at the current dose.  We will repeat basic metabolic panel in 1 week after adding spironolactone.

## 2022-12-14 NOTE — PROGRESS NOTES
CARDIOLOGY FOLLOW-UP PROGRESS NOTE        Chief Complaint  Follow-up, Hypertension, and Atrial Fibrillation    Subjective            Zachariah Cruz presents to [facility] CARDIOLOGY  History of Present Illness    Mr. Cruz is here for 2-month follow-up visit.  During last office visit, blood pressure was noted on the higher side.  The dose of amlodipine was increased to 10 mg daily.  Today, he brought a log of home blood pressure which showed that blood pressure is still uncontrolled at home.  Systolic blood pressure mostly in 140s to 160s and at times in 170s.  He had a visit to emergency room 2 weeks back for left flank pain.  Symptoms are still persisting, he is currently following up with urology.  Palpitations are better.  He continues to have pedal edema which is stable.      Past History:    1) Coronary artery disease status post 3-vessel coronary artery bypass grafting on 05/21/2010 by Dr. Garcia at [facility] (LIMA graft to the LAD, SVG graft to OM, SVG graft to posterior descending branch of RCA); 2) Diabetes mellitus; 3) Hypertension; 4) Hyperlipidemia; 5) Gout; 6) Obstructive sleep apnea, on CPAP; 7) Paroxysmal atrial flutter, detected on 24-hour Holter monitor study done in October 2019.     Medical History:  Past Medical History:   Diagnosis Date   • Diabetes (HCC)    • Hematuria    • Hyperlipidemia    • Hypertension        Surgical History: has a past surgical history that includes Appendectomy; Cataract extraction; Coronary artery bypass graft; Prostate surgery; and Coronary artery bypass graft (05/21/2010).     Family History: family history includes Coronary artery disease in his father and another family member; Hypertension in his mother; Stroke in his mother and paternal grandfather.     Social History: reports that he quit smoking about 53 years ago. His smoking use included cigarettes. He has a 5.00 pack-year smoking history. He has never used smokeless tobacco.  "He reports that he does not currently use alcohol. Drug use questions deferred to the physician.    Allergies: Patient has no known allergies.    Current Outpatient Medications on File Prior to Visit   Medication Sig   • allopurinol (ZYLOPRIM) 100 MG tablet TAKE ONE TABLET BY MOUTH DAILY   • amLODIPine (NORVASC) 10 MG tablet Take 1 tablet by mouth Daily.   • aspirin 81 MG EC tablet Take 81 mg by mouth Every Other Day.   • Cephalexin (KEFLEX PO) Take  by mouth.   • glimepiride (AMARYL) 4 MG tablet TAKE ONE TABLET BY MOUTH DAILY   • lisinopril (PRINIVIL,ZESTRIL) 20 MG tablet TAKE ONE TABLET BY MOUTH TWICE A DAY   • metFORMIN ER (GLUCOPHAGE-XR) 500 MG 24 hr tablet Take 4 tablets by mouth Daily.   • metoprolol succinate XL (TOPROL-XL) 25 MG 24 hr tablet TAKE ONE TABLET BY MOUTH TWICE A DAY   • simvastatin (ZOCOR) 40 MG tablet TAKE ONE TABLET BY MOUTH DAILY   • warfarin (Coumadin) 1 MG tablet Take as directed   • warfarin (Coumadin) 4 MG tablet Take 1 tablet by mouth daily or as directed   • warfarin (COUMADIN) 5 MG tablet Take 1 tablet by mouth Daily. (Patient taking differently: Take 5 mg by mouth Daily. Currently taking 6mg 5mg alternate)     No current facility-administered medications on file prior to visit.          Review of Systems   Respiratory: Positive for shortness of breath. Negative for cough and wheezing.    Cardiovascular: Positive for leg swelling. Negative for chest pain and palpitations.   Gastrointestinal: Negative for nausea and vomiting.   Genitourinary: Positive for flank pain.   Neurological: Negative for dizziness and syncope.        Objective     /67   Pulse 62   Ht 177.8 cm (70\")   Wt 106 kg (233 lb)   BMI 33.43 kg/m²       Physical Exam    General : Alert, awake, no acute distress  Neck : Supple, no carotid bruit, no jugular venous distention  CVS : Regular rate and rhythm, no murmur, rubs or gallops  Lungs: Clear to auscultation bilaterally, no crackles or rhonchi  Abdomen: Soft, " nontender, bowel sounds heard in all 4 quadrants  Extremities: Warm, well-perfused, 1+ edema bilaterally,    Result Review :     The following data was reviewed by: Vishal Denton MD on 12/14/2022:    CMP    CMP 2/22/22 6/10/22 11/22/22   Glucose 156 (A) 164 (A) 82   BUN 16 19 13   Creatinine 0.98 1.19 0.99   eGFR Non African Am 75     Sodium 138 136 140   Potassium 4.4 4.5 4.5   Chloride 99 101 105   Calcium 9.8 9.7 9.4   Albumin  4.20    Total Bilirubin  0.3    Alkaline Phosphatase  80    AST (SGOT)  20    ALT (SGPT)  25    (A) Abnormal value            CBC    CBC 11/22/22   WBC 5.67   RBC 4.35   Hemoglobin 10.8 (A)   Hematocrit 35.9 (A)   MCV 82.5   MCH 24.8 (A)   MCHC 30.1 (A)   RDW 16.2 (A)   Platelets 258   (A) Abnormal value              Lipid Panel    Lipid Panel 6/10/22   Total Cholesterol 116   Triglycerides 214 (A)   HDL Cholesterol 30 (A)   VLDL Cholesterol 35   LDL Cholesterol  51   LDL/HDL Ratio 1.44   (A) Abnormal value                             Assessment and Plan        Diagnoses and all orders for this visit:    1. Essential hypertension (Primary)  Assessment & Plan:  Blood pressure remains persistently elevated per home blood pressure log.  He also has pedal edema.  He is mildly elevated in the office today as well.  He is already on the maximum dose of amlodipine and lisinopril which will be continued.  We will add spironolactone 25 mg daily for better blood pressure control.  Continue metoprolol at the current dose.  We will repeat basic metabolic panel in 1 week after adding spironolactone.    Orders:  -     spironolactone (ALDACTONE) 25 MG tablet; Take 1 tablet by mouth Daily.  Dispense: 30 tablet; Refill: 11  -     Basic Metabolic Panel; Future    2. Coronary artery disease involving native coronary artery of native heart without angina pectoris  Assessment & Plan:  He is stable with no angina.  Continue aspirin, statin beta-blocker for secondary prevention              Follow Up      Return in about 4 months (around 4/14/2023) for Recheck.    Patient was given instructions and counseling regarding his condition or for health maintenance advice. Please see specific information pulled into the AVS if appropriate.

## 2022-12-18 DIAGNOSIS — E11.9 TYPE 2 DIABETES MELLITUS WITHOUT COMPLICATION, WITHOUT LONG-TERM CURRENT USE OF INSULIN: ICD-10-CM

## 2022-12-19 RX ORDER — METFORMIN HYDROCHLORIDE 500 MG/1
TABLET, EXTENDED RELEASE ORAL
Qty: 120 TABLET | Refills: 0 | Status: SHIPPED | OUTPATIENT
Start: 2022-12-19 | End: 2023-01-18

## 2022-12-19 NOTE — TELEPHONE ENCOUNTER
Rx Refill Note  Requested Prescriptions     Pending Prescriptions Disp Refills   • metFORMIN ER (GLUCOPHAGE-XR) 500 MG 24 hr tablet [Pharmacy Med Name: METFORMIN HCL  MG TABLET] 360 tablet 1     Sig: TAKE 4 TABLETS BY MOUTH ONCE DAILY      Last office visit with prescribing clinician: 12/7/2022     Next office visit with prescribing clinician: 6/7/2023        Mili Reyes LPN  12/19/22, 09:45 EST

## 2022-12-21 ENCOUNTER — HOSPITAL ENCOUNTER (OUTPATIENT)
Dept: ULTRASOUND IMAGING | Facility: HOSPITAL | Age: 77
Discharge: HOME | End: 2022-12-21

## 2022-12-21 ENCOUNTER — LAB (OUTPATIENT)
Dept: LAB | Facility: HOSPITAL | Age: 77
End: 2022-12-21

## 2022-12-21 DIAGNOSIS — I48.91 ATRIAL FIBRILLATION, UNSPECIFIED TYPE: ICD-10-CM

## 2022-12-21 DIAGNOSIS — Z12.5 SCREENING FOR MALIGNANT NEOPLASM OF PROSTATE: ICD-10-CM

## 2022-12-21 DIAGNOSIS — E11.9 TYPE 2 DIABETES MELLITUS WITHOUT COMPLICATION, WITHOUT LONG-TERM CURRENT USE OF INSULIN: ICD-10-CM

## 2022-12-21 DIAGNOSIS — I10 ESSENTIAL HYPERTENSION: ICD-10-CM

## 2022-12-21 DIAGNOSIS — N13.30 HYDRONEPHROSIS, UNSPECIFIED HYDRONEPHROSIS TYPE: ICD-10-CM

## 2022-12-21 LAB
ANION GAP SERPL CALCULATED.3IONS-SCNC: 10 MMOL/L (ref 5–15)
BUN SERPL-MCNC: 14 MG/DL (ref 8–23)
BUN/CREAT SERPL: 14.1 (ref 7–25)
CALCIUM SPEC-SCNC: 9.8 MG/DL (ref 8.6–10.5)
CHLORIDE SERPL-SCNC: 103 MMOL/L (ref 98–107)
CHOLEST SERPL-MCNC: 131 MG/DL (ref 0–200)
CO2 SERPL-SCNC: 26 MMOL/L (ref 22–29)
CREAT SERPL-MCNC: 0.99 MG/DL (ref 0.76–1.27)
EGFRCR SERPLBLD CKD-EPI 2021: 79.4 ML/MIN/1.73
GLUCOSE SERPL-MCNC: 166 MG/DL (ref 65–99)
HBA1C MFR BLD: 7.5 % (ref 4.8–5.6)
HDLC SERPL-MCNC: 38 MG/DL (ref 40–60)
INR PPP: 2.93 (ref 0.86–1.15)
LDLC SERPL CALC-MCNC: 52 MG/DL (ref 0–100)
LDLC/HDLC SERPL: 1.07 {RATIO}
POTASSIUM SERPL-SCNC: 5 MMOL/L (ref 3.5–5.2)
PROTHROMBIN TIME: 31.2 SECONDS (ref 11.8–14.9)
PSA SERPL-MCNC: 1.38 NG/ML (ref 0–4)
SODIUM SERPL-SCNC: 139 MMOL/L (ref 136–145)
TRIGL SERPL-MCNC: 261 MG/DL (ref 0–150)
VLDLC SERPL-MCNC: 41 MG/DL (ref 5–40)

## 2022-12-21 PROCEDURE — G0103 PSA SCREENING: HCPCS

## 2022-12-21 PROCEDURE — 85610 PROTHROMBIN TIME: CPT

## 2022-12-21 PROCEDURE — 80061 LIPID PANEL: CPT

## 2022-12-21 PROCEDURE — 36415 COLL VENOUS BLD VENIPUNCTURE: CPT

## 2022-12-21 PROCEDURE — 80048 BASIC METABOLIC PNL TOTAL CA: CPT

## 2022-12-21 PROCEDURE — 76775 US EXAM ABDO BACK WALL LIM: CPT

## 2022-12-21 PROCEDURE — 83036 HEMOGLOBIN GLYCOSYLATED A1C: CPT

## 2022-12-22 ENCOUNTER — ANTICOAGULATION VISIT (OUTPATIENT)
Dept: CARDIOLOGY | Facility: CLINIC | Age: 77
End: 2022-12-22

## 2022-12-22 DIAGNOSIS — I48.91 ATRIAL FIBRILLATION, UNSPECIFIED TYPE: Primary | ICD-10-CM

## 2022-12-22 NOTE — PROGRESS NOTES
Lab Results   Component Value Date    INR 2.93 (H) 12/21/2022    INR 2.90 (H) 12/07/2022    INR 2.20 (H) 11/28/2022    PROTIME 31.2 (H) 12/21/2022    PROTIME 31.0 (H) 12/07/2022    PROTIME 24.9 (H) 11/28/2022     Taking 4 mg daily     Dx Afib     Range 2-3      LAB

## 2022-12-22 NOTE — PROGRESS NOTES
The patient had ultrasound performed early however his hydronephrosis has resolved I do believe this may have been related to a renal stone.

## 2023-01-04 ENCOUNTER — TELEPHONE (OUTPATIENT)
Dept: FAMILY MEDICINE CLINIC | Age: 78
End: 2023-01-04
Payer: MEDICARE

## 2023-01-04 ENCOUNTER — LAB (OUTPATIENT)
Dept: LAB | Facility: HOSPITAL | Age: 78
End: 2023-01-04
Payer: MEDICARE

## 2023-01-04 ENCOUNTER — ANTICOAGULATION VISIT (OUTPATIENT)
Dept: CARDIOLOGY | Facility: CLINIC | Age: 78
End: 2023-01-04
Payer: MEDICARE

## 2023-01-04 DIAGNOSIS — I48.91 ATRIAL FIBRILLATION, UNSPECIFIED TYPE: ICD-10-CM

## 2023-01-04 DIAGNOSIS — M79.673 PAIN OF FOOT, UNSPECIFIED LATERALITY: Primary | ICD-10-CM

## 2023-01-04 DIAGNOSIS — I48.91 ATRIAL FIBRILLATION, UNSPECIFIED TYPE: Primary | ICD-10-CM

## 2023-01-04 LAB
INR PPP: 2.44 (ref 0.86–1.15)
PROTHROMBIN TIME: 27 SECONDS (ref 11.8–14.9)

## 2023-01-04 PROCEDURE — 85610 PROTHROMBIN TIME: CPT

## 2023-01-04 PROCEDURE — 36415 COLL VENOUS BLD VENIPUNCTURE: CPT

## 2023-01-04 NOTE — TELEPHONE ENCOUNTER
Caller: Zachariah Cruz    Relationship: Self    Best call back number: [phone]    What is the medical concern/diagnosis: PAIN IN ARCH AREA OF FEET    What specialty or service is being requested: PODIATRY    What is the provider, practice or medical service name: UNKNOWN, DOCTORS SUGGESTION    What is the office location: PREFERS SOMEWHERE IN [place] OR CLOSE TO [place]

## 2023-01-04 NOTE — PROGRESS NOTES
Lab Results   Component Value Date    INR 2.44 (H) 01/04/2023    INR 2.93 (H) 12/21/2022    INR 2.90 (H) 12/07/2022    PROTIME 27.0 (H) 01/04/2023    PROTIME 31.2 (H) 12/21/2022    PROTIME 31.0 (H) 12/07/2022     Taking 4 mg daily     Dx Afib     Range 2-3      LAB

## 2023-01-06 ENCOUNTER — TELEPHONE (OUTPATIENT)
Dept: CARDIOLOGY | Facility: CLINIC | Age: 78
End: 2023-01-06
Payer: MEDICARE

## 2023-01-06 NOTE — TELEPHONE ENCOUNTER
Was speaking to patient about INR and patient stated for the last 2 to 3 weeks every time patient plugs anything in or turns on anything electric he gets a strong shock. Patient asking if this has anything to do with the electrical activity in his heart?

## 2023-01-06 NOTE — TELEPHONE ENCOUNTER
This would not be related to any cardiac issues, he should definitely have his electric outlets evaluated.

## 2023-01-09 RX ORDER — ALLOPURINOL 100 MG/1
TABLET ORAL
Qty: 90 TABLET | Refills: 0 | Status: SHIPPED | OUTPATIENT
Start: 2023-01-09

## 2023-01-09 RX ORDER — METOPROLOL SUCCINATE 25 MG/1
TABLET, EXTENDED RELEASE ORAL
Qty: 180 TABLET | Refills: 0 | Status: SHIPPED | OUTPATIENT
Start: 2023-01-09

## 2023-01-09 NOTE — TELEPHONE ENCOUNTER
Rx Refill Note  Requested Prescriptions     Pending Prescriptions Disp Refills   • allopurinol (ZYLOPRIM) 100 MG tablet [Pharmacy Med Name: ALLOPURINOL 100 MG TABLET] 90 tablet 0     Sig: TAKE ONE TABLET BY MOUTH DAILY   • metoprolol succinate XL (TOPROL-XL) 25 MG 24 hr tablet [Pharmacy Med Name: METOPROLOL SUCC ER 25 MG TAB] 180 tablet 0     Sig: TAKE ONE TABLET BY MOUTH TWICE A DAY      Last office visit with prescribing clinician: 12/7/2022   Last telemedicine visit with prescribing clinician: 6/7/2023   Next office visit with prescribing clinician: 6/7/2023     Zahra Bruner LPN  01/09/23, 13:36 EST     -10/14/22 #90    DID NOT MEET PROTOCOL:    Uric acid in past 12 months

## 2023-01-11 ENCOUNTER — TELEPHONE (OUTPATIENT)
Dept: UROLOGY | Facility: CLINIC | Age: 78
End: 2023-01-11

## 2023-01-11 ENCOUNTER — OFFICE VISIT (OUTPATIENT)
Dept: UROLOGY | Facility: CLINIC | Age: 78
End: 2023-01-11
Payer: MEDICARE

## 2023-01-11 ENCOUNTER — LAB (OUTPATIENT)
Dept: LAB | Facility: HOSPITAL | Age: 78
End: 2023-01-11
Payer: MEDICARE

## 2023-01-11 VITALS — WEIGHT: 234.8 LBS | HEIGHT: 70 IN | BODY MASS INDEX: 33.61 KG/M2

## 2023-01-11 DIAGNOSIS — N28.1 BILATERAL RENAL CYSTS: ICD-10-CM

## 2023-01-11 DIAGNOSIS — R31.0 GROSS HEMATURIA: ICD-10-CM

## 2023-01-11 DIAGNOSIS — R31.0 GROSS HEMATURIA: Primary | ICD-10-CM

## 2023-01-11 LAB
BACTERIA UR QL AUTO: NORMAL /HPF
BILIRUB UR QL STRIP: NEGATIVE
CLARITY UR: CLEAR
COLOR UR: YELLOW
GLUCOSE UR STRIP-MCNC: NEGATIVE MG/DL
HGB UR QL STRIP.AUTO: NEGATIVE
KETONES UR QL STRIP: NEGATIVE
LEUKOCYTE ESTERASE UR QL STRIP.AUTO: NEGATIVE
NITRITE UR QL STRIP: NEGATIVE
PH UR STRIP.AUTO: 5.5 [PH] (ref 5–8)
PROT UR QL STRIP: NEGATIVE
RBC # UR STRIP: NORMAL /HPF
REF LAB TEST METHOD: NORMAL
SP GR UR STRIP: 1.02 (ref 1–1.03)
SQUAMOUS #/AREA URNS HPF: NORMAL /HPF
UROBILINOGEN UR QL STRIP: NORMAL
WBC # UR STRIP: NORMAL /HPF

## 2023-01-11 PROCEDURE — 99214 OFFICE O/P EST MOD 30 MIN: CPT | Performed by: NURSE PRACTITIONER

## 2023-01-11 PROCEDURE — 81001 URINALYSIS AUTO W/SCOPE: CPT

## 2023-01-11 NOTE — TELEPHONE ENCOUNTER
----- Message from JACIEL Munoz sent at 1/11/2023 12:37 PM EST -----  Please inform patient that no blood is in his urine

## 2023-01-11 NOTE — PROGRESS NOTES
Chief Complaint: Blood in Urine and hydronephrosis    Subjective         History of Present Illness  Zachariah Cruz is a 77 y.o. male presents to [facility] to be seen for f/u gross hematuria.    The patient has seen no further blood in his urine.     He is still with pain in his right lower back and is worsening if he sits in a hard chair or when he has to void or have a bowel movement.     He states that this pain was in his mid back before but went to the chiropractor and this resolved.     Patient has had a repeat renal ultrasound to evaluate for resolution of hydronephrosis which is gone at this point in time however there are bilateral renal cysts annotated on his ultrasound.    The patient did have a full upper and lower urinary tract evaluation in 2/13/2022.          Previous:   Patient began having gross hematuria on 11/21/2022.    The patient states that the blood began before the pain began.  He began having right-sided flank pain on 11/27/22 and presented to the ED at [facility].     He had a CT scan without contrast which revealed small hiatal hernia, uncomplicated cholelithiasis, perinephric stranding around the right kidney with mild right hydronephrosis.  There is mild hydroureter to the mid pelvis.  The distal ureter is normal in caliber.  There are no stones identified.  There are no bladder stones.  Findings could be due to a recently passed stone or infection.  There are no stones in left kidney.  The GI tract demonstrates no obstruction.  There is moderate sigmoid diverticulosis without diverticulitis.    The patient's urine culture from that visit revealed no growth.    Patient last saw Dr. Douglas Chawla on 2/13/2022 for a cystoscopy.  Cystoscopy revealed 4 cm prostate previous with some regrowth on the median lobe and lateral lobe.  Moderate trabeculations throughout, no pathology.  There were no tumors lesions stones or other abnormalities noted within the  bladder.  There was no increased vascularity.  Both ureteral orifices were identified and were normal in appearance.  The flexible cystoscope was removed.  Patient tolerated the procedure well.    He denies any straining to void, no dysuria, no urgency or frequency with urination.     Objective     Past Medical History:   Diagnosis Date   • Diabetes (HCC)    • Hematuria    • Hyperlipidemia    • Hypertension        Past Surgical History:   Procedure Laterality Date   • APPENDECTOMY     • CATARACT EXTRACTION     • CORONARY ARTERY BYPASS GRAFT     • CORONARY ARTERY BYPASS GRAFT  05/21/2010    LIMA to LAD, SVG to OM, SVG to PDA   • PROSTATE SURGERY      TRANSURETHRAL RESECTION OF THE PROSTATE         Current Outpatient Medications:   •  metFORMIN ER (GLUCOPHAGE-XR) 500 MG 24 hr tablet, TAKE 4 TABLETS BY MOUTH ONCE DAILY, Disp: 120 tablet, Rfl: 0  •  allopurinol (ZYLOPRIM) 100 MG tablet, TAKE ONE TABLET BY MOUTH DAILY, Disp: 90 tablet, Rfl: 0  •  amLODIPine (NORVASC) 10 MG tablet, Take 1 tablet by mouth Daily., Disp: 90 tablet, Rfl: 3  •  aspirin 81 MG EC tablet, Take 81 mg by mouth Every Other Day., Disp: , Rfl:   •  Cephalexin (KEFLEX PO), Take  by mouth., Disp: , Rfl:   •  glimepiride (AMARYL) 4 MG tablet, TAKE ONE TABLET BY MOUTH DAILY, Disp: 90 tablet, Rfl: 0  •  lisinopril (PRINIVIL,ZESTRIL) 20 MG tablet, TAKE ONE TABLET BY MOUTH TWICE A DAY, Disp: 180 tablet, Rfl: 0  •  metoprolol succinate XL (TOPROL-XL) 25 MG 24 hr tablet, TAKE ONE TABLET BY MOUTH TWICE A DAY, Disp: 180 tablet, Rfl: 0  •  simvastatin (ZOCOR) 40 MG tablet, TAKE ONE TABLET BY MOUTH DAILY, Disp: 90 tablet, Rfl: 0  •  spironolactone (ALDACTONE) 25 MG tablet, Take 1 tablet by mouth Daily., Disp: 30 tablet, Rfl: 11  •  warfarin (Coumadin) 1 MG tablet, Take as directed, Disp: 90 tablet, Rfl: 1  •  warfarin (Coumadin) 4 MG tablet, Take 1 tablet by mouth daily or as directed, Disp: 90 tablet, Rfl: 1  •  warfarin (COUMADIN) 5 MG tablet, Take 1 tablet by  "mouth Daily. (Patient taking differently: Take 5 mg by mouth Daily. Currently taking 6mg 5mg alternate), Disp: 90 tablet, Rfl: 2    No Known Allergies     Family History   Problem Relation Age of Onset   • Hypertension Mother    • Stroke Mother    • Coronary artery disease Father    • Stroke Paternal Grandfather    • Coronary artery disease Other        Social History     Socioeconomic History   • Marital status:    Tobacco Use   • Smoking status: Former     Packs/day: 1.00     Years: 5.00     Pack years: 5.00     Types: Cigarettes     Quit date:      Years since quittin.0   • Smokeless tobacco: Never   Vaping Use   • Vaping Use: Never used   Substance and Sexual Activity   • Alcohol use: Not Currently   • Drug use: Defer   • Sexual activity: Defer       Vital Signs:   Ht 177.8 cm (70\")   Wt 107 kg (234 lb 12.8 oz)   BMI 33.69 kg/m²      Physical Exam     Result Review :   The following data was reviewed by: JACIEL Munoz on 2022:  Results for orders placed or performed in visit on 23   Protime-INR    Specimen: Blood   Result Value Ref Range    Protime 27.0 (H) 11.8 - 14.9 Seconds    INR 2.44 (H) 0.86 - 1.15      PSA    PSA 22   PSA 1.380             PROCEDURE:  US RENAL BILATERAL     COMPARISON: [facility], CT, CT ABDOMEN PELVIS STONE PROTOCOL, 2021, 13:11.     INDICATIONS:  hydronephrosis     FINDINGS:          The right kidney measures 11.2 x 5.5 x 7.6 cm.  The left kidney measures 16.3 x 6.2 x 6.7 cm.  The   cortical thickness and echogenicity is normal.  There is no hydronephrosis or echogenic shadowing   calculi.  There is a round hypoechoic mass in the interpolar region of the right kidney.  It   measures 1.3 x 1.3 x 1.6 cm.  This probably represents a renal cyst.  There is a benign cyst   extending off the upper pole of the left kidney.  It measures 5.8 x 6.3 x 5.1 cm.  There is also a   smaller round hypoechoic mass in the left kidney " measuring 1.5 x 1.3 x 0.9 cm.  This may also   represent a benign cyst.  The urinary bladder is unremarkable.       IMPRESSION:                 1. Benign cyst extending off the upper pole of the left kidney measuring 5.8 x 6.3 x 5.1 cm.  2. There are round hypoechoic masses in both kidneys which probably represent cysts.  This could be   evaluated either with a contrast enhanced CT or a repeat renal ultrasound in 6 months if the   patient cannot tolerate iodinated contrast.  3. The urinary bladder is unremarkable.                 DELVIS AGUSTIN MD         Electronically Signed and Approved By: DELVIS AGUSTIN MD on 12/22/2022 at 11:01       Procedures        Assessment and Plan    Diagnoses and all orders for this visit:    1. Gross hematuria (Primary)  -     Urinalysis With Microscopic - Urine, Clean Catch; Future    2. Bilateral renal cysts  -     CT Abdomen Pelvis With & Without Contrast; Future      Semaj with the patient at this point time we could proceed with follow-up lower urinary tract evaluation however patient states that he is not interested at this point in time given that he believes that he may have passed a stone and this was the reason for the gross hematuria.    Discussed with the patient his pain sounds musculoskeletal in nature given exacerbation with sitting. Recommended chiropractic care for further evaluation.     He will get ua today.     He will call if any furhter blood in urine.    We will repeat a ct  Scan in 3 months to evaluate the renal cysts and f/u in office.     He will give UA in the lab and we will call with results.  I spent 20 minutes caring for Zachariah on this date of service. This time includes time spent by me in the following activities:reviewing tests, obtaining and/or reviewing a separately obtained history, performing a medically appropriate examination and/or evaluation , counseling and educating the patient/family/caregiver, ordering medications, tests, or procedures, and  documenting information in the medical record  Follow Up   Return in about 3 months (around 4/11/2023) for f/u ct for renal cysts.  Patient was given instructions and counseling regarding his condition or for health maintenance advice. Please see specific information pulled into the AVS if appropriate.         This document has been electronically signed by JACIEL Munoz  January 11, 2023 08:55 EST

## 2023-01-18 DIAGNOSIS — E11.9 TYPE 2 DIABETES MELLITUS WITHOUT COMPLICATION, WITHOUT LONG-TERM CURRENT USE OF INSULIN: ICD-10-CM

## 2023-01-18 RX ORDER — METFORMIN HYDROCHLORIDE 500 MG/1
TABLET, EXTENDED RELEASE ORAL
Qty: 120 TABLET | Refills: 0 | Status: SHIPPED | OUTPATIENT
Start: 2023-01-18 | End: 2023-02-15

## 2023-01-18 RX ORDER — SIMVASTATIN 40 MG
TABLET ORAL
Qty: 90 TABLET | Refills: 0 | Status: SHIPPED | OUTPATIENT
Start: 2023-01-18

## 2023-01-18 NOTE — TELEPHONE ENCOUNTER
Rx Refill Note  Requested Prescriptions     Pending Prescriptions Disp Refills   • simvastatin (ZOCOR) 40 MG tablet [Pharmacy Med Name: SIMVASTATIN 40 MG TABLET] 90 tablet 0     Sig: TAKE ONE TABLET BY MOUTH DAILY   • metFORMIN ER (GLUCOPHAGE-XR) 500 MG 24 hr tablet [Pharmacy Med Name: METFORMIN HCL  MG TABLET] 120 tablet 0     Sig: TAKE 4 TABLETS BY MOUTH ONCE DAILY      Last office visit with prescribing clinician: 12/7/2022       Next office visit with prescribing clinician: 6/7/2023     Mili Reyes LPN  01/18/23, 12:18 EST

## 2023-01-18 NOTE — TELEPHONE ENCOUNTER
Caller: SELF    What was the call regarding: PATIENT STATES HE WOULD LIKE MULTIPLE REFILLS TO BE GIVEN FOR HIS MEDICATIONS.

## 2023-02-01 ENCOUNTER — ANTICOAGULATION VISIT (OUTPATIENT)
Dept: CARDIOLOGY | Facility: CLINIC | Age: 78
End: 2023-02-01
Payer: MEDICARE

## 2023-02-01 ENCOUNTER — LAB (OUTPATIENT)
Dept: LAB | Facility: HOSPITAL | Age: 78
End: 2023-02-01
Payer: MEDICARE

## 2023-02-01 DIAGNOSIS — I48.91 ATRIAL FIBRILLATION, UNSPECIFIED TYPE: Primary | ICD-10-CM

## 2023-02-01 DIAGNOSIS — I48.91 ATRIAL FIBRILLATION, UNSPECIFIED TYPE: ICD-10-CM

## 2023-02-01 LAB
INR PPP: 2.38 (ref 0.86–1.15)
PROTHROMBIN TIME: 26.4 SECONDS (ref 11.8–14.9)

## 2023-02-01 PROCEDURE — 85610 PROTHROMBIN TIME: CPT

## 2023-02-01 PROCEDURE — 36415 COLL VENOUS BLD VENIPUNCTURE: CPT

## 2023-02-01 NOTE — PROGRESS NOTES
Lab Results   Component Value Date    INR 2.38 (H) 02/01/2023    INR 2.44 (H) 01/04/2023    INR 2.93 (H) 12/21/2022    PROTIME 26.4 (H) 02/01/2023    PROTIME 27.0 (H) 01/04/2023    PROTIME 31.2 (H) 12/21/2022        Taking 4 mg daily     Dx Afib     Range 2-3      LAB

## 2023-02-02 ENCOUNTER — TELEPHONE (OUTPATIENT)
Dept: CARDIOLOGY | Facility: CLINIC | Age: 78
End: 2023-02-02
Payer: MEDICARE

## 2023-02-02 DIAGNOSIS — R07.9 CHEST PAIN, UNSPECIFIED TYPE: Primary | ICD-10-CM

## 2023-02-02 NOTE — TELEPHONE ENCOUNTER
Patient called and said he was unloading the back of his pickup and said he felt a tightness pull across his chest. Had to sit for about 5-10 minute before it went away. Did not have to use nitro and said object weighed about 50 pounds. Anything to worry about?

## 2023-02-06 RX ORDER — LISINOPRIL 20 MG/1
TABLET ORAL
Qty: 180 TABLET | Refills: 0 | Status: SHIPPED | OUTPATIENT
Start: 2023-02-06

## 2023-02-07 NOTE — TELEPHONE ENCOUNTER
SW patient. Checking to see if patient has had any additional episodes. Patient reported last night he was going to bed and felt a tightness in his chest- denied SOA or N/V. States it went away but this morning he experiencing chest tightness, with a slight dull pain; location center and over to the right slightly. Patient denies changes in pain with deep breathing. /80; 64- patient reports SOA- denies nausea or radiating pain. Patient does have nitroglycerin that is . Patient denies change with activity with pain. Patient states he feels like he has a kidney stone as well.     Encouraged patient if his pain increases in intensity/severity, begins radiating or increased SOA- occurs to seek emergent care in the nearest ER.    Please advise: Patient also need new script for nitroglycerin- his  in .

## 2023-02-08 NOTE — TELEPHONE ENCOUNTER
Events noted, it appears that patient is getting recurrent chest discomfort and shortness of breath.  He has underlying coronary artery disease and has not had any ischemic evaluation for the past several years.      Okay to refill the prescription for sublingual nitroglycerin 0.4 mg as needed    Would recommend to do a SPECT stress test to rule out reversible ischemia : Diagnosis : Chest pain, unspecified.  Coronary artery disease      Electronically signed by Vishal Denton MD, 02/08/23, 3:57 PM EST.

## 2023-02-09 RX ORDER — NITROGLYCERIN 0.4 MG/1
TABLET SUBLINGUAL
Qty: 100 TABLET | Refills: 11 | Status: SHIPPED | OUTPATIENT
Start: 2023-02-09

## 2023-02-15 DIAGNOSIS — E11.9 TYPE 2 DIABETES MELLITUS WITHOUT COMPLICATION, WITHOUT LONG-TERM CURRENT USE OF INSULIN: ICD-10-CM

## 2023-02-15 RX ORDER — METFORMIN HYDROCHLORIDE 500 MG/1
TABLET, EXTENDED RELEASE ORAL
Qty: 120 TABLET | Refills: 0 | Status: SHIPPED | OUTPATIENT
Start: 2023-02-15 | End: 2023-02-24 | Stop reason: SDUPTHER

## 2023-02-15 RX ORDER — GLIMEPIRIDE 4 MG/1
TABLET ORAL
Qty: 90 TABLET | Refills: 0 | Status: SHIPPED | OUTPATIENT
Start: 2023-02-15

## 2023-02-15 NOTE — TELEPHONE ENCOUNTER
Caller: Zachariah Cruz    Relationship: Self    Best call back number: [phone]    Requested Prescriptions:   Requested Prescriptions     Pending Prescriptions Disp Refills   • metFORMIN ER (GLUCOPHAGE-XR) 500 MG 24 hr tablet [Pharmacy Med Name: METFORMIN HCL  MG TABLET] 120 tablet 0     Sig: TAKE 4 TABLETS BY MOUTH ONCE DAILY   • glimepiride (AMARYL) 4 MG tablet [Pharmacy Med Name: GLIMEPIRIDE 4 MG TABLET] 90 tablet 0     Sig: TAKE ONE TABLET BY MOUTH DAILY        Pharmacy where request should be sent: [facility] [id number] [address] RASHEL CHAUHAN [facility] - [phone]  - [phone] FX     Additional details provided by patient: PATIENT REQUESTS 90 DAY SUPPLY ON METFORMIN    Does the patient have less than a 3 day supply:  [x] Yes  [] No      Sabino Lara Rep   02/15/23 10:36 EST

## 2023-02-22 ENCOUNTER — HOSPITAL ENCOUNTER (OUTPATIENT)
Dept: NUCLEAR MEDICINE | Facility: HOSPITAL | Age: 78
Discharge: HOME | End: 2023-02-22
Payer: MEDICARE

## 2023-02-22 ENCOUNTER — HOSPITAL ENCOUNTER (OUTPATIENT)
Dept: CARDIOLOGY | Facility: HOSPITAL | Age: 78
Discharge: HOME | End: 2023-02-22
Payer: MEDICARE

## 2023-02-22 DIAGNOSIS — R07.9 CHEST PAIN, UNSPECIFIED TYPE: ICD-10-CM

## 2023-02-22 LAB
BH CV REST NUCLEAR ISOTOPE DOSE: 12 MCI
BH CV STRESS BP STAGE 1: NORMAL
BH CV STRESS COMMENTS STAGE 1: NORMAL
BH CV STRESS DOSE REGADENOSON STAGE 1: 0.4
BH CV STRESS DURATION MIN STAGE 1: 0
BH CV STRESS DURATION SEC STAGE 1: 30
BH CV STRESS HR STAGE 1: 59
BH CV STRESS NUCLEAR ISOTOPE DOSE: 33.6 MCI
BH CV STRESS O2 STAGE 1: 97
BH CV STRESS PROTOCOL 1: NORMAL
BH CV STRESS RECOVERY BP: NORMAL MMHG
BH CV STRESS RECOVERY HR: 78 BPM
BH CV STRESS RECOVERY O2: 100 %
BH CV STRESS STAGE 1: 1
LV EF NUC BP: 38 %
MAXIMAL PREDICTED HEART RATE: 145 BPM
PERCENT MAX PREDICTED HR: 57.93 %
STRESS BASELINE BP: NORMAL MMHG
STRESS BASELINE HR: 69 BPM
STRESS O2 SAT REST: 97 %
STRESS PERCENT HR: 68 %
STRESS POST ESTIMATED WORKLOAD: 1 METS
STRESS POST EXERCISE DUR SEC: 30 SEC
STRESS POST O2 SAT PEAK: 100 %
STRESS POST PEAK BP: NORMAL MMHG
STRESS POST PEAK HR: 84 BPM
STRESS TARGET HR: 123 BPM

## 2023-02-22 PROCEDURE — 93017 CV STRESS TEST TRACING ONLY: CPT

## 2023-02-22 PROCEDURE — 25010000002 REGADENOSON 0.4 MG/5ML SOLUTION: Performed by: INTERNAL MEDICINE

## 2023-02-22 PROCEDURE — 78452 HT MUSCLE IMAGE SPECT MULT: CPT

## 2023-02-22 PROCEDURE — 78452 HT MUSCLE IMAGE SPECT MULT: CPT | Performed by: INTERNAL MEDICINE

## 2023-02-22 PROCEDURE — 93018 CV STRESS TEST I&R ONLY: CPT | Performed by: INTERNAL MEDICINE

## 2023-02-22 PROCEDURE — A9500 TC99M SESTAMIBI: HCPCS | Performed by: INTERNAL MEDICINE

## 2023-02-22 PROCEDURE — 0 TECHNETIUM SESTAMIBI: Performed by: INTERNAL MEDICINE

## 2023-02-22 PROCEDURE — 93016 CV STRESS TEST SUPVJ ONLY: CPT | Performed by: INTERNAL MEDICINE

## 2023-02-22 RX ADMIN — TECHNETIUM TC 99M SESTAMIBI 1 DOSE: 1 INJECTION INTRAVENOUS at 07:35

## 2023-02-22 RX ADMIN — TECHNETIUM TC 99M SESTAMIBI 1 DOSE: 1 INJECTION INTRAVENOUS at 08:53

## 2023-02-22 RX ADMIN — REGADENOSON 0.4 MG: 0.08 INJECTION, SOLUTION INTRAVENOUS at 08:52

## 2023-02-24 DIAGNOSIS — E11.9 TYPE 2 DIABETES MELLITUS WITHOUT COMPLICATION, WITHOUT LONG-TERM CURRENT USE OF INSULIN: ICD-10-CM

## 2023-02-24 RX ORDER — METFORMIN HYDROCHLORIDE 500 MG/1
2000 TABLET, EXTENDED RELEASE ORAL DAILY
Qty: 360 TABLET | Refills: 0 | Status: SHIPPED | OUTPATIENT
Start: 2023-02-24 | End: 2023-03-22 | Stop reason: SDUPTHER

## 2023-02-24 NOTE — TELEPHONE ENCOUNTER
Rx Refill Note  Requested Prescriptions     Pending Prescriptions Disp Refills   • metFORMIN ER (GLUCOPHAGE-XR) 500 MG 24 hr tablet 360 tablet 0     Sig: Take 4 tablets by mouth Daily.      Last office visit with prescribing clinician: 12/7/2022   Next office visit with prescribing clinician: 6/7/2023                         Would you like a call back once the refill request has been completed: [] Yes [] No    If the office needs to give you a call back, can they leave a voicemail: [] Yes [] No    Mili Reyes LPN  02/24/23, 11:53 EST     Pt requested 90 day supply

## 2023-02-27 ENCOUNTER — TELEPHONE (OUTPATIENT)
Dept: CARDIOLOGY | Facility: CLINIC | Age: 78
End: 2023-02-27
Payer: MEDICARE

## 2023-02-27 ENCOUNTER — TELEPHONE (OUTPATIENT)
Dept: FAMILY MEDICINE CLINIC | Age: 78
End: 2023-02-27
Payer: MEDICARE

## 2023-02-27 NOTE — TELEPHONE ENCOUNTER
Patient called requesting results from Stress test he had in [place] at [facility].     Please adivse

## 2023-02-27 NOTE — TELEPHONE ENCOUNTER
Pt said his right foot, above his heel and below his ankle has been hurting.  He said he has a hx of gout in his toes. He took a couple indocin tablets he had left and they helped it a lot.  He said he has to come into the lab Wednesday for other lab work and ask if you would put in a order to check him for gout?

## 2023-02-28 ENCOUNTER — PREP FOR SURGERY (OUTPATIENT)
Dept: OTHER | Facility: HOSPITAL | Age: 78
End: 2023-02-28
Payer: MEDICARE

## 2023-02-28 DIAGNOSIS — M1A.0790 CHRONIC IDIOPATHIC GOUT INVOLVING TOE WITHOUT TOPHUS, UNSPECIFIED LATERALITY: ICD-10-CM

## 2023-02-28 DIAGNOSIS — I25.10 CORONARY ARTERY DISEASE INVOLVING NATIVE CORONARY ARTERY OF NATIVE HEART WITHOUT ANGINA PECTORIS: Primary | ICD-10-CM

## 2023-02-28 DIAGNOSIS — R94.39 ABNORMAL NUCLEAR STRESS TEST: ICD-10-CM

## 2023-02-28 DIAGNOSIS — I10 ESSENTIAL HYPERTENSION: Primary | ICD-10-CM

## 2023-02-28 DIAGNOSIS — I25.119 CORONARY ARTERY DISEASE INVOLVING NATIVE HEART WITH ANGINA PECTORIS: ICD-10-CM

## 2023-02-28 RX ORDER — SODIUM CHLORIDE 9 MG/ML
40 INJECTION, SOLUTION INTRAVENOUS AS NEEDED
Status: CANCELLED | OUTPATIENT
Start: 2023-02-28

## 2023-02-28 RX ORDER — SODIUM CHLORIDE 9 MG/ML
75 INJECTION, SOLUTION INTRAVENOUS CONTINUOUS
Status: CANCELLED | OUTPATIENT
Start: 2023-02-28

## 2023-02-28 RX ORDER — SODIUM CHLORIDE 0.9 % (FLUSH) 0.9 %
10 SYRINGE (ML) INJECTION EVERY 12 HOURS SCHEDULED
Status: CANCELLED | OUTPATIENT
Start: 2023-02-28

## 2023-02-28 RX ORDER — SODIUM CHLORIDE 0.9 % (FLUSH) 0.9 %
10 SYRINGE (ML) INJECTION AS NEEDED
Status: CANCELLED | OUTPATIENT
Start: 2023-02-28

## 2023-02-28 RX ORDER — NITROGLYCERIN 0.4 MG/1
0.4 TABLET SUBLINGUAL
Status: CANCELLED | OUTPATIENT
Start: 2023-02-28

## 2023-02-28 NOTE — TELEPHONE ENCOUNTER
Can add order for CBC and uric acid and make sure they labs being done that day also had renal function

## 2023-03-01 ENCOUNTER — ANTICOAGULATION VISIT (OUTPATIENT)
Dept: CARDIOLOGY | Facility: CLINIC | Age: 78
End: 2023-03-01
Payer: MEDICARE

## 2023-03-01 ENCOUNTER — LAB (OUTPATIENT)
Dept: LAB | Facility: HOSPITAL | Age: 78
End: 2023-03-01
Payer: MEDICARE

## 2023-03-01 DIAGNOSIS — M1A.0790 CHRONIC IDIOPATHIC GOUT INVOLVING TOE WITHOUT TOPHUS, UNSPECIFIED LATERALITY: ICD-10-CM

## 2023-03-01 DIAGNOSIS — I10 ESSENTIAL HYPERTENSION: ICD-10-CM

## 2023-03-01 DIAGNOSIS — I48.91 ATRIAL FIBRILLATION, UNSPECIFIED TYPE: ICD-10-CM

## 2023-03-01 DIAGNOSIS — I48.91 ATRIAL FIBRILLATION, UNSPECIFIED TYPE: Primary | ICD-10-CM

## 2023-03-01 LAB
ALBUMIN SERPL-MCNC: 4.2 G/DL (ref 3.5–5.2)
ALBUMIN/GLOB SERPL: 1.6 G/DL
ALP SERPL-CCNC: 88 U/L (ref 39–117)
ALT SERPL W P-5'-P-CCNC: 16 U/L (ref 1–41)
ANION GAP SERPL CALCULATED.3IONS-SCNC: 11.7 MMOL/L (ref 5–15)
AST SERPL-CCNC: 21 U/L (ref 1–40)
BILIRUB SERPL-MCNC: 0.2 MG/DL (ref 0–1.2)
BUN SERPL-MCNC: 15 MG/DL (ref 8–23)
BUN/CREAT SERPL: 14.2 (ref 7–25)
CALCIUM SPEC-SCNC: 9.6 MG/DL (ref 8.6–10.5)
CHLORIDE SERPL-SCNC: 102 MMOL/L (ref 98–107)
CO2 SERPL-SCNC: 24.3 MMOL/L (ref 22–29)
CREAT SERPL-MCNC: 1.06 MG/DL (ref 0.76–1.27)
DEPRECATED RDW RBC AUTO: 46.2 FL (ref 37–54)
EGFRCR SERPLBLD CKD-EPI 2021: 73.2 ML/MIN/1.73
ERYTHROCYTE [DISTWIDTH] IN BLOOD BY AUTOMATED COUNT: 15.2 % (ref 12.3–15.4)
GLOBULIN UR ELPH-MCNC: 2.7 GM/DL
GLUCOSE SERPL-MCNC: 164 MG/DL (ref 65–99)
HCT VFR BLD AUTO: 34.8 % (ref 37.5–51)
HGB BLD-MCNC: 10.9 G/DL (ref 13–17.7)
INR PPP: 2.03 (ref 0.86–1.15)
MCH RBC QN AUTO: 26 PG (ref 26.6–33)
MCHC RBC AUTO-ENTMCNC: 31.3 G/DL (ref 31.5–35.7)
MCV RBC AUTO: 82.9 FL (ref 79–97)
PLATELET # BLD AUTO: 298 10*3/MM3 (ref 140–450)
PMV BLD AUTO: 10.7 FL (ref 6–12)
POTASSIUM SERPL-SCNC: 4.9 MMOL/L (ref 3.5–5.2)
PROT SERPL-MCNC: 6.9 G/DL (ref 6–8.5)
PROTHROMBIN TIME: 22.7 SECONDS (ref 11.8–14.9)
RBC # BLD AUTO: 4.2 10*6/MM3 (ref 4.14–5.8)
SODIUM SERPL-SCNC: 138 MMOL/L (ref 136–145)
URATE SERPL-MCNC: 4.6 MG/DL (ref 3.4–7)
WBC NRBC COR # BLD: 7.46 10*3/MM3 (ref 3.4–10.8)

## 2023-03-01 PROCEDURE — 85027 COMPLETE CBC AUTOMATED: CPT

## 2023-03-01 PROCEDURE — 84550 ASSAY OF BLOOD/URIC ACID: CPT

## 2023-03-01 PROCEDURE — 85610 PROTHROMBIN TIME: CPT

## 2023-03-01 PROCEDURE — 80053 COMPREHEN METABOLIC PANEL: CPT

## 2023-03-01 PROCEDURE — 36415 COLL VENOUS BLD VENIPUNCTURE: CPT

## 2023-03-01 NOTE — PROGRESS NOTES
Lab Results   Component Value Date    INR 2.03 (H) 03/01/2023    INR 2.38 (H) 02/01/2023    INR 2.44 (H) 01/04/2023    PROTIME 22.7 (H) 03/01/2023    PROTIME 26.4 (H) 02/01/2023    PROTIME 27.0 (H) 01/04/2023        Taking 4 mg daily     Dx Afib     Range 2-3      LAB

## 2023-03-07 ENCOUNTER — OFFICE VISIT (OUTPATIENT)
Dept: FAMILY MEDICINE CLINIC | Age: 78
End: 2023-03-07
Payer: MEDICARE

## 2023-03-07 VITALS
BODY MASS INDEX: 34.04 KG/M2 | DIASTOLIC BLOOD PRESSURE: 67 MMHG | HEART RATE: 64 BPM | TEMPERATURE: 97.8 F | WEIGHT: 237.8 LBS | OXYGEN SATURATION: 100 % | HEIGHT: 70 IN | SYSTOLIC BLOOD PRESSURE: 145 MMHG

## 2023-03-07 DIAGNOSIS — M1A.0710 IDIOPATHIC CHRONIC GOUT OF RIGHT FOOT WITHOUT TOPHUS: ICD-10-CM

## 2023-03-07 DIAGNOSIS — E11.9 TYPE 2 DIABETES MELLITUS WITHOUT COMPLICATION, WITHOUT LONG-TERM CURRENT USE OF INSULIN: Primary | ICD-10-CM

## 2023-03-07 DIAGNOSIS — D50.8 OTHER IRON DEFICIENCY ANEMIA: ICD-10-CM

## 2023-03-07 PROBLEM — M1A.0790 CHRONIC IDIOPATHIC GOUT INVOLVING TOE WITHOUT TOPHUS: Status: ACTIVE | Noted: 2021-10-27

## 2023-03-07 PROBLEM — D64.9 ABSOLUTE ANEMIA: Status: ACTIVE | Noted: 2023-03-07

## 2023-03-07 PROCEDURE — 99214 OFFICE O/P EST MOD 30 MIN: CPT | Performed by: NURSE PRACTITIONER

## 2023-03-07 NOTE — PROGRESS NOTES
Zachariah Cruz presents to [facility].    Chief Complaint:  Foot Injury ((R) foot, inside of heel. Pt states he believes he has gout. Ongoing for 6 weeks. Podiatrist states he has an inflamed ligament at his last apt which was 2 weeks ago. )         History of Present Illness:  Joint pain:   Foot pain R  Symptoms started:several weeks   associated symptoms:pain in medial and now lateral foot   Treatment tried: exercises, may need an inj podiatry said   Seeing podiatry   Dg testing : x-ray done at podiatry office     Diabetes:  Current medication: metformin, on 4 wants to take 2-2  a day  Tolerating medication: Yes but wonders if metormin is safe after seeing something on facebook   At home BS ranges: < 100 usually this week 127-140  Drinks a sunkist daily, sausage and disquiets every am, likes chocolate   Lab Results       Component                Value               Date                       HGBA1C                   7.50 (H)            12/21/2022              Had an abnormal stress test, was having some intermittent chest pain, Cardiac appt/test 3-16-23 having a heart cath     CBC H&H low, no blood in stool, on coumadin, bruising bleeds easy     PAST MEDICAL HISTORY changes since        covid +     AF, sees Dr Moffett    Sleep Apnea: uses CPAP;     Renal Stones: he has undergone ECSW lithotripsy and laser       Hospitalizations:    Pneumonia admitted once on 10-20-20 (COVID +)         CURRENT MEDICAL PROVIDERS:    Orthopedist    Rheumatologist: Dr. Arvizu     urology : [facility]        PREVENTIVE HEALTH MAINTENANCE             COLORECTAL CANCER SCREENING: Up to date (colonoscopy q10y; sigmoidoscopy q5y; Cologuard q3y) was last done 12/01/2020, Results are in chart; Cologuard normal       Hepatitis C Medicare Screening: was last done 9-2017; negative            Surgical History:     Left cataract 4-2022    Appendectomy    Coronary Artery Bypass Graft: 5-2010;      Transurethral Resection of Prostate: ;     Cataract Removal: right; ;         Family History:     Father:  at age 91; Cause of death was CAD;  Coronary Artery Disease     Mother:  at age 88; Cause of death was CVA;  Hypertension     Paternal Grandfather: Cerebrovascular Accident         Social History:     Occupation:.   (Self-Employed) [employer]      Marital Status:      Children: 3 children          Review of Systems:  Review of Systems   Constitutional: Negative for fatigue and fever.   Respiratory: Negative for cough and shortness of breath.    Cardiovascular: Positive for leg swelling (left leg, improvd some wiht spirolactone ). Negative for palpitations.   Gastrointestinal: Negative for blood in stool.          Current Outpatient Medications:   •  allopurinol (ZYLOPRIM) 100 MG tablet, TAKE ONE TABLET BY MOUTH DAILY, Disp: 90 tablet, Rfl: 0  •  amLODIPine (NORVASC) 10 MG tablet, Take 1 tablet by mouth Daily., Disp: 90 tablet, Rfl: 3  •  aspirin 81 MG EC tablet, Take 1 tablet by mouth Every Other Day., Disp: , Rfl:   •  Cephalexin (KEFLEX PO), Take  by mouth., Disp: , Rfl:   •  glimepiride (AMARYL) 4 MG tablet, TAKE ONE TABLET BY MOUTH DAILY, Disp: 90 tablet, Rfl: 0  •  lisinopril (PRINIVIL,ZESTRIL) 20 MG tablet, TAKE ONE TABLET BY MOUTH TWICE A DAY, Disp: 180 tablet, Rfl: 0  •  metFORMIN ER (GLUCOPHAGE-XR) 500 MG 24 hr tablet, Take 4 tablets by mouth Daily., Disp: 360 tablet, Rfl: 0  •  metoprolol succinate XL (TOPROL-XL) 25 MG 24 hr tablet, TAKE ONE TABLET BY MOUTH TWICE A DAY, Disp: 180 tablet, Rfl: 0  •  nitroglycerin (NITROSTAT) 0.4 MG SL tablet, 1 under the tongue as needed for angina, may repeat q5mins for up three doses, Disp: 100 tablet, Rfl: 11  •  simvastatin (ZOCOR) 40 MG tablet, TAKE ONE TABLET BY MOUTH DAILY, Disp: 90 tablet, Rfl: 0  •  spironolactone (ALDACTONE) 25 MG tablet, Take 1 tablet by mouth Daily., Disp: 30 tablet, Rfl: 11  •  warfarin (Coumadin)  "1 MG tablet, Take as directed, Disp: 90 tablet, Rfl: 1  •  warfarin (Coumadin) 4 MG tablet, Take 1 tablet by mouth daily or as directed, Disp: 90 tablet, Rfl: 1  •  warfarin (COUMADIN) 5 MG tablet, Take 1 tablet by mouth Daily. (Patient taking differently: Take 1 tablet by mouth Daily. Currently taking 6mg 5mg alternate), Disp: 90 tablet, Rfl: 2    Vital Signs:   Vitals:    03/07/23 1304   BP: 145/67   BP Location: Left arm   Patient Position: Sitting   Cuff Size: Adult   Pulse: 64   Temp: 97.8 °F (36.6 °C)   TempSrc: Oral   SpO2: 100%  Comment: room air   Weight: 108 kg (237 lb 12.8 oz)   Height: 177.8 cm (70\")         Physical Exam:  Physical Exam  Vitals reviewed.   Constitutional:       General: He is not in acute distress.     Appearance: Normal appearance.   Cardiovascular:      Rate and Rhythm: Normal rate and regular rhythm.      Heart sounds: Normal heart sounds. No murmur heard.  Pulmonary:      Effort: Pulmonary effort is normal. No respiratory distress.      Breath sounds: Normal breath sounds.   Musculoskeletal:      Right lower leg: No edema.      Left lower leg: Edema (trace - 1 +) present.   Neurological:      Mental Status: He is alert.   Psychiatric:         Mood and Affect: Mood normal.         Behavior: Behavior normal.         Result Review      The following data was reviewed by: JACIEL Tovar on 03/07/2023:    Results for orders placed or performed in visit on 03/01/23   Protime-INR    Specimen: Blood   Result Value Ref Range    Protime 22.7 (H) 11.8 - 14.9 Seconds    INR 2.03 (H) 0.86 - 1.15   Uric acid    Specimen: Blood   Result Value Ref Range    Uric Acid 4.6 3.4 - 7.0 mg/dL   CBC No Differential    Specimen: Blood   Result Value Ref Range    WBC 7.46 3.40 - 10.80 10*3/mm3    RBC 4.20 4.14 - 5.80 10*6/mm3    Hemoglobin 10.9 (L) 13.0 - 17.7 g/dL    Hematocrit 34.8 (L) 37.5 - 51.0 %    MCV 82.9 79.0 - 97.0 fL    MCH 26.0 (L) 26.6 - 33.0 pg    MCHC 31.3 (L) 31.5 - 35.7 g/dL    " RDW 15.2 12.3 - 15.4 %    RDW-SD 46.2 37.0 - 54.0 fl    MPV 10.7 6.0 - 12.0 fL    Platelets 298 140 - 450 10*3/mm3   Comprehensive Metabolic Panel    Specimen: Blood   Result Value Ref Range    Glucose 164 (H) 65 - 99 mg/dL    BUN 15 8 - 23 mg/dL    Creatinine 1.06 0.76 - 1.27 mg/dL    Sodium 138 136 - 145 mmol/L    Potassium 4.9 3.5 - 5.2 mmol/L    Chloride 102 98 - 107 mmol/L    CO2 24.3 22.0 - 29.0 mmol/L    Calcium 9.6 8.6 - 10.5 mg/dL    Total Protein 6.9 6.0 - 8.5 g/dL    Albumin 4.2 3.5 - 5.2 g/dL    ALT (SGPT) 16 1 - 41 U/L    AST (SGOT) 21 1 - 40 U/L    Alkaline Phosphatase 88 39 - 117 U/L    Total Bilirubin 0.2 0.0 - 1.2 mg/dL    Globulin 2.7 gm/dL    A/G Ratio 1.6 g/dL    BUN/Creatinine Ratio 14.2 7.0 - 25.0    Anion Gap 11.7 5.0 - 15.0 mmol/L    eGFR 73.2 >60.0 mL/min/1.73               Assessment and Plan:          Diagnoses and all orders for this visit:    1. Type 2 diabetes mellitus without complication, without long-term current use of insulin (HCC) (Primary)  Assessment & Plan:  Keep upcoming appt with his cardiologist for heart cath next week, to work on diet and continue testing and taking metformin 2000 mg daily, reviewed labs with pt     Orders:  -     Cancel: Hemoglobin A1c; Future  -     Hemoglobin A1c; Future  -     Basic metabolic panel; Future    2. Other iron deficiency anemia  Assessment & Plan:  Reviewed CBC's, to check a serum iron in the near future     Orders:  -     Cancel: Iron level; Future  -     Iron level; Future    3. Idiopathic chronic gout of right foot without tophus  Assessment & Plan:  Reviewed recent uric acid, continue exercises podiatry recommended, follow up with podiatry           Follow Up   Return for follow up for labs approx 3-21-23.  Patient was given instructions and counseling regarding his condition or for health maintenance advice. Please see specific information pulled into the AVS if appropriate.

## 2023-03-07 NOTE — ASSESSMENT & PLAN NOTE
Keep upcoming appt with his cardiologist for heart cath next week, to work on diet and continue testing and taking metformin 2000 mg daily, reviewed labs with pt

## 2023-03-09 ENCOUNTER — TELEPHONE (OUTPATIENT)
Dept: CARDIOLOGY | Facility: CLINIC | Age: 78
End: 2023-03-09
Payer: MEDICARE

## 2023-03-09 NOTE — TELEPHONE ENCOUNTER
I spoke to patient and gave an arrival time of 8:00 on 03/16/23 for [facility]. I instructed patient to hold Warfarin on 03/13/23, 03/14/23, and 03/15/23. I instructed patient to hold his Metformin on 03/15/23. Patient was agreeable to all other instructions.

## 2023-03-13 NOTE — PRE-PROCEDURE INSTRUCTIONS
PATIENT INSTRUCTED TO BE:    - NPO AFTER MIDNIGHT EXCEPT CAN HAVE SIPS OF WATER WITH HIS MEDICATION PRIOR TO PROCEDURE    -  INSTRUCTED NO LOTIONS, JEWELRY, PIERCINGS, OR DEODORANT DAY OF THE PROCEDURE    - INSTRUCTED TO TAKE THE FOLLOWING MEDICATIONS THE DAY OF SURGERY:       METOPROLOL, ALLOPURINOL, AMLODIPINE, ASA, GLIMEPIRIDE,LISINOPRIL, NITRO IF NEEDED    - INSTRUCTED PT TO FOLLOW ANY INSTRUCTIONS GIVEN BY HIS CARDIOLOGIST.    - INFORMED PT THEY ATTEMPT RADIAL APPROACH FIRST IF UNABLE TO PERFORM CATH WITH THAT APPROACH THEY WILL DO A FEMORAL APPROACH.  ALSO, INFORMED PT THEY WILL BE ON BEDREST POSTOP.  IF THE SURGEON FEELS  AN INTERVENTION OR STENTS NEEDS TO BE PLACED, HE/SHE WILL STAY OVER NIGHT FOR OBSERVATION AND MONITORING.     - INFORMED THE PATIENT HE CAN HAVE TWO VISITORS WITH HIM THE DAY OF THE PROCEDURE    - INSTRUCTED PT TO PARK IN THE PARKING GARAGE, ENTER THE HOSPITAL THRU ENTRANCE A AND  CHECK IN AT THE MAIN REGISTRATION DESK, AFTER REGISTRATION PT WILL BE TAKEN THE THE PREOP AREA FOR HIS PROCEDURE.    -ARRIVAL TIME GIVEN BY CARDIOLOGIST OFFICE, INFORMED PT IF ARRIVAL TIME CHANGES OR ADJUSTMENTS NEED TO BE MADE IN THEIR ARRIVAL TIME, HE/SHE WOULD RECEIVE A CALL.      - PATIENT VERBALIZED UNDERSTANDING   PT REPORTED LAST DOSE OF METFORMIN TO BE 3/14/23 PM DOSE AND THAT LAST DOSE OF COUMADIN WAS 3/12/23 PM DOSE PER CARDIOLOGY OFFICE INSTRUCTIONS

## 2023-03-16 ENCOUNTER — HOSPITAL ENCOUNTER (OUTPATIENT)
Facility: HOSPITAL | Age: 78
Discharge: HOME | End: 2023-03-17
Attending: INTERNAL MEDICINE | Admitting: INTERNAL MEDICINE
Payer: MEDICARE

## 2023-03-16 DIAGNOSIS — I25.10 CORONARY ARTERY DISEASE INVOLVING NATIVE CORONARY ARTERY OF NATIVE HEART WITHOUT ANGINA PECTORIS: ICD-10-CM

## 2023-03-16 DIAGNOSIS — Z98.61 CAD S/P PERCUTANEOUS CORONARY ANGIOPLASTY: Primary | ICD-10-CM

## 2023-03-16 DIAGNOSIS — R94.39 ABNORMAL NUCLEAR STRESS TEST: ICD-10-CM

## 2023-03-16 DIAGNOSIS — I25.10 CAD S/P PERCUTANEOUS CORONARY ANGIOPLASTY: Primary | ICD-10-CM

## 2023-03-16 LAB
ACT BLD: 143 SECONDS (ref 89–137)
ACT BLD: 173 SECONDS (ref 89–137)
ACT BLD: 185 SECONDS (ref 89–137)
ACT BLD: 209 SECONDS (ref 89–137)
ACT BLD: 209 SECONDS (ref 89–137)
ANION GAP SERPL CALCULATED.3IONS-SCNC: 10.9 MMOL/L (ref 5–15)
APTT PPP: 41.1 SECONDS (ref 24.2–34.2)
BASOPHILS # BLD AUTO: 0.01 10*3/MM3 (ref 0–0.2)
BASOPHILS NFR BLD AUTO: 0.1 % (ref 0–1.5)
BUN SERPL-MCNC: 19 MG/DL (ref 8–23)
BUN/CREAT SERPL: 20.9 (ref 7–25)
CALCIUM SPEC-SCNC: 9.7 MG/DL (ref 8.6–10.5)
CHLORIDE SERPL-SCNC: 105 MMOL/L (ref 98–107)
CO2 SERPL-SCNC: 24.1 MMOL/L (ref 22–29)
CREAT SERPL-MCNC: 0.91 MG/DL (ref 0.76–1.27)
DEPRECATED RDW RBC AUTO: 44.9 FL (ref 37–54)
EGFRCR SERPLBLD CKD-EPI 2021: 87.9 ML/MIN/1.73
EOSINOPHIL # BLD AUTO: 0.24 10*3/MM3 (ref 0–0.4)
EOSINOPHIL NFR BLD AUTO: 3.6 % (ref 0.3–6.2)
ERYTHROCYTE [DISTWIDTH] IN BLOOD BY AUTOMATED COUNT: 15.1 % (ref 12.3–15.4)
GLUCOSE BLDC GLUCOMTR-MCNC: 122 MG/DL (ref 70–99)
GLUCOSE BLDC GLUCOMTR-MCNC: 145 MG/DL (ref 70–99)
GLUCOSE SERPL-MCNC: 144 MG/DL (ref 65–99)
HCT VFR BLD AUTO: 35.9 % (ref 37.5–51)
HGB BLD-MCNC: 11.3 G/DL (ref 13–17.7)
IMM GRANULOCYTES # BLD AUTO: 0.02 10*3/MM3 (ref 0–0.05)
IMM GRANULOCYTES NFR BLD AUTO: 0.3 % (ref 0–0.5)
INR PPP: 1.27 (ref 0.86–1.15)
LYMPHOCYTES # BLD AUTO: 1.31 10*3/MM3 (ref 0.7–3.1)
LYMPHOCYTES NFR BLD AUTO: 19.5 % (ref 19.6–45.3)
MCH RBC QN AUTO: 25.7 PG (ref 26.6–33)
MCHC RBC AUTO-ENTMCNC: 31.5 G/DL (ref 31.5–35.7)
MCV RBC AUTO: 81.6 FL (ref 79–97)
MONOCYTES # BLD AUTO: 0.64 10*3/MM3 (ref 0.1–0.9)
MONOCYTES NFR BLD AUTO: 9.5 % (ref 5–12)
NEUTROPHILS NFR BLD AUTO: 4.51 10*3/MM3 (ref 1.7–7)
NEUTROPHILS NFR BLD AUTO: 67 % (ref 42.7–76)
NRBC BLD AUTO-RTO: 0 /100 WBC (ref 0–0.2)
PLATELET # BLD AUTO: 271 10*3/MM3 (ref 140–450)
PMV BLD AUTO: 10.3 FL (ref 6–12)
POTASSIUM SERPL-SCNC: 4 MMOL/L (ref 3.5–5.2)
PROTHROMBIN TIME: 16 SECONDS (ref 11.8–14.9)
RBC # BLD AUTO: 4.4 10*6/MM3 (ref 4.14–5.8)
SODIUM SERPL-SCNC: 140 MMOL/L (ref 136–145)
WBC NRBC COR # BLD: 6.73 10*3/MM3 (ref 3.4–10.8)

## 2023-03-16 PROCEDURE — 25010000002 MIDAZOLAM PER 1 MG: Performed by: INTERNAL MEDICINE

## 2023-03-16 PROCEDURE — A9270 NON-COVERED ITEM OR SERVICE: HCPCS | Performed by: INTERNAL MEDICINE

## 2023-03-16 PROCEDURE — C1874 STENT, COATED/COV W/DEL SYS: HCPCS | Performed by: INTERNAL MEDICINE

## 2023-03-16 PROCEDURE — 63710000001 SPIRONOLACTONE 25 MG TABLET: Performed by: INTERNAL MEDICINE

## 2023-03-16 PROCEDURE — 85025 COMPLETE CBC W/AUTO DIFF WBC: CPT | Performed by: FAMILY MEDICINE

## 2023-03-16 PROCEDURE — 85610 PROTHROMBIN TIME: CPT | Performed by: FAMILY MEDICINE

## 2023-03-16 PROCEDURE — C1894 INTRO/SHEATH, NON-LASER: HCPCS | Performed by: INTERNAL MEDICINE

## 2023-03-16 PROCEDURE — 85730 THROMBOPLASTIN TIME PARTIAL: CPT | Performed by: FAMILY MEDICINE

## 2023-03-16 PROCEDURE — 63710000001 METOPROLOL SUCCINATE XL 25 MG TABLET SUSTAINED-RELEASE 24 HOUR: Performed by: INTERNAL MEDICINE

## 2023-03-16 PROCEDURE — 85347 COAGULATION TIME ACTIVATED: CPT

## 2023-03-16 PROCEDURE — 93459 L HRT ART/GRFT ANGIO: CPT | Performed by: INTERNAL MEDICINE

## 2023-03-16 PROCEDURE — 63710000001 ATORVASTATIN 20 MG TABLET: Performed by: INTERNAL MEDICINE

## 2023-03-16 PROCEDURE — C1769 GUIDE WIRE: HCPCS | Performed by: INTERNAL MEDICINE

## 2023-03-16 PROCEDURE — 99152 MOD SED SAME PHYS/QHP 5/>YRS: CPT | Performed by: INTERNAL MEDICINE

## 2023-03-16 PROCEDURE — 25010000002 FENTANYL CITRATE (PF) 50 MCG/ML SOLUTION: Performed by: INTERNAL MEDICINE

## 2023-03-16 PROCEDURE — S0260 H&P FOR SURGERY: HCPCS | Performed by: INTERNAL MEDICINE

## 2023-03-16 PROCEDURE — 99153 MOD SED SAME PHYS/QHP EA: CPT | Performed by: INTERNAL MEDICINE

## 2023-03-16 PROCEDURE — 92937 PRQ TRLUML REVSC CAB GRF 1: CPT | Performed by: INTERNAL MEDICINE

## 2023-03-16 PROCEDURE — C1725 CATH, TRANSLUMIN NON-LASER: HCPCS | Performed by: INTERNAL MEDICINE

## 2023-03-16 PROCEDURE — 25510000001 IOPAMIDOL PER 1 ML: Performed by: INTERNAL MEDICINE

## 2023-03-16 PROCEDURE — 63710000001 TICAGRELOR 90 MG TABLET: Performed by: INTERNAL MEDICINE

## 2023-03-16 PROCEDURE — 63710000001 BACITRACIN 500 UNIT/GM OINTMENT: Performed by: INTERNAL MEDICINE

## 2023-03-16 PROCEDURE — 80048 BASIC METABOLIC PNL TOTAL CA: CPT | Performed by: FAMILY MEDICINE

## 2023-03-16 PROCEDURE — 93005 ELECTROCARDIOGRAM TRACING: CPT | Performed by: INTERNAL MEDICINE

## 2023-03-16 PROCEDURE — C1887 CATHETER, GUIDING: HCPCS | Performed by: INTERNAL MEDICINE

## 2023-03-16 PROCEDURE — 63710000001 LISINOPRIL 20 MG TABLET: Performed by: INTERNAL MEDICINE

## 2023-03-16 PROCEDURE — 93005 ELECTROCARDIOGRAM TRACING: CPT | Performed by: FAMILY MEDICINE

## 2023-03-16 PROCEDURE — 63710000001 AMLODIPINE 5 MG TABLET: Performed by: INTERNAL MEDICINE

## 2023-03-16 PROCEDURE — 82962 GLUCOSE BLOOD TEST: CPT

## 2023-03-16 PROCEDURE — C9604 PERC D-E COR REVASC T CABG S: HCPCS | Performed by: INTERNAL MEDICINE

## 2023-03-16 PROCEDURE — 25010000002 HEPARIN (PORCINE) PER 1000 UNITS: Performed by: INTERNAL MEDICINE

## 2023-03-16 DEVICE — XIENCE SKYPOINT™ EVEROLIMUS ELUTING CORONARY STENT SYSTEM 3.00 MM X 18 MM / RAPID-EXCHANGE
Type: IMPLANTABLE DEVICE | Status: FUNCTIONAL
Brand: XIENCE SKYPOINT™

## 2023-03-16 RX ORDER — AMLODIPINE BESYLATE 5 MG/1
10 TABLET ORAL DAILY
Status: DISCONTINUED | OUTPATIENT
Start: 2023-03-16 | End: 2023-03-17 | Stop reason: HOSPADM

## 2023-03-16 RX ORDER — DIAPER,BRIEF,INFANT-TODD,DISP
1 EACH MISCELLANEOUS ONCE
Status: COMPLETED | OUTPATIENT
Start: 2023-03-16 | End: 2023-03-16

## 2023-03-16 RX ORDER — ONDANSETRON 4 MG/1
4 TABLET, FILM COATED ORAL EVERY 6 HOURS PRN
Status: DISCONTINUED | OUTPATIENT
Start: 2023-03-16 | End: 2023-03-17 | Stop reason: HOSPADM

## 2023-03-16 RX ORDER — NITROGLYCERIN 0.4 MG/1
0.4 TABLET SUBLINGUAL
Status: DISCONTINUED | OUTPATIENT
Start: 2023-03-16 | End: 2023-03-17 | Stop reason: HOSPADM

## 2023-03-16 RX ORDER — ASPIRIN 81 MG/1
81 TABLET ORAL EVERY OTHER DAY
Status: DISCONTINUED | OUTPATIENT
Start: 2023-03-16 | End: 2023-03-17 | Stop reason: HOSPADM

## 2023-03-16 RX ORDER — ATORVASTATIN CALCIUM 20 MG/1
20 TABLET, FILM COATED ORAL DAILY
Status: DISCONTINUED | OUTPATIENT
Start: 2023-03-16 | End: 2023-03-16

## 2023-03-16 RX ORDER — ACETAMINOPHEN 325 MG/1
650 TABLET ORAL EVERY 4 HOURS PRN
Status: DISCONTINUED | OUTPATIENT
Start: 2023-03-16 | End: 2023-03-17 | Stop reason: HOSPADM

## 2023-03-16 RX ORDER — SODIUM CHLORIDE 9 MG/ML
100 INJECTION, SOLUTION INTRAVENOUS CONTINUOUS
Status: DISCONTINUED | OUTPATIENT
Start: 2023-03-16 | End: 2023-03-16

## 2023-03-16 RX ORDER — MIDAZOLAM HYDROCHLORIDE 1 MG/ML
INJECTION INTRAMUSCULAR; INTRAVENOUS
Status: DISCONTINUED | OUTPATIENT
Start: 2023-03-16 | End: 2023-03-16 | Stop reason: HOSPADM

## 2023-03-16 RX ORDER — SPIRONOLACTONE 25 MG/1
25 TABLET ORAL DAILY
Status: DISCONTINUED | OUTPATIENT
Start: 2023-03-16 | End: 2023-03-17 | Stop reason: HOSPADM

## 2023-03-16 RX ORDER — ALLOPURINOL 100 MG/1
100 TABLET ORAL DAILY
Status: DISCONTINUED | OUTPATIENT
Start: 2023-03-16 | End: 2023-03-17 | Stop reason: HOSPADM

## 2023-03-16 RX ORDER — FENTANYL CITRATE 50 UG/ML
INJECTION, SOLUTION INTRAMUSCULAR; INTRAVENOUS
Status: DISCONTINUED | OUTPATIENT
Start: 2023-03-16 | End: 2023-03-16 | Stop reason: HOSPADM

## 2023-03-16 RX ORDER — NALOXONE HCL 0.4 MG/ML
0.4 VIAL (ML) INJECTION
Status: DISCONTINUED | OUTPATIENT
Start: 2023-03-16 | End: 2023-03-17 | Stop reason: HOSPADM

## 2023-03-16 RX ORDER — SODIUM CHLORIDE 9 MG/ML
75 INJECTION, SOLUTION INTRAVENOUS CONTINUOUS
Status: DISCONTINUED | OUTPATIENT
Start: 2023-03-16 | End: 2023-03-16

## 2023-03-16 RX ORDER — MORPHINE SULFATE 2 MG/ML
1 INJECTION, SOLUTION INTRAMUSCULAR; INTRAVENOUS EVERY 4 HOURS PRN
Status: DISCONTINUED | OUTPATIENT
Start: 2023-03-16 | End: 2023-03-17 | Stop reason: HOSPADM

## 2023-03-16 RX ORDER — ASPIRIN 81 MG/1
TABLET, CHEWABLE ORAL
Status: DISCONTINUED | OUTPATIENT
Start: 2023-03-16 | End: 2023-03-16 | Stop reason: HOSPADM

## 2023-03-16 RX ORDER — SODIUM CHLORIDE 0.9 % (FLUSH) 0.9 %
10 SYRINGE (ML) INJECTION EVERY 12 HOURS SCHEDULED
Status: DISCONTINUED | OUTPATIENT
Start: 2023-03-16 | End: 2023-03-16 | Stop reason: HOSPADM

## 2023-03-16 RX ORDER — NITROGLYCERIN 0.4 MG/1
0.4 TABLET SUBLINGUAL
Status: DISCONTINUED | OUTPATIENT
Start: 2023-03-16 | End: 2023-03-16 | Stop reason: HOSPADM

## 2023-03-16 RX ORDER — ONDANSETRON 2 MG/ML
4 INJECTION INTRAMUSCULAR; INTRAVENOUS EVERY 6 HOURS PRN
Status: DISCONTINUED | OUTPATIENT
Start: 2023-03-16 | End: 2023-03-17 | Stop reason: HOSPADM

## 2023-03-16 RX ORDER — LIDOCAINE HYDROCHLORIDE 20 MG/ML
INJECTION, SOLUTION INFILTRATION; PERINEURAL
Status: DISCONTINUED | OUTPATIENT
Start: 2023-03-16 | End: 2023-03-16 | Stop reason: HOSPADM

## 2023-03-16 RX ORDER — METOPROLOL SUCCINATE 25 MG/1
25 TABLET, EXTENDED RELEASE ORAL 2 TIMES DAILY
Status: DISCONTINUED | OUTPATIENT
Start: 2023-03-16 | End: 2023-03-17 | Stop reason: HOSPADM

## 2023-03-16 RX ORDER — LISINOPRIL 20 MG/1
20 TABLET ORAL 2 TIMES DAILY
Status: DISCONTINUED | OUTPATIENT
Start: 2023-03-16 | End: 2023-03-17 | Stop reason: HOSPADM

## 2023-03-16 RX ORDER — HEPARIN SODIUM 1000 [USP'U]/ML
INJECTION, SOLUTION INTRAVENOUS; SUBCUTANEOUS
Status: DISCONTINUED | OUTPATIENT
Start: 2023-03-16 | End: 2023-03-16 | Stop reason: HOSPADM

## 2023-03-16 RX ORDER — SODIUM CHLORIDE 0.9 % (FLUSH) 0.9 %
10 SYRINGE (ML) INJECTION AS NEEDED
Status: DISCONTINUED | OUTPATIENT
Start: 2023-03-16 | End: 2023-03-16 | Stop reason: HOSPADM

## 2023-03-16 RX ORDER — ATORVASTATIN CALCIUM 20 MG/1
20 TABLET, FILM COATED ORAL NIGHTLY
Status: DISCONTINUED | OUTPATIENT
Start: 2023-03-16 | End: 2023-03-17 | Stop reason: HOSPADM

## 2023-03-16 RX ORDER — SODIUM CHLORIDE 9 MG/ML
40 INJECTION, SOLUTION INTRAVENOUS AS NEEDED
Status: DISCONTINUED | OUTPATIENT
Start: 2023-03-16 | End: 2023-03-16 | Stop reason: HOSPADM

## 2023-03-16 RX ADMIN — LISINOPRIL 20 MG: 20 TABLET ORAL at 20:29

## 2023-03-16 RX ADMIN — METOPROLOL SUCCINATE 25 MG: 25 TABLET, EXTENDED RELEASE ORAL at 20:29

## 2023-03-16 RX ADMIN — TICAGRELOR 90 MG: 90 TABLET ORAL at 21:17

## 2023-03-16 RX ADMIN — ATORVASTATIN CALCIUM 20 MG: 20 TABLET, FILM COATED ORAL at 20:29

## 2023-03-16 RX ADMIN — METOPROLOL SUCCINATE 25 MG: 25 TABLET, EXTENDED RELEASE ORAL at 14:44

## 2023-03-16 RX ADMIN — AMLODIPINE BESYLATE 10 MG: 5 TABLET ORAL at 14:44

## 2023-03-16 RX ADMIN — BACITRACIN 0.9 G: 500 OINTMENT TOPICAL at 14:44

## 2023-03-16 RX ADMIN — LISINOPRIL 20 MG: 20 TABLET ORAL at 14:44

## 2023-03-16 RX ADMIN — SPIRONOLACTONE 25 MG: 25 TABLET ORAL at 14:44

## 2023-03-16 NOTE — PRE-SEDATION DOCUMENTATION
Sedation Plan    ASA 2     Mallampati class: II.    Risks, benefits, and alternatives discussed with patient and spouse.

## 2023-03-16 NOTE — H&P
[facility]   CARDIOLOGY HISTORY AND PHYSICAL    Patient Name: Zachariah Cruz  : 1945  MRN: [id number]  Primary Care Physician:  Nelia Frost APRN  Date of admission: 3/16/2023    Subjective   Subjective     Chief Complaint: Chest discomfort, positive stress test, elective Cardiac catheterization    HPI:    Zachariah Cruz is a 77 y.o. male with coronary artery disease, previous bypass grafting, paroxysmal atrial flutter, hypertension, hyperlipidemia, sleep apnea, gout.  He is reporting intermittent midsternal chest discomfort, multiple times a day with and without activities.  As part of work-up, he underwent a SPECT stress test which showed inferolateral wall infarct with duglas-infarct ischemia.  Ejection fraction was noted to be on the lower side than before.  He is on appropriate medical therapy.  Today, he came to the hospital for elective cardiac catheterization to delineate the coronary anatomy.    Patient currently denies any active chest pain.  He does get short of breath with activities.  Denies palpitations or dizziness.  His last dose of Coumadin was 4 days back.    Review of Systems   All systems were reviewed and negative except for: Chest pain intermittent along with shortness of breath.  Negative for palpitations or dizziness.    Personal History     Past Medical History:   Diagnosis Date   • Coronary artery disease    • Diabetes (HCC)    • Hematuria    • Hyperlipidemia    • Hypertension        Family History: family history includes Coronary artery disease in his father and another family member; Hypertension in his mother; Stroke in his mother and paternal grandfather. Otherwise pertinent FHx was reviewed and not pertinent to current issue.    Social History:  reports that he quit smoking about 54 years ago. His smoking use included cigarettes. He has a 5.00 pack-year smoking history. He has never used smokeless tobacco. He reports that he does not currently use alcohol.  He reports that he does not use drugs.    Home Medications:  allopurinol, amLODIPine, aspirin, glimepiride, lisinopril, metFORMIN ER, metoprolol succinate XL, nitroglycerin, simvastatin, spironolactone, and warfarin      Allergies:  No Known Allergies    Objective   Objective     Vitals:   Temp:  [98.7 °F (37.1 °C)] 98.7 °F (37.1 °C)  Heart Rate:  [66] 66  Resp:  [16] 16  BP: (148)/(73) 148/73     Physical Exam    Constitutional: Awake, alert, no acute distress   Eyes: PERRLA, sclerae anicteric, no conjunctival injection   HENT: NCAT, mucous membranes moist   Neck: Supple, no thyromegaly, no lymphadenopathy, trachea midline   Respiratory: Clear to auscultation bilaterally, nonlabored respirations    Cardiovascular: RRR, no murmurs, rubs, or gallops, palpable pedal pulses bilaterally   Gastrointestinal: Positive bowel sounds, soft, nontender, nondistended   Musculoskeletal: No bilateral ankle edema, no clubbing or cyanosis to extremities   Psychiatric: Appropriate affect, cooperative   Neurologic: Oriented x 3, strength symmetric in all extremities, speech clear   Skin: No rashes     Result Review    Result Review:  I have personally reviewed the results from the time of this admission to 3/16/2023 08:15 EDT and agree with these findings:  [x]  Laboratory  []  Microbiology  [x]  Radiology  [x]  EKG/Telemetry   [x]  Cardiology/Vascular   []  Pathology  [x]  Old records  []  Other:  Most notable findings include:    CBC    CBC 11/22/22 3/1/23 3/16/23   WBC 5.67 7.46 6.73   RBC 4.35 4.20 4.40   Hemoglobin 10.8 (A) 10.9 (A) 11.3 (A)   Hematocrit 35.9 (A) 34.8 (A) 35.9 (A)   MCV 82.5 82.9 81.6   MCH 24.8 (A) 26.0 (A) 25.7 (A)   MCHC 30.1 (A) 31.3 (A) 31.5   RDW 16.2 (A) 15.2 15.1   Platelets 258 298 271   (A) Abnormal value              Results for orders placed during the hospital encounter of 02/22/23    Stress Test With Myocardial Perfusion One Day    Interpretation Summary  •  There is a moderate sized infarct in the  basal-mid inferolateral walls with a small-moderate amount of duglas-infarct ischemia..  •  Left ventricular ejection fraction is moderately reduced (Calculated EF = 38%).  •  Abnormal LV wall motion consistent with severe hypokinesis of the lateral wall.  •  Impressions are consistent with an intermediate risk study.  •  Findings consistent with an indeterminate ECG stress test.     Echocardiogram done on 11/28/2018 showed      1.  Normal left ventricular systolic function with an estimated LV ejection fraction of 60%.  2.  Mild concentric left ventricular hypertrophy.  3.  Grade I diastolic dysfunction of the left ventricle.  4.  Aortic valvular sclerosis with no stenosis, which is likely etiology of heart murmur in this patient.     Assessment & Plan   Assessment / Plan     Brief Patient Summary:  Zachariah Cruz is a 77 y.o. male with coronary artery disease, previous bypass grafting, paroxysmal atrial flutter, hypertension, hyperlipidemia, sleep apnea, gout    Active Hospital Problems:  Active Hospital Problems    Diagnosis    • **Coronary artery disease involving native coronary artery of native heart without angina pectoris    • Abnormal nuclear stress test      Angina pectoris, other : New onset symptoms with a positive stress test showing inferolateral wall infarct with ischemia.    Paroxysmal atrial flutter  Hypertension  Hyperlipidemia    Plan:     We will proceed with Cardiac catheterization to delineate coronary anatomy and angioplasty if indicated.  The risks, benefits and alternatives of the test were explained detail to the patient and spouse and they agreed to proceed.    Further management plans based on cath outcomes.    DVT prophylaxis: DVT prophylaxis not indicated for this outpatient procedure    CODE STATUS: Full code     Admission Status:  I believe this patient meets outpatient status.    Electronically signed by Vishal Denton MD, 03/16/23, 8:12 AM EDT.

## 2023-03-16 NOTE — PLAN OF CARE
Goal Outcome Evaluation:  Plan of Care Reviewed With: patient           Outcome Evaluation: post cardiac stent placement, sheath removal, pt tolerated well.

## 2023-03-17 ENCOUNTER — TELEPHONE (OUTPATIENT)
Dept: FAMILY MEDICINE CLINIC | Age: 78
End: 2023-03-17
Payer: MEDICARE

## 2023-03-17 ENCOUNTER — APPOINTMENT (OUTPATIENT)
Dept: CARDIOLOGY | Facility: HOSPITAL | Age: 78
End: 2023-03-17
Payer: MEDICARE

## 2023-03-17 VITALS
DIASTOLIC BLOOD PRESSURE: 78 MMHG | HEIGHT: 70 IN | RESPIRATION RATE: 16 BRPM | BODY MASS INDEX: 33.64 KG/M2 | SYSTOLIC BLOOD PRESSURE: 145 MMHG | TEMPERATURE: 97.7 F | OXYGEN SATURATION: 98 % | HEART RATE: 73 BPM | WEIGHT: 235 LBS

## 2023-03-17 LAB
ANION GAP SERPL CALCULATED.3IONS-SCNC: 10.4 MMOL/L (ref 5–15)
BUN SERPL-MCNC: 17 MG/DL (ref 8–23)
BUN/CREAT SERPL: 17.9 (ref 7–25)
CALCIUM SPEC-SCNC: 9.8 MG/DL (ref 8.6–10.5)
CHLORIDE SERPL-SCNC: 104 MMOL/L (ref 98–107)
CHOLEST SERPL-MCNC: 121 MG/DL (ref 0–200)
CO2 SERPL-SCNC: 24.6 MMOL/L (ref 22–29)
CREAT SERPL-MCNC: 0.95 MG/DL (ref 0.76–1.27)
DEPRECATED RDW RBC AUTO: 43.9 FL (ref 37–54)
EGFRCR SERPLBLD CKD-EPI 2021: 83.5 ML/MIN/1.73
ERYTHROCYTE [DISTWIDTH] IN BLOOD BY AUTOMATED COUNT: 15 % (ref 12.3–15.4)
GLUCOSE BLDC GLUCOMTR-MCNC: 167 MG/DL (ref 70–99)
GLUCOSE SERPL-MCNC: 177 MG/DL (ref 65–99)
HCT VFR BLD AUTO: 33.9 % (ref 37.5–51)
HDLC SERPL-MCNC: 34 MG/DL (ref 40–60)
HGB BLD-MCNC: 10.7 G/DL (ref 13–17.7)
LDLC SERPL CALC-MCNC: 64 MG/DL (ref 0–100)
LDLC/HDLC SERPL: 1.81 {RATIO}
MCH RBC QN AUTO: 25.5 PG (ref 26.6–33)
MCHC RBC AUTO-ENTMCNC: 31.6 G/DL (ref 31.5–35.7)
MCV RBC AUTO: 80.9 FL (ref 79–97)
PLATELET # BLD AUTO: 321 10*3/MM3 (ref 140–450)
PMV BLD AUTO: 10.7 FL (ref 6–12)
POTASSIUM SERPL-SCNC: 4.4 MMOL/L (ref 3.5–5.2)
QT INTERVAL: 406 MS
QT INTERVAL: 427 MS
QT INTERVAL: 431 MS
RBC # BLD AUTO: 4.19 10*6/MM3 (ref 4.14–5.8)
SODIUM SERPL-SCNC: 139 MMOL/L (ref 136–145)
TRIGL SERPL-MCNC: 127 MG/DL (ref 0–150)
VLDLC SERPL-MCNC: 23 MG/DL (ref 5–40)
WBC NRBC COR # BLD: 9.16 10*3/MM3 (ref 3.4–10.8)

## 2023-03-17 PROCEDURE — 93306 TTE W/DOPPLER COMPLETE: CPT

## 2023-03-17 PROCEDURE — A9270 NON-COVERED ITEM OR SERVICE: HCPCS | Performed by: INTERNAL MEDICINE

## 2023-03-17 PROCEDURE — 80048 BASIC METABOLIC PNL TOTAL CA: CPT | Performed by: INTERNAL MEDICINE

## 2023-03-17 PROCEDURE — 63710000001 LISINOPRIL 20 MG TABLET: Performed by: INTERNAL MEDICINE

## 2023-03-17 PROCEDURE — 63710000001 SPIRONOLACTONE 25 MG TABLET: Performed by: INTERNAL MEDICINE

## 2023-03-17 PROCEDURE — 63710000001 METOPROLOL SUCCINATE XL 25 MG TABLET SUSTAINED-RELEASE 24 HOUR: Performed by: INTERNAL MEDICINE

## 2023-03-17 PROCEDURE — 63710000001 CLOPIDOGREL 75 MG TABLET: Performed by: INTERNAL MEDICINE

## 2023-03-17 PROCEDURE — 85027 COMPLETE CBC AUTOMATED: CPT | Performed by: INTERNAL MEDICINE

## 2023-03-17 PROCEDURE — 63710000001 AMLODIPINE 5 MG TABLET: Performed by: INTERNAL MEDICINE

## 2023-03-17 PROCEDURE — 80061 LIPID PANEL: CPT | Performed by: INTERNAL MEDICINE

## 2023-03-17 PROCEDURE — 93306 TTE W/DOPPLER COMPLETE: CPT | Performed by: INTERNAL MEDICINE

## 2023-03-17 PROCEDURE — 93005 ELECTROCARDIOGRAM TRACING: CPT | Performed by: INTERNAL MEDICINE

## 2023-03-17 PROCEDURE — 82962 GLUCOSE BLOOD TEST: CPT

## 2023-03-17 PROCEDURE — 99238 HOSP IP/OBS DSCHRG MGMT 30/<: CPT | Performed by: INTERNAL MEDICINE

## 2023-03-17 PROCEDURE — 63710000001 ALLOPURINOL 100 MG TABLET: Performed by: INTERNAL MEDICINE

## 2023-03-17 PROCEDURE — 93010 ELECTROCARDIOGRAM REPORT: CPT | Performed by: INTERNAL MEDICINE

## 2023-03-17 RX ORDER — CLOPIDOGREL BISULFATE 75 MG/1
75 TABLET ORAL DAILY
Qty: 90 TABLET | Refills: 1 | Status: SHIPPED | OUTPATIENT
Start: 2023-03-17 | End: 2023-03-21

## 2023-03-17 RX ORDER — CLOPIDOGREL BISULFATE 75 MG/1
300 TABLET ORAL ONCE
Status: COMPLETED | OUTPATIENT
Start: 2023-03-17 | End: 2023-03-17

## 2023-03-17 RX ORDER — ASPIRIN 81 MG/1
81 TABLET ORAL DAILY
Qty: 30 TABLET | Refills: 1
Start: 2023-03-17 | End: 2023-03-22

## 2023-03-17 RX ADMIN — METOPROLOL SUCCINATE 25 MG: 25 TABLET, EXTENDED RELEASE ORAL at 09:20

## 2023-03-17 RX ADMIN — CLOPIDOGREL BISULFATE 300 MG: 75 TABLET ORAL at 09:20

## 2023-03-17 RX ADMIN — SPIRONOLACTONE 25 MG: 25 TABLET ORAL at 09:20

## 2023-03-17 RX ADMIN — LISINOPRIL 20 MG: 20 TABLET ORAL at 09:20

## 2023-03-17 RX ADMIN — AMLODIPINE BESYLATE 10 MG: 5 TABLET ORAL at 09:20

## 2023-03-17 RX ADMIN — ALLOPURINOL 100 MG: 100 TABLET ORAL at 09:20

## 2023-03-17 NOTE — TELEPHONE ENCOUNTER
Spoke with pt who reported he got home about an hour ago from having his stent placed, he is a little sore but doing very well. Thank you for checking on him and for all you do.

## 2023-03-17 NOTE — PLAN OF CARE
Goal Outcome Evaluation:  Plan of Care Reviewed With: patient           Pt to discharge home.  Follow up appt made.

## 2023-03-17 NOTE — PLAN OF CARE
Problem: Adult Inpatient Plan of Care  Goal: Plan of Care Review  3/17/2023 0548 by Lyudmila Cox RN  Outcome: Ongoing, Progressing  3/16/2023 2007 by Lyudmila Cox RN  Outcome: Ongoing, Progressing  Goal: Patient-Specific Goal (Individualized)  3/17/2023 0548 by Lyudmila Cox RN  Outcome: Ongoing, Progressing  3/16/2023 2007 by Lyudmila Cox RN  Outcome: Ongoing, Progressing  Goal: Absence of Hospital-Acquired Illness or Injury  3/17/2023 0548 by Lyudmila Cox RN  Outcome: Ongoing, Progressing  3/16/2023 2007 by Lyudmila Cox RN  Outcome: Ongoing, Progressing  Intervention: Identify and Manage Fall Risk  Recent Flowsheet Documentation  Taken 3/16/2023 1900 by Lyudmila Cox RN  Safety Promotion/Fall Prevention: safety round/check completed  Intervention: Prevent and Manage VTE (Venous Thromboembolism) Risk  Recent Flowsheet Documentation  Taken 3/16/2023 1900 by Lyudmila Cox RN  Activity Management: bedrest  Intervention: Prevent Infection  Recent Flowsheet Documentation  Taken 3/16/2023 1900 by Lyudmila Cox RN  Infection Prevention: rest/sleep promoted  Goal: Optimal Comfort and Wellbeing  3/17/2023 0548 by Lyudmila Cox RN  Outcome: Ongoing, Progressing  3/16/2023 2007 by Lyudmila Cox RN  Outcome: Ongoing, Progressing  Intervention: Provide Person-Centered Care  Recent Flowsheet Documentation  Taken 3/16/2023 1900 by Lyudmila Cox RN  Trust Relationship/Rapport: care explained  Goal: Readiness for Transition of Care  3/17/2023 0548 by Lyudmila Cox RN  Outcome: Ongoing, Progressing  3/16/2023 2007 by Lyudmila Cox RN  Outcome: Ongoing, Progressing     Problem: Fall Injury Risk  Goal: Absence of Fall and Fall-Related Injury  3/17/2023 0548 by Lyudmila Cox RN  Outcome: Ongoing, Progressing  3/16/2023 2007 by Lyudmila Cox RN  Outcome: Ongoing, Progressing  Intervention: Promote Injury-Free Environment  Recent Flowsheet Documentation  Taken 3/16/2023 1900 by Lyudmila Cox  RN  Safety Promotion/Fall Prevention: safety round/check completed     Problem: Diabetes Comorbidity  Goal: Blood Glucose Level Within Targeted Range  3/17/2023 0548 by Lyudmila Cox RN  Outcome: Ongoing, Progressing  3/16/2023 2007 by Lyudmila Cox RN  Outcome: Ongoing, Progressing     Problem: Hypertension Comorbidity  Goal: Blood Pressure in Desired Range  3/17/2023 0548 by Lyudmila Cox RN  Outcome: Ongoing, Progressing  3/16/2023 2007 by Lyudmila Cox RN  Outcome: Ongoing, Progressing     Problem: Obstructive Sleep Apnea Risk or Actual Comorbidity Management  Goal: Unobstructed Breathing During Sleep  3/17/2023 0548 by Lyudmila Cox RN  Outcome: Ongoing, Progressing  3/16/2023 2007 by Lyudmila Cox RN  Outcome: Ongoing, Progressing     Problem: Skin Injury Risk Increased  Goal: Skin Health and Integrity  3/17/2023 0548 by Lyudmila Cox RN  Outcome: Ongoing, Progressing  3/16/2023 2007 by Lyudmila Cox RN  Outcome: Ongoing, Progressing     Problem: Arrhythmia/Dysrhythmia (Cardiac Catheterization)  Goal: Stable Heart Rate and Rhythm  Outcome: Ongoing, Progressing     Problem: Bleeding (Cardiac Catheterization)  Goal: Absence of Bleeding  Outcome: Ongoing, Progressing     Problem: Contrast-Induced Injury Risk (Cardiac Catheterization)  Goal: Absence of Contrast-Induced Injury  Outcome: Ongoing, Progressing     Problem: Embolism (Cardiac Catheterization)  Goal: Absence of Embolism Signs and Symptoms  Outcome: Ongoing, Progressing     Problem: Ongoing Anesthesia/Sedation Effects (Cardiac Catheterization)  Goal: Anesthesia/Sedation Recovery  Outcome: Ongoing, Progressing  Intervention: Optimize Anesthesia Recovery  Recent Flowsheet Documentation  Taken 3/16/2023 1900 by Lyudmila Cox RN  Safety Promotion/Fall Prevention: safety round/check completed     Problem: Pain (Cardiac Catheterization)  Goal: Acceptable Pain Control  Outcome: Ongoing, Progressing     Problem: Vascular Access Protection  (Cardiac Catheterization)  Goal: Absence of Vascular Access Complication  Outcome: Ongoing, Progressing  Intervention: Prevent Access Site Complications  Recent Flowsheet Documentation  Taken 3/16/2023 1900 by Lyudmila Cox RN  Activity Management: bedrest   Goal Outcome Evaluation:

## 2023-03-17 NOTE — DISCHARGE SUMMARY
[facility]         CARDIOLOGY DISCHARGE SUMMARY    Patient Name: Zachariah Cruz  : 1945  MRN: [id number]    Date of Admission: 3/16/2023  Date of Discharge:  3/17/2023  Primary Care Physician: Nelia Frost APRN      Presenting Problem:   Coronary artery disease involving native coronary artery of native heart without angina pectoris [I25.10]  Abnormal nuclear stress test [R94.39]  CAD S/P percutaneous coronary angioplasty [I25.10, Z98.61]    Active and Resolved Hospital Problems:  Active Hospital Problems    Diagnosis POA   • **Coronary artery disease involving native coronary artery of native heart without angina pectoris [I25.10] No     Priority: High   • CAD S/P percutaneous coronary angioplasty [I25.10, Z98.61] Not Applicable   • Abnormal nuclear stress test [R94.39] Yes      Resolved Hospital Problems   No resolved problems to display.         Hospital Course     Hospital Course:  Zachariah Cruz is a 77 y.o. male with coronary disease, previous CABG, paroxysmal atrial flutter, hypertension, hyperlipidemia, sleep apnea and gout.  He is reporting intermittent midsternal chest discomfort multiple times a day for the past several weeks.  He underwent a SPECT stress test which showed inferolateral wall infarct with duglas-infarct ischemia.  Patient underwent outpatient Cardiac catheterization on 3/16/2023.  Cardiac catheter showed a tight 95% stenosis of the proximal segment of SVG to obtuse marginal branch.  He underwent successful angioplasty with stent placement for the same.  He was admitted to the hospital for overnight observation.    He remained chest pain-free throughout hospital stay.  He had urinary retention after the angioplasty during bedrest time, hence a Kim catheter was placed.  The catheter was removed after 12 hours.  Patient also developed a small hematoma at the site of femoral arteriotomy site after sheath removal, which was compressed.  On the day of  discharge, he was hemodynamically stable.  Right groin was soft with some bruising.  Hemoglobin and other labs remained stable.  He was able to ambulate without difficulties.  He is being discharged home in stable condition.  Extensive counseling regarding medication compliance performed.    He will be taking aspirin, Plavix and warfarin for the past 1 month.  After 1 month, we will discontinue aspirin and continue Plavix and warfarin.  Statins and other medications will be continued as before.  He will hold metformin for another day.  If continues to have urinary retention after discharge, he will need further evaluation by urologist.        DISCHARGE Follow Up Recommendations for labs and diagnostics: None      Day of Discharge     Vital Signs:  Temp:  [97.6 °F (36.4 °C)-98.6 °F (37 °C)] 97.7 °F (36.5 °C)  Heart Rate:  [52-76] 73  Resp:  [16-18] 16  BP: ()/(50-84) 145/78  Physical Exam:    General : Alert, awake, no acute distress  HEENT : No pallor, anicteric  Neck : Supple, no JVD  CVS : Regular rate and rhythm, no murmur, rubs or gallops  Lungs: Clear to auscultation bilaterally, no crackles or rhonchi  Right groin : In bruising present, minimal swelling present, soft, distal pulses palpable  Abdomen: Soft, nontender, bowel sounds heard in all 4 quadrants  Neuro : Alert, awake, oriented x3, no focal neurological deficits  Extremities: Warm, well-perfused, no pedal edema    Pertinent  and/or Most Recent Results     LAB RESULTS:      Lab 03/17/23 0423 03/16/23  0753   WBC 9.16 6.73   HEMOGLOBIN 10.7* 11.3*   HEMATOCRIT 33.9* 35.9*   PLATELETS 321 271   NEUTROS ABS  --  4.51   IMMATURE GRANS (ABS)  --  0.02   LYMPHS ABS  --  1.31   MONOS ABS  --  0.64   EOS ABS  --  0.24   MCV 80.9 81.6   PROTIME  --  16.0*   APTT  --  41.1*         Lab 03/17/23  0423 03/16/23  0753   SODIUM 139 140   POTASSIUM 4.4 4.0   CHLORIDE 104 105   CO2 24.6 24.1   ANION GAP 10.4 10.9   BUN 17 19   CREATININE 0.95 0.91   EGFR 83.5  87.9   GLUCOSE 177* 144*   CALCIUM 9.8 9.7             Lab 03/16/23  0753   PROTIME 16.0*   INR 1.27*         Lab 03/17/23  0423   CHOLESTEROL 121   LDL CHOL 64   HDL CHOL 34*   TRIGLYCERIDES 127                           Labs Pending at Discharge:  Pending Labs     Order Current Status    Activated Clotting Time (ACT) Collected (03/16/23 1257)            Discharge Details        Discharge Medications      New Medications      Instructions Start Date   clopidogrel 75 MG tablet  Commonly known as: PLAVIX   75 mg, Oral, Daily         Changes to Medications      Instructions Start Date   aspirin 81 MG EC tablet  What changed: when to take this   81 mg, Oral, Daily      simvastatin 40 MG tablet  Commonly known as: ZOCOR  What changed: when to take this   TAKE ONE TABLET BY MOUTH DAILY      warfarin 4 MG tablet  Commonly known as: Coumadin  What changed:   · how much to take  · how to take this  · when to take this  · additional instructions   Take 1 tablet by mouth daily or as directed         Continue These Medications      Instructions Start Date   allopurinol 100 MG tablet  Commonly known as: ZYLOPRIM   TAKE ONE TABLET BY MOUTH DAILY      amLODIPine 10 MG tablet  Commonly known as: NORVASC   10 mg, Oral, Daily      glimepiride 4 MG tablet  Commonly known as: AMARYL   TAKE ONE TABLET BY MOUTH DAILY      lisinopril 20 MG tablet  Commonly known as: PRINIVIL,ZESTRIL   TAKE ONE TABLET BY MOUTH TWICE A DAY      metoprolol succinate XL 25 MG 24 hr tablet  Commonly known as: TOPROL-XL   TAKE ONE TABLET BY MOUTH TWICE A DAY      nitroglycerin 0.4 MG SL tablet  Commonly known as: NITROSTAT   1 under the tongue as needed for angina, may repeat q5mins for up three doses      spironolactone 25 MG tablet  Commonly known as: ALDACTONE   25 mg, Oral, Daily         ASK your doctor about these medications      Instructions Start Date   metFORMIN  MG 24 hr tablet  Commonly known as: GLUCOPHAGE-XR   2,000 mg, Oral, Daily              No Known Allergies      Discharge Disposition:  Home or Self Care    Diet:  Hospital:  Diet Order   Procedures   • Diet: Cardiac Diets, Diabetic Diets; Healthy Heart (2-3 Na+); Consistent Carbohydrate; Texture: Regular Texture (IDDSI 7); Fluid Consistency: Thin (IDDSI 0)         Discharge Activity:   Activity Instructions     Activity as Tolerated              CODE STATUS:  Code Status and Medical Interventions:   Ordered at: 03/16/23 1238     Level Of Support Discussed With:    Patient     Code Status (Patient has no pulse and is not breathing):    CPR (Attempt to Resuscitate)     Medical Interventions (Patient has pulse or is breathing):    Full Support     Release to patient:    Routine Release         Future Appointments   Date Time Provider Department Center   4/3/2023  9:00 AM [address]   4/5/2023 10:15 AM Vishal Denton MD [facility]   4/12/2023  8:45 AM Sarah Oneill APRN [facility]   6/7/2023 10:00 AM Nelia Frost [facility]       Additional Instructions for the Follow-ups that You Need to Schedule     Ambulatory Referral to Cardiac Rehab   As directed            Time spent on Discharge including face to face service:  23 minutes    Electronically signed by Vishal Denton MD, 03/17/23, 9:06 AM EDT.

## 2023-03-17 NOTE — TELEPHONE ENCOUNTER
Call later today:see how he is doing     This was at the end of the discharge note:   He will be taking aspirin, Plavix and warfarin for the past 1 month.  After 1 month, we will discontinue aspirin and continue Plavix and warfarin.  Statins and other medications will be continued as before.  He will hold metformin for another day.  If continues to have urinary retention after discharge, he will need further evaluation by urologist. Per AB

## 2023-03-21 ENCOUNTER — TELEPHONE (OUTPATIENT)
Dept: CARDIOLOGY | Facility: CLINIC | Age: 78
End: 2023-03-21
Payer: MEDICARE

## 2023-03-21 LAB
AORTIC DIMENSIONLESS INDEX: 0.4 (DI)
BH CV ECHO MEAS - AO MAX PG: 17 MMHG
BH CV ECHO MEAS - AO MEAN PG: 9 MMHG
BH CV ECHO MEAS - AO ROOT DIAM: 3.4 CM
BH CV ECHO MEAS - AO V2 MAX: 207 CM/SEC
BH CV ECHO MEAS - AO V2 VTI: 42.2 CM
BH CV ECHO MEAS - AVA(I,D): 1.2 CM2
BH CV ECHO MEAS - EDV(MOD-SP2): 48 ML
BH CV ECHO MEAS - EDV(MOD-SP4): 50 ML
BH CV ECHO MEAS - EF(MOD-BP): 51.2 %
BH CV ECHO MEAS - ESV(MOD-SP2): 22 ML
BH CV ECHO MEAS - ESV(MOD-SP4): 24 ML
BH CV ECHO MEAS - IVSD: 1.5 CM
BH CV ECHO MEAS - LA DIMENSION: 4.3 CM
BH CV ECHO MEAS - LAT PEAK E' VEL: 6.7 CM/SEC
BH CV ECHO MEAS - LV MAX PG: 3 MMHG
BH CV ECHO MEAS - LV MEAN PG: 1 MMHG
BH CV ECHO MEAS - LV V1 MAX: 81.4 CM/SEC
BH CV ECHO MEAS - LV V1 VTI: 16.7 CM
BH CV ECHO MEAS - LVIDD: 4.8 CM
BH CV ECHO MEAS - LVIDS: 3.4 CM
BH CV ECHO MEAS - LVOT DIAM: 2 CM
BH CV ECHO MEAS - LVPWD: 1.3 CM
BH CV ECHO MEAS - MED PEAK E' VEL: 4.35 CM/SEC
BH CV ECHO MEAS - MV A MAX VEL: 102 CM/SEC
BH CV ECHO MEAS - MV DEC TIME: 160 MSEC
BH CV ECHO MEAS - MV E MAX VEL: 54 CM/SEC
BH CV ECHO MEAS - MV E/A: 0.5
BH CV ECHO MEAS - RVDD: 2.9 CM
BH CV ECHO MEAS - TAPSE (>1.6): 1.12 CM
BH CV ECHO MEASUREMENTS AVERAGE E/E' RATIO: 9.77
IVRT: 71 MSEC
LEFT ATRIUM VOLUME INDEX: 16.3 ML/M2
MAXIMAL PREDICTED HEART RATE: 145 BPM
STRESS TARGET HR: 123 BPM

## 2023-03-21 NOTE — TELEPHONE ENCOUNTER
We can change Plavix to Brilinta 90 mg twice daily to see whether it helps.     Please send a new prescription for Brilinta 90 mg BID. We can give some samples/ or 30 day free card if he or someone can come to the office.   Continue Plavix until he gets Brilinta filled. Encourage to get medical help / ER visit if these symptoms persists.     Electronically signed by Vishal Denton MD, 03/21/23, 2:46 PM EDT.

## 2023-03-21 NOTE — TELEPHONE ENCOUNTER
Patient called stating he had PCI on 3/16/23.     Today patient is c/o pain all over, tinnitus extremely loud, light headedness, dizziness and unsteady- increased SOA, head ache- Patient states he feels it is his plavix causing these side effects.    Patient encouraged to get checked for COVID or virus. Patient would like to hear back from our office first.    Please advise

## 2023-03-21 NOTE — TELEPHONE ENCOUNTER
GURMEET patient. Patient verbalized understanding and appreciation. Patient states he will stop by office tomorrow in [place] if unable to afford Brilinta. Patient verbalized understanding and appreciation.

## 2023-03-22 ENCOUNTER — TELEPHONE (OUTPATIENT)
Dept: CARDIOLOGY | Facility: CLINIC | Age: 78
End: 2023-03-22
Payer: MEDICARE

## 2023-03-22 ENCOUNTER — OFFICE VISIT (OUTPATIENT)
Dept: FAMILY MEDICINE CLINIC | Age: 78
End: 2023-03-22
Payer: MEDICARE

## 2023-03-22 ENCOUNTER — APPOINTMENT (OUTPATIENT)
Dept: GENERAL RADIOLOGY | Facility: HOSPITAL | Age: 78
DRG: 872 | End: 2023-03-22
Payer: MEDICARE

## 2023-03-22 ENCOUNTER — HOSPITAL ENCOUNTER (INPATIENT)
Facility: HOSPITAL | Age: 78
LOS: 2 days | Discharge: HOME HEALTH | DRG: 872 | End: 2023-03-25
Attending: EMERGENCY MEDICINE | Admitting: STUDENT IN AN ORGANIZED HEALTH CARE EDUCATION/TRAINING PROGRAM
Payer: MEDICARE

## 2023-03-22 VITALS
BODY MASS INDEX: 33.72 KG/M2 | OXYGEN SATURATION: 98 % | HEART RATE: 87 BPM | HEIGHT: 70 IN | SYSTOLIC BLOOD PRESSURE: 97 MMHG | TEMPERATURE: 99.1 F | DIASTOLIC BLOOD PRESSURE: 52 MMHG | RESPIRATION RATE: 18 BRPM

## 2023-03-22 DIAGNOSIS — E86.0 DEHYDRATION: ICD-10-CM

## 2023-03-22 DIAGNOSIS — R50.9 FEBRILE ILLNESS, ACUTE: Primary | ICD-10-CM

## 2023-03-22 DIAGNOSIS — E11.9 TYPE 2 DIABETES MELLITUS WITHOUT COMPLICATION, WITHOUT LONG-TERM CURRENT USE OF INSULIN: ICD-10-CM

## 2023-03-22 DIAGNOSIS — D64.9 CHRONIC ANEMIA: ICD-10-CM

## 2023-03-22 DIAGNOSIS — N17.9 ACUTE KIDNEY INJURY: ICD-10-CM

## 2023-03-22 DIAGNOSIS — R53.1 WEAKNESS: ICD-10-CM

## 2023-03-22 DIAGNOSIS — R05.1 ACUTE COUGH: Primary | ICD-10-CM

## 2023-03-22 DIAGNOSIS — N39.0 URINARY TRACT INFECTION WITHOUT HEMATURIA, SITE UNSPECIFIED: ICD-10-CM

## 2023-03-22 DIAGNOSIS — I25.10 CAD S/P PERCUTANEOUS CORONARY ANGIOPLASTY: ICD-10-CM

## 2023-03-22 DIAGNOSIS — R26.2 DIFFICULTY WALKING: ICD-10-CM

## 2023-03-22 DIAGNOSIS — Z98.61 CAD S/P PERCUTANEOUS CORONARY ANGIOPLASTY: ICD-10-CM

## 2023-03-22 LAB
ALBUMIN SERPL-MCNC: 3.9 G/DL (ref 3.5–5.2)
ALBUMIN/GLOB SERPL: 1.1 G/DL
ALP SERPL-CCNC: 84 U/L (ref 39–117)
ALT SERPL W P-5'-P-CCNC: 11 U/L (ref 1–41)
ANION GAP SERPL CALCULATED.3IONS-SCNC: 14.1 MMOL/L (ref 5–15)
AST SERPL-CCNC: 13 U/L (ref 1–40)
BACTERIA UR QL AUTO: ABNORMAL /HPF
BASOPHILS # BLD AUTO: 0.03 10*3/MM3 (ref 0–0.2)
BASOPHILS NFR BLD AUTO: 0.2 % (ref 0–1.5)
BILIRUB SERPL-MCNC: 0.9 MG/DL (ref 0–1.2)
BILIRUB UR QL STRIP: ABNORMAL
BUN SERPL-MCNC: 33 MG/DL (ref 8–23)
BUN/CREAT SERPL: 19 (ref 7–25)
CALCIUM SPEC-SCNC: 9.6 MG/DL (ref 8.6–10.5)
CHLORIDE SERPL-SCNC: 95 MMOL/L (ref 98–107)
CLARITY UR: ABNORMAL
CO2 SERPL-SCNC: 21.9 MMOL/L (ref 22–29)
COLOR UR: ABNORMAL
CREAT SERPL-MCNC: 1.74 MG/DL (ref 0.76–1.27)
D-LACTATE SERPL-SCNC: 1.6 MMOL/L (ref 0.5–2)
DEPRECATED RDW RBC AUTO: 46.1 FL (ref 37–54)
EGFRCR SERPLBLD CKD-EPI 2021: 40.4 ML/MIN/1.73
EOSINOPHIL # BLD AUTO: 0 10*3/MM3 (ref 0–0.4)
EOSINOPHIL NFR BLD AUTO: 0 % (ref 0.3–6.2)
ERYTHROCYTE [DISTWIDTH] IN BLOOD BY AUTOMATED COUNT: 15.9 % (ref 12.3–15.4)
EXPIRATION DATE: NORMAL
FLUAV AG NPH QL: NEGATIVE
FLUAV AG UPPER RESP QL IA.RAPID: NOT DETECTED
FLUBV AG NPH QL IA: NEGATIVE
FLUBV AG UPPER RESP QL IA.RAPID: NOT DETECTED
GLOBULIN UR ELPH-MCNC: 3.5 GM/DL
GLUCOSE BLDC GLUCOMTR-MCNC: 134 MG/DL (ref 70–99)
GLUCOSE BLDC GLUCOMTR-MCNC: 44 MG/DL (ref 70–99)
GLUCOSE SERPL-MCNC: 64 MG/DL (ref 65–99)
GLUCOSE UR STRIP-MCNC: NEGATIVE MG/DL
HCT VFR BLD AUTO: 30.7 % (ref 37.5–51)
HGB BLD-MCNC: 9.9 G/DL (ref 13–17.7)
HGB UR QL STRIP.AUTO: ABNORMAL
HOLD SPECIMEN: NORMAL
HOLD SPECIMEN: NORMAL
HYALINE CASTS UR QL AUTO: ABNORMAL /LPF
IMM GRANULOCYTES # BLD AUTO: 0.14 10*3/MM3 (ref 0–0.05)
IMM GRANULOCYTES NFR BLD AUTO: 0.8 % (ref 0–0.5)
INR PPP: 1.22 (ref 0.86–1.15)
INTERNAL CONTROL: NORMAL
KETONES UR QL STRIP: ABNORMAL
LEUKOCYTE ESTERASE UR QL STRIP.AUTO: ABNORMAL
LYMPHOCYTES # BLD AUTO: 1.27 10*3/MM3 (ref 0.7–3.1)
LYMPHOCYTES NFR BLD AUTO: 7.5 % (ref 19.6–45.3)
Lab: NORMAL
MAGNESIUM SERPL-MCNC: 1.8 MG/DL (ref 1.6–2.4)
MCH RBC QN AUTO: 26.1 PG (ref 26.6–33)
MCHC RBC AUTO-ENTMCNC: 32.2 G/DL (ref 31.5–35.7)
MCV RBC AUTO: 80.8 FL (ref 79–97)
MONOCYTES # BLD AUTO: 1.69 10*3/MM3 (ref 0.1–0.9)
MONOCYTES NFR BLD AUTO: 10 % (ref 5–12)
NEUTROPHILS NFR BLD AUTO: 13.84 10*3/MM3 (ref 1.7–7)
NEUTROPHILS NFR BLD AUTO: 81.5 % (ref 42.7–76)
NITRITE UR QL STRIP: NEGATIVE
NRBC BLD AUTO-RTO: 0 /100 WBC (ref 0–0.2)
PH UR STRIP.AUTO: <=5 [PH] (ref 5–8)
PLATELET # BLD AUTO: 273 10*3/MM3 (ref 140–450)
PMV BLD AUTO: 10.1 FL (ref 6–12)
POTASSIUM SERPL-SCNC: 4 MMOL/L (ref 3.5–5.2)
PROT SERPL-MCNC: 7.4 G/DL (ref 6–8.5)
PROT UR QL STRIP: ABNORMAL
PROTHROMBIN TIME: 15.4 SECONDS (ref 11.8–14.9)
RBC # BLD AUTO: 3.8 10*6/MM3 (ref 4.14–5.8)
RBC # UR STRIP: ABNORMAL /HPF
REF LAB TEST METHOD: ABNORMAL
SARS-COV-2 AG UPPER RESP QL IA.RAPID: NOT DETECTED
SARS-COV-2 RNA RESP QL NAA+PROBE: NOT DETECTED
SODIUM SERPL-SCNC: 131 MMOL/L (ref 136–145)
SP GR UR STRIP: 1.02 (ref 1–1.03)
SQUAMOUS #/AREA URNS HPF: ABNORMAL /HPF
TRANS CELLS #/AREA URNS HPF: ABNORMAL /HPF
TROPONIN T SERPL HS-MCNC: 61 NG/L
UROBILINOGEN UR QL STRIP: ABNORMAL
WBC # UR STRIP: ABNORMAL /HPF
WBC NRBC COR # BLD: 16.97 10*3/MM3 (ref 3.4–10.8)
WHOLE BLOOD HOLD COAG: NORMAL
WHOLE BLOOD HOLD SPECIMEN: NORMAL

## 2023-03-22 PROCEDURE — 84484 ASSAY OF TROPONIN QUANT: CPT | Performed by: EMERGENCY MEDICINE

## 2023-03-22 PROCEDURE — 83735 ASSAY OF MAGNESIUM: CPT | Performed by: EMERGENCY MEDICINE

## 2023-03-22 PROCEDURE — 80053 COMPREHEN METABOLIC PANEL: CPT | Performed by: EMERGENCY MEDICINE

## 2023-03-22 PROCEDURE — P9612 CATHETERIZE FOR URINE SPEC: HCPCS

## 2023-03-22 PROCEDURE — 87186 SC STD MICRODIL/AGAR DIL: CPT | Performed by: INTERNAL MEDICINE

## 2023-03-22 PROCEDURE — 85610 PROTHROMBIN TIME: CPT | Performed by: EMERGENCY MEDICINE

## 2023-03-22 PROCEDURE — 85025 COMPLETE CBC W/AUTO DIFF WBC: CPT | Performed by: EMERGENCY MEDICINE

## 2023-03-22 PROCEDURE — 87077 CULTURE AEROBIC IDENTIFY: CPT | Performed by: INTERNAL MEDICINE

## 2023-03-22 PROCEDURE — 99222 1ST HOSP IP/OBS MODERATE 55: CPT | Performed by: STUDENT IN AN ORGANIZED HEALTH CARE EDUCATION/TRAINING PROGRAM

## 2023-03-22 PROCEDURE — 71045 X-RAY EXAM CHEST 1 VIEW: CPT

## 2023-03-22 PROCEDURE — U0005 INFEC AGEN DETEC AMPLI PROBE: HCPCS | Performed by: EMERGENCY MEDICINE

## 2023-03-22 PROCEDURE — 25010000002 CEFTRIAXONE PER 250 MG: Performed by: EMERGENCY MEDICINE

## 2023-03-22 PROCEDURE — U0004 COV-19 TEST NON-CDC HGH THRU: HCPCS | Performed by: EMERGENCY MEDICINE

## 2023-03-22 PROCEDURE — G0378 HOSPITAL OBSERVATION PER HR: HCPCS

## 2023-03-22 PROCEDURE — 93005 ELECTROCARDIOGRAM TRACING: CPT | Performed by: EMERGENCY MEDICINE

## 2023-03-22 PROCEDURE — 83605 ASSAY OF LACTIC ACID: CPT | Performed by: EMERGENCY MEDICINE

## 2023-03-22 PROCEDURE — 82962 GLUCOSE BLOOD TEST: CPT

## 2023-03-22 PROCEDURE — 87804 INFLUENZA ASSAY W/OPTIC: CPT | Performed by: EMERGENCY MEDICINE

## 2023-03-22 PROCEDURE — 93005 ELECTROCARDIOGRAM TRACING: CPT

## 2023-03-22 PROCEDURE — 81001 URINALYSIS AUTO W/SCOPE: CPT | Performed by: EMERGENCY MEDICINE

## 2023-03-22 PROCEDURE — 36415 COLL VENOUS BLD VENIPUNCTURE: CPT

## 2023-03-22 PROCEDURE — 93010 ELECTROCARDIOGRAM REPORT: CPT | Performed by: SPECIALIST

## 2023-03-22 PROCEDURE — 99285 EMERGENCY DEPT VISIT HI MDM: CPT

## 2023-03-22 PROCEDURE — 87040 BLOOD CULTURE FOR BACTERIA: CPT | Performed by: EMERGENCY MEDICINE

## 2023-03-22 PROCEDURE — 87086 URINE CULTURE/COLONY COUNT: CPT | Performed by: INTERNAL MEDICINE

## 2023-03-22 RX ORDER — SODIUM CHLORIDE 9 MG/ML
125 INJECTION, SOLUTION INTRAVENOUS CONTINUOUS
Status: ACTIVE | OUTPATIENT
Start: 2023-03-22 | End: 2023-03-23

## 2023-03-22 RX ORDER — DEXTROSE MONOHYDRATE 25 G/50ML
INJECTION, SOLUTION INTRAVENOUS
Status: DISPENSED
Start: 2023-03-22 | End: 2023-03-23

## 2023-03-22 RX ORDER — WARFARIN SODIUM 4 MG/1
4 TABLET ORAL
Status: DISCONTINUED | OUTPATIENT
Start: 2023-03-22 | End: 2023-03-25 | Stop reason: HOSPADM

## 2023-03-22 RX ORDER — CEFTRIAXONE SODIUM 1 G/50ML
1 INJECTION, SOLUTION INTRAVENOUS ONCE
Status: COMPLETED | OUTPATIENT
Start: 2023-03-22 | End: 2023-03-22

## 2023-03-22 RX ORDER — CEFTRIAXONE SODIUM 1 G/50ML
1 INJECTION, SOLUTION INTRAVENOUS EVERY 24 HOURS
Status: DISCONTINUED | OUTPATIENT
Start: 2023-03-22 | End: 2023-03-22

## 2023-03-22 RX ORDER — ALLOPURINOL 100 MG/1
100 TABLET ORAL DAILY
Status: DISCONTINUED | OUTPATIENT
Start: 2023-03-23 | End: 2023-03-25 | Stop reason: HOSPADM

## 2023-03-22 RX ORDER — DEXTROSE MONOHYDRATE 25 G/50ML
25 INJECTION, SOLUTION INTRAVENOUS ONCE
Status: COMPLETED | OUTPATIENT
Start: 2023-03-22 | End: 2023-03-22

## 2023-03-22 RX ORDER — ACETAMINOPHEN 325 MG/1
650 TABLET ORAL EVERY 4 HOURS PRN
Status: DISCONTINUED | OUTPATIENT
Start: 2023-03-22 | End: 2023-03-25 | Stop reason: HOSPADM

## 2023-03-22 RX ORDER — SODIUM CHLORIDE 0.9 % (FLUSH) 0.9 %
10 SYRINGE (ML) INJECTION AS NEEDED
Status: DISCONTINUED | OUTPATIENT
Start: 2023-03-22 | End: 2023-03-25 | Stop reason: HOSPADM

## 2023-03-22 RX ORDER — HYDROCODONE BITARTRATE AND ACETAMINOPHEN 5; 325 MG/1; MG/1
1 TABLET ORAL ONCE AS NEEDED
Status: DISCONTINUED | OUTPATIENT
Start: 2023-03-22 | End: 2023-03-25 | Stop reason: HOSPADM

## 2023-03-22 RX ORDER — ATORVASTATIN CALCIUM 20 MG/1
20 TABLET, FILM COATED ORAL DAILY
Status: DISCONTINUED | OUTPATIENT
Start: 2023-03-23 | End: 2023-03-25 | Stop reason: HOSPADM

## 2023-03-22 RX ORDER — SODIUM CHLORIDE 9 MG/ML
40 INJECTION, SOLUTION INTRAVENOUS AS NEEDED
Status: DISCONTINUED | OUTPATIENT
Start: 2023-03-22 | End: 2023-03-25 | Stop reason: HOSPADM

## 2023-03-22 RX ORDER — SODIUM CHLORIDE 0.9 % (FLUSH) 0.9 %
10 SYRINGE (ML) INJECTION EVERY 12 HOURS SCHEDULED
Status: DISCONTINUED | OUTPATIENT
Start: 2023-03-22 | End: 2023-03-25 | Stop reason: HOSPADM

## 2023-03-22 RX ORDER — METFORMIN HYDROCHLORIDE 500 MG/1
2000 TABLET, EXTENDED RELEASE ORAL DAILY
Qty: 360 TABLET | Refills: 0 | Status: SHIPPED | OUTPATIENT
Start: 2023-03-22

## 2023-03-22 RX ORDER — ACETAMINOPHEN 325 MG/1
650 TABLET ORAL ONCE
Status: COMPLETED | OUTPATIENT
Start: 2023-03-22 | End: 2023-03-22

## 2023-03-22 RX ORDER — METOPROLOL SUCCINATE 25 MG/1
25 TABLET, EXTENDED RELEASE ORAL 2 TIMES DAILY
Status: DISCONTINUED | OUTPATIENT
Start: 2023-03-22 | End: 2023-03-25 | Stop reason: HOSPADM

## 2023-03-22 RX ORDER — CEFTRIAXONE SODIUM 1 G/50ML
1 INJECTION, SOLUTION INTRAVENOUS EVERY 24 HOURS
Status: DISCONTINUED | OUTPATIENT
Start: 2023-03-23 | End: 2023-03-24

## 2023-03-22 RX ORDER — ASPIRIN 81 MG/1
81 TABLET ORAL DAILY
Status: DISCONTINUED | OUTPATIENT
Start: 2023-03-23 | End: 2023-03-25 | Stop reason: HOSPADM

## 2023-03-22 RX ADMIN — DEXTROSE MONOHYDRATE 25 G: 25 INJECTION, SOLUTION INTRAVENOUS at 18:46

## 2023-03-22 RX ADMIN — WARFARIN SODIUM 4 MG: 4 TABLET ORAL at 21:55

## 2023-03-22 RX ADMIN — SODIUM CHLORIDE 500 ML: 9 INJECTION, SOLUTION INTRAVENOUS at 14:57

## 2023-03-22 RX ADMIN — ACETAMINOPHEN 650 MG: 325 TABLET ORAL at 14:56

## 2023-03-22 RX ADMIN — METOPROLOL SUCCINATE 25 MG: 25 TABLET, EXTENDED RELEASE ORAL at 21:55

## 2023-03-22 RX ADMIN — CEFTRIAXONE SODIUM 1 G: 1 INJECTION, SOLUTION INTRAVENOUS at 17:32

## 2023-03-22 RX ADMIN — Medication 10 ML: at 21:56

## 2023-03-22 RX ADMIN — SODIUM CHLORIDE 125 ML/HR: 9 INJECTION, SOLUTION INTRAVENOUS at 21:55

## 2023-03-22 NOTE — ED NOTES
Patient sleeping and snoring, oxygen sats 88% to 89%.  Patient wears a cpap at home.  Patient placed on oxygen via nc and 2L.

## 2023-03-22 NOTE — ED PROVIDER NOTES
Time: 1:05 PM EDT  Date of encounter:  3/22/2023  Independent Historian/Clinical History and Information was obtained by:   Patient  Chief Complaint: weakness    History is limited by: N/A    History of Present Illness:  Patient is a 77 y.o. year old male who presents to the emergency department for evaluation of weakness. The patient's generalized weakness started on Sunday. The patient states that when he feels fatigued and light headedness from his weakness. He states that he experienced a fall secondary to his weakness yesterday. The patient documented a fever of 101.0 °F this morning and the patient's wife believes that the patient had a fever last night. The patient claims non productive cough. Claims minor shortness of breath. Denies chest pain. Denies chest pressure. Denies abdominal pain.  Claims diarrhea. The patient states that he had 3 loose stools yesterday and 2 loose stools today. He notes some black stool. Denies bloody stools. The patient states that he did take Pepto bismol. Denies dysuria. Denies urgency. Denies frequency. Denies hematuria. The patient claims that he has a scratchy throat. denies sore throat. Claims chronic bilateral lower extremity swelling. Denies recent exposure to illness. The patient has not taken new antibiotics over the last month. The patient states that he had a recent cardiac stent placed last Thursday. He states that he is on warfarin for atrial fibrillation which he restarted on Sunday.     Patient Care Team  Primary Care Provider: Nelia Frost APRN    Past Medical History:     No Known Allergies  Past Medical History:   Diagnosis Date   • Coronary artery disease    • Diabetes (HCC)    • Hematuria    • Hyperlipidemia    • Hypertension      Past Surgical History:   Procedure Laterality Date   • APPENDECTOMY     • CARDIAC CATHETERIZATION     • CARDIAC CATHETERIZATION N/A 3/16/2023    Procedure: Left Heart Cath with possible angioplasty;  Surgeon: Bertin  Vishal DOYLE MD;  Location: [facility] INVASIVE LOCATION;  Service: Cardiovascular;  Laterality: N/A;   • CATARACT EXTRACTION     • CORONARY ARTERY BYPASS GRAFT  05/21/2010    LIMA to LAD, SVG to OM, SVG to PDA   • PROSTATE SURGERY      TRANSURETHRAL RESECTION OF THE PROSTATE     Family History   Problem Relation Age of Onset   • Hypertension Mother    • Stroke Mother    • Coronary artery disease Father    • Stroke Paternal Grandfather    • Coronary artery disease Other        Home Medications:  Prior to Admission medications    Medication Sig Start Date End Date Taking? Authorizing Provider   allopurinol (ZYLOPRIM) 100 MG tablet TAKE ONE TABLET BY MOUTH DAILY 1/9/23   Nelia Frost APRN   amLODIPine (NORVASC) 10 MG tablet Take 1 tablet by mouth Daily. 10/19/22   Vishal Denton MD   aspirin 81 MG EC tablet Take 1 tablet by mouth Daily.  Patient not taking: Reported on 3/22/2023 3/17/23   Vishal Denton MD   glimepiride (AMARYL) 4 MG tablet TAKE ONE TABLET BY MOUTH DAILY 2/15/23   Nelia Frost APRN   lisinopril (PRINIVIL,ZESTRIL) 20 MG tablet TAKE ONE TABLET BY MOUTH TWICE A DAY 2/6/23   Nelia Frost APRN   metFORMIN ER (GLUCOPHAGE-XR) 500 MG 24 hr tablet Take 4 tablets by mouth Daily. 3/22/23   Nelia Frost APRN   metoprolol succinate XL (TOPROL-XL) 25 MG 24 hr tablet TAKE ONE TABLET BY MOUTH TWICE A DAY 1/9/23   Nelia Frost APRN   nitroglycerin (NITROSTAT) 0.4 MG SL tablet 1 under the tongue as needed for angina, may repeat q5mins for up three doses 2/9/23   Maia Carbajal APRN   simvastatin (ZOCOR) 40 MG tablet TAKE ONE TABLET BY MOUTH DAILY  Patient taking differently: Take 1 tablet by mouth Every Night. 1/18/23   Nelia Frost APRN   spironolactone (ALDACTONE) 25 MG tablet Take 1 tablet by mouth Daily. 12/14/22   Vishal Denton MD   ticagrelor (Brilinta) 90 MG tablet tablet Take 1 tablet by mouth 2 (Two) Times a Day.  Patient not taking:  Reported on 3/22/2023 3/21/23   Vishal Denton MD   ticagrelor (Brilinta) 90 MG tablet tablet Take 1 tablet by mouth 2 (Two) Times a Day. 3/22/23   Vishal Denton MD   warfarin (Coumadin) 4 MG tablet Take 1 tablet by mouth daily or as directed  Patient taking differently: Take 1 tablet by mouth Every Night. REPORTED PER CARDIOLOGY LAST DOSE TO BE PM DOSE OF 3/12/23  Home Warfarin Notes    Home warfarin dose: 4mg qday  Who monitors the patients INR? Bertin  Has the patient been consistent on their current dosing regimen? Since 22   Vishal Denton MD   metFORMIN ER (GLUCOPHAGE-XR) 500 MG 24 hr tablet Take 4 tablets by mouth Daily.  Patient taking differently: Take 2 tablets by mouth 2 (Two) Times a Day. INSTRUCTED AS CARDIOLOGY DIRECTS REPORTED LAST DOSE WAS TO BE 3/14/23 PM DOSE 2/24/23 3/22/23  Nelia Frost APRN        Social History:   Social History     Tobacco Use   • Smoking status: Former     Packs/day: 1.00     Years: 5.00     Pack years: 5.00     Types: Cigarettes     Quit date:      Years since quittin.2   • Smokeless tobacco: Never   Vaping Use   • Vaping Use: Never used   Substance Use Topics   • Alcohol use: Not Currently   • Drug use: Never         Review of Systems:  Review of Systems   Constitutional: Positive for fatigue and fever. Negative for chills and diaphoresis.   HENT: Negative for congestion, postnasal drip, rhinorrhea and sore throat.    Eyes: Negative for photophobia.   Respiratory: Positive for shortness of breath. Negative for cough and chest tightness.    Cardiovascular: Negative for chest pain, palpitations and leg swelling.   Gastrointestinal: Positive for diarrhea. Negative for abdominal pain, blood in stool, nausea and vomiting.   Genitourinary: Negative for difficulty urinating, dysuria, flank pain, frequency, hematuria and urgency.   Musculoskeletal: Negative for neck pain and neck stiffness.   Skin: Negative for pallor and rash.  "  Neurological: Positive for weakness and light-headedness. Negative for dizziness, syncope, numbness and headaches.   Hematological: Negative for adenopathy. Does not bruise/bleed easily.   Psychiatric/Behavioral: Negative.         Physical Exam:  /60   Pulse 71   Temp (!) 101.5 °F (38.6 °C) (Rectal)   Resp 20   Ht 177.8 cm (70\")   Wt 107 kg (235 lb 14.3 oz)   SpO2 96%   BMI 33.85 kg/m²     Physical Exam  Vitals and nursing note reviewed.   Constitutional:       General: He is not in acute distress.     Appearance: Normal appearance. He is not ill-appearing, toxic-appearing or diaphoretic.   HENT:      Head: Normocephalic and atraumatic.      Mouth/Throat:      Mouth: Mucous membranes are moist.      Pharynx: No posterior oropharyngeal erythema.   Eyes:      Pupils: Pupils are equal, round, and reactive to light.   Cardiovascular:      Rate and Rhythm: Normal rate and regular rhythm.      Pulses: Normal pulses.           Carotid pulses are 2+ on the right side and 2+ on the left side.       Radial pulses are 2+ on the right side and 2+ on the left side.        Femoral pulses are 2+ on the right side and 2+ on the left side.       Popliteal pulses are 2+ on the right side and 2+ on the left side.        Dorsalis pedis pulses are 2+ on the right side and 2+ on the left side.        Posterior tibial pulses are 2+ on the right side and 2+ on the left side.      Heart sounds: Murmur heard.    Systolic murmur is present.  Pulmonary:      Effort: Pulmonary effort is normal. No accessory muscle usage, respiratory distress or retractions.      Breath sounds: Normal breath sounds. No wheezing, rhonchi or rales.   Abdominal:      General: Abdomen is flat. There is no distension.      Palpations: Abdomen is soft. There is no mass or pulsatile mass.      Tenderness: There is no abdominal tenderness. There is no right CVA tenderness, left CVA tenderness, guarding or rebound.      Comments: No rigidity "   Musculoskeletal:         General: No swelling, tenderness or deformity.      Cervical back: Neck supple. No tenderness.      Right lower leg: No tenderness. Edema present.      Left lower leg: No tenderness. Edema present.      Comments: Calves are non tender bilaterally   Skin:     General: Skin is warm and dry.      Capillary Refill: Capillary refill takes less than 2 seconds.      Coloration: Skin is not jaundiced or pale.      Findings: No erythema.   Neurological:      General: No focal deficit present.      Mental Status: He is alert and oriented to person, place, and time. Mental status is at baseline.      Cranial Nerves: Cranial nerves 2-12 are intact. No cranial nerve deficit.      Sensory: Sensation is intact. No sensory deficit.      Motor: Motor function is intact. No weakness or pronator drift.      Coordination: Coordination is intact. Coordination normal.   Psychiatric:         Mood and Affect: Mood normal.         Behavior: Behavior normal.                  Procedures:  Procedures      Medical Decision Making:      Comorbidities that affect care:    hyperlipidemia, Coronary Artery Disease, Diabetes, Hypertension    External Notes reviewed:    None      The following orders were placed and all results were independently analyzed by me:  Orders Placed This Encounter   Procedures   • Blood Culture - Blood,   • Blood Culture - Blood,   • COVID-19,APTIMA PANTHER(KELSY),[facility], NP/OP SWAB IN UTM/VTM/SALINE TRANSPORT MEDIA,24 HR TAT - Swab, Nasal Cavity   • Influenza Antigen, Rapid - Swab, Nasopharynx   • XR Chest 1 View   • [place] Draw   • Comprehensive Metabolic Panel   • Single High Sensitivity Troponin T   • Magnesium   • Urinalysis With Microscopic If Indicated (No Culture) - Urine, Clean Catch   • Protime-INR   • CBC Auto Differential   • Lactic Acid, Plasma   • Urinalysis, Microscopic Only - Urine, Clean Catch   • NPO Diet NPO Type: Strict NPO   • Undress & Gown   • Continuous Pulse Oximetry    • Vital Signs   • Orthostatic Blood Pressure   • Please perform rectal temperature and notify physician  Nursing Communication   • Straight cath   • Discontinue Cardiac Monitoring   • Inpatient Hospitalist Consult   • Oxygen Therapy- Nasal Cannula; 2 LPM; Titrate for SPO2: 92%, Greater Than or Equal To   • POC Glucose Once   • ECG 12 Lead ED Triage Standing Order; Weak / Dizzy / AMS   • Insert Peripheral IV   • Initiate Observation Status   • Fall Precautions   • CBC & Differential   • Green Top (Gel)   • Lavender Top   • Gold Top - SST   • Light Blue Top       Medications Given in the Emergency Department:  Medications   sodium chloride 0.9 % flush 10 mL (has no administration in time range)   acetaminophen (TYLENOL) tablet 650 mg (650 mg Oral Given 3/22/23 1456)   sodium chloride 0.9 % bolus 500 mL (0 mL Intravenous Stopped 3/22/23 1547)   cefTRIAXone (ROCEPHIN) IVPB 1 g (0 g Intravenous Stopped 3/22/23 1825)        ED Course:         Labs:    Lab Results (last 24 hours)     Procedure Component Value Units Date/Time    POCT SARS-CoV-2 Antigen HAROON + Flu [[id number]] Collected: 03/22/23 0949    Specimen: Swab Updated: 03/22/23 0950     SARS Antigen Not Detected     Influenza A Antigen HAROON Not Detected     Influenza B Antigen HAROON Not Detected     Internal Control Passed     Lot Number [id number]     Expiration Date 01/06/23    CBC & Differential [[id number]]  (Abnormal) Collected: 03/22/23 1247    Specimen: Blood Updated: 03/22/23 1258    Narrative:      The following orders were created for panel order CBC & Differential.  Procedure                               Abnormality         Status                     ---------                               -----------         ------                     CBC Auto Differential[[id number]]        Abnormal            Final result                 Please view results for these tests on the individual orders.    Comprehensive Metabolic Panel [[id number]]  (Abnormal) Collected: 03/22/23  1247    Specimen: Blood Updated: 03/22/23 1317     Glucose 64 mg/dL      BUN 33 mg/dL      Creatinine 1.74 mg/dL      Sodium 131 mmol/L      Potassium 4.0 mmol/L      Chloride 95 mmol/L      CO2 21.9 mmol/L      Calcium 9.6 mg/dL      Total Protein 7.4 g/dL      Albumin 3.9 g/dL      ALT (SGPT) 11 U/L      AST (SGOT) 13 U/L      Alkaline Phosphatase 84 U/L      Total Bilirubin 0.9 mg/dL      Globulin 3.5 gm/dL      A/G Ratio 1.1 g/dL      BUN/Creatinine Ratio 19.0     Anion Gap 14.1 mmol/L      eGFR 40.4 mL/min/1.73     Narrative:      GFR Normal >60  Chronic Kidney Disease <60  Kidney Failure <15    The GFR formula is only valid for adults with stable renal function between ages 18 and 70.    Single High Sensitivity Troponin T [[id number]]  (Abnormal) Collected: 03/22/23 1247    Specimen: Blood Updated: 03/22/23 1329     HS Troponin T 61 ng/L     Narrative:      High Sensitive Troponin T Reference Range:  <10.0 ng/L- Negative Female for AMI  <15.0 ng/L- Negative Male for AMI  >=10 - Abnormal Female indicating possible myocardial injury.  >=15 - Abnormal Male indicating possible myocardial injury.   Clinicians would have to utilize clinical acumen, EKG, Troponin, and serial changes to determine if it is an Acute Myocardial Infarction or myocardial injury due to an underlying chronic condition.         Magnesium [[id number]]  (Normal) Collected: 03/22/23 1247    Specimen: Blood Updated: 03/22/23 1313     Magnesium 1.8 mg/dL     Protime-INR [[id number]]  (Abnormal) Collected: 03/22/23 1247    Specimen: Blood Updated: 03/22/23 1312     Protime 15.4 Seconds      INR 1.22    Narrative:      Suggested Therapeutic Ranges For Oral Anticoagulant Therapy:  Level of Therapy                      INR Target Range  Standard Dose                            2.0-3.0  High Dose                                2.5-3.5  Patients not receiving anticoagulant  Therapy Normal Range                     0.86-1.15    CBC Auto Differential  [[id number]]  (Abnormal) Collected: 03/22/23 1247    Specimen: Blood Updated: 03/22/23 1258     WBC 16.97 10*3/mm3      RBC 3.80 10*6/mm3      Hemoglobin 9.9 g/dL      Hematocrit 30.7 %      MCV 80.8 fL      MCH 26.1 pg      MCHC 32.2 g/dL      RDW 15.9 %      RDW-SD 46.1 fl      MPV 10.1 fL      Platelets 273 10*3/mm3      Neutrophil % 81.5 %      Lymphocyte % 7.5 %      Monocyte % 10.0 %      Eosinophil % 0.0 %      Basophil % 0.2 %      Immature Grans % 0.8 %      Neutrophils, Absolute 13.84 10*3/mm3      Lymphocytes, Absolute 1.27 10*3/mm3      Monocytes, Absolute 1.69 10*3/mm3      Eosinophils, Absolute 0.00 10*3/mm3      Basophils, Absolute 0.03 10*3/mm3      Immature Grans, Absolute 0.14 10*3/mm3      nRBC 0.0 /100 WBC     COVID-19,APTIMA PANTHER(KELSY), [facility]/ [facility]IONE, NP/OP SWAB IN UTM/VTM/SALINE TRANSPORT MEDIA,24 HR TAT - Swab, Nasal Cavity [[id number]] Collected: 03/22/23 1339    Specimen: Swab from Nasal Cavity Updated: 03/22/23 1346    Influenza Antigen, Rapid - Swab, Nasopharynx [[id number]]  (Normal) Collected: 03/22/23 1339    Specimen: Swab from Nasopharynx Updated: 03/22/23 1426     Influenza A Ag, EIA Negative     Influenza B Ag, EIA Negative    Lactic Acid, Plasma [[id number]]  (Normal) Collected: 03/22/23 1345    Specimen: Blood Updated: 03/22/23 1412     Lactate 1.6 mmol/L     Blood Culture - Blood, Arm, Left [[id number]] Collected: 03/22/23 1346    Specimen: Blood from Arm, Left Updated: 03/22/23 1352    Blood Culture - Blood, Arm, Left [[id number]] Collected: 03/22/23 1347    Specimen: Blood from Arm, Left Updated: 03/22/23 1354    Urinalysis With Microscopic If Indicated (No Culture) - Straight Cath [[id number]]  (Abnormal) Collected: 03/22/23 1544    Specimen: Urine from Straight Cath Updated: 03/22/23 1623     Color, UA Dark Yellow     Appearance, UA Cloudy     pH, UA <=5.0     Specific Gravity, UA 1.019     Glucose, UA Negative     Ketones, UA Trace     Bilirubin, UA Small (1+)     Blood, UA Trace      Protein,  mg/dL (2+)     Leuk Esterase, UA Small (1+)     Nitrite, UA Negative     Urobilinogen, UA 1.0 E.U./dL    Urinalysis, Microscopic Only - Straight Cath [[id number]]  (Abnormal) Collected: 03/22/23 1544    Specimen: Urine from Straight Cath Updated: 03/22/23 1623     RBC, UA 3-5 /HPF      WBC, UA 6-12 /HPF      Bacteria, UA 1+ /HPF      Squamous Epithelial Cells, UA 0-2 /HPF      Transitional Epithelial Cells, UA 0-2 /HPF      Hyaline Casts, UA 0-2 /LPF      Methodology Manual Light Microscopy           Imaging:    XR Chest 1 View    Result Date: 3/22/2023  PROCEDURE: XR CHEST 1 VW  [facility]  INDICATIONS: Weak/Dizzy/AMS triage protocol  FINDINGS:  The heart size is normal with postsurgical changes apparent.  The pulmonary vascular markings are normal.  The lungs and pleural spaces are clear of active disease.  There are chronic age-related changes involving the bony thorax and thoracic aorta.       No active disease.       DELVIS AGUSTIN MD       Electronically Signed and Approved By: DELVIS AGUSTIN MD on 3/22/2023 at 13:17                 Differential Diagnosis and Discussion:    Dizziness: Based on the patient's history, signs, and symptoms, the diffential diagnosis includes but is not limited to meningitis, stroke, sepsis, subarachnoid hemorrhage, intracranial bleeding, encephalitis, vertigo, electrolyte imbalance, and metabolic disorders.    All labs were reviewed and interpreted by me.    MDM  Number of Diagnoses or Management Options  Acute kidney injury (HCC)  Chronic anemia  Dehydration  Febrile illness, acute  Urinary tract infection without hematuria, site unspecified  Weakness  Diagnosis management comments: Sepsis was not present in the emergency department or on arrival. This is supported as the patient does not either meet two out of the four SIRS criteria or has an obvious bacterial infection.      SIRS criteria  considered:   1.                     Temperature > 100.4 or <98.6    2.                     Heart Rate > 90    3.                     Respiratory Rate > 22    4.                     WBC > 12K or <4K.       Patient's vital signs were stable upon arrival.  Patient had a normal temperature.  The patient did have a rectal temperature checked and it was 101.5.  The patient's CBC demonstrates a white blood cell count elevated at 16.9.  The patient's hemoglobin hematocrit were 9.9 and 30.7.  In comparison to old labs the patient does appear to have some chronic anemia.  The patient's neutrophil count was elevated at 13.8.  The patient had no bandemia.  The patient's platelets were normal.  The patient had recently started his Coumadin.  The patient's INR was 1.22.  Patient's chemistry demonstrates a glucose of 64, BUN of 33 and a creatinine of 1.74.  In comparison to old labs 5 days ago the patient appears to be dehydrated and have acute kidney injury.  Patient's flu was negative.  The patient's lactate was negative or normal at 1.6.  The patient's chest x-ray was clear.  Patient's urinalysis was positive for infection.  The patient had 6 0 squamous with 1+ bacteria and 6-12 WBCs.  Blood cultures were ordered and pending at the time of admission.  The patient was started on IV fluids of normal saline and the patient was given Rocephin for possible UTI.  The patient was reassessed.  The patient subjectively states he is very weak.  I did try to sit the patient up in bed.  The patient was weak and unable to sit up in bed.  I do not feel the patient can go home due to his weakness.  The patient was subsequently admitted to the hospital for the febrile illness, dehydration, acute kidney injury with possible UTI.  At the time of admission, the patient is resting comfortably he does not appear septic or toxic       Amount and/or Complexity of Data Reviewed  Clinical lab tests: reviewed  Tests in the radiology section of CPT®:  reviewed  Tests in the medicine section of CPT®: reviewed  Decide to obtain previous medical records or to obtain history from someone other than the patient: yes (Compared the patient's CBC and chemistry to old lab work.  The patient appears to have chronic anemia.  The patient does appear to have dehydration acute kidney injury from the previous blood performed 5 days)  Discuss the patient with other providers: yes (I discussed the case with the hospitalist.  We have discussed the patient's presenting symptoms, laboratory values, imaging and condition at the time of admission.  They will evaluate the patient in the emergency room and admit the patient to the hospital)             Social Determinants of Health:    Patient is independent, reliable, and has access to care.       Disposition and Care Coordination:    Admit:   Through independent evaluation of the patient's history, physical, and imperical data, the patient meets criteria for observation/admission to the hospital.        Final diagnoses:   Febrile illness, acute   Dehydration   Acute kidney injury (HCC)   Urinary tract infection without hematuria, site unspecified   Chronic anemia   Weakness        ED Disposition     ED Disposition   Decision to Admit    Condition   --    Comment   Level of Care: Med/Surg [1]   Diagnosis: Febrile illness, acute [116919]   Admitting Physician: ELIS DALE [[id number]]   Attending Physician: ELIS DALE [[id number]]               This medical record created using voice recognition software.    Documentation assistance provided by Orlando Thomas acting as scribe for Raudel Lundberg DO. Information recorded by the scribe was done at my direction and has been verified and validated by me.            Orlando Thomas  03/22/23 2173       Raudel Lundberg DO  03/22/23 [id number]

## 2023-03-22 NOTE — PROGRESS NOTES
Zachariah Cruz presents to [facility].    Chief Complaint:  Hospital Follow Up Visit ([facility] 3/16/-3/17/23 for Coronary artery disease involving native coronary artery of native heart without angina pectoris, Abnormal nuclear stress test, CAD S/P percutaneous coronary angioplasty. Treated by Dr. Denton) and Dizziness (Reports dizziness, unable to walk, reports fell last night, weakness, shortness of breath, fatigue, chills, feverish feeling, nauseated, constipated has been taking miralax and has been having bowel movements since. Symptoms started Monday and worsened last night. Reports calling Dr. Denton's office and states recommend a covid test. )         History of Present Illness:  URI  When did symptoms started 2-3 days ago   Any exposures: recently in hospital for CAD/stent/cath  Symptoms: cough, felt feverish, weakness, dizziness, achy, not eating, fell last night, not injured    Treatment tried: Tylenol 2 days ago    He feels like his symptoms started after getting on plavik   /59 at home this am    Hospitalization:   Admitted 3-16-23  Discharged: 3-17-23  Condition: not improved  Procedures: heart cath and stent         PAST MEDICAL HISTORY changes since 3-7-2023       covid +     AF, sees Dr Moffett    Sleep Apnea: uses CPAP;     Renal Stones: he has undergone ECSW lithotripsy and laser       Hospitalizations:  Heart cath 3-16-23    Pneumonia admitted once on 10-20-20 (COVID +)         CURRENT MEDICAL PROVIDERS:    Orthopedist    Rheumatologist: Dr. Arvizu     urology : [facility]        PREVENTIVE HEALTH MAINTENANCE             COLORECTAL CANCER SCREENING: Up to date (colonoscopy q10y; sigmoidoscopy q5y; Cologuard q3y) was last done 12/01/2020, Results are in chart; Cologuard normal       Hepatitis C Medicare Screening: was last done 9-2017; negative            Surgical History:     Heart cath 3-16-23 and stenting     Left cataract      Appendectomy    Coronary Artery Bypass Graft: ;     Transurethral Resection of Prostate: ;     Cataract Removal: right; ;         Family History:     Father:  at age 91; Cause of death was CAD;  Coronary Artery Disease     Mother:  at age 88; Cause of death was CVA;  Hypertension     Paternal Grandfather: Cerebrovascular Accident         Social History:     Occupation:.   (Self-Employed) [employer]      Marital Status:      Children: 3 children             Review of Systems:  Review of Systems   Constitutional: Positive for fever (subjective ).   Respiratory: Positive for cough (slight dry ) and shortness of breath (slight, oxygen running 93-95 ).    Cardiovascular: Negative for chest pain.   Genitourinary: Negative for dysuria (but did have a folley catheter while in hospital last week, and a few days ago passed something in his urine ).   Neurological: Positive for dizziness, weakness and headache (slight ).          Current Outpatient Medications:   •  allopurinol (ZYLOPRIM) 100 MG tablet, TAKE ONE TABLET BY MOUTH DAILY, Disp: 90 tablet, Rfl: 0  •  amLODIPine (NORVASC) 10 MG tablet, Take 1 tablet by mouth Daily., Disp: 90 tablet, Rfl: 3  •  glimepiride (AMARYL) 4 MG tablet, TAKE ONE TABLET BY MOUTH DAILY, Disp: 90 tablet, Rfl: 0  •  lisinopril (PRINIVIL,ZESTRIL) 20 MG tablet, TAKE ONE TABLET BY MOUTH TWICE A DAY, Disp: 180 tablet, Rfl: 0  •  metFORMIN ER (GLUCOPHAGE-XR) 500 MG 24 hr tablet, Take 4 tablets by mouth Daily., Disp: 360 tablet, Rfl: 0  •  metoprolol succinate XL (TOPROL-XL) 25 MG 24 hr tablet, TAKE ONE TABLET BY MOUTH TWICE A DAY, Disp: 180 tablet, Rfl: 0  •  nitroglycerin (NITROSTAT) 0.4 MG SL tablet, 1 under the tongue as needed for angina, may repeat q5mins for up three doses, Disp: 100 tablet, Rfl: 11  •  simvastatin (ZOCOR) 40 MG tablet, TAKE ONE TABLET BY MOUTH DAILY (Patient taking differently: Take 1 tablet by mouth Every Night.), Disp: 90  "tablet, Rfl: 0  •  spironolactone (ALDACTONE) 25 MG tablet, Take 1 tablet by mouth Daily., Disp: 30 tablet, Rfl: 11  •  warfarin (Coumadin) 4 MG tablet, Take 1 tablet by mouth daily or as directed (Patient taking differently: Take 1 tablet by mouth Every Night. REPORTED PER CARDIOLOGY LAST DOSE TO BE PM DOSE OF 3/12/23 Home Warfarin Notes  Home warfarin dose: 4mg qday Who monitors the patients INR? Bertin Has the patient been consistent on their current dosing regimen? Since November), Disp: 90 tablet, Rfl: 1  •  aspirin 81 MG EC tablet, Take 1 tablet by mouth Daily. (Patient not taking: Reported on 3/22/2023), Disp: 30 tablet, Rfl: 1  •  ticagrelor (Brilinta) 90 MG tablet tablet, Take 1 tablet by mouth 2 (Two) Times a Day. (Patient not taking: Reported on 3/22/2023), Disp: 60 tablet, Rfl: 5  •  ticagrelor (Brilinta) 90 MG tablet tablet, Take 1 tablet by mouth 2 (Two) Times a Day., Disp: 112 tablet, Rfl: 0    Vital Signs:   Vitals:    03/22/23 0913 03/22/23 1059   BP: 103/51 97/52   BP Location: Right arm Right arm   Patient Position: Sitting Sitting   Cuff Size: Large Adult Large Adult   Pulse: 88 87   Resp: 18 18   Temp: (!) 101.9 °F (38.8 °C) 99.1 °F (37.3 °C)   TempSrc: Oral Oral   SpO2: 97%  Comment: room air 98%  Comment: room air   Weight: Comment: unable to stand to weigh    Height: 177.8 cm (70\")    PainSc:   8    PainLoc: Generalized          Physical Exam:  Physical Exam  Constitutional:       General: He is not in acute distress.     Appearance: Normal appearance.   Cardiovascular:      Rate and Rhythm: Normal rate and regular rhythm.      Heart sounds: No murmur heard.  Pulmonary:      Effort: Pulmonary effort is normal.      Breath sounds: Normal breath sounds.   Musculoskeletal:      Right lower leg: No edema.      Left lower leg: No edema.      Comments: Sitting in wheelchair    Lymphadenopathy:      Cervical: No cervical adenopathy.   Neurological:      General: No focal deficit present.      " Mental Status: He is alert.   Psychiatric:         Mood and Affect: Mood normal.         Behavior: Behavior normal.         Result Review      The following data was reviewed by: JACIEL Tovar on 03/22/2023:    Results for orders placed or performed in visit on 03/22/23   POCT SARS-CoV-2 Antigen HAORON + Flu    Specimen: Swab   Result Value Ref Range    SARS Antigen Not Detected Not Detected, Presumptive Negative    Influenza A Antigen HAROON Not Detected Not Detected    Influenza B Antigen HAROON Not Detected Not Detected    Internal Control Passed Passed    Lot Number [id number]     Expiration Date 01/06/23                Assessment and Plan:          Diagnoses and all orders for this visit:    1. Acute cough (Primary)  Assessment & Plan:  Rest, increase fluids, follow up if symptoms progress or change   covid screen: neg   Flu screen: neg       Orders:  -     POCT SARS-CoV-2 Antigen HAROON + Flu    2. CAD S/P percutaneous coronary angioplasty  Assessment & Plan:  His wife is with him and was planning on going upstairs to  a rx from cardiologist today, had my nurse call the office, sent him on upstairs and to see if cardiology wants to see him or send him to the ER/he was given the rx he is to start, sent back to our office to determine further evaluation today      3. Type 2 diabetes mellitus without complication, without long-term current use of insulin (HCC)  Assessment & Plan:  His wife reports he needs his metformin refilled to [facility]     Orders:  -     metFORMIN ER (GLUCOPHAGE-XR) 500 MG 24 hr tablet; Take 4 tablets by mouth Daily.  Dispense: 360 tablet; Refill: 0    4. Weakness  Assessment & Plan:  Consulted with Dr Gonzalez, due to his weakness, recent fall, not able to ambulate on his own, and having recently had cardiac cath/ stents, to send him to [facility] for an evluation   I called [facility], gave report to [facility]           Follow Up   Return if symptoms worsen or fail to  improve.  Patient was given instructions and counseling regarding his condition or for health maintenance advice. Please see specific information pulled into the AVS if appropriate.

## 2023-03-22 NOTE — ASSESSMENT & PLAN NOTE
His wife is with him and was planning on going upstairs to  a rx from cardiologist today, had my nurse call the office, sent him on upstairs and to see if cardiology wants to see him or send him to the ER/he was given the rx he is to start, sent back to our office to determine further evaluation today

## 2023-03-22 NOTE — ASSESSMENT & PLAN NOTE
Consulted with Dr Gonzalez, due to his weakness, recent fall, not able to ambulate on his own, and having recently had cardiac cath/ stents, to send him to [facility] for an evluation   I called [facility], gave report to Barbi

## 2023-03-22 NOTE — H&P
[facility] HISTORY AND PHYSICAL  Date: 3/22/2023   Patient Name: Zachariah Cruz  : 1945  MRN: [id number]  Primary Care Physician:  Nelia Frost APRN  Date of admission: 3/22/2023    Subjective   Subjective     Chief Complaint: Febrile associated with weakness    HPI:    Zachariah Cruz is a 77 y.o. male with history of coronary artery disease, previous CABG, paroxysmal atrial flutter, hypertension, hyperlipidemia, sleep apnea and gout he presented here to the emergency room with weakness.  Recently patient had a cardiac catheterization on  of this year and he underwent a successful angioplasty with stent placement in the SVG to OM branch.  She was discharged on warfarin and Plavix.  Patient did okay on Friday and Saturday and on  he began to have weakness.  Patient was not able to eat anything yesterday and at night his mouth was so dry that he had a fall.  He did not injure himself.  The patient stated that he documented a fever of 101 yesterday.  Patient denies having any chest pain chest pressure abdominal pain or shortness of breath.  He does complain of diarrhea.  The diarrhea is nonbloody.    According to notes patient developed a small hematoma at the vascular access site for the cardiac catheterization.    The ED showed a urinalysis that is dark yellow cloudy with 1+ bacteria and 6-12 WBC.  In the ED he was treated for a UTI.  Blood cultures and urine culture is pending.    Personal History     Past Medical History:   Diagnosis Date   • Coronary artery disease    • Diabetes (HCC)    • Hematuria    • Hyperlipidemia    • Hypertension          Past Surgical History:   Procedure Laterality Date   • APPENDECTOMY     • CARDIAC CATHETERIZATION     • CARDIAC CATHETERIZATION N/A 3/16/2023    Procedure: Left Heart Cath with possible angioplasty;  Surgeon: Vishal Denton MD;  Location: [facility] CATH INVASIVE LOCATION;  Service: Cardiovascular;  Laterality:  N/A;   • CATARACT EXTRACTION     • CORONARY ARTERY BYPASS GRAFT  2010    LIMA to LAD, SVG to OM, SVG to PDA   • PROSTATE SURGERY      TRANSURETHRAL RESECTION OF THE PROSTATE         Family History   Problem Relation Age of Onset   • Hypertension Mother    • Stroke Mother    • Coronary artery disease Father    • Stroke Paternal Grandfather    • Coronary artery disease Other          Social History     Socioeconomic History   • Marital status:    Tobacco Use   • Smoking status: Former     Packs/day: 1.00     Years: 5.00     Pack years: 5.00     Types: Cigarettes     Quit date:      Years since quittin.2   • Smokeless tobacco: Never   Vaping Use   • Vaping Use: Never used   Substance and Sexual Activity   • Alcohol use: Not Currently   • Drug use: Never   • Sexual activity: Defer         Home Medications:  allopurinol, amLODIPine, aspirin, glimepiride, lisinopril, metFORMIN ER, metoprolol succinate XL, nitroglycerin, simvastatin, spironolactone, ticagrelor, and warfarin    Allergies:  No Known Allergies    Review of Systems   Constitutional: Positive for appetite change, fatigue and fever. Negative for chills.   HENT: Negative for postnasal drip, sinus pain, sore throat and trouble swallowing.    Eyes: Negative for pain, discharge and itching.   Respiratory: Negative for cough, chest tightness, shortness of breath and wheezing.    Cardiovascular: Negative for chest pain, palpitations and leg swelling.   Gastrointestinal: Negative for abdominal pain, blood in stool, constipation, diarrhea, nausea and vomiting.   Genitourinary: Negative for difficulty urinating, dysuria, frequency, hematuria and urgency.   Skin: Negative for color change, rash and wound.   Neurological: Negative for dizziness, tremors, seizures, syncope, facial asymmetry, speech difficulty, weakness, light-headedness, numbness and headaches.   Psychiatric/Behavioral: Negative for agitation and confusion. The patient is not  nervous/anxious and is not hyperactive.         Objective   Objective     Vitals:   Temp:  [99 °F (37.2 °C)-101.9 °F (38.8 °C)] 101.5 °F (38.6 °C)  Heart Rate:  [69-88] 71  Resp:  [18-20] 20  BP: ()/(51-60) 121/60    Physical Exam    Constitutional: Awake, alert, no acute distress   Eyes: Pupils equal, sclerae anicteric, no conjunctival injection   HENT: NCAT, mucous membranes moist   Neck: Supple, no thyromegaly, no lymphadenopathy, trachea midline   Respiratory: Clear to auscultation bilaterally, nonlabored respirations    Cardiovascular: RRR, no murmurs, rubs, or gallops, palpable pedal pulses bilaterally   Gastrointestinal: Positive bowel sounds, soft, nontender, nondistended   Musculoskeletal: No bilateral ankle edema, no clubbing or cyanosis to extremities   Psychiatric: Appropriate affect, cooperative   Neurologic: Oriented x 3, strength symmetric in all extremities, Cranial Nerves grossly intact to confrontation, speech clear   Skin: No rashes     Result Review    Result Review:  I have personally reviewed the results from the time of this admission to 3/22/2023 19:24 EDT and agree with these findings:  [x]  Laboratory  []  Microbiology  [x]  Radiology  []  EKG/Telemetry   []  Cardiology/Vascular   []  Pathology  []  Old records  []  Other:    Imaging Results (Last 24 Hours)     Procedure Component Value Units Date/Time    XR Chest 1 View [[id number]] Collected: 03/22/23 1318     Updated: 03/22/23 1321    Narrative:      PROCEDURE: XR CHEST 1 VW     COMPARISON: [facility] , XR CHEST PA AND LATERAL, 7/18/2022, 13:50.     INDICATIONS: Weak/Dizzy/AMS triage protocol     FINDINGS:   The heart size is normal with postsurgical changes apparent.  The pulmonary vascular markings are   normal.  The lungs and pleural spaces are clear of active disease.  There are chronic age-related   changes involving the bony thorax and thoracic aorta.       Impression:       No active disease.                   DELVIS AGUSTIN MD         Electronically Signed and Approved By: DELVIS AGUSTIN MD on 3/22/2023 at 13:17                            Assessment & Plan   Assessment / Plan     Assessment  #UTI  #Acute kidney injury  #Weakness  #CAD status post stent  #Paroxysmal a flutter        Plan:   -Admit for observation  -Continue ceftriaxone  -Blood culture pending  -Continue Brilinta and warfarin and aspirin  -Hold lisinopril and Aldactone because of YURIDIA  -Continue other appropriate home medications  -Healthy diet  -Normal saline at 125 mill per hour  -Morning labs  -Continue warfarin for his a flutter        DVT prophylaxis:  No DVT prophylaxis order currently exists.    CODE STATUS:         Admission Status:  I believe this patient meets observe status.    Kingston Bill MD

## 2023-03-22 NOTE — ASSESSMENT & PLAN NOTE
Rest, increase fluids, follow up if symptoms progress or change   covid screen: neg   Flu screen: neg

## 2023-03-23 LAB
ANION GAP SERPL CALCULATED.3IONS-SCNC: 12.5 MMOL/L (ref 5–15)
BUN SERPL-MCNC: 39 MG/DL (ref 8–23)
BUN/CREAT SERPL: 26 (ref 7–25)
CALCIUM SPEC-SCNC: 9 MG/DL (ref 8.6–10.5)
CHLORIDE SERPL-SCNC: 101 MMOL/L (ref 98–107)
CO2 SERPL-SCNC: 19.5 MMOL/L (ref 22–29)
CREAT SERPL-MCNC: 1.5 MG/DL (ref 0.76–1.27)
DEPRECATED RDW RBC AUTO: 45.7 FL (ref 37–54)
EGFRCR SERPLBLD CKD-EPI 2021: 48.2 ML/MIN/1.73
ERYTHROCYTE [DISTWIDTH] IN BLOOD BY AUTOMATED COUNT: 15.8 % (ref 12.3–15.4)
GLUCOSE BLDC GLUCOMTR-MCNC: 103 MG/DL (ref 70–99)
GLUCOSE BLDC GLUCOMTR-MCNC: 123 MG/DL (ref 70–99)
GLUCOSE BLDC GLUCOMTR-MCNC: 77 MG/DL (ref 70–99)
GLUCOSE SERPL-MCNC: 50 MG/DL (ref 65–99)
HCT VFR BLD AUTO: 28.7 % (ref 37.5–51)
HGB BLD-MCNC: 9.2 G/DL (ref 13–17.7)
L PNEUMO1 AG UR QL IA: NEGATIVE
MAGNESIUM SERPL-MCNC: 1.9 MG/DL (ref 1.6–2.4)
MCH RBC QN AUTO: 25.6 PG (ref 26.6–33)
MCHC RBC AUTO-ENTMCNC: 32.1 G/DL (ref 31.5–35.7)
MCV RBC AUTO: 79.9 FL (ref 79–97)
PHOSPHATE SERPL-MCNC: 3.1 MG/DL (ref 2.5–4.5)
PLATELET # BLD AUTO: 252 10*3/MM3 (ref 140–450)
PMV BLD AUTO: 11.1 FL (ref 6–12)
POTASSIUM SERPL-SCNC: 3.7 MMOL/L (ref 3.5–5.2)
PROCALCITONIN SERPL-MCNC: 1.47 NG/ML (ref 0–0.25)
RBC # BLD AUTO: 3.59 10*6/MM3 (ref 4.14–5.8)
S PNEUM AG SPEC QL LA: NEGATIVE
SODIUM SERPL-SCNC: 133 MMOL/L (ref 136–145)
WBC NRBC COR # BLD: 13.43 10*3/MM3 (ref 3.4–10.8)

## 2023-03-23 PROCEDURE — 99233 SBSQ HOSP IP/OBS HIGH 50: CPT | Performed by: INTERNAL MEDICINE

## 2023-03-23 PROCEDURE — 83735 ASSAY OF MAGNESIUM: CPT | Performed by: STUDENT IN AN ORGANIZED HEALTH CARE EDUCATION/TRAINING PROGRAM

## 2023-03-23 PROCEDURE — 87899 AGENT NOS ASSAY W/OPTIC: CPT | Performed by: STUDENT IN AN ORGANIZED HEALTH CARE EDUCATION/TRAINING PROGRAM

## 2023-03-23 PROCEDURE — 94799 UNLISTED PULMONARY SVC/PX: CPT

## 2023-03-23 PROCEDURE — 80048 BASIC METABOLIC PNL TOTAL CA: CPT | Performed by: STUDENT IN AN ORGANIZED HEALTH CARE EDUCATION/TRAINING PROGRAM

## 2023-03-23 PROCEDURE — 85027 COMPLETE CBC AUTOMATED: CPT | Performed by: STUDENT IN AN ORGANIZED HEALTH CARE EDUCATION/TRAINING PROGRAM

## 2023-03-23 PROCEDURE — 87449 NOS EACH ORGANISM AG IA: CPT | Performed by: STUDENT IN AN ORGANIZED HEALTH CARE EDUCATION/TRAINING PROGRAM

## 2023-03-23 PROCEDURE — 84100 ASSAY OF PHOSPHORUS: CPT | Performed by: STUDENT IN AN ORGANIZED HEALTH CARE EDUCATION/TRAINING PROGRAM

## 2023-03-23 PROCEDURE — 84145 PROCALCITONIN (PCT): CPT | Performed by: STUDENT IN AN ORGANIZED HEALTH CARE EDUCATION/TRAINING PROGRAM

## 2023-03-23 PROCEDURE — 82962 GLUCOSE BLOOD TEST: CPT

## 2023-03-23 PROCEDURE — 25010000002 CEFTRIAXONE PER 250 MG: Performed by: STUDENT IN AN ORGANIZED HEALTH CARE EDUCATION/TRAINING PROGRAM

## 2023-03-23 RX ORDER — TAMSULOSIN HYDROCHLORIDE 0.4 MG/1
0.4 CAPSULE ORAL DAILY
Status: DISCONTINUED | OUTPATIENT
Start: 2023-03-23 | End: 2023-03-25 | Stop reason: HOSPADM

## 2023-03-23 RX ORDER — NICOTINE POLACRILEX 4 MG
15 LOZENGE BUCCAL
Status: DISCONTINUED | OUTPATIENT
Start: 2023-03-23 | End: 2023-03-25 | Stop reason: HOSPADM

## 2023-03-23 RX ORDER — DEXTROSE MONOHYDRATE 25 G/50ML
25 INJECTION, SOLUTION INTRAVENOUS
Status: DISCONTINUED | OUTPATIENT
Start: 2023-03-23 | End: 2023-03-25 | Stop reason: HOSPADM

## 2023-03-23 RX ORDER — DEXTROSE, SODIUM CHLORIDE, AND POTASSIUM CHLORIDE 5; .45; .15 G/100ML; G/100ML; G/100ML
75 INJECTION INTRAVENOUS CONTINUOUS
Status: DISCONTINUED | OUTPATIENT
Start: 2023-03-23 | End: 2023-03-24

## 2023-03-23 RX ADMIN — METOPROLOL SUCCINATE 25 MG: 25 TABLET, EXTENDED RELEASE ORAL at 21:24

## 2023-03-23 RX ADMIN — ASPIRIN 81 MG: 81 TABLET, COATED ORAL at 09:39

## 2023-03-23 RX ADMIN — SODIUM CHLORIDE 125 ML/HR: 9 INJECTION, SOLUTION INTRAVENOUS at 05:59

## 2023-03-23 RX ADMIN — ALLOPURINOL 100 MG: 100 TABLET ORAL at 09:39

## 2023-03-23 RX ADMIN — TICAGRELOR 90 MG: 90 TABLET ORAL at 21:24

## 2023-03-23 RX ADMIN — WARFARIN SODIUM 4 MG: 4 TABLET ORAL at 18:08

## 2023-03-23 RX ADMIN — CEFTRIAXONE SODIUM 1 G: 1 INJECTION, SOLUTION INTRAVENOUS at 09:40

## 2023-03-23 RX ADMIN — TAMSULOSIN HYDROCHLORIDE 0.4 MG: 0.4 CAPSULE ORAL at 16:04

## 2023-03-23 RX ADMIN — ATORVASTATIN CALCIUM 20 MG: 20 TABLET, FILM COATED ORAL at 09:40

## 2023-03-23 RX ADMIN — ACETAMINOPHEN 650 MG: 325 TABLET ORAL at 21:24

## 2023-03-23 RX ADMIN — TICAGRELOR 90 MG: 90 TABLET ORAL at 09:40

## 2023-03-23 RX ADMIN — Medication 10 ML: at 09:40

## 2023-03-23 RX ADMIN — METOPROLOL SUCCINATE 25 MG: 25 TABLET, EXTENDED RELEASE ORAL at 09:40

## 2023-03-23 RX ADMIN — POTASSIUM CHLORIDE, DEXTROSE MONOHYDRATE AND SODIUM CHLORIDE 75 ML/HR: 150; 5; 450 INJECTION, SOLUTION INTRAVENOUS at 09:49

## 2023-03-23 NOTE — PROGRESS NOTES
[facility]   Hospitalist Progress Note  Date: 3/23/2023  Patient Name: Zachariah Cruz  : 1945  MRN: [id number]  Date of admission: 3/22/2023    Subjective   Subjective     Chief Complaint: Generalized weakness and fever    Summary:   Zachariah Cruz is a 77 y.o. male with CAD s/p CABG and PCI, paroxysmal atrial flutter, hypertension, hyperlipidemia, sleep apnea and gout who presented to the ED with complaints of weakness and fever.  Patient also noted that he had developed extensive hematoma and groin area following recent cardiac catheterization procedure.  Urinalysis consistent with UTI.  Patient started on antibiotics.  Hospitalist service contacted for further evaluation management.  Patient found to have acute urinary retention, Kim catheter inserted.    Interval Followup:   Patient without any recurrent episodes of dizziness or lightheadedness.  Still feels weak overall.  Tmax: 101.5 °F overnight.  Nursing with no additional acute issues to report    Antibiotics:   -Ceftriaxone    Pain Medication:   -Ashton    Review of Systems   All systems reviewed and negative unless stated otherwise under subjective.    Objective   Objective     Vitals:   Temp:  [97.5 °F (36.4 °C)-101.5 °F (38.6 °C)] 98.2 °F (36.8 °C)  Heart Rate:  [] 92  Resp:  [18-20] 20  BP: ()/(48-80) 135/68  Flow (L/min):  [1-2] 1  Physical Exam   Gen: No acute distress, Conversant, Pleasant, sitting up in bed eating lunch  Resp: CTAB, No w/r/r, No respiratory distress appreciated, good aeration, equal chest rise bilaterally  Card: RRR, No m/r/g  Abd: Soft, Nontender, Nondistended, + bowel sounds, extensive hematoma noted in pelvic region      Result Review    Result Review:  I have personally reviewed the results as below and agree with these findings:  []  Laboratory:   CMP    CMP 3/17/23 3/22/23 3/23/23   Glucose 177 (A) 64 (A) 50 (A)   BUN 17 33 (A) 39 (A)   Creatinine 0.95 1.74 (A) 1.50 (A)   eGFR 83.5 40.4 (A)  48.2 (A)   Sodium 139 131 (A) 133 (A)   Potassium 4.4 4.0 3.7   Chloride 104 95 (A) 101   Calcium 9.8 9.6 9.0   Total Protein  7.4    Albumin  3.9    Globulin  3.5    Total Bilirubin  0.9    Alkaline Phosphatase  84    AST (SGOT)  13    ALT (SGPT)  11    Albumin/Globulin Ratio  1.1    BUN/Creatinine Ratio 17.9 19.0 26.0 (A)   Anion Gap 10.4 14.1 12.5   (A) Abnormal value            CBC    CBC 3/17/23 3/22/23 3/23/23   WBC 9.16 16.97 (A) 13.43 (A)   RBC 4.19 3.80 (A) 3.59 (A)   Hemoglobin 10.7 (A) 9.9 (A) 9.2 (A)   Hematocrit 33.9 (A) 30.7 (A) 28.7 (A)   MCV 80.9 80.8 79.9   MCH 25.5 (A) 26.1 (A) 25.6 (A)   MCHC 31.6 32.2 32.1   RDW 15.0 15.9 (A) 15.8 (A)   Platelets 321 273 252   (A) Abnormal value            Hypoglycemia noted.  Phosphorus within normal limits.  Magnesium within normal limits.  Procalcitonin elevated.    [x]  Microbiology: Urine culture (03/22/2023): Pending.  Blood culture (03/08/2023): Pending.  []  Radiology:   []  EKG/Telemetry:    []  Cardiology/Vascular:    []  Pathology:  []  Old records:  []  Other:    Assessment & Plan   Assessment / Plan     Assessment:  Urinary tract infection due to unknown organism  Sepsis secondary to above, present on admission  Generalized weakness  Acute renal failure  Acute urinary retention  CAD s/p CABG and PCI  Hypoglycemia  Paroxysmal atrial flutter  Therapeutic drug level monitoring, warfarin    Plan:  -Continue ceftriaxone, tailor based on results of infectious work-up  -Start IV fluids  -Bladder scan done and patient noted to have greater than 500 mL of urine even after voiding.  Kim catheter to be inserted.  Start tamsulosin  -PT/OT consulted  -Continue appropriate home medications including ticagrelor and warfarin  -Hold home lisinopril given elevated creatinine  -Continue warfarin.  Monitor INR  -Given hypoglycemia start D5 half-normal saline with potassium.  Monitor blood glucose level every 4 hours.  Hypoglycemia can be dangerous so will need close  monitoring  -Hold home amlodipine, patient currently normotensive  -Will monitor electrolytes and renal function with BMP and magnesium level in the AM  -Will monitor WBC and Hgb with CBC in the AM  -Clinical course will dictate further management     DVT Prophylaxis: Warfarin  Diet: Cardiac  Dispo: PT/OT consulted  Code Status: Full code     Personally reviewed patients labs and imaging, discussed with patient and nurse at bedside.     Part of this note may be an electronic transcription/translation of spoken language to printed text using the Dragon dictation system.    DVT prophylaxis:  Medical DVT prophylaxis orders are present.    CODE STATUS:   Code Status (Patient has no pulse and is not breathing): CPR (Attempt to Resuscitate)  Medical Interventions (Patient has pulse or is breathing): Full Support        Electronically signed by Evans Ojeda MD, 03/23/23, 1:43 PM EDT.

## 2023-03-23 NOTE — PLAN OF CARE
Goal Outcome Evaluation:  Plan of Care Reviewed With: patient        Progress: no change  Outcome Evaluation: Pt. was an ED admission last night. Pt. denied any numbness/tingling this shift. Pt. AOX4. Pt. on continuous fluids running at 125ml/hr.

## 2023-03-23 NOTE — PLAN OF CARE
Goal Outcome Evaluation:  Plan of Care Reviewed With: patient           Outcome Evaluation: Patient A/Ox4. On room air. Bladder scan this morning showed over 700ml. Straight cath per order. MD aware. Kim catheter inserted per order. Family is bringing home cpap machine for night. No other issues/needs at this time.

## 2023-03-24 LAB
ANION GAP SERPL CALCULATED.3IONS-SCNC: 12.9 MMOL/L (ref 5–15)
BASOPHILS # BLD AUTO: 0.01 10*3/MM3 (ref 0–0.2)
BASOPHILS NFR BLD AUTO: 0.1 % (ref 0–1.5)
BUN SERPL-MCNC: 26 MG/DL (ref 8–23)
BUN/CREAT SERPL: 26.3 (ref 7–25)
CALCIUM SPEC-SCNC: 8.7 MG/DL (ref 8.6–10.5)
CHLORIDE SERPL-SCNC: 102 MMOL/L (ref 98–107)
CO2 SERPL-SCNC: 21.1 MMOL/L (ref 22–29)
CREAT SERPL-MCNC: 0.99 MG/DL (ref 0.76–1.27)
DEPRECATED RDW RBC AUTO: 46.7 FL (ref 37–54)
EGFRCR SERPLBLD CKD-EPI 2021: 79.4 ML/MIN/1.73
EOSINOPHIL # BLD AUTO: 0.08 10*3/MM3 (ref 0–0.4)
EOSINOPHIL NFR BLD AUTO: 1 % (ref 0.3–6.2)
ERYTHROCYTE [DISTWIDTH] IN BLOOD BY AUTOMATED COUNT: 15.8 % (ref 12.3–15.4)
GLUCOSE BLDC GLUCOMTR-MCNC: 118 MG/DL (ref 70–99)
GLUCOSE BLDC GLUCOMTR-MCNC: 142 MG/DL (ref 70–99)
GLUCOSE BLDC GLUCOMTR-MCNC: 159 MG/DL (ref 70–99)
GLUCOSE BLDC GLUCOMTR-MCNC: 191 MG/DL (ref 70–99)
GLUCOSE BLDC GLUCOMTR-MCNC: 210 MG/DL (ref 70–99)
GLUCOSE BLDC GLUCOMTR-MCNC: 274 MG/DL (ref 70–99)
GLUCOSE SERPL-MCNC: 142 MG/DL (ref 65–99)
HCT VFR BLD AUTO: 27.7 % (ref 37.5–51)
HGB BLD-MCNC: 8.6 G/DL (ref 13–17.7)
HOLD SPECIMEN: NORMAL
IMM GRANULOCYTES # BLD AUTO: 0.03 10*3/MM3 (ref 0–0.05)
IMM GRANULOCYTES NFR BLD AUTO: 0.4 % (ref 0–0.5)
INR PPP: 1.37 (ref 0.86–1.15)
LYMPHOCYTES # BLD AUTO: 0.86 10*3/MM3 (ref 0.7–3.1)
LYMPHOCYTES NFR BLD AUTO: 10.5 % (ref 19.6–45.3)
MCH RBC QN AUTO: 25.2 PG (ref 26.6–33)
MCHC RBC AUTO-ENTMCNC: 31 G/DL (ref 31.5–35.7)
MCV RBC AUTO: 81.2 FL (ref 79–97)
MONOCYTES # BLD AUTO: 0.92 10*3/MM3 (ref 0.1–0.9)
MONOCYTES NFR BLD AUTO: 11.3 % (ref 5–12)
NEUTROPHILS NFR BLD AUTO: 6.26 10*3/MM3 (ref 1.7–7)
NEUTROPHILS NFR BLD AUTO: 76.7 % (ref 42.7–76)
NRBC BLD AUTO-RTO: 0 /100 WBC (ref 0–0.2)
PLATELET # BLD AUTO: 241 10*3/MM3 (ref 140–450)
PMV BLD AUTO: 11.1 FL (ref 6–12)
POTASSIUM SERPL-SCNC: 4 MMOL/L (ref 3.5–5.2)
PROTHROMBIN TIME: 16.9 SECONDS (ref 11.8–14.9)
RBC # BLD AUTO: 3.41 10*6/MM3 (ref 4.14–5.8)
SODIUM SERPL-SCNC: 136 MMOL/L (ref 136–145)
WBC NRBC COR # BLD: 8.16 10*3/MM3 (ref 3.4–10.8)
WHOLE BLOOD HOLD SPECIMEN: NORMAL

## 2023-03-24 PROCEDURE — 82962 GLUCOSE BLOOD TEST: CPT

## 2023-03-24 PROCEDURE — 94799 UNLISTED PULMONARY SVC/PX: CPT

## 2023-03-24 PROCEDURE — 85025 COMPLETE CBC W/AUTO DIFF WBC: CPT | Performed by: INTERNAL MEDICINE

## 2023-03-24 PROCEDURE — 97161 PT EVAL LOW COMPLEX 20 MIN: CPT

## 2023-03-24 PROCEDURE — 25010000002 CEFTRIAXONE PER 250 MG: Performed by: STUDENT IN AN ORGANIZED HEALTH CARE EDUCATION/TRAINING PROGRAM

## 2023-03-24 PROCEDURE — 80048 BASIC METABOLIC PNL TOTAL CA: CPT | Performed by: INTERNAL MEDICINE

## 2023-03-24 PROCEDURE — 99232 SBSQ HOSP IP/OBS MODERATE 35: CPT | Performed by: INTERNAL MEDICINE

## 2023-03-24 PROCEDURE — 25010000002 AMPICILLIN PER 500 MG: Performed by: INTERNAL MEDICINE

## 2023-03-24 PROCEDURE — 85610 PROTHROMBIN TIME: CPT | Performed by: INTERNAL MEDICINE

## 2023-03-24 RX ORDER — AMLODIPINE BESYLATE 5 MG/1
10 TABLET ORAL DAILY
Status: DISCONTINUED | OUTPATIENT
Start: 2023-03-24 | End: 2023-03-25 | Stop reason: HOSPADM

## 2023-03-24 RX ADMIN — METOPROLOL SUCCINATE 25 MG: 25 TABLET, EXTENDED RELEASE ORAL at 09:52

## 2023-03-24 RX ADMIN — AMLODIPINE BESYLATE 10 MG: 5 TABLET ORAL at 15:47

## 2023-03-24 RX ADMIN — METOPROLOL SUCCINATE 25 MG: 25 TABLET, EXTENDED RELEASE ORAL at 21:14

## 2023-03-24 RX ADMIN — Medication 10 ML: at 09:52

## 2023-03-24 RX ADMIN — AMPICILLIN SODIUM 2 G: 2 INJECTION, POWDER, FOR SOLUTION INTRAVENOUS at 18:46

## 2023-03-24 RX ADMIN — TAMSULOSIN HYDROCHLORIDE 0.4 MG: 0.4 CAPSULE ORAL at 09:52

## 2023-03-24 RX ADMIN — CEFTRIAXONE SODIUM 1 G: 1 INJECTION, SOLUTION INTRAVENOUS at 09:59

## 2023-03-24 RX ADMIN — TICAGRELOR 90 MG: 90 TABLET ORAL at 21:14

## 2023-03-24 RX ADMIN — Medication 10 ML: at 21:14

## 2023-03-24 RX ADMIN — POTASSIUM CHLORIDE, DEXTROSE MONOHYDRATE AND SODIUM CHLORIDE 75 ML/HR: 150; 5; 450 INJECTION, SOLUTION INTRAVENOUS at 00:54

## 2023-03-24 RX ADMIN — TICAGRELOR 90 MG: 90 TABLET ORAL at 09:52

## 2023-03-24 RX ADMIN — ALLOPURINOL 100 MG: 100 TABLET ORAL at 09:52

## 2023-03-24 RX ADMIN — WARFARIN SODIUM 4 MG: 4 TABLET ORAL at 18:46

## 2023-03-24 RX ADMIN — ASPIRIN 81 MG: 81 TABLET, COATED ORAL at 09:51

## 2023-03-24 RX ADMIN — ATORVASTATIN CALCIUM 20 MG: 20 TABLET, FILM COATED ORAL at 09:52

## 2023-03-24 NOTE — PLAN OF CARE
Goal Outcome Evaluation:  Plan of Care Reviewed With: patient        Progress: no change  Outcome Evaluation: Pt. AOX4 this shift. Pt. wore home Cpap when resting. Pt. continues to be on continuous fluids.Pt. denied any numbness/tingling this shift. No new issues/needs noted at this time.

## 2023-03-24 NOTE — THERAPY EVALUATION
Acute Care - Physical Therapy Initial Evaluation   [facility]     Patient Name: Zachariah Cruz  : 1945  MRN: [id number]  Today's Date: 3/24/2023      Visit Dx:     ICD-10-CM ICD-9-CM   1. Febrile illness, acute  R50.9 780.60   2. Dehydration  E86.0 276.51   3. Acute kidney injury (HCC)  N17.9 584.9   4. Urinary tract infection without hematuria, site unspecified  N39.0 599.0   5. Chronic anemia  D64.9 285.9   6. Weakness  R53.1 780.79   7. Difficulty walking  R26.2 719.7     Patient Active Problem List   Diagnosis   • Atrial fibrillation (HCC)   • Diabetes mellitus (HCC)   • Essential hypertension   • Hyperlipemia   • Left leg swelling   • Left leg pain   • Acute bilateral low back pain with left-sided sciatica   • Uncontrolled diabetes mellitus with complications (HCC)   • Screening for prostate cancer   • Chronic right-sided low back pain without sciatica   • Personal history of COVID-19   • Idiopathic chronic gout of foot without tophus   • Other specific arthropathies, not elsewhere classified, multiple sites   • Gross hematuria   • Coronary artery disease involving native coronary artery of native heart without angina pectoris   • Type 2 diabetes mellitus without complication, without long-term current use of insulin (HCC)   • Tinnitus   • Routine general medical examination at a health care facility   • Screening for malignant neoplasm of prostate   • Abnormal nuclear stress test   • Absolute anemia   • CAD S/P percutaneous coronary angioplasty   • Acute cough   • Weakness   • Febrile illness, acute     Past Medical History:   Diagnosis Date   • Coronary artery disease    • Diabetes (HCC)    • Hematuria    • Hyperlipidemia    • Hypertension      Past Surgical History:   Procedure Laterality Date   • APPENDECTOMY     • CARDIAC CATHETERIZATION     • CARDIAC CATHETERIZATION N/A 3/16/2023    Procedure: Left Heart Cath with possible angioplasty;  Surgeon: Vishal Denton MD;  Location: [facility]  INVASIVE LOCATION;  Service: Cardiovascular;  Laterality: N/A;   • CATARACT EXTRACTION     • CORONARY ARTERY BYPASS GRAFT  05/21/2010    LIMA to LAD, SVG to OM, SVG to PDA   • PROSTATE SURGERY      TRANSURETHRAL RESECTION OF THE PROSTATE     PT Assessment (last 12 hours)     PT Evaluation and Treatment     Row Name 03/24/23 1152          Physical Therapy Time and Intention    Subjective Information no complaints (P)   -TG     Document Type evaluation (P)   -TG     Mode of Treatment individual therapy;physical therapy (P)   -TG     Patient Effort good (P)   -TG     Symptoms Noted During/After Treatment none (P)   -TG     Row Name 03/24/23 1152          General Information    Patient Profile Reviewed yes (P)   -TG     Patient Observations alert;cooperative;agree to therapy (P)   -TG     Prior Level of Function independent:;gait;transfer;bed mobility;ADL's (P)   -TG     Equipment Currently Used at Home none (P)   -TG     Barriers to Rehab none identified (P)   -TG     Row Name 03/24/23 1152          Living Environment    Current Living Arrangements home (P)   -TG     Home Accessibility stairs to enter home (P)   -TG     People in Home spouse (P)   -TG     Primary Care Provided by self (P)   -TG     Row Name 03/24/23 1152          Home Main Entrance    Number of Stairs, Main Entrance one (P)   -TG     Row Name 03/24/23 1152          Cognition    Orientation Status (Cognition) oriented x 4 (P)   -TG     Row Name 03/24/23 1152          Range of Motion (ROM)    Range of Motion bilateral lower extremities;ROM is WFL (P)   -TG     Row Name 03/24/23 1152          Strength (Manual Muscle Testing)    Strength (Manual Muscle Testing) bilateral lower extremities;strength is WFL (P)   -TG     Row Name 03/24/23 1152          Bed Mobility    Bed Mobility sit-supine (P)   -TG     Sit-Supine [place] (Bed Mobility) standby assist (P)   -TG     Comment, (Bed Mobility) Pt seated in recliner upon therapist entry (P)   -TG     Row  Name 03/24/23 1152          Transfers    Transfers sit-stand transfer (P)   -TG     Row Name 03/24/23 1152          Sit-Stand Transfer    Sit-Stand [place] (Transfers) contact guard (P)   -TG     Assistive Device (Sit-Stand Transfers) walker, front-wheeled (P)   -TG     Row Name 03/24/23 1152          Gait/Stairs (Locomotion)    Gait/Stairs Locomotion gait/ambulation independence;distance ambulated (P)   -TG     [place] Level (Gait) contact guard (P)   -TG     Assistive Device (Gait) other (see comments) (P)   none  -TG     Distance in Feet (Gait) 300 (P)   -TG     Comment, (Gait/Stairs) pt declined use of AD (P)   -TG     Row Name 03/24/23 1152          Balance    Balance Assessment standing dynamic balance (P)   -TG     Dynamic Standing Balance contact guard (P)   -TG     Position/Device Used, Standing Balance unsupported (P)   -TG     Row Name 03/24/23 1152          Plan of Care Review    Plan of Care Reviewed With patient (P)   -TG     Outcome Evaluation Pt demonstrates deficits in strength, balance, and mobility. He will benefit from in patient PT services. Recommend home with assist following discharge from hospital. (P)   -TG     Row Name 03/24/23 1152          Therapy Assessment/Plan (PT)    Rehab Potential (PT) good, to achieve stated therapy goals (P)   -TG     Criteria for Skilled Interventions Met (PT) yes;skilled treatment is necessary (P)   -TG     Therapy Frequency (PT) daily (P)   -TG     Predicted Duration of Therapy Intervention (PT) 10 (P)   -TG     Problem List (PT) problems related to;balance;mobility;strength (P)   -TG     Activity Limitations Related to Problem List (PT) unable to ambulate safely;unable to transfer safely (P)   -TG     Row Name 03/24/23 1152          PT Evaluation Complexity    History, PT Evaluation Complexity no personal factors and/or comorbidities (P)   -TG     Examination of Body Systems (PT Eval Complexity) total of 4 or more elements (P)   -TG     Clinical  Presentation (PT Evaluation Complexity) stable (P)   -TG     Clinical Decision Making (PT Evaluation Complexity) low complexity (P)   -TG     Overall Complexity (PT Evaluation Complexity) low complexity (P)   -TG     Row Name 03/24/23 1152          Physical Therapy Goals    Gait Training Goal Selection (PT) gait training, PT goal 1 (P)   -TG     Row Name 03/24/23 1152          Gait Training Goal 1 (PT)    Activity/Assistive Device (Gait Training Goal 1, PT) gait (walking locomotion) (P)   -TG     [place] Level (Gait Training Goal 1, PT) standby assist (P)   -TG     Distance (Gait Training Goal 1, PT) 300 (P)   -TG     Time Frame (Gait Training Goal 1, PT) 10 days (P)   -TG           User Key  (r) = Recorded By, (t) = Taken By, (c) = Cosigned By    Initials Name Provider Type    TG Corina Bermeo, PT Student PT Student                  PT Recommendation and Plan  Anticipated Discharge Disposition (PT): (P) home with assist  Planned Therapy Interventions (PT): (P) balance training, bed mobility training, gait training, strengthening, transfer training  Therapy Frequency (PT): (P) daily  Plan of Care Reviewed With: (P) patient  Outcome Evaluation: (P) Pt demonstrates deficits in strength, balance, and mobility. He will benefit from in patient PT services. Recommend home with assist following discharge from hospital.   Outcome Measures     Row Name 03/24/23 1100             How much help from another person do you currently need...    Turning from your back to your side while in flat bed without using bedrails? 4 (P)   -TG      Moving from lying on back to sitting on the side of a flat bed without bedrails? 4 (P)   -TG      Moving to and from a bed to a chair (including a wheelchair)? 4 (P)   -TG      Standing up from a chair using your arms (e.g., wheelchair, bedside chair)? 4 (P)   -TG      Climbing 3-5 steps with a railing? 3 (P)   -TG      To walk in hospital room? 4 (P)   -TG      AM-PAC 6 Clicks Score (PT) 23  (P)   -TG         Functional Assessment    Outcome Measure Options AM-PAC 6 Clicks Basic Mobility (PT) (P)   -TG            User Key  (r) = Recorded By, (t) = Taken By, (c) = Cosigned By    Initials Name Provider Type    TG Corina Bermeo PT Student PT Student                 Time Calculation:    PT Charges     Row Name 03/24/23 1157             Time Calculation    PT Received On 03/24/23 (P)   -TG      PT Goal Re-Cert Due Date 04/02/23 (P)   -TG         Untimed Charges    PT Eval/Re-eval Minutes 40 (P)   -TG         Total Minutes    Untimed Charges Total Minutes 40 (P)   -TG       Total Minutes 40 (P)   -TG            User Key  (r) = Recorded By, (t) = Taken By, (c) = Cosigned By    Initials Name Provider Type    Corina Tsang PT Student PT Student              Therapy Charges for Today     Code Description Service Date Service Provider Modifiers Qty    16913978938 HC PT EVAL LOW COMPLEXITY 3 3/24/2023 Corina Bermeo, PT Student GP 1          PT G-Codes  Outcome Measure Options: (P) AM-PAC 6 Clicks Basic Mobility (PT)  AM-PAC 6 Clicks Score (PT): (P) 23    Corina Bermeo PT Student  3/24/2023

## 2023-03-24 NOTE — PROGRESS NOTES
[facility]   Hospitalist Progress Note  Date: 3/24/2023  Patient Name: Zachariah Cruz  : 1945  MRN: [id number]  Date of admission: 3/22/2023    Subjective   Subjective     Chief Complaint: Generalized weakness and fever    Summary:   Zachariah Cruz is a 77 y.o. male with CAD s/p CABG and PCI, paroxysmal atrial flutter, hypertension, hyperlipidemia, sleep apnea and gout who presented to the ED with complaints of weakness and fever.  Patient also noted that he had developed extensive hematoma and groin area following recent cardiac catheterization procedure.  Urinalysis consistent with UTI.  Patient started on antibiotics.  Hospitalist service contacted for further evaluation management.  Patient found to have acute urinary retention, Kim catheter inserted.  After antibiotic therapy initiated patient noted to be improvement in his condition.    Interval Followup:   No acute events overnight.  Patient afebrile over the last 24 hours ([id number]).  Notes that he is feeling much better today.  Kim catheter still in and patient with good output.  Creatinine improved.    Antibiotics:   -Ceftriaxone    Pain Medication:   -[place]    Review of Systems   All systems reviewed and negative unless stated otherwise under subjective.    Objective   Objective     Vitals:   Temp:  [97.7 °F (36.5 °C)-98.8 °F (37.1 °C)] 98.2 °F (36.8 °C)  Heart Rate:  [72-95] 77  Resp:  [18-20] 18  BP: (125-146)/(55-69) 125/55  Physical Exam   Gen: No acute distress, sitting up in chair bedside watching TV, conversant, pleasant  Resp: CTAB, No w/r/r, normal respiratory effort  Card: RRR, No m/r/g  Abd: Soft, Nontender, Nondistended, + bowel sounds, extensive hematoma noted in pelvic region      Result Review    Result Review:  I have personally reviewed the results as below and agree with these findings:  []  Laboratory:   CMP    CMP 3/22/23 3/23/23 3/24/23   Glucose 64 (A) 50 (A) 142 (A)   BUN 33 (A) 39 (A) 26 (A)   Creatinine  1.74 (A) 1.50 (A) 0.99   eGFR 40.4 (A) 48.2 (A) 79.4   Sodium 131 (A) 133 (A) 136   Potassium 4.0 3.7 4.0   Chloride 95 (A) 101 102   Calcium 9.6 9.0 8.7   Total Protein 7.4     Albumin 3.9     Globulin 3.5     Total Bilirubin 0.9     Alkaline Phosphatase 84     AST (SGOT) 13     ALT (SGPT) 11     Albumin/Globulin Ratio 1.1     BUN/Creatinine Ratio 19.0 26.0 (A) 26.3 (A)   Anion Gap 14.1 12.5 12.9   (A) Abnormal value            CBC    CBC 3/22/23 3/23/23 3/24/23   WBC 16.97 (A) 13.43 (A) 8.16   RBC 3.80 (A) 3.59 (A) 3.41 (A)   Hemoglobin 9.9 (A) 9.2 (A) 8.6 (A)   Hematocrit 30.7 (A) 28.7 (A) 27.7 (A)   MCV 80.8 79.9 81.2   MCH 26.1 (A) 25.6 (A) 25.2 (A)   MCHC 32.2 32.1 31.0 (A)   RDW 15.9 (A) 15.8 (A) 15.8 (A)   Platelets 273 252 241   (A) Abnormal value            Blood glucose value improved.  No recurrent episodes of hypoglycemia noted.    [x]  Microbiology: Urine culture (03/22/2023): No growth to date.  Blood culture (03/08/2023): No growth to date.  []  Radiology:   []  EKG/Telemetry:    []  Cardiology/Vascular:    []  Pathology:  []  Old records:  []  Other:    Assessment & Plan   Assessment / Plan     Assessment:  Urinary tract infection due to unknown organism  Sepsis secondary to above, present on admission  Generalized weakness  Acute renal failure, resolved  Acute urinary retention  CAD s/p CABG and PCI  Hypoglycemia, improved  Chronic paroxysmal atrial flutter  Therapeutic drug level monitoring, warfarin    Plan:  -Continue ceftriaxone, tailor based on results of infectious work-up.  Plan to complete 5 total days of antibiotic treatment.  Will transition to oral antibiotics at time of discharge.  -Continue tamsulosin and Kim catheter.  Plan to discontinue Kim catheter this evening and for patient to have voiding trial.  Depending how patient does may require reinsertion of Kim catheter and subsequent outpatient follow-up with urology after discharge  -PT/OT consulted  -Continue appropriate home  medications including ticagrelor and warfarin  -Creatinine improved.  Plan to resume patient's home lisinopril at time of discharge  -Continue warfarin.  Monitor INR  -Blood glucose value improved.  Discontinue IV fluids at this time.  -Resume home amlodipine  -Monitor electrolytes and renal function with BMP and magnesium level in the AM  -Monitor WBC and Hgb with CBC in the AM  -Clinical course will dictate further management     DVT Prophylaxis: Warfarin  Diet: Cardiac  Dispo: Home with home health  Code Status: Full code     Personally reviewed patients labs and imaging, discussed with patient and nurse at bedside.     Part of this note may be an electronic transcription/translation of spoken language to printed text using the Dragon dictation system.    DVT prophylaxis:  Medical DVT prophylaxis orders are present.    CODE STATUS:   Code Status (Patient has no pulse and is not breathing): CPR (Attempt to Resuscitate)  Medical Interventions (Patient has pulse or is breathing): Full Support      Electronically signed by Evans Ojeda MD, 03/24/23, 2:17 PM EDT.

## 2023-03-24 NOTE — PLAN OF CARE
Goal Outcome Evaluation:  Plan of Care Reviewed With: (P) patient           Outcome Evaluation: (P) Pt demonstrates deficits in strength, balance, and mobility. He will benefit from in patient PT services. Recommend home with assist following discharge from hospital.

## 2023-03-24 NOTE — PLAN OF CARE
Goal Outcome Evaluation:  Plan of Care Reviewed With: patient           Outcome Evaluation: Patient A/Ox4. On room air. No complaints of pain. Kim catheter removed this evening. No other issues/needs at this time.

## 2023-03-25 ENCOUNTER — READMISSION MANAGEMENT (OUTPATIENT)
Dept: CALL CENTER | Facility: HOSPITAL | Age: 78
End: 2023-03-25
Payer: MEDICARE

## 2023-03-25 VITALS
TEMPERATURE: 98.1 F | WEIGHT: 212.74 LBS | RESPIRATION RATE: 18 BRPM | DIASTOLIC BLOOD PRESSURE: 69 MMHG | OXYGEN SATURATION: 98 % | HEIGHT: 70 IN | HEART RATE: 83 BPM | BODY MASS INDEX: 30.46 KG/M2 | SYSTOLIC BLOOD PRESSURE: 133 MMHG

## 2023-03-25 LAB
ANION GAP SERPL CALCULATED.3IONS-SCNC: 13.8 MMOL/L (ref 5–15)
BUN SERPL-MCNC: 19 MG/DL (ref 8–23)
BUN/CREAT SERPL: 19.4 (ref 7–25)
CALCIUM SPEC-SCNC: 9.4 MG/DL (ref 8.6–10.5)
CHLORIDE SERPL-SCNC: 100 MMOL/L (ref 98–107)
CO2 SERPL-SCNC: 22.2 MMOL/L (ref 22–29)
CREAT SERPL-MCNC: 0.98 MG/DL (ref 0.76–1.27)
DEPRECATED RDW RBC AUTO: 45.2 FL (ref 37–54)
EGFRCR SERPLBLD CKD-EPI 2021: 80.4 ML/MIN/1.73
ERYTHROCYTE [DISTWIDTH] IN BLOOD BY AUTOMATED COUNT: 15.3 % (ref 12.3–15.4)
GLUCOSE BLDC GLUCOMTR-MCNC: 193 MG/DL (ref 70–99)
GLUCOSE BLDC GLUCOMTR-MCNC: 195 MG/DL (ref 70–99)
GLUCOSE SERPL-MCNC: 197 MG/DL (ref 65–99)
HCT VFR BLD AUTO: 31.8 % (ref 37.5–51)
HGB BLD-MCNC: 10 G/DL (ref 13–17.7)
INR PPP: 1.51 (ref 0.86–1.15)
MAGNESIUM SERPL-MCNC: 1.7 MG/DL (ref 1.6–2.4)
MCH RBC QN AUTO: 25.4 PG (ref 26.6–33)
MCHC RBC AUTO-ENTMCNC: 31.4 G/DL (ref 31.5–35.7)
MCV RBC AUTO: 80.9 FL (ref 79–97)
PLATELET # BLD AUTO: 344 10*3/MM3 (ref 140–450)
PMV BLD AUTO: 10.9 FL (ref 6–12)
POTASSIUM SERPL-SCNC: 3.8 MMOL/L (ref 3.5–5.2)
PROTHROMBIN TIME: 18.2 SECONDS (ref 11.8–14.9)
RBC # BLD AUTO: 3.93 10*6/MM3 (ref 4.14–5.8)
SODIUM SERPL-SCNC: 136 MMOL/L (ref 136–145)
WBC NRBC COR # BLD: 10.37 10*3/MM3 (ref 3.4–10.8)

## 2023-03-25 PROCEDURE — 94799 UNLISTED PULMONARY SVC/PX: CPT

## 2023-03-25 PROCEDURE — 85027 COMPLETE CBC AUTOMATED: CPT | Performed by: INTERNAL MEDICINE

## 2023-03-25 PROCEDURE — 82962 GLUCOSE BLOOD TEST: CPT

## 2023-03-25 PROCEDURE — 85610 PROTHROMBIN TIME: CPT | Performed by: INTERNAL MEDICINE

## 2023-03-25 PROCEDURE — 99239 HOSP IP/OBS DSCHRG MGMT >30: CPT | Performed by: INTERNAL MEDICINE

## 2023-03-25 PROCEDURE — 25010000002 AMPICILLIN PER 500 MG: Performed by: INTERNAL MEDICINE

## 2023-03-25 PROCEDURE — 83735 ASSAY OF MAGNESIUM: CPT | Performed by: INTERNAL MEDICINE

## 2023-03-25 PROCEDURE — 80048 BASIC METABOLIC PNL TOTAL CA: CPT | Performed by: INTERNAL MEDICINE

## 2023-03-25 RX ORDER — TAMSULOSIN HYDROCHLORIDE 0.4 MG/1
0.4 CAPSULE ORAL DAILY
Qty: 30 CAPSULE | Refills: 0 | Status: SHIPPED | OUTPATIENT
Start: 2023-03-26 | End: 2023-04-19 | Stop reason: SDUPTHER

## 2023-03-25 RX ORDER — AMOXICILLIN AND CLAVULANATE POTASSIUM 875; 125 MG/1; MG/1
1 TABLET, FILM COATED ORAL 2 TIMES DAILY
Qty: 8 TABLET | Refills: 0 | Status: SHIPPED | OUTPATIENT
Start: 2023-03-25 | End: 2023-03-30

## 2023-03-25 RX ADMIN — TAMSULOSIN HYDROCHLORIDE 0.4 MG: 0.4 CAPSULE ORAL at 08:20

## 2023-03-25 RX ADMIN — ASPIRIN 81 MG: 81 TABLET, COATED ORAL at 08:20

## 2023-03-25 RX ADMIN — ATORVASTATIN CALCIUM 20 MG: 20 TABLET, FILM COATED ORAL at 08:20

## 2023-03-25 RX ADMIN — Medication 10 ML: at 08:23

## 2023-03-25 RX ADMIN — TICAGRELOR 90 MG: 90 TABLET ORAL at 08:23

## 2023-03-25 RX ADMIN — AMPICILLIN SODIUM 2 G: 2 INJECTION, POWDER, FOR SOLUTION INTRAVENOUS at 06:12

## 2023-03-25 RX ADMIN — ALLOPURINOL 100 MG: 100 TABLET ORAL at 08:20

## 2023-03-25 RX ADMIN — AMPICILLIN SODIUM 2 G: 2 INJECTION, POWDER, FOR SOLUTION INTRAVENOUS at 00:20

## 2023-03-25 RX ADMIN — AMLODIPINE BESYLATE 10 MG: 5 TABLET ORAL at 08:20

## 2023-03-25 RX ADMIN — METOPROLOL SUCCINATE 25 MG: 25 TABLET, EXTENDED RELEASE ORAL at 08:20

## 2023-03-25 NOTE — OUTREACH NOTE
Prep Survey    Flowsheet Row Responses   [facility] patient discharged from? [facility]   Is LACE score < 7 ? No   Eligibility [facility]   Date of Admission 03/22/23   Date of Discharge 03/25/23   Discharge Disposition Home-Health Care [facility]   Discharge diagnosis UTI due to Enterococcus faecalis,  sepsis secondary to UTI   Does the patient have one of the following disease processes/diagnoses(primary or secondary)? Sepsis   Does the patient have Home health ordered? Yes   What is the Home health agency?  [facility]    Is there a DME ordered? No   Prep survey completed? Yes          Arabella SHAFFER - Registered Nurse

## 2023-03-25 NOTE — DISCHARGE SUMMARY
[facility]         HOSPITALIST  DISCHARGE SUMMARY    Patient Name: Zachariah Cruz  : 1945  MRN: [id number]    Date of Admission: 3/22/2023  Date of Discharge:  23  Primary Care Physician: Nelia Frost APRN    Hospital Problems:  Urinary tract infection due to Enterococcus faecalis  Sepsis secondary to above, present on admission  Generalized weakness  Acute renal failure, resolved  Acute urinary retention  CAD s/p CABG and PCI  Hypoglycemia, improved  Chronic paroxysmal atrial flutter  Therapeutic drug level monitoring, warfarin    Hospital Course     Hospital Course:  Zachariah Cruz is a 77 y.o. male with CAD s/p CABG and PCI, paroxysmal atrial flutter, hypertension, hyperlipidemia, sleep apnea and gout who presented to the ED with complaints of weakness and fever.  Patient also noted that he had developed extensive hematoma and groin area following recent cardiac catheterization procedure.  Urinalysis consistent with UTI.  Patient started on antibiotics.  Hospitalist service contacted for further evaluation management.  Patient found to have acute urinary retention, Kim catheter inserted.  After antibiotic therapy initiated patient noted to be improvement in his condition and patient will complete treatment after discharge.  Kim removed and voiding trial completed during admission and patient was able to urinate without issue.  Patient will continue on tamsulosin after discharge.  Patient hemodynamically stable and no additional inpatient evaluation or work-up necessary at this time, patient discharged home with outpatient follow-up.    DISCHARGE Follow Up Recommendations for labs and diagnostics:   -Follow-up with PCP in 3 to 5 days    Day of Discharge     Vital Signs:  Temp:  [97.7 °F (36.5 °C)-98.9 °F (37.2 °C)] 98.1 °F (36.7 °C)  Heart Rate:  [77-90] 83  Resp:  [18-20] 18  BP: (125-143)/(55-74) 133/69  Physical Exam:   Gen: No acute distress,  conversant,  pleasant, lying in bed  Resp: CTAB, No w/r/r, no increased work of breathing  Card: RRR, No m/r/g  Abd: Soft, Nontender, Nondistended, + bowel sounds, extensive hematoma noted in pelvic region       Discharge Details        Discharge Medications      New Medications      Instructions Start Date   amoxicillin-clavulanate 875-125 MG per tablet  Commonly known as: Augmentin   1 tablet, Oral, 2 Times Daily      tamsulosin 0.4 MG capsule 24 hr capsule  Commonly known as: FLOMAX   0.4 mg, Oral, Daily   Start Date: March 26, 2023        Changes to Medications      Instructions Start Date   glimepiride 4 MG tablet  Commonly known as: AMARYL  What changed: when to take this   TAKE ONE TABLET BY MOUTH DAILY      lisinopril 20 MG tablet  Commonly known as: PRINIVILZESTRIL  What changed: when to take this   TAKE ONE TABLET BY MOUTH TWICE A DAY      simvastatin 40 MG tablet  Commonly known as: ZOCOR  What changed: when to take this   TAKE ONE TABLET BY MOUTH DAILY      warfarin 4 MG tablet  Commonly known as: Coumadin  What changed:   · how much to take  · how to take this  · when to take this  · additional instructions   Take 1 tablet by mouth daily or as directed         Continue These Medications      Instructions Start Date   allopurinol 100 MG tablet  Commonly known as: ZYLOPRIM   TAKE ONE TABLET BY MOUTH DAILY      amLODIPine 10 MG tablet  Commonly known as: NORVASC   10 mg, Oral, Daily      metoprolol succinate XL 25 MG 24 hr tablet  Commonly known as: TOPROL-XL   TAKE ONE TABLET BY MOUTH TWICE A DAY      nitroglycerin 0.4 MG SL tablet  Commonly known as: NITROSTAT   1 under the tongue as needed for angina, may repeat q5mins for up three doses      spironolactone 25 MG tablet  Commonly known as: ALDACTONE   25 mg, Oral, Daily      ticagrelor 90 MG tablet tablet  Commonly known as: Brilinta   90 mg, Oral, 2 Times Daily         ASK your doctor about these medications      Instructions Start Date   metFORMIN  MG 24 hr  tablet  Commonly known as: GLUCOPHAGE-XR   2,000 mg, Oral, Daily             No Known Allergies    Discharge Disposition:  Home or Self Care    Diet:  Hospital:  Diet Order   Procedures   • Diet: Cardiac Diets; Healthy Heart (2-3 Na+); Texture: Regular Texture (IDDSI 7); Fluid Consistency: Thin (IDDSI 0)       Discharge Activity:   Activity Instructions     Activity as Tolerated            CODE STATUS:  Code Status and Medical Interventions:   Ordered at: 03/23/23 1343     Code Status (Patient has no pulse and is not breathing):    CPR (Attempt to Resuscitate)     Medical Interventions (Patient has pulse or is breathing):    Full Support       Future Appointments   Date Time Provider Department Center   3/28/2023 10:45 AM Vishal Denton MD [facility] CD BARDS [facility]   [facility]   4/12/2023  8:45 AM Sarah Oneill APRN [facility] U BAR [facility]   6/7/2023 10:00 AM [facility]       Additional Instructions for the Follow-ups that You Need to Schedule     Discharge Follow-up with PCP   As directed       Currently Documented PCP:    Nelia Frost APRN    PCP Phone Number:    [phone]     Follow Up Details: Follow-up in 3 to 5 days               Pertinent  and/or Most Recent Results     RADIOLOGY:  XR Chest 1 View [[id number]] Jaydon as Reviewed   Order Status: Completed Collected: 03/22/23 1318    Updated: 03/22/23 1321   Narrative:     PROCEDURE: XR CHEST 1 VW       COMPARISON: [facility] ASAEL , XR CHEST PA AND LATERAL, 7/18/2022, 13:50.       INDICATIONS: Weak/Dizzy/AMS triage protocol       FINDINGS:   The heart size is normal with postsurgical changes apparent.  The pulmonary vascular markings are   normal.  The lungs and pleural spaces are clear of active disease.  There are chronic age-related   changes involving the bony thorax and thoracic aorta.       Impression:     No active disease.                        DELVIS AGUSTIN MD          Electronically Signed and Approved By: DELVIS AGUSTIN MD on 3/22/2023 at 13:17          LAB RESULTS:      Lab 03/25/23  0502 03/24/23  0626 03/24/23  0544 03/23/23  0626 03/22/23  1345 03/22/23  1247   WBC 10.37 8.16  --  13.43*  --  16.97*   HEMOGLOBIN 10.0* 8.6*  --  9.2*  --  9.9*   HEMATOCRIT 31.8* 27.7*  --  28.7*  --  30.7*   PLATELETS 344 241  --  252  --  273   NEUTROS ABS  --  6.26  --   --   --  13.84*   IMMATURE GRANS (ABS)  --  0.03  --   --   --  0.14*   LYMPHS ABS  --  0.86  --   --   --  1.27   MONOS ABS  --  0.92*  --   --   --  1.69*   EOS ABS  --  0.08  --   --   --  0.00   MCV 80.9 81.2  --  79.9  --  80.8   PROCALCITONIN  --   --   --  1.47*  --   --    LACTATE  --   --   --   --  1.6  --    PROTIME 18.2*  --  16.9*  --   --  15.4*         Lab 03/25/23  0502 03/24/23  0626 03/23/23  0626 03/22/23  1247   SODIUM 136 136 133* 131*   POTASSIUM 3.8 4.0 3.7 4.0   CHLORIDE 100 102 101 95*   CO2 22.2 21.1* 19.5* 21.9*   ANION GAP 13.8 12.9 12.5 14.1   BUN 19 26* 39* 33*   CREATININE 0.98 0.99 1.50* 1.74*   EGFR 80.4 79.4 48.2* 40.4*   GLUCOSE 197* 142* 50* 64*   CALCIUM 9.4 8.7 9.0 9.6   MAGNESIUM 1.7  --  1.9 1.8   PHOSPHORUS  --   --  3.1  --          Lab 03/22/23  1247   TOTAL PROTEIN 7.4   ALBUMIN 3.9   GLOBULIN 3.5   ALT (SGPT) 11   AST (SGOT) 13   BILIRUBIN 0.9   ALK PHOS 84         Lab 03/25/23  0502 03/24/23  0544 03/22/23  1247   HSTROP T  --   --  61*   PROTIME 18.2* 16.9* 15.4*   INR 1.51* 1.37* 1.22*                 Brief Urine Lab Results  (Last result in the past 365 days)      Color   Clarity   Blood   Leuk Est   Nitrite   Protein   CREAT   Urine HCG        03/22/23 1544 Dark Yellow   Cloudy   Trace   Small (1+)   Negative   100 mg/dL (2+)               Microbiology Results (last 10 days)     Procedure Component Value - Date/Time    Legionella Antigen, Urine - Urine, Urine, Clean Catch [[id number]]  (Normal) Collected: 03/23/23 1113    Lab Status: Final result Specimen: Urine, Clean Catch  Updated: 03/23/23 1139     LEGIONELLA ANTIGEN, URINE Negative    S. Pneumo Ag Urine or CSF - Urine, Urine, Clean Catch [[id number]]  (Normal) Collected: 03/23/23 1113    Lab Status: Final result Specimen: Urine, Clean Catch Updated: 03/23/23 1139     Strep Pneumo Ag Negative    Urine Culture - Urine, Straight Cath [[id number]]  (Abnormal)  (Susceptibility) Collected: 03/22/23 1544    Lab Status: Preliminary result Specimen: Urine from Straight Cath Updated: 03/25/23 0930     Urine Culture >100,000 CFU/mL Enterococcus faecalis    Narrative:      Colonization of the urinary tract without infection is common. Treatment is discouraged unless the patient is symptomatic, pregnant, or undergoing an invasive urologic procedure.    Susceptibility      Enterococcus faecalis      ANA LILIA      Ampicillin Susceptible      Levofloxacin Susceptible      Nitrofurantoin Susceptible      Tetracycline Resistant     Vancomycin Susceptible                           Blood Culture - Blood, Arm, Left [[id number]]  (Normal) Collected: 03/22/23 1347    Lab Status: Preliminary result Specimen: Blood from Arm, Left Updated: 03/24/23 1400     Blood Culture No growth at 2 days    Blood Culture - Blood, Arm, Left [[id number]]  (Normal) Collected: 03/22/23 1346    Lab Status: Preliminary result Specimen: Blood from Arm, Left Updated: 03/24/23 1400     Blood Culture No growth at 2 days    COVID-19,APTIMA PANTHER(KELSY),[facility], NP/OP SWAB IN UTM/VTM/SALINE TRANSPORT MEDIA,24 HR TAT - Swab, Nasal Cavity [[id number]]  (Normal) Collected: 03/22/23 1339    Lab Status: Final result Specimen: Swab from Nasal Cavity Updated: 03/22/23 1841     COVID19 Not Detected    Narrative:      Fact sheet for providers: https://www.fda.gov/media/270409/download     Fact sheet for patients: https://www.fda.gov/media/172864/download    Test performed by RT PCR.    Influenza Antigen, Rapid - Swab, Nasopharynx [[id number]]  (Normal) Collected: 03/22/23 1339    Lab Status: Final  result Specimen: Swab from Nasopharynx Updated: 03/22/23 1426     Influenza A Ag, EIA Negative     Influenza B Ag, EIA Negative                    Results for orders placed during the hospital encounter of 03/16/23    Adult Transthoracic Echo Complete w/ Color, Spectral and Contrast if necessary per protocol    Interpretation Summary  •  Overall LV ejection fraction is 50 to 55% with mild inferolateral wall hypokinesis.  •  Left ventricular wall thickness is consistent with mild concentric hypertrophy.  •  Left ventricular diastolic function is consistent with (grade I) impaired relaxation.  •  Aortic valve is mildly calcified.  Mild aortic valve stenosis is present.      Labs Pending at Discharge:  Pending Labs     Order Current Status    Blood Culture - Blood, Arm, Left Preliminary result    Blood Culture - Blood, Arm, Left Preliminary result    Urine Culture - Urine, Straight Cath Preliminary result          Time spent on Discharge including face to face service: Greater than 30 minutes    Electronically signed by Evans Ojeda MD, 03/25/23, 11:46 AM EDT.

## 2023-03-25 NOTE — PLAN OF CARE
Goal Outcome Evaluation:  Plan of Care Reviewed With: patient        Progress: improving  Outcome Evaluation: patient is alert and oriented this shift. no c/o pain. voiding without difficulty. blood glucose monitored. will discharge home

## 2023-03-25 NOTE — PLAN OF CARE
Goal Outcome Evaluation:  Plan of Care Reviewed With: patient        Progress: no change  Outcome Evaluation: Pt. encouraged to keep using incentive spirometer. Pt. AOX4. Pt. denied any pain this shift. Pt. encouraged to turn throughout the night to prevent skin breakdown. No new issues/needs noted at this time.

## 2023-03-26 LAB
BACTERIA SPEC AEROBE CULT: ABNORMAL
BACTERIA SPEC AEROBE CULT: ABNORMAL

## 2023-03-27 ENCOUNTER — TELEPHONE (OUTPATIENT)
Dept: FAMILY MEDICINE CLINIC | Age: 78
End: 2023-03-27
Payer: MEDICARE

## 2023-03-27 ENCOUNTER — TRANSITIONAL CARE MANAGEMENT TELEPHONE ENCOUNTER (OUTPATIENT)
Dept: CALL CENTER | Facility: HOSPITAL | Age: 78
End: 2023-03-27
Payer: MEDICARE

## 2023-03-27 LAB
BACTERIA SPEC AEROBE CULT: NORMAL
BACTERIA SPEC AEROBE CULT: NORMAL

## 2023-03-27 NOTE — TELEPHONE ENCOUNTER
Yareli with [facility] called and said they got a referral from [facility] to see him for home health services.  She wanted to make sure that is okay with A Frost.

## 2023-03-27 NOTE — OUTREACH NOTE
Call Center TCM Note    Flowsheet Row Responses   [facility] patient discharged from? [facility]   Does the patient have one of the following disease processes/diagnoses(primary or secondary)? Sepsis   TCM attempt successful? Yes  [VR , Per CM notes Wife involved in care]   Call start time [phone]   Call end time [phone]   Discharge diagnosis UTI due to Enterococcus faecalis,  sepsis secondary to UTI   Meds reviewed with patient/caregiver? Yes   Is the patient having any side effects they believe may be caused by any medication additions or changes? No   Does the patient have all medications related to this admission filled (includes all antibiotics, inhalers, nebulizers,steroids,etc.) Yes   Is the patient taking all medications as directed (includes completed medication regime)? Yes   Comments HOSP DC FU appt 3/30/23 1045 am   Does the patient have an appointment with their PCP within 7 days of discharge? Yes   What is the Home health agency?  [facility]     Has home health visited the patient within 72 hours of discharge? Call prior to 72 hours   Psychosocial issues? No   Did the patient receive a copy of their discharge instructions? Yes   Nursing interventions Reviewed instructions with patient   What is the patient's perception of their health status since discharge? Improving   Nursing interventions Nurse provided patient education   Is the patient/caregiver able to teach back TIME? T emperature - higher or lower than normal, E xtremely Ill - severe pain, discomfort, shortness of breath, I nfection - may have signs and symptoms of an infection, M ental Decline - confused, sleepy, difficult to arouse   Nursing interventions Nurse provided patient education   Is patient/caregiver able to teach back steps to recovery at home? Set small, achievable goals for return to baseline health, Rest and regain strength, Eat a balanced diet   Is the patient/caregiver able to teach back signs and symptoms of worsening  condition: Fever, Altered mental status(confusion/coma)   Is the patient/caregiver able to teach back the hierarchy of who to call/visit for symptoms/problems? PCP, Specialist, Home health nurse, Urgent Care, ED, 911 Yes   TCM call completed? Yes   Wrap up additional comments Pt feels he is doing a little better.    Call end time [phone]          Sheila Wilson RN    3/27/2023, 08:56 EDT

## 2023-03-28 ENCOUNTER — LAB (OUTPATIENT)
Dept: LAB | Facility: HOSPITAL | Age: 78
End: 2023-03-28
Payer: MEDICARE

## 2023-03-28 ENCOUNTER — OFFICE VISIT (OUTPATIENT)
Dept: CARDIOLOGY | Facility: CLINIC | Age: 78
End: 2023-03-28
Payer: MEDICARE

## 2023-03-28 VITALS
SYSTOLIC BLOOD PRESSURE: 126 MMHG | HEART RATE: 81 BPM | BODY MASS INDEX: 31.92 KG/M2 | HEIGHT: 70 IN | WEIGHT: 223 LBS | DIASTOLIC BLOOD PRESSURE: 58 MMHG

## 2023-03-28 DIAGNOSIS — I48.91 ATRIAL FIBRILLATION, UNSPECIFIED TYPE: ICD-10-CM

## 2023-03-28 DIAGNOSIS — I25.10 CORONARY ARTERY DISEASE INVOLVING NATIVE CORONARY ARTERY OF NATIVE HEART WITHOUT ANGINA PECTORIS: Primary | ICD-10-CM

## 2023-03-28 DIAGNOSIS — R50.9 FEBRILE ILLNESS, ACUTE: ICD-10-CM

## 2023-03-28 DIAGNOSIS — I48.0 PAROXYSMAL ATRIAL FIBRILLATION: ICD-10-CM

## 2023-03-28 DIAGNOSIS — D50.8 OTHER IRON DEFICIENCY ANEMIA: ICD-10-CM

## 2023-03-28 DIAGNOSIS — I10 ESSENTIAL HYPERTENSION: ICD-10-CM

## 2023-03-28 DIAGNOSIS — E11.9 TYPE 2 DIABETES MELLITUS WITHOUT COMPLICATION, WITHOUT LONG-TERM CURRENT USE OF INSULIN: ICD-10-CM

## 2023-03-28 LAB
ANION GAP SERPL CALCULATED.3IONS-SCNC: 13.2 MMOL/L (ref 5–15)
BUN SERPL-MCNC: 30 MG/DL (ref 8–23)
BUN/CREAT SERPL: 24.2 (ref 7–25)
CALCIUM SPEC-SCNC: 9.8 MG/DL (ref 8.6–10.5)
CHLORIDE SERPL-SCNC: 104 MMOL/L (ref 98–107)
CO2 SERPL-SCNC: 21.8 MMOL/L (ref 22–29)
CREAT SERPL-MCNC: 1.24 MG/DL (ref 0.76–1.27)
EGFRCR SERPLBLD CKD-EPI 2021: 60.6 ML/MIN/1.73
GLUCOSE SERPL-MCNC: 171 MG/DL (ref 65–99)
HBA1C MFR BLD: 7.9 % (ref 4.8–5.6)
INR PPP: 1.85 (ref 0.86–1.15)
IRON 24H UR-MRATE: 33 MCG/DL (ref 59–158)
POTASSIUM SERPL-SCNC: 4.5 MMOL/L (ref 3.5–5.2)
PROTHROMBIN TIME: 21.2 SECONDS (ref 11.8–14.9)
SODIUM SERPL-SCNC: 139 MMOL/L (ref 136–145)

## 2023-03-28 PROCEDURE — 85007 BL SMEAR W/DIFF WBC COUNT: CPT

## 2023-03-28 PROCEDURE — 83540 ASSAY OF IRON: CPT

## 2023-03-28 PROCEDURE — 80048 BASIC METABOLIC PNL TOTAL CA: CPT

## 2023-03-28 PROCEDURE — 36415 COLL VENOUS BLD VENIPUNCTURE: CPT

## 2023-03-28 PROCEDURE — 3078F DIAST BP <80 MM HG: CPT | Performed by: INTERNAL MEDICINE

## 2023-03-28 PROCEDURE — 3074F SYST BP LT 130 MM HG: CPT | Performed by: INTERNAL MEDICINE

## 2023-03-28 PROCEDURE — 1160F RVW MEDS BY RX/DR IN RCRD: CPT | Performed by: INTERNAL MEDICINE

## 2023-03-28 PROCEDURE — 83036 HEMOGLOBIN GLYCOSYLATED A1C: CPT

## 2023-03-28 PROCEDURE — 99214 OFFICE O/P EST MOD 30 MIN: CPT | Performed by: INTERNAL MEDICINE

## 2023-03-28 PROCEDURE — 85025 COMPLETE CBC W/AUTO DIFF WBC: CPT

## 2023-03-28 PROCEDURE — 1159F MED LIST DOCD IN RCRD: CPT | Performed by: INTERNAL MEDICINE

## 2023-03-28 PROCEDURE — 85610 PROTHROMBIN TIME: CPT

## 2023-03-28 NOTE — ASSESSMENT & PLAN NOTE
Recent angioplasty to SVG to OM branch.  Currently stable with no anginal-like symptoms.  LVEF is 50 to 55% per echocardiogram done during recent hospital stay.  Plavix was changed to Brilinta due to concerns for side effects.  We will continue Brilinta 90 mg twice daily for now.  He is not on aspirin to minimize bleeding risk and avoid triple therapy.  Brilinta may be changed back to Plavix after 4 to 6 weeks since we do not feel that the current symptoms were a side effect of Plavix but rather due to an infection.

## 2023-03-28 NOTE — ASSESSMENT & PLAN NOTE
Paroxysmal atrial flutter/fibrillation.  Currently in irregular rhythm with controlled ventricular rates.  Denies any palpitations.  Continue metoprolol at the current dose.  We will do INR today since patient is taking antibiotics.  Likely need a lower dose of Coumadin.

## 2023-03-28 NOTE — ASSESSMENT & PLAN NOTE
Recent admission to the hospital, subsequently diagnosed with UTI due to Enterococcus with sepsis.  He also had urinary retention, requiring Kim catheter placement briefly..  Fever and most other symptoms subsided.  He is currently reporting profuse watery diarrhea.  This is likely a side effect of Augmentin.  He has 1 more day of Augmentin left but advised to stop it now due to significant diarrhea.  Increase fluid intake for now.

## 2023-03-28 NOTE — PROGRESS NOTES
CARDIOLOGY FOLLOW-UP PROGRESS NOTE        Chief Complaint  Follow-up, Hypertension, Atrial Fibrillation, and Coronary Artery Disease    Subjective            Zachariah Cruz presents to [facility] CARDIOLOGY  History of Present Illness    Mr. Cruz is here for a follow-up visit.  For the past few months, he is reporting chest discomfort with activity and subsequent stress test was positive for reversible ischemia.  He underwent cardiac catheterization on 3/16/2023 which showed 95% tight lesion involving the proximal segment of SVG to OM branch, for which he underwent angioplasty and stent placement.  He was discharged the next day with aspirin and Plavix, Coumadin was continued.    3 days later, patient called our office reporting severe fatigue, cough, shortness of breath, back pain and fever and stress.  Patient thought that this was a side effect of Plavix and wanted the medication to be changed.  We called in a prescription for Brilinta in place of Plavix.  Patient was seen in PCP office the next day, was noted to have fever.  COVID and flu test were negative.  Because of severe fatigue to the point that he was not able to move around, he was seen in the emergency room the same day.  He was admitted to the hospital, and subsequently diagnosed with UTI due to Enterococcus faecalis, sepsis and acute renal failure.  He also had acute urinary retention and had a Kim placed.  He was treated with antibiotics for 3 days after symptoms improved.  Kim was removed.  He was discharged on 3/25/2023 on Augmentin.    Today, patient reports that the fever and back pain completely subsided.  He has mild cough which is improving.  However he continues to have significant fatigue and weakness.  He also reports new symptom of severe diarrhea which is watery and multiple times a day.  He has not had any chest pain since the stent placement.  He does report some fluttering sensation in the chest.  He is  taking Brilinta, along with warfarin.  He is off aspirin as advised.      Past History:    1) Coronary artery disease status post 3-vessel coronary artery bypass grafting on 05/21/2010 by Dr. Garcia at [facility] (LIMA graft to the LAD, SVG graft to OM, SVG graft to posterior descending branch of RCA); cardiac cath done on 3/16/2023 showed patent LIMA to LAD, 95% stenosis of proximal segment of SVG to OM branch and 50% stenosis of SVG to RCA at anastomosis.  He underwent angioplasty and stent placement to SVG to OM branch.  2) Diabetes mellitus; 3) Hypertension; 4) Hyperlipidemia; 5) Gout; 6) Obstructive sleep apnea, on CPAP; 7) Paroxysmal atrial flutter, detected on 24-hour Holter monitor study done in October 2019.     Medical History:  Past Medical History:   Diagnosis Date   • Coronary artery disease    • Diabetes (HCC)    • Hematuria    • Hyperlipidemia    • Hypertension        Surgical History: has a past surgical history that includes Appendectomy; Cataract extraction; Prostate surgery; Coronary artery bypass graft (05/21/2010); Cardiac catheterization; and Cardiac catheterization (N/A, 3/16/2023).     Family History: family history includes Coronary artery disease in his father and another family member; Hypertension in his mother; Stroke in his mother and paternal grandfather.     Social History: reports that he quit smoking about 54 years ago. His smoking use included cigarettes. He has a 5.00 pack-year smoking history. He has never used smokeless tobacco. He reports that he does not currently use alcohol. He reports that he does not use drugs.    Allergies: Patient has no known allergies.    Current Outpatient Medications on File Prior to Visit   Medication Sig   • allopurinol (ZYLOPRIM) 100 MG tablet TAKE ONE TABLET BY MOUTH DAILY (Patient taking differently: Take 1 tablet by mouth Daily.)   • amLODIPine (NORVASC) 10 MG tablet Take 1 tablet by mouth Daily.   • amoxicillin-clavulanate (Augmentin)  "875-125 MG per tablet Take 1 tablet by mouth 2 (Two) Times a Day for 4 days.   • glimepiride (AMARYL) 4 MG tablet TAKE ONE TABLET BY MOUTH DAILY (Patient taking differently: Take 1 tablet by mouth Every Morning Before Breakfast.)   • lisinopril (PRINIVIL,ZESTRIL) 20 MG tablet TAKE ONE TABLET BY MOUTH TWICE A DAY   • metFORMIN ER (GLUCOPHAGE-XR) 500 MG 24 hr tablet Take 4 tablets by mouth Daily. (Patient taking differently: Take 2 tablets by mouth 2 (Two) Times a Day.)   • metoprolol succinate XL (TOPROL-XL) 25 MG 24 hr tablet TAKE ONE TABLET BY MOUTH TWICE A DAY (Patient taking differently: Take 1 tablet by mouth 2 (Two) Times a Day.)   • nitroglycerin (NITROSTAT) 0.4 MG SL tablet 1 under the tongue as needed for angina, may repeat q5mins for up three doses   • simvastatin (ZOCOR) 40 MG tablet TAKE ONE TABLET BY MOUTH DAILY (Patient taking differently: Take 1 tablet by mouth Every Night.)   • spironolactone (ALDACTONE) 25 MG tablet Take 1 tablet by mouth Daily.   • tamsulosin (FLOMAX) 0.4 MG capsule 24 hr capsule Take 1 capsule by mouth Daily for 30 days.   • ticagrelor (Brilinta) 90 MG tablet tablet Take 1 tablet by mouth 2 (Two) Times a Day.   • warfarin (Coumadin) 4 MG tablet Take 1 tablet by mouth daily or as directed (Patient taking differently: Take 1 tablet by mouth Every Night. Home warfarin dose: 4mg qday  Who monitors the patients INR? Bertin)     No current facility-administered medications on file prior to visit.          Review of Systems   Constitutional: Positive for fatigue.   Respiratory: Negative for cough, shortness of breath and wheezing.    Cardiovascular: Negative for chest pain, palpitations and leg swelling.   Gastrointestinal: Positive for diarrhea. Negative for nausea and vomiting.   Neurological: Negative for dizziness and syncope.        Objective     /58   Pulse 81   Ht 177.8 cm (70\")   Wt 101 kg (223 lb)   BMI 32.00 kg/m²       Physical Exam    General : Alert, awake, no " acute distress  Neck : Supple, no carotid bruit, no jugular venous distention  CVS : Irregular, 2/6 systolic murmur at aortic area, no rubs or gallops  Lungs: Clear to auscultation bilaterally, no crackles or rhonchi  Abdomen: Soft, nontender, bowel sounds heard in all 4 quadrants  Extremities: Warm, well-perfused, trace edema LLE, engorged veins present    Result Review :     The following data was reviewed by: Vishal Denton MD on 03/28/2023:    CMP    CMP 3/23/23 3/24/23 3/25/23   Glucose 50 (A) 142 (A) 197 (A)   BUN 39 (A) 26 (A) 19   Creatinine 1.50 (A) 0.99 0.98   eGFR 48.2 (A) 79.4 80.4   Sodium 133 (A) 136 136   Potassium 3.7 4.0 3.8   Chloride 101 102 100   Calcium 9.0 8.7 9.4   BUN/Creatinine Ratio 26.0 (A) 26.3 (A) 19.4   Anion Gap 12.5 12.9 13.8   (A) Abnormal value            CBC    CBC 3/23/23 3/24/23 3/25/23   WBC 13.43 (A) 8.16 10.37   RBC 3.59 (A) 3.41 (A) 3.93 (A)   Hemoglobin 9.2 (A) 8.6 (A) 10.0 (A)   Hematocrit 28.7 (A) 27.7 (A) 31.8 (A)   MCV 79.9 81.2 80.9   MCH 25.6 (A) 25.2 (A) 25.4 (A)   MCHC 32.1 31.0 (A) 31.4 (A)   RDW 15.8 (A) 15.8 (A) 15.3   Platelets 252 241 344   (A) Abnormal value              Lipid Panel    Lipid Panel 6/10/22 12/21/22 3/17/23   Total Cholesterol 116 131 121   Triglycerides 214 (A) 261 (A) 127   HDL Cholesterol 30 (A) 38 (A) 34 (A)   VLDL Cholesterol 35 41 (A) 23   LDL Cholesterol  51 52 64   LDL/HDL Ratio 1.44 1.07 1.81   (A) Abnormal value                 Data reviewed: Cardiology studies        Results for orders placed during the hospital encounter of 03/16/23    Adult Transthoracic Echo Complete w/ Color, Spectral and Contrast if necessary per protocol    Interpretation Summary  •  Overall LV ejection fraction is 50 to 55% with mild inferolateral wall hypokinesis.  •  Left ventricular wall thickness is consistent with mild concentric hypertrophy.  •  Left ventricular diastolic function is consistent with (grade I) impaired relaxation.  •  Aortic valve is  mildly calcified.  Mild aortic valve stenosis is present.      Results for orders placed during the hospital encounter of 02/22/23    Stress Test With Myocardial Perfusion One Day    Interpretation Summary  •  There is a moderate sized infarct in the basal-mid inferolateral walls with a small-moderate amount of duglas-infarct ischemia..  •  Left ventricular ejection fraction is moderately reduced (Calculated EF = 38%).  •  Abnormal LV wall motion consistent with severe hypokinesis of the lateral wall.  •  Impressions are consistent with an intermediate risk study.  •  Findings consistent with an indeterminate ECG stress test.               Assessment and Plan        Diagnoses and all orders for this visit:    1. Coronary artery disease involving native coronary artery of native heart without angina pectoris (Primary)  Assessment & Plan:  Recent angioplasty to SVG to OM branch.  Currently stable with no anginal-like symptoms.  LVEF is 50 to 55% per echocardiogram done during recent hospital stay.  Plavix was changed to Brilinta due to concerns for side effects.  We will continue Brilinta 90 mg twice daily for now.  He is not on aspirin to minimize bleeding risk and avoid triple therapy.  Brilinta may be changed back to Plavix after 4 to 6 weeks since we do not feel that the current symptoms were a side effect of Plavix but rather due to an infection.      2. Paroxysmal atrial fibrillation (HCC)  Assessment & Plan:  Paroxysmal atrial flutter/fibrillation.  Currently in irregular rhythm with controlled ventricular rates.  Denies any palpitations.  Continue metoprolol at the current dose.  We will do INR today since patient is taking antibiotics.  Likely need a lower dose of Coumadin.      3. Essential hypertension  Assessment & Plan:  Blood pressure well controlled.  Continue the current regimen      4. Febrile illness, acute  Assessment & Plan:  Recent admission to the hospital, subsequently diagnosed with UTI due to  Enterococcus with sepsis.  He also had urinary retention, requiring Kim catheter placement briefly..  Fever and most other symptoms subsided.  He is currently reporting profuse watery diarrhea.  This is likely a side effect of Augmentin.  He has 1 more day of Augmentin left but advised to stop it now due to significant diarrhea.  Increase fluid intake for now.              Follow Up     Return in about 3 weeks (around 4/18/2023) for Next scheduled follow up, ok TO DOUBLE BOOK .    Patient was given instructions and counseling regarding his condition or for health maintenance advice. Please see specific information pulled into the AVS if appropriate.

## 2023-03-29 ENCOUNTER — ANTICOAGULATION VISIT (OUTPATIENT)
Dept: CARDIOLOGY | Facility: CLINIC | Age: 78
End: 2023-03-29
Payer: MEDICARE

## 2023-03-29 DIAGNOSIS — I48.0 PAROXYSMAL ATRIAL FIBRILLATION: Primary | ICD-10-CM

## 2023-03-29 NOTE — PROGRESS NOTES
Lab Results   Component Value Date    INR 1.85 (H) 03/28/2023    INR 1.51 (H) 03/25/2023    INR 1.37 (H) 03/24/2023    PROTIME 21.2 (H) 03/28/2023    PROTIME 18.2 (H) 03/25/2023    PROTIME 16.9 (H) 03/24/2023        Taking 4 mg daily  Dx Afib  Range 2-3  [facility]    Patient has been sick and on antibiotics

## 2023-03-30 ENCOUNTER — OFFICE VISIT (OUTPATIENT)
Dept: FAMILY MEDICINE CLINIC | Age: 78
End: 2023-03-30
Payer: MEDICARE

## 2023-03-30 VITALS
TEMPERATURE: 97.5 F | WEIGHT: 223.4 LBS | DIASTOLIC BLOOD PRESSURE: 71 MMHG | SYSTOLIC BLOOD PRESSURE: 140 MMHG | HEIGHT: 70 IN | BODY MASS INDEX: 31.98 KG/M2 | OXYGEN SATURATION: 100 % | RESPIRATION RATE: 16 BRPM | HEART RATE: 87 BPM

## 2023-03-30 DIAGNOSIS — N39.0 URINARY TRACT INFECTION WITHOUT HEMATURIA, SITE UNSPECIFIED: ICD-10-CM

## 2023-03-30 DIAGNOSIS — M25.50 ARTHRALGIA, UNSPECIFIED JOINT: ICD-10-CM

## 2023-03-30 DIAGNOSIS — R19.7 DIARRHEA, UNSPECIFIED TYPE: ICD-10-CM

## 2023-03-30 DIAGNOSIS — R53.1 WEAKNESS: ICD-10-CM

## 2023-03-30 DIAGNOSIS — E11.9 TYPE 2 DIABETES MELLITUS WITHOUT COMPLICATION, WITHOUT LONG-TERM CURRENT USE OF INSULIN: Primary | ICD-10-CM

## 2023-03-30 DIAGNOSIS — I48.0 PAROXYSMAL ATRIAL FIBRILLATION: ICD-10-CM

## 2023-03-30 PROBLEM — R50.9 FEBRILE ILLNESS, ACUTE: Status: RESOLVED | Noted: 2023-03-22 | Resolved: 2023-03-30

## 2023-03-30 LAB
DEPRECATED RDW RBC AUTO: 44.6 FL (ref 37–54)
EOSINOPHIL # BLD MANUAL: 0.33 10*3/MM3 (ref 0–0.4)
EOSINOPHIL NFR BLD MANUAL: 3 % (ref 0.3–6.2)
ERYTHROCYTE [DISTWIDTH] IN BLOOD BY AUTOMATED COUNT: 15.3 % (ref 12.3–15.4)
HCT VFR BLD AUTO: 29 % (ref 37.5–51)
HGB BLD-MCNC: 9 G/DL (ref 13–17.7)
LYMPHOCYTES # BLD MANUAL: 2.39 10*3/MM3 (ref 0.7–3.1)
LYMPHOCYTES NFR BLD MANUAL: 9 % (ref 5–12)
MCH RBC QN AUTO: 25.2 PG (ref 26.6–33)
MCHC RBC AUTO-ENTMCNC: 31 G/DL (ref 31.5–35.7)
MCV RBC AUTO: 81.2 FL (ref 79–97)
MONOCYTES # BLD: 0.98 10*3/MM3 (ref 0.1–0.9)
NEUTROPHILS # BLD AUTO: 7.16 10*3/MM3 (ref 1.7–7)
NEUTROPHILS NFR BLD MANUAL: 66 % (ref 42.7–76)
PLAT MORPH BLD: NORMAL
PLATELET # BLD AUTO: 509 10*3/MM3 (ref 140–450)
PMV BLD AUTO: 11.4 FL (ref 6–12)
RBC # BLD AUTO: 3.57 10*6/MM3 (ref 4.14–5.8)
RBC MORPH BLD: NORMAL
VARIANT LYMPHS NFR BLD MANUAL: 22 % (ref 19.6–45.3)
WBC MORPH BLD: NORMAL
WBC NRBC COR # BLD: 10.85 10*3/MM3 (ref 3.4–10.8)

## 2023-03-30 PROCEDURE — 87493 C DIFF AMPLIFIED PROBE: CPT

## 2023-03-30 NOTE — PROGRESS NOTES
Transitional Care Follow Up Visit  Subjective     Zachariah Cruz is a 77 y.o. male who presents for a transitional care management visit.    Within 48 business hours after discharge our office contacted him via telephone to coordinate his care and needs.      I reviewed and discussed the details of that call along with the discharge summary, hospital problems, inpatient lab results, inpatient diagnostic studies, and consultation reports with Zachariah.     Current outpatient and discharge medications have been reconciled for the patient.  Reviewed by: JACIEL Tovar      Date of TCM Phone Call 3/25/2023   [facility]   Date of Admission 3/22/2023   Date of Discharge 3/25/2023   Discharge Disposition Home-Health Care [facility]       Risk for Readmission (LACE) Score: 7 (3/25/2023  6:01 AM)      History of Present Illness  MICK  Admitted: 3-22-23  Discharged: 3-25-23  Condition: improved slightly   Treatment: ATB, he has stopped due to diarrhea     CAD s/p CABG and PCI, paroxysmal atrial flutter, hypertension, hyperlipidemia, sleep apnea and gout who presented to the ED with complaints of weakness and fever.  Patient also noted that he had developed extensive hematoma and groin area following recent cardiac catheterization procedure.  Urinalysis consistent with UTI.  Patient started on antibiotics.  Hospitalist service contacted for further evaluation management.  Patient found to have acute urinary retention, Kim catheter inserted.  After antibiotic therapy initiated patient noted to be improvement in his condition and patient will complete treatment after discharge.  Kim removed and voiding trial completed during admission and patient was able to urinate without issue.  Patient will continue on tamsulosin after discharge.  Patient hemodynamically stable and no additional inpatient evaluation or work-up necessary at this time, patient discharged home with outpatient follow-up.    Serum  iron 33 in hospital  He saw cardiology 2 days ago and he advised stopping ATB, he also changed his coumadin dose     PAST MEDICAL HISTORY changes since 3-       covid +     AF, sees Dr Moffett    Sleep Apnea: uses CPAP;     Renal Stones: he has undergone ECSW lithotripsy and laser       Hospitalizations:  UTI/weaknss 3-22-23  Heart cath 3-16-23    Pneumonia admitted once on 10-20-20 (COVID +)         CURRENT MEDICAL PROVIDERS:    Orthopedist    Rheumatologist: Dr. Arvizu     urology : [facility]        PREVENTIVE HEALTH MAINTENANCE             COLORECTAL CANCER SCREENING: Up to date (colonoscopy q10y; sigmoidoscopy q5y; Cologuard q3y) was last done 2020, Results are in chart; Cologuard normal       Hepatitis C Medicare Screening: was last done ; negative            Surgical History:      Heart cath 3-16-23 and stenting     Left cataract     Appendectomy    Coronary Artery Bypass Graft: ;     Transurethral Resection of Prostate: ;     Cataract Removal: right; ;         Family History:     Father:  at age 91; Cause of death was CAD;  Coronary Artery Disease     Mother:  at age 88; Cause of death was CVA;  Hypertension     Paternal Grandfather: Cerebrovascular Accident         Social History:     Occupation:.   (Self-Employed) [employer]      Marital Status:      Children: 3 children              Course During Hospital Stay(Copied from D/C Summary and reviewed during encounter on 2023 by JACIEL Tovar):      The following portions of the patient's history were reviewed and updated as appropriate: allergies, current medications, past family history, past medical history, past social history, past surgical history and problem list.      Current Outpatient Medications:   •  allopurinol (ZYLOPRIM) 100 MG tablet, TAKE ONE TABLET BY MOUTH DAILY (Patient taking differently: Take 1 tablet by mouth Daily.), Disp: 90 tablet, Rfl: 0  •   "amLODIPine (NORVASC) 10 MG tablet, Take 1 tablet by mouth Daily., Disp: 90 tablet, Rfl: 3  •  glimepiride (AMARYL) 4 MG tablet, TAKE ONE TABLET BY MOUTH DAILY (Patient taking differently: Take 1 tablet by mouth Every Morning Before Breakfast.), Disp: 90 tablet, Rfl: 0  •  lisinopril (PRINIVIL,ZESTRIL) 20 MG tablet, TAKE ONE TABLET BY MOUTH TWICE A DAY, Disp: 180 tablet, Rfl: 0  •  metFORMIN ER (GLUCOPHAGE-XR) 500 MG 24 hr tablet, Take 4 tablets by mouth Daily. (Patient taking differently: Take 2 tablets by mouth 2 (Two) Times a Day.), Disp: 360 tablet, Rfl: 0  •  metoprolol succinate XL (TOPROL-XL) 25 MG 24 hr tablet, TAKE ONE TABLET BY MOUTH TWICE A DAY (Patient taking differently: Take 1 tablet by mouth 2 (Two) Times a Day.), Disp: 180 tablet, Rfl: 0  •  nitroglycerin (NITROSTAT) 0.4 MG SL tablet, 1 under the tongue as needed for angina, may repeat q5mins for up three doses, Disp: 100 tablet, Rfl: 11  •  simvastatin (ZOCOR) 40 MG tablet, TAKE ONE TABLET BY MOUTH DAILY (Patient taking differently: Take 1 tablet by mouth Every Night.), Disp: 90 tablet, Rfl: 0  •  spironolactone (ALDACTONE) 25 MG tablet, Take 1 tablet by mouth Daily., Disp: 30 tablet, Rfl: 11  •  tamsulosin (FLOMAX) 0.4 MG capsule 24 hr capsule, Take 1 capsule by mouth Daily for 30 days., Disp: 30 capsule, Rfl: 0  •  ticagrelor (Brilinta) 90 MG tablet tablet, Take 1 tablet by mouth 2 (Two) Times a Day., Disp: 60 tablet, Rfl: 5  •  warfarin (Coumadin) 4 MG tablet, Take 1 tablet by mouth daily or as directed (Patient taking differently: Take 1 tablet by mouth Every Night. Home warfarin dose: 4mg qday Who monitors the patients INR? Bertin), Disp: 90 tablet, Rfl: 1     Vitals:    03/30/23 1040   BP: 140/71   BP Location: Right arm   Patient Position: Sitting   Cuff Size: Large Adult   Pulse: 87   Resp: 16   Temp: 97.5 °F (36.4 °C)   TempSrc: Oral   SpO2: 100%  Comment: room air   Weight: 101 kg (223 lb 6.4 oz)   Height: 177.8 cm (70\")   PainSc: 0-No " pain       Advance Care Planning     Review of Systems   Constitutional: Negative for fever.   Respiratory: Positive for cough (occ, dry ). Negative for shortness of breath.    Cardiovascular: Positive for leg swelling (left leg, chronic issue ). Negative for chest pain and palpitations.   Gastrointestinal: Positive for diarrhea (watery, still notice dark stool, no bright red blood ).   Genitourinary: Negative for difficulty urinating and dysuria.   Musculoskeletal: Positive for arthralgias.   Neurological: Positive for weakness (improved but persist ).        Objective   Physical Exam  Vitals reviewed.   Constitutional:       General: He is not in acute distress.     Appearance: Normal appearance.   Neck:      Vascular: No carotid bruit.   Cardiovascular:      Rate and Rhythm: Normal rate and regular rhythm.      Heart sounds: Normal heart sounds. No murmur heard.  Pulmonary:      Effort: Pulmonary effort is normal. No respiratory distress.      Breath sounds: Normal breath sounds.   Abdominal:      Tenderness: There is no right CVA tenderness or left CVA tenderness.   Musculoskeletal:      Right lower leg: No edema.      Left lower leg: Edema (trace) present.   Neurological:      Mental Status: He is alert.   Psychiatric:         Mood and Affect: Mood normal.         Behavior: Behavior normal.         DATA REVIEWED:    Data Reviewed: discharge summary/ labs     XR Chest 1 View    Result Date: 3/22/2023  Impression:  No active disease.       DELVIS AGUSTIN MD       Electronically Signed and Approved By: DELVIS AGUSTIN MD on 3/22/2023 at 13:17               Assessment & Plan     Diagnoses and all orders for this visit:    1. Type 2 diabetes mellitus without complication, without long-term current use of insulin (HCC) (Primary)  Assessment & Plan:  's 2 days ago, to check sugars daily, reviewed recent labs, A1C 7.9      2. Weakness  Assessment & Plan:  Push fluids, reviewed recent labs, done 2 days ago     Orders:  -      CBC w AUTO Differential  -     Cancel: POCT Occult blood x 3, stool    3. Urinary tract infection without hematuria, site unspecified  Assessment & Plan:  Discussed UTI's, will consider sending him back to [facility]       4. Diarrhea, unspecified type  Assessment & Plan:  Check c diff, stool for hemoccult, his serum iron low, may need to see hematology     Orders:  -     Clostridioides difficile Toxin, PCR - Stool, Per Rectum; Future    5. Paroxysmal atrial fibrillation (HCC)  Assessment & Plan:  Follow up with cardiology and take his rx as directed      6. Arthralgia, unspecified joint  Assessment & Plan:  Discussed his decreased mobility, he is being seen by home health, to work with them and PT/OT, if symptoms persist or change, follow up /let me know         Follow Up      Return for followup pending lab results.

## 2023-03-30 NOTE — ASSESSMENT & PLAN NOTE
Discussed his decreased mobility, he is being seen by home health, to work with them and PT/OT, if symptoms persist or change, follow up /let me know

## 2023-03-31 ENCOUNTER — LAB (OUTPATIENT)
Dept: LAB | Facility: HOSPITAL | Age: 78
End: 2023-03-31
Payer: MEDICARE

## 2023-03-31 DIAGNOSIS — R19.7 DIARRHEA, UNSPECIFIED TYPE: ICD-10-CM

## 2023-03-31 DIAGNOSIS — E61.1 LOW IRON: Primary | ICD-10-CM

## 2023-03-31 DIAGNOSIS — A04.72 C. DIFFICILE COLITIS: Primary | ICD-10-CM

## 2023-03-31 DIAGNOSIS — R19.7 DIARRHEA, UNSPECIFIED TYPE: Primary | ICD-10-CM

## 2023-03-31 LAB
027 TOXIN: ABNORMAL
C DIFF TOX GENS STL QL NAA+PROBE: POSITIVE
DEVELOPER EXPIRATION DATE: NORMAL
DEVELOPER LOT NUMBER: NORMAL
EXPIRATION DATE: NORMAL
FECAL OCCULT BLOOD SCREEN, POC: NEGATIVE
Lab: 1322
NEGATIVE CONTROL: NEGATIVE
POSITIVE CONTROL: POSITIVE

## 2023-03-31 PROCEDURE — 82270 OCCULT BLOOD FECES: CPT | Performed by: NURSE PRACTITIONER

## 2023-03-31 PROCEDURE — 87493 C DIFF AMPLIFIED PROBE: CPT

## 2023-03-31 RX ORDER — VANCOMYCIN HYDROCHLORIDE 125 MG/1
125 CAPSULE ORAL 4 TIMES DAILY
Qty: 40 CAPSULE | Refills: 0 | Status: SHIPPED | OUTPATIENT
Start: 2023-03-31 | End: 2023-04-10

## 2023-04-03 ENCOUNTER — HOSPITAL ENCOUNTER (OUTPATIENT)
Dept: CT IMAGING | Facility: HOSPITAL | Age: 78
Discharge: HOME | End: 2023-04-03
Admitting: NURSE PRACTITIONER
Payer: MEDICARE

## 2023-04-03 ENCOUNTER — TELEPHONE (OUTPATIENT)
Dept: FAMILY MEDICINE CLINIC | Age: 78
End: 2023-04-03
Payer: MEDICARE

## 2023-04-03 DIAGNOSIS — N28.1 BILATERAL RENAL CYSTS: ICD-10-CM

## 2023-04-03 PROCEDURE — 25510000001 IOPAMIDOL PER 1 ML: Performed by: NURSE PRACTITIONER

## 2023-04-03 PROCEDURE — 74178 CT ABD&PLV WO CNTR FLWD CNTR: CPT

## 2023-04-03 RX ADMIN — IOPAMIDOL 100 ML: 755 INJECTION, SOLUTION INTRAVENOUS at 09:24

## 2023-04-03 NOTE — TELEPHONE ENCOUNTER
"Caller: Zachariah Cruz \"Keith\"    Relationship: Self    Best call back number: [phone]    What medications are you currently taking:   Current Outpatient Medications on File Prior to Visit   Medication Sig Dispense Refill   • allopurinol (ZYLOPRIM) 100 MG tablet TAKE ONE TABLET BY MOUTH DAILY (Patient taking differently: Take 1 tablet by mouth Daily.) 90 tablet 0   • amLODIPine (NORVASC) 10 MG tablet Take 1 tablet by mouth Daily. 90 tablet 3   • glimepiride (AMARYL) 4 MG tablet TAKE ONE TABLET BY MOUTH DAILY (Patient taking differently: Take 1 tablet by mouth Every Morning Before Breakfast.) 90 tablet 0   • lisinopril (PRINIVIL,ZESTRIL) 20 MG tablet TAKE ONE TABLET BY MOUTH TWICE A  tablet 0   • metFORMIN ER (GLUCOPHAGE-XR) 500 MG 24 hr tablet Take 4 tablets by mouth Daily. (Patient taking differently: Take 2 tablets by mouth 2 (Two) Times a Day.) 360 tablet 0   • metoprolol succinate XL (TOPROL-XL) 25 MG 24 hr tablet TAKE ONE TABLET BY MOUTH TWICE A DAY (Patient taking differently: Take 1 tablet by mouth 2 (Two) Times a Day.) 180 tablet 0   • nitroglycerin (NITROSTAT) 0.4 MG SL tablet 1 under the tongue as needed for angina, may repeat q5mins for up three doses 100 tablet 11   • simvastatin (ZOCOR) 40 MG tablet TAKE ONE TABLET BY MOUTH DAILY (Patient taking differently: Take 1 tablet by mouth Every Night.) 90 tablet 0   • spironolactone (ALDACTONE) 25 MG tablet Take 1 tablet by mouth Daily. 30 tablet 11   • tamsulosin (FLOMAX) 0.4 MG capsule 24 hr capsule Take 1 capsule by mouth Daily for 30 days. 30 capsule 0   • ticagrelor (Brilinta) 90 MG tablet tablet Take 1 tablet by mouth 2 (Two) Times a Day. 60 tablet 5   • vancomycin (VANCOCIN) 125 MG capsule Take 1 capsule by mouth 4 (Four) Times a Day for 10 days. 40 capsule 0   • warfarin (Coumadin) 4 MG tablet Take 1 tablet by mouth daily or as directed (Patient taking differently: Take 1 tablet by mouth Every Night. Home warfarin dose: 4mg qday  Who " monitors the patients INR? Valayam) 90 tablet 1     Current Facility-Administered Medications on File Prior to Visit   Medication Dose Route Frequency Provider Last Rate Last Admin   • [COMPLETED] iopamidol (ISOVUE-370) 76 % injection 100 mL  100 mL Intravenous Once in imaging Sarha Oneill APRN   100 mL at 04/03/23 0924          When did you start taking these medications: 03/31/2023    Which medication are you concerned about: vancomycin (VANCOCIN) 125 MG capsule         Who prescribed you this medication: DR. RODRIGUEZ     What are your concerns: BEEN GETTING COAGULATED NOSE BLEEDS, PATIENT IS UNSURE IF THE MEDICATION IS CAUSING THIS BUT HE HAS NEVER HAD THESE BEFORE SHOULD HE CONTINUE TAKING THIS MEDICATION     How long have you had these concerns: AS SOON AS PATIENT STARTED TAKING THIS MEDICATION

## 2023-04-03 NOTE — TELEPHONE ENCOUNTER
Yes I would not think the vanc would cause this, other than the interaction with coumadin, keep us posted

## 2023-04-03 NOTE — TELEPHONE ENCOUNTER
When did you start taking these medications: 03/31/2023     Which medication are you concerned about: vancomycin (VANCOCIN) 125 MG capsule            Who prescribed you this medication: DR. RODRIGUEZ      What are your concerns: BEEN GETTING COAGULATED NOSE BLEEDS, PATIENT IS UNSURE IF THE MEDICATION IS CAUSING THIS BUT HE HAS NEVER HAD THESE BEFORE SHOULD HE CONTINUE TAKING THIS MEDICATION      How long have you had these concerns: AS SOON AS PATIENT STARTED TAKING THIS MEDICATION

## 2023-04-03 NOTE — TELEPHONE ENCOUNTER
Pt stated his last INR was 1.8 on 3/29/23, his next call is to cardiology for next steps and to inf him of abx. He states it is not a nose bleed, but when he blows his nose, he is noticing some dried blood. Home health nurse was there at the time of the call. He states from time to time he will get this with wearing his bi-pap.

## 2023-04-04 ENCOUNTER — LAB (OUTPATIENT)
Dept: LAB | Facility: HOSPITAL | Age: 78
End: 2023-04-04
Payer: MEDICARE

## 2023-04-04 ENCOUNTER — TELEPHONE (OUTPATIENT)
Dept: FAMILY MEDICINE CLINIC | Age: 78
End: 2023-04-04
Payer: MEDICARE

## 2023-04-04 DIAGNOSIS — I48.91 ATRIAL FIBRILLATION, UNSPECIFIED TYPE: ICD-10-CM

## 2023-04-04 DIAGNOSIS — D50.8 OTHER IRON DEFICIENCY ANEMIA: Primary | ICD-10-CM

## 2023-04-04 DIAGNOSIS — D50.8 OTHER IRON DEFICIENCY ANEMIA: ICD-10-CM

## 2023-04-04 LAB
BASOPHILS # BLD AUTO: 0.01 10*3/MM3 (ref 0–0.2)
BASOPHILS NFR BLD AUTO: 0.1 % (ref 0–1.5)
DEPRECATED RDW RBC AUTO: 50.4 FL (ref 37–54)
EOSINOPHIL # BLD AUTO: 0.14 10*3/MM3 (ref 0–0.4)
EOSINOPHIL NFR BLD AUTO: 1.8 % (ref 0.3–6.2)
ERYTHROCYTE [DISTWIDTH] IN BLOOD BY AUTOMATED COUNT: 16.6 % (ref 12.3–15.4)
HCT VFR BLD AUTO: 32.9 % (ref 37.5–51)
HGB BLD-MCNC: 9.8 G/DL (ref 13–17.7)
IMM GRANULOCYTES # BLD AUTO: 0.03 10*3/MM3 (ref 0–0.05)
IMM GRANULOCYTES NFR BLD AUTO: 0.4 % (ref 0–0.5)
INR PPP: 3.22 (ref 0.86–1.15)
IRON 24H UR-MRATE: 39 MCG/DL (ref 59–158)
IRON SATN MFR SERPL: 10 % (ref 20–50)
LYMPHOCYTES # BLD AUTO: 1.09 10*3/MM3 (ref 0.7–3.1)
LYMPHOCYTES NFR BLD AUTO: 13.7 % (ref 19.6–45.3)
MCH RBC QN AUTO: 25 PG (ref 26.6–33)
MCHC RBC AUTO-ENTMCNC: 29.8 G/DL (ref 31.5–35.7)
MCV RBC AUTO: 83.9 FL (ref 79–97)
MONOCYTES # BLD AUTO: 0.47 10*3/MM3 (ref 0.1–0.9)
MONOCYTES NFR BLD AUTO: 5.9 % (ref 5–12)
NEUTROPHILS NFR BLD AUTO: 6.22 10*3/MM3 (ref 1.7–7)
NEUTROPHILS NFR BLD AUTO: 78.1 % (ref 42.7–76)
PLATELET # BLD AUTO: 465 10*3/MM3 (ref 140–450)
PMV BLD AUTO: 8.9 FL (ref 6–12)
PROTHROMBIN TIME: 32.3 SECONDS (ref 11.8–14.9)
QT INTERVAL: 352 MS
RBC # BLD AUTO: 3.92 10*6/MM3 (ref 4.14–5.8)
TIBC SERPL-MCNC: 389 MCG/DL (ref 298–536)
TRANSFERRIN SERPL-MCNC: 261 MG/DL (ref 200–360)
WBC NRBC COR # BLD: 7.96 10*3/MM3 (ref 3.4–10.8)

## 2023-04-04 PROCEDURE — 36415 COLL VENOUS BLD VENIPUNCTURE: CPT

## 2023-04-04 PROCEDURE — 85025 COMPLETE CBC W/AUTO DIFF WBC: CPT

## 2023-04-04 PROCEDURE — 83540 ASSAY OF IRON: CPT

## 2023-04-04 PROCEDURE — 85610 PROTHROMBIN TIME: CPT

## 2023-04-04 PROCEDURE — 84466 ASSAY OF TRANSFERRIN: CPT

## 2023-04-04 NOTE — TELEPHONE ENCOUNTER
Nelia Frost APRN  P [facility]  I added a cbc and a iron and tibc   Let pt know and they said iron panel

## 2023-04-04 NOTE — PROGRESS NOTES
This patient needs to be seen in the next month or so for f/u. Recently had a UTI admitted to the hospital.

## 2023-04-04 NOTE — TELEPHONE ENCOUNTER
From: Nithya Obregon   Sent: 4/4/2023   1:04 PM EDT   To: JACIEL Tovar, *   Subject: REFERRAL                                         Scheduling called, needing a second Iron Panel for comparison. Can we pls place lab order and have pt come in to complete the lab.

## 2023-04-05 ENCOUNTER — TELEPHONE (OUTPATIENT)
Dept: CARDIOLOGY | Facility: CLINIC | Age: 78
End: 2023-04-05
Payer: MEDICARE

## 2023-04-05 ENCOUNTER — ANTICOAGULATION VISIT (OUTPATIENT)
Dept: CARDIOLOGY | Facility: CLINIC | Age: 78
End: 2023-04-05
Payer: MEDICARE

## 2023-04-05 ENCOUNTER — READMISSION MANAGEMENT (OUTPATIENT)
Dept: CALL CENTER | Facility: HOSPITAL | Age: 78
End: 2023-04-05
Payer: MEDICARE

## 2023-04-05 DIAGNOSIS — I48.0 PAROXYSMAL ATRIAL FIBRILLATION: Primary | ICD-10-CM

## 2023-04-05 NOTE — PROGRESS NOTES
His level is slightly elevated, however he is also found in to let us know he has been having nosebleeds.  Have him hold today's dose, then restart at 4 mg daily tomorrow.   Recheck INR on Monday.  Recomemend he using a saline nasal spray as well for next few days.  Contact us if he has any further bleeding issues.  (ER for severe uncontrollable nose bleeds)

## 2023-04-05 NOTE — OUTREACH NOTE
Sepsis Week 2 Survey    Flowsheet Row Responses   [facility] patient discharged from? [facility]   Does the patient have one of the following disease processes/diagnoses(primary or secondary)? Sepsis   Week 2 attempt successful? Yes   Call start time 1146   Call end time 1148   Discharge diagnosis UTI due to Enterococcus faecalis,  sepsis secondary to UTI   Meds reviewed with patient/caregiver? Yes   Is the patient having any side effects they believe may be caused by any medication additions or changes? No   Does the patient have all medications related to this admission filled (includes all antibiotics, inhalers, nebulizers,steroids,etc.) Yes   Is the patient taking all medications as directed (includes completed medication regime)? Yes   Does the patient have a primary care provider?  Yes   Does the patient have an appointment with their PCP within 7 days of discharge? Yes   Has the patient kept scheduled appointments due by today? Yes   What is the Home health agency?  [facility]    Psychosocial issues? No   What is the patient's perception of their health status since discharge? Improving   Week 2 call completed? Yes          Chari HECTOR - Registered Nurse

## 2023-04-05 NOTE — PROGRESS NOTES
Lab Results   Component Value Date    INR 3.22 (H) 04/04/2023    INR 1.85 (H) 03/28/2023    INR 1.51 (H) 03/25/2023    PROTIME 32.3 (H) 04/04/2023    PROTIME 21.2 (H) 03/28/2023    PROTIME 18.2 (H) 03/25/2023     Taking 5 mg/ 4 mg   Dx Afib  Range 2-3   LAB

## 2023-04-05 NOTE — TELEPHONE ENCOUNTER
Patient left a vm regarding a nose bleed.     SW patient. Patient  reporting he is having nose bleeds, with large clots. Patient states his nose did stop last evening but is concerned. Patient is a warfarin patient- that was started on vancomycin 3/31/23 for lung infection.   Patient went to have INR checked yesterday and resulted at 3.22.    Please advise.

## 2023-04-10 ENCOUNTER — LAB (OUTPATIENT)
Dept: LAB | Facility: HOSPITAL | Age: 78
End: 2023-04-10
Payer: MEDICARE

## 2023-04-10 ENCOUNTER — ANTICOAGULATION VISIT (OUTPATIENT)
Dept: CARDIOLOGY | Facility: CLINIC | Age: 78
End: 2023-04-10
Payer: MEDICARE

## 2023-04-10 DIAGNOSIS — I48.0 PAROXYSMAL ATRIAL FIBRILLATION: Primary | ICD-10-CM

## 2023-04-10 DIAGNOSIS — I48.91 ATRIAL FIBRILLATION, UNSPECIFIED TYPE: ICD-10-CM

## 2023-04-10 LAB
INR PPP: 2.03 (ref 0.86–1.15)
PROTHROMBIN TIME: 22.8 SECONDS (ref 11.8–14.9)

## 2023-04-10 PROCEDURE — 36415 COLL VENOUS BLD VENIPUNCTURE: CPT

## 2023-04-10 PROCEDURE — 85610 PROTHROMBIN TIME: CPT

## 2023-04-10 RX ORDER — BLOOD SUGAR DIAGNOSTIC
STRIP MISCELLANEOUS
Qty: 100 EACH | Refills: 1 | Status: SHIPPED | OUTPATIENT
Start: 2023-04-10 | End: 2023-04-13

## 2023-04-10 NOTE — PROGRESS NOTES
Lab Results   Component Value Date    INR 2.03 (H) 04/10/2023    INR 3.22 (H) 04/04/2023    INR 1.85 (H) 03/28/2023    PROTIME 22.8 (H) 04/10/2023    PROTIME 32.3 (H) 04/04/2023    PROTIME 21.2 (H) 03/28/2023     Dx:Atrial fibrillation   Dose:4mg  Range:2.0-3.0  Location:[facility]          Informed pt of results and plan no question or concerns

## 2023-04-10 NOTE — TELEPHONE ENCOUNTER
Rx Refill Note  Requested Prescriptions     Pending Prescriptions Disp Refills   • OneTouch Ultra test strip [Pharmacy Med Name: OneTouch Ultra Blue In Vitro Strip] 100 each 0     Sig: USE 1 STRIP TO CHECK GLUCOSE ONCE DAILY      Last office visit with prescribing clinician: 3/30/2023      Next office visit with prescribing clinician: 6/7/2023     Mili Reyes LPN  04/10/23, 10:01 EDT

## 2023-04-11 RX ORDER — METOPROLOL SUCCINATE 25 MG/1
25 TABLET, EXTENDED RELEASE ORAL 2 TIMES DAILY
Qty: 180 TABLET | Refills: 0 | Status: SHIPPED | OUTPATIENT
Start: 2023-04-11

## 2023-04-11 NOTE — TELEPHONE ENCOUNTER
Rx Refill Note  Requested Prescriptions     Pending Prescriptions Disp Refills   • metoprolol succinate XL (TOPROL-XL) 25 MG 24 hr tablet [Pharmacy Med Name: METOPROLOL SUCC ER 25 MG TAB] 180 tablet 0     Sig: TAKE ONE TABLET BY MOUTH TWICE A DAY      Last office visit with prescribing clinician: 3/30/2023   Last telemedicine visit with prescribing clinician: 6/7/2023   Next office visit with prescribing clinician: 6/7/2023  Last refill sent: 01/09/23, #180  Basic metabolic panel (03/28/2023 11:16)      Marissa Oliveira LPN  04/11/23, 14:08 EDT

## 2023-04-12 ENCOUNTER — OUTSIDE FACILITY SERVICE (OUTPATIENT)
Dept: FAMILY MEDICINE CLINIC | Age: 78
End: 2023-04-12
Payer: MEDICARE

## 2023-04-13 ENCOUNTER — TELEPHONE (OUTPATIENT)
Dept: FAMILY MEDICINE CLINIC | Age: 78
End: 2023-04-13
Payer: MEDICARE

## 2023-04-13 DIAGNOSIS — E11.9 TYPE 2 DIABETES MELLITUS WITHOUT COMPLICATION, WITHOUT LONG-TERM CURRENT USE OF INSULIN: Primary | ICD-10-CM

## 2023-04-13 NOTE — TELEPHONE ENCOUNTER
"  Caller: Zachariah Cruz \"Keith\"    Relationship to patient: Self    Best call back number:[phone]    Patient is needing: PATIENT STATED THAT WHEN HE TRIED TO  HIS TEST STRIPS HE WAS TOLD BY [facility] THAT THEY NEEDED A DIAGNOSTIC FROM OUR OFFICE. PLEASE CALL PATIENT WITH CLARIFICATION ON THIS AND WHEN THIS HAS BEEN COMPLETED.          "

## 2023-04-14 ENCOUNTER — CONSULT (OUTPATIENT)
Dept: ONCOLOGY | Facility: HOSPITAL | Age: 78
End: 2023-04-14
Payer: MEDICARE

## 2023-04-14 ENCOUNTER — LAB (OUTPATIENT)
Dept: ONCOLOGY | Facility: HOSPITAL | Age: 78
End: 2023-04-14
Payer: MEDICARE

## 2023-04-14 VITALS
WEIGHT: 220.02 LBS | SYSTOLIC BLOOD PRESSURE: 124 MMHG | RESPIRATION RATE: 18 BRPM | OXYGEN SATURATION: 100 % | BODY MASS INDEX: 31.57 KG/M2 | HEART RATE: 81 BPM | DIASTOLIC BLOOD PRESSURE: 67 MMHG | TEMPERATURE: 98.4 F

## 2023-04-14 DIAGNOSIS — N28.89 RENAL MASS, RIGHT: ICD-10-CM

## 2023-04-14 DIAGNOSIS — D75.839 THROMBOCYTOSIS: ICD-10-CM

## 2023-04-14 DIAGNOSIS — R13.10 DIFFICULTY SWALLOWING SOLIDS: Primary | ICD-10-CM

## 2023-04-14 DIAGNOSIS — D64.9 ANEMIA, UNSPECIFIED TYPE: ICD-10-CM

## 2023-04-14 LAB
ALBUMIN SERPL-MCNC: 4 G/DL (ref 3.5–5.2)
ALBUMIN/GLOB SERPL: 1.4 G/DL
ALP SERPL-CCNC: 88 U/L (ref 39–117)
ALT SERPL W P-5'-P-CCNC: 13 U/L (ref 1–41)
ANION GAP SERPL CALCULATED.3IONS-SCNC: 13.5 MMOL/L (ref 5–15)
ANISOCYTOSIS BLD QL: ABNORMAL
AST SERPL-CCNC: 13 U/L (ref 1–40)
BASOPHILS # BLD AUTO: 0.01 10*3/MM3 (ref 0–0.2)
BASOPHILS NFR BLD AUTO: 0.1 % (ref 0–1.5)
BILIRUB SERPL-MCNC: 0.4 MG/DL (ref 0–1.2)
BUN SERPL-MCNC: 15 MG/DL (ref 8–23)
BUN/CREAT SERPL: 12.3 (ref 7–25)
CALCIUM SPEC-SCNC: 9.7 MG/DL (ref 8.6–10.5)
CHLORIDE SERPL-SCNC: 103 MMOL/L (ref 98–107)
CO2 SERPL-SCNC: 20.5 MMOL/L (ref 22–29)
CREAT SERPL-MCNC: 1.22 MG/DL (ref 0.76–1.27)
DEPRECATED RDW RBC AUTO: 50.6 FL (ref 37–54)
EGFRCR SERPLBLD CKD-EPI 2021: 61.4 ML/MIN/1.73
EOSINOPHIL # BLD AUTO: 0.18 10*3/MM3 (ref 0–0.4)
EOSINOPHIL # BLD MANUAL: 0.65 10*3/MM3 (ref 0–0.4)
EOSINOPHIL NFR BLD AUTO: 2.5 % (ref 0.3–6.2)
EOSINOPHIL NFR BLD MANUAL: 9 % (ref 0.3–6.2)
ERYTHROCYTE [DISTWIDTH] IN BLOOD BY AUTOMATED COUNT: 16.5 % (ref 12.3–15.4)
ERYTHROCYTE [SEDIMENTATION RATE] IN BLOOD: 53 MM/HR (ref 0–20)
FERRITIN SERPL-MCNC: 48.8 NG/ML (ref 30–400)
FOLATE SERPL-MCNC: 3.31 NG/ML (ref 4.78–24.2)
GLOBULIN UR ELPH-MCNC: 2.9 GM/DL
GLUCOSE SERPL-MCNC: 135 MG/DL (ref 65–99)
HCT VFR BLD AUTO: 32.9 % (ref 37.5–51)
HGB BLD-MCNC: 10.4 G/DL (ref 13–17.7)
HYPOCHROMIA BLD QL: ABNORMAL
IMM GRANULOCYTES # BLD AUTO: 0.01 10*3/MM3 (ref 0–0.05)
IMM GRANULOCYTES NFR BLD AUTO: 0.1 % (ref 0–0.5)
IRON 24H UR-MRATE: 29 MCG/DL (ref 59–158)
IRON SATN MFR SERPL: 7 % (ref 20–50)
LYMPHOCYTES # BLD AUTO: 1.1 10*3/MM3 (ref 0.7–3.1)
LYMPHOCYTES # BLD MANUAL: 0.72 10*3/MM3 (ref 0.7–3.1)
LYMPHOCYTES NFR BLD AUTO: 15.3 % (ref 19.6–45.3)
LYMPHOCYTES NFR BLD MANUAL: 4 % (ref 5–12)
MCH RBC QN AUTO: 26.3 PG (ref 26.6–33)
MCHC RBC AUTO-ENTMCNC: 31.6 G/DL (ref 31.5–35.7)
MCV RBC AUTO: 83.3 FL (ref 79–97)
MONOCYTES # BLD AUTO: 0.52 10*3/MM3 (ref 0.1–0.9)
MONOCYTES # BLD: 0.29 10*3/MM3 (ref 0.1–0.9)
MONOCYTES NFR BLD AUTO: 7.2 % (ref 5–12)
NEUTROPHILS # BLD AUTO: 5.55 10*3/MM3 (ref 1.7–7)
NEUTROPHILS NFR BLD AUTO: 5.39 10*3/MM3 (ref 1.7–7)
NEUTROPHILS NFR BLD AUTO: 74.8 % (ref 42.7–76)
NEUTROPHILS NFR BLD MANUAL: 76 % (ref 42.7–76)
NEUTS BAND NFR BLD MANUAL: 1 % (ref 0–5)
OVALOCYTES BLD QL SMEAR: ABNORMAL
PATHOLOGY REVIEW: YES
PLATELET # BLD AUTO: 309 10*3/MM3 (ref 140–450)
PMV BLD AUTO: 9.7 FL (ref 6–12)
POIKILOCYTOSIS BLD QL SMEAR: ABNORMAL
POTASSIUM SERPL-SCNC: 4.6 MMOL/L (ref 3.5–5.2)
PROT SERPL-MCNC: 6.9 G/DL (ref 6–8.5)
RBC # BLD AUTO: 3.95 10*6/MM3 (ref 4.14–5.8)
RETICS # AUTO: 0.08 10*6/MM3 (ref 0.02–0.13)
RETICS/RBC NFR AUTO: 2.11 % (ref 0.7–1.9)
SMALL PLATELETS BLD QL SMEAR: ADEQUATE
SODIUM SERPL-SCNC: 137 MMOL/L (ref 136–145)
T-UPTAKE NFR SERPL: 1.08 TBI (ref 0.8–1.3)
T4 SERPL-MCNC: 6.9 MCG/DL (ref 4.5–11.7)
TIBC SERPL-MCNC: 407 MCG/DL (ref 298–536)
TRANSFERRIN SERPL-MCNC: 273 MG/DL (ref 200–360)
TSH SERPL DL<=0.05 MIU/L-ACNC: 1.9 UIU/ML (ref 0.27–4.2)
VARIANT LYMPHS NFR BLD MANUAL: 10 % (ref 19.6–45.3)
VIT B12 BLD-MCNC: 155 PG/ML (ref 211–946)
WBC MORPH BLD: NORMAL
WBC NRBC COR # BLD: 7.21 10*3/MM3 (ref 3.4–10.8)

## 2023-04-14 PROCEDURE — 82668 ASSAY OF ERYTHROPOIETIN: CPT | Performed by: NURSE PRACTITIONER

## 2023-04-14 PROCEDURE — G0463 HOSPITAL OUTPT CLINIC VISIT: HCPCS | Performed by: NURSE PRACTITIONER

## 2023-04-14 PROCEDURE — 82728 ASSAY OF FERRITIN: CPT | Performed by: NURSE PRACTITIONER

## 2023-04-14 PROCEDURE — 83540 ASSAY OF IRON: CPT | Performed by: NURSE PRACTITIONER

## 2023-04-14 PROCEDURE — 84466 ASSAY OF TRANSFERRIN: CPT | Performed by: NURSE PRACTITIONER

## 2023-04-14 PROCEDURE — 82746 ASSAY OF FOLIC ACID SERUM: CPT | Performed by: NURSE PRACTITIONER

## 2023-04-14 PROCEDURE — 85045 AUTOMATED RETICULOCYTE COUNT: CPT | Performed by: NURSE PRACTITIONER

## 2023-04-14 PROCEDURE — 80053 COMPREHEN METABOLIC PANEL: CPT | Performed by: NURSE PRACTITIONER

## 2023-04-14 PROCEDURE — 84436 ASSAY OF TOTAL THYROXINE: CPT | Performed by: NURSE PRACTITIONER

## 2023-04-14 PROCEDURE — 86334 IMMUNOFIX E-PHORESIS SERUM: CPT | Performed by: NURSE PRACTITIONER

## 2023-04-14 PROCEDURE — 82607 VITAMIN B-12: CPT | Performed by: NURSE PRACTITIONER

## 2023-04-14 PROCEDURE — 85652 RBC SED RATE AUTOMATED: CPT | Performed by: NURSE PRACTITIONER

## 2023-04-14 PROCEDURE — 85025 COMPLETE CBC W/AUTO DIFF WBC: CPT | Performed by: NURSE PRACTITIONER

## 2023-04-14 PROCEDURE — 84479 ASSAY OF THYROID (T3 OR T4): CPT | Performed by: NURSE PRACTITIONER

## 2023-04-14 PROCEDURE — 84443 ASSAY THYROID STIM HORMONE: CPT | Performed by: NURSE PRACTITIONER

## 2023-04-14 PROCEDURE — 82784 ASSAY IGA/IGD/IGG/IGM EACH: CPT | Performed by: NURSE PRACTITIONER

## 2023-04-14 PROCEDURE — 83521 IG LIGHT CHAINS FREE EACH: CPT | Performed by: NURSE PRACTITIONER

## 2023-04-14 PROCEDURE — 36415 COLL VENOUS BLD VENIPUNCTURE: CPT | Performed by: NURSE PRACTITIONER

## 2023-04-14 PROCEDURE — 84165 PROTEIN E-PHORESIS SERUM: CPT | Performed by: NURSE PRACTITIONER

## 2023-04-14 NOTE — PROGRESS NOTES
"Chief Complaint/Reason for Referral:  anemia    Nelia Frost, *  Nelia Frost, JACIEL    Records Obtained:  Records of the patients history including those obtained from  [facility] and patient information  were reviewed and summarized in detail.    Subjective    History of Present Illness   Mr. Keith Cruz presents with his wife for new patient evaluation for anemia as referral from his PCP: JACIEL Bliss.     PMH of RAMY (uses CPAP nightly), hypertension, type II DM, hyperlipidemia, MI with recent angioplasty / stent, anemia, gout.  He was started on He has 2 recent hospitalizations recently: # 1: 3/16/- 3/17/23 MI, Atrial flutter / fib/ angioplasty, #2 3/22-3/25/23: UTI (had catheter placed after heart cath) Found to have C-diff on 3/30/2023.     He is following with Dr. Denton (cardiology) and saw him last on 3/28/2023 and Plavix was changed to Brilinta. He is not taking ASA to minimize his bleeding risk. Plans to change back to Plavix after 4-6 weeks. He is Coumadin for Atrial Fib / flutter.     He has had chronic normocytic anemia dating back to at least 3-4 years in the range 9.0 to 13.30. More recently, has had 2 lab with thrombocytosis up to 509K. Iron studies on 4/4/2023 with iron of 39, iron sat of 10%, TIBC of 389. Ferritin not done.     Reports he has had a few falls in the last year. Reports frequent nose bleeds since his hospital discharge. He has had complaints of intermittent \"traveling arthralgia's / myalgia's\" during the last several months. Reports he has intermittent itching. He has never had a colonoscopy. Cologard testing was negative in 11/30/2020. Reports he feels like \"food getting stuck\" when he eats dry foods. This occurs intermittently.     Recent CT scan on 4/3/43100 shows concern: 1.3 cm hyperattentuating lesion of the right kidney with mild enhancement suggesting a solid lesion and the lesion has not increased in size since 12/2020 and has a follow up with  " Denton on 4/25/2023.     Labs: 4/4/23: iron 39, iron sat 10%, TIBC 389, WBC 7.96, Hemoglobin 9.8, MCV 84, platelet count 465. Diff: with increase in neutrophil and lymphocyte % on recent lab work. Absolute values were normal.       Oncology/Hematology History    No history exists.       Review of Systems   Constitutional: Positive for fatigue. Negative for appetite change, diaphoresis, fever, unexpected weight gain and unexpected weight loss.   HENT: Negative for hearing loss, sore throat and voice change.    Eyes: Negative for blurred vision, double vision, pain, redness and visual disturbance.   Respiratory: Negative for cough, shortness of breath and wheezing.    Cardiovascular: Negative for chest pain, palpitations and leg swelling.   Gastrointestinal: Positive for diarrhea.   Endocrine: Negative for cold intolerance, heat intolerance, polydipsia and polyuria.   Genitourinary: Negative for decreased urine volume, difficulty urinating, frequency and urinary incontinence.   Musculoskeletal: Positive for arthralgias, back pain, joint swelling and myalgias.   Skin: Positive for bruise. Negative for color change, rash, skin lesions and wound.   Neurological: Negative for dizziness, seizures, numbness and headache.   Hematological: Negative for adenopathy. Bruises/bleeds easily.   Psychiatric/Behavioral: Negative for depressed mood. The patient is not nervous/anxious.        Current Outpatient Medications on File Prior to Visit   Medication Sig Dispense Refill   • allopurinol (ZYLOPRIM) 100 MG tablet TAKE ONE TABLET BY MOUTH DAILY (Patient taking differently: Take 1 tablet by mouth Daily.) 90 tablet 0   • amLODIPine (NORVASC) 10 MG tablet Take 1 tablet by mouth Daily. 90 tablet 3   • glimepiride (AMARYL) 4 MG tablet TAKE ONE TABLET BY MOUTH DAILY (Patient taking differently: Take 1 tablet by mouth Every Morning Before Breakfast.) 90 tablet 0   • glucose blood (OneTouch Ultra) test strip 1 each by Other route Daily.  Use as instructed 100 each 1   • lisinopril (PRINIVIL,ZESTRIL) 20 MG tablet TAKE ONE TABLET BY MOUTH TWICE A  tablet 0   • metFORMIN ER (GLUCOPHAGE-XR) 500 MG 24 hr tablet Take 4 tablets by mouth Daily. (Patient taking differently: Take 2 tablets by mouth 2 (Two) Times a Day.) 360 tablet 0   • metoprolol succinate XL (TOPROL-XL) 25 MG 24 hr tablet Take 1 tablet by mouth 2 (Two) Times a Day. 180 tablet 0   • nitroglycerin (NITROSTAT) 0.4 MG SL tablet 1 under the tongue as needed for angina, may repeat q5mins for up three doses 100 tablet 11   • simvastatin (ZOCOR) 40 MG tablet TAKE ONE TABLET BY MOUTH DAILY (Patient taking differently: Take 1 tablet by mouth Every Night.) 90 tablet 0   • spironolactone (ALDACTONE) 25 MG tablet Take 1 tablet by mouth Daily. 30 tablet 11   • tamsulosin (FLOMAX) 0.4 MG capsule 24 hr capsule Take 1 capsule by mouth Daily for 30 days. 30 capsule 0   • ticagrelor (Brilinta) 90 MG tablet tablet Take 1 tablet by mouth 2 (Two) Times a Day. 60 tablet 5   • warfarin (Coumadin) 4 MG tablet Take 1 tablet by mouth daily or as directed (Patient taking differently: Take 1 tablet by mouth Every Night. Home warfarin dose: 4mg qday  Who monitors the patients INR? Bertin) 90 tablet 1     No current facility-administered medications on file prior to visit.       No Known Allergies  Past Medical History:   Diagnosis Date   • Coronary artery disease    • Diabetes    • Hematuria    • Hyperlipidemia    • Hypertension      Past Surgical History:   Procedure Laterality Date   • APPENDECTOMY     • CARDIAC CATHETERIZATION     • CARDIAC CATHETERIZATION N/A 3/16/2023    Procedure: Left Heart Cath with possible angioplasty;  Surgeon: iVshal Denton MD;  Location: [facility] CATH INVASIVE LOCATION;  Service: Cardiovascular;  Laterality: N/A;   • CATARACT EXTRACTION     • CORONARY ARTERY BYPASS GRAFT  05/21/2010    LIMA to LAD, SVG to OM, SVG to PDA   • PROSTATE SURGERY      TRANSURETHRAL RESECTION OF THE  PROSTATE     Social History     Socioeconomic History   • Marital status:    Tobacco Use   • Smoking status: Former     Packs/day: 1.00     Years: 5.00     Pack years: 5.00     Types: Cigarettes     Quit date:      Years since quittin.3   • Smokeless tobacco: Never   Vaping Use   • Vaping Use: Never used   Substance and Sexual Activity   • Alcohol use: Not Currently   • Drug use: Never   • Sexual activity: Defer     Family History   Problem Relation Age of Onset   • Hypertension Mother    • Stroke Mother    • Coronary artery disease Father    • Stroke Paternal Grandfather    • Coronary artery disease Other      Immunization History   Administered Date(s) Administered   • COVID-19 (PFIZER) BIVALENT BOOSTER 12+YRS 2022   • COVID-19 (PFIZER) PURPLE CAP 2021, 2021       Tobacco Use: Medium Risk   • Smoking Tobacco Use: Former   • Smokeless Tobacco Use: Never   • Passive Exposure: Not on file       Objective     Physical Exam  Vitals and nursing note reviewed.   Constitutional:       Appearance: Normal appearance.   HENT:      Head: Normocephalic.      Nose: Nose normal.      Mouth/Throat:      Mouth: Mucous membranes are moist.   Eyes:      Pupils: Pupils are equal, round, and reactive to light.   Cardiovascular:      Rate and Rhythm: Normal rate and regular rhythm.      Pulses: Normal pulses.      Heart sounds: Normal heart sounds. No murmur heard.  Pulmonary:      Effort: Pulmonary effort is normal. No respiratory distress.      Breath sounds: Normal breath sounds.   Abdominal:      General: Bowel sounds are normal. There is no distension.      Palpations: Abdomen is soft.   Musculoskeletal:         General: Normal range of motion.      Cervical back: Normal range of motion and neck supple.      Right lower leg: Edema present.      Left lower leg: Edema present.   Skin:     General: Skin is warm.      Capillary Refill: Capillary refill takes less than 2 seconds.   Neurological:       General: No focal deficit present.      Mental Status: He is alert and oriented to person, place, and time.   Psychiatric:         Mood and Affect: Mood normal.         Behavior: Behavior normal.         Thought Content: Thought content normal.         Judgment: Judgment normal.         Vitals:    04/14/23 1128   BP: 124/67   Pulse: 81   Resp: 18   Temp: 98.4 °F (36.9 °C)   TempSrc: Temporal   SpO2: 100%   Weight: 99.8 kg (220 lb 0.3 oz)   PainSc: 0-No pain       Wt Readings from Last 3 Encounters:   04/14/23 99.8 kg (220 lb 0.3 oz)   03/30/23 101 kg (223 lb 6.4 oz)   03/28/23 101 kg (223 lb)             ECOG score: 0         ECOG: (0) Fully Active - Able to Carry On All Pre-disease Performance Without Restriction  Fall Risk Assessment was completed, and patient is at low risk for falls.  PHQ-9 Total Score: 0       The patient is  experiencing fatigue. Fatigue score: 5    PT/OT Functional Screening: PT fx screen: No needs identified  Speech Functional Screening: Speech fx screen: No needs identified  Rehab to be ordered: Rehab to be ordered: No needs identified        Result Review :  The following data was reviewed by: JACIEL Millard on 04/14/2023:  Lab Results   Component Value Date    HGB 9.8 (L) 04/04/2023    HCT 32.9 (L) 04/04/2023    MCV 83.9 04/04/2023     (H) 04/04/2023    WBC 7.96 04/04/2023    NEUTROABS 6.22 04/04/2023    LYMPHSABS 1.09 04/04/2023    MONOSABS 0.47 04/04/2023    EOSABS 0.14 04/04/2023    BASOSABS 0.01 04/04/2023     Lab Results   Component Value Date    GLUCOSE 171 (H) 03/28/2023    BUN 30 (H) 03/28/2023    CREATININE 1.24 03/28/2023     03/28/2023    K 4.5 03/28/2023     03/28/2023    CO2 21.8 (L) 03/28/2023    CALCIUM 9.8 03/28/2023    PROTEINTOT 7.4 03/22/2023    ALBUMIN 3.9 03/22/2023    BILITOT 0.9 03/22/2023    ALKPHOS 84 03/22/2023    AST 13 03/22/2023    ALT 11 03/22/2023        Lab Results   Component Value Date    IRON 39 (L) 04/04/2023    LABIRON  10 (L) 04/04/2023    TRANSFERRIN 261 04/04/2023    TIBC 389 04/04/2023     No results found for: LDH, FERRITIN, FLUXBHOE88, FOLATE  Lab Results   Component Value Date    PSA 1.380 12/21/2022       CT Abdomen Pelvis With & Without Contrast    Result Date: 4/4/2023     1. There is a 1.3 cm hyperattenuating lesion of the right kidney which demonstrates mild enhancement (slightly greater than 20 Hounsfield units) suggesting a solid lesion.  The abnormality has not increased in size since the comparison study from 12/2021.  This lesion might be better characterized with MRI.  Consider urologic consultation  2. Additional bilateral renal cysts  3. Stable 1 cm cyst of the neck of pancreas  4. Cholelithiasis  5. Colonic diverticulosis      BAILEY WOODWARD MD       Electronically Signed and Approved By: BAILEY WOODWARD MD on 4/04/2023 at 7:47             XR Chest 1 View    Result Date: 3/22/2023   No active disease.       DELVIS AGUSTIN MD       Electronically Signed and Approved By: DELVIS AGUSTIN MD on 3/22/2023 at 13:17             US Renal Bilateral    Result Date: 12/22/2022    1. Benign cyst extending off the upper pole of the left kidney measuring 5.8 x 6.3 x 5.1 cm. 2. There are round hypoechoic masses in both kidneys which probably represent cysts.  This could be evaluated either with a contrast enhanced CT or a repeat renal ultrasound in 6 months if the patient cannot tolerate iodinated contrast. 3. The urinary bladder is unremarkable.       DELVIS AGUSTIN MD       Electronically Signed and Approved By: DELVIS AGUSTIN MD on 12/22/2022 at 11:01                    Assessment and Plan:  Diagnoses and all orders for this visit:    1. Renal mass, right (Primary)  -     Erythropoietin    2. Thrombocytosis  -     CBC & Differential  -     Peripheral Blood Smear  -     Comprehensive Metabolic Panel  -     Iron Profile    3. Anemia, unspecified type  -     CBC & Differential  -     Peripheral Blood Smear  -     Comprehensive Metabolic  Panel  -     Iron Profile  -     Ferritin  -     Reticulocytes  -     Vitamin B12  -     Folate  -     FLACA, PE & Free LT Chains, Ser  -     Protein Electrophoresis, Random Urine - Urine, Clean Catch  -     Sedimentation Rate  -     Thyroid Panel With TSH  -     Erythropoietin    4. Difficulty swallowing solids    Chronic anemia for at least the last 3-4 years, but more recently seems to have worsened. He has been hospitalized x 2 recently for angioplasty and UTI admission. Developed C diff after having antibiotics for the UTI. He had urinary retention with the cardiac cath and urinary catheter was placed. He has complaints of food getting stuck but has been present for several months but only occurs infrequently. He has never had a colonoscopy, but did have a cologard testing negative in August of 2021. He follows with cardiology for the INR checks for Coumadin.     He has a consult with Dr. Chawla on 4/25/23 for a right kidney mass.     Food getting stuck intermittently when he eats dry foods. No prior EGD or colonoscopy. Cologard was negative in 8/2021.     Labs today: CBC with diff, peripheral smear, ferritin, iron panel, folate and B-12, Thyroid panel, sed rate, SPEP, UPEP, SFLC and QUIGS and EPO.     He needs a GI consult to evaluate the difficulty swallowing.     I spent 30 minutes caring for Zachariah on this date of service. This time includes time spent by me in the following activities:preparing for the visit, reviewing tests, obtaining and/or reviewing a separately obtained history, performing a medically appropriate examination and/or evaluation , counseling and educating the patient/family/caregiver, ordering medications, tests, or procedures, referring and communicating with other health care professionals , documenting information in the medical record, independently interpreting results and communicating that information with the patient/family/caregiver and care coordination    Patient Follow Up:  follow up in 2 weeks with MD for lab results.     Patient was given instructions and counseling regarding his condition or for health maintenance advice. Please see specific information pulled into the AVS if appropriate.     Josy Cotton, APRN    4/14/2023    .tob

## 2023-04-17 ENCOUNTER — LAB (OUTPATIENT)
Dept: LAB | Facility: HOSPITAL | Age: 78
End: 2023-04-17
Payer: MEDICARE

## 2023-04-17 DIAGNOSIS — E11.9 TYPE 2 DIABETES MELLITUS WITHOUT COMPLICATION, WITHOUT LONG-TERM CURRENT USE OF INSULIN: ICD-10-CM

## 2023-04-17 LAB
ALBUMIN SERPL ELPH-MCNC: 3.4 G/DL (ref 2.9–4.4)
ALBUMIN/GLOB SERPL: 1.1 {RATIO} (ref 0.7–1.7)
ALPHA1 GLOB SERPL ELPH-MCNC: 0.3 G/DL (ref 0–0.4)
ALPHA2 GLOB SERPL ELPH-MCNC: 1.1 G/DL (ref 0.4–1)
B-GLOBULIN SERPL ELPH-MCNC: 1.2 G/DL (ref 0.7–1.3)
CYTO UR: NORMAL
EPO SERPL-ACNC: 20 MIU/ML (ref 2.6–18.5)
GAMMA GLOB SERPL ELPH-MCNC: 0.6 G/DL (ref 0.4–1.8)
GLOBULIN SER-MCNC: 3.3 G/DL (ref 2.2–3.9)
IGA SERPL-MCNC: 315 MG/DL (ref 61–437)
IGG SERPL-MCNC: 820 MG/DL (ref 603–1613)
IGM SERPL-MCNC: 60 MG/DL (ref 15–143)
INTERPRETATION SERPL IEP-IMP: ABNORMAL
KAPPA LC FREE SER-MCNC: 40.9 MG/L (ref 3.3–19.4)
KAPPA LC FREE/LAMBDA FREE SER: 1.84 {RATIO} (ref 0.26–1.65)
LAB AP CASE REPORT: NORMAL
LAB AP CLINICAL INFORMATION: NORMAL
LABORATORY COMMENT REPORT: ABNORMAL
LAMBDA LC FREE SERPL-MCNC: 22.2 MG/L (ref 5.7–26.3)
M PROTEIN SERPL ELPH-MCNC: ABNORMAL G/DL
PATH REPORT.FINAL DX SPEC: NORMAL
PATH REPORT.GROSS SPEC: NORMAL
PROT SERPL-MCNC: 6.7 G/DL (ref 6–8.5)

## 2023-04-17 PROCEDURE — 84156 ASSAY OF PROTEIN URINE: CPT | Performed by: NURSE PRACTITIONER

## 2023-04-17 PROCEDURE — 84166 PROTEIN E-PHORESIS/URINE/CSF: CPT | Performed by: NURSE PRACTITIONER

## 2023-04-17 RX ORDER — ALLOPURINOL 100 MG/1
100 TABLET ORAL DAILY
Qty: 90 TABLET | Refills: 0 | Status: SHIPPED | OUTPATIENT
Start: 2023-04-17

## 2023-04-17 NOTE — TELEPHONE ENCOUNTER
Rx Refill Note  Requested Prescriptions     Pending Prescriptions Disp Refills   • allopurinol (ZYLOPRIM) 100 MG tablet [Pharmacy Med Name: ALLOPURINOL 100 MG TABLET] 90 tablet 0     Sig: TAKE ONE TABLET BY MOUTH DAILY      Last office visit with prescribing clinician: 3/30/2023     Next office visit with prescribing clinician: 6/7/2023    Mili Reyes LPN  04/17/23, 10:34 EDT

## 2023-04-18 DIAGNOSIS — E53.8 FOLATE DEFICIENCY: Primary | ICD-10-CM

## 2023-04-18 DIAGNOSIS — E53.8 B12 DEFICIENCY: ICD-10-CM

## 2023-04-18 NOTE — PROGRESS NOTES
Please call the patient regarding his abnormal result.    Folate is low, B-12 is low. Ordered intrinsic factor antibodies and anti-parietal antibody.     Iron studies low, hemeglobin is low. ferritin ok.     Wanted to wait till he saw dr. Burgess on 4/28/23 before starting any meds.     Has never taken any oral iron previously.

## 2023-04-19 ENCOUNTER — OFFICE VISIT (OUTPATIENT)
Dept: CARDIOLOGY | Facility: CLINIC | Age: 78
End: 2023-04-19
Payer: MEDICARE

## 2023-04-19 VITALS
HEIGHT: 70 IN | DIASTOLIC BLOOD PRESSURE: 67 MMHG | SYSTOLIC BLOOD PRESSURE: 144 MMHG | BODY MASS INDEX: 31.64 KG/M2 | WEIGHT: 221 LBS | HEART RATE: 80 BPM

## 2023-04-19 DIAGNOSIS — R33.9 URINARY RETENTION: ICD-10-CM

## 2023-04-19 DIAGNOSIS — I25.10 CORONARY ARTERY DISEASE INVOLVING NATIVE CORONARY ARTERY OF NATIVE HEART WITHOUT ANGINA PECTORIS: Primary | ICD-10-CM

## 2023-04-19 DIAGNOSIS — I48.0 PAROXYSMAL ATRIAL FIBRILLATION: ICD-10-CM

## 2023-04-19 DIAGNOSIS — I10 ESSENTIAL HYPERTENSION: ICD-10-CM

## 2023-04-19 PROBLEM — R94.39 ABNORMAL NUCLEAR STRESS TEST: Status: RESOLVED | Noted: 2023-02-28 | Resolved: 2023-04-19

## 2023-04-19 PROBLEM — Z98.61 CAD S/P PERCUTANEOUS CORONARY ANGIOPLASTY: Status: RESOLVED | Noted: 2023-03-16 | Resolved: 2023-04-19

## 2023-04-19 LAB
ALBUMIN MFR UR ELPH: 40.4 %
ALPHA1 GLOB MFR UR ELPH: 3.1 %
ALPHA2 GLOB MFR UR ELPH: 14.3 %
B-GLOBULIN MFR UR ELPH: 20.8 %
GAMMA GLOB MFR UR ELPH: 21.4 %
LABORATORY COMMENT REPORT: NORMAL
M PROTEIN MFR UR ELPH: NORMAL %
PROT UR-MCNC: 19.6 MG/DL

## 2023-04-19 PROCEDURE — 1160F RVW MEDS BY RX/DR IN RCRD: CPT | Performed by: INTERNAL MEDICINE

## 2023-04-19 PROCEDURE — 99214 OFFICE O/P EST MOD 30 MIN: CPT | Performed by: INTERNAL MEDICINE

## 2023-04-19 PROCEDURE — 3077F SYST BP >= 140 MM HG: CPT | Performed by: INTERNAL MEDICINE

## 2023-04-19 PROCEDURE — 3078F DIAST BP <80 MM HG: CPT | Performed by: INTERNAL MEDICINE

## 2023-04-19 PROCEDURE — 1159F MED LIST DOCD IN RCRD: CPT | Performed by: INTERNAL MEDICINE

## 2023-04-19 RX ORDER — TAMSULOSIN HYDROCHLORIDE 0.4 MG/1
0.4 CAPSULE ORAL DAILY
Qty: 90 CAPSULE | Refills: 1 | Status: SHIPPED | OUTPATIENT
Start: 2023-04-19 | End: 2023-10-16

## 2023-04-19 RX ORDER — CLOPIDOGREL BISULFATE 75 MG/1
75 TABLET ORAL DAILY
Qty: 90 TABLET | Refills: 2 | Status: SHIPPED | OUTPATIENT
Start: 2023-04-19

## 2023-04-19 NOTE — PROGRESS NOTES
CARDIOLOGY FOLLOW-UP PROGRESS NOTE        Chief Complaint  Coronary Artery Disease, Atrial Fibrillation, and Follow-up    Subjective            Zachariah Cruz presents to [facility] CARDIOLOGY  History of Present Illness    Mr. Cruz is here for 3-week follow-up visit.  He was last seen in the office on 3/28/2023, 10 days after having angioplasty to saphenous venous graft to obtuse marginal branch.  He was just discharged home after hospitalization for UTI, probable sepsis.  Today, patient reports improving symptoms.  Fever back pain and urinary retention subsided.  He continues to be weak and tired.  He gets recurrent episodes of nosebleeds.  Denies any further episodes of chest pain.  He does have shortness of breath activity.  Recently his hemoglobin was noted to be persistently low.       Past History:    1) Coronary artery disease status post 3-vessel coronary artery bypass grafting on 05/21/2010 by Dr. Gacria at [facility] (LIMA graft to the LAD, SVG graft to OM, SVG graft to posterior descending branch of RCA); cardiac cath done on 3/16/2023 showed patent LIMA to LAD, 95% stenosis of proximal segment of SVG to OM branch and 50% stenosis of SVG to RCA at anastomosis.  He underwent angioplasty and stent placement to SVG to OM branch.  2) Diabetes mellitus; 3) Hypertension; 4) Hyperlipidemia; 5) Gout; 6) Obstructive sleep apnea, on CPAP; 7) Paroxysmal atrial flutter, detected on 24-hour Holter monitor study done in October 2019.     Medical History:  Past Medical History:   Diagnosis Date   • Coronary artery disease    • Diabetes    • Hematuria    • Hyperlipidemia    • Hypertension        Surgical History: has a past surgical history that includes Appendectomy; Cataract extraction; Prostate surgery; Coronary artery bypass graft (05/21/2010); Cardiac catheterization; and Cardiac catheterization (N/A, 3/16/2023).     Family History: family history includes Coronary artery disease in  his father and another family member; Hypertension in his mother; Stroke in his mother and paternal grandfather.     Social History: reports that he quit smoking about 54 years ago. His smoking use included cigarettes. He has a 5.00 pack-year smoking history. He has never used smokeless tobacco. He reports that he does not currently use alcohol. He reports that he does not use drugs.    Allergies: Patient has no known allergies.    Current Outpatient Medications on File Prior to Visit   Medication Sig   • allopurinol (ZYLOPRIM) 100 MG tablet Take 1 tablet by mouth Daily.   • amLODIPine (NORVASC) 10 MG tablet Take 1 tablet by mouth Daily.   • glimepiride (AMARYL) 4 MG tablet TAKE ONE TABLET BY MOUTH DAILY (Patient taking differently: Take 1 tablet by mouth Every Morning Before Breakfast.)   • glucose blood (OneTouch Ultra) test strip Test Glucose once day   • lisinopril (PRINIVIL,ZESTRIL) 20 MG tablet TAKE ONE TABLET BY MOUTH TWICE A DAY   • metFORMIN ER (GLUCOPHAGE-XR) 500 MG 24 hr tablet Take 4 tablets by mouth Daily. (Patient taking differently: Take 2 tablets by mouth 2 (Two) Times a Day.)   • metoprolol succinate XL (TOPROL-XL) 25 MG 24 hr tablet Take 1 tablet by mouth 2 (Two) Times a Day.   • simvastatin (ZOCOR) 40 MG tablet TAKE ONE TABLET BY MOUTH DAILY (Patient taking differently: Take 1 tablet by mouth Every Night.)   • spironolactone (ALDACTONE) 25 MG tablet Take 1 tablet by mouth Daily.   • warfarin (Coumadin) 4 MG tablet Take 1 tablet by mouth daily or as directed (Patient taking differently: Take 1 tablet by mouth Every Night. Home warfarin dose: 4mg qday  Who monitors the patients INR? Bertin)   • tamsulosin (FLOMAX) 0.4 MG capsule 24 hr capsule Take 1 capsule by mouth Daily for 30 days.   • ticagrelor (Brilinta) 90 MG tablet tablet Take 1 tablet by mouth 2 (Two) Times a Day.   • nitroglycerin (NITROSTAT) 0.4 MG SL tablet 1 under the tongue as needed for angina, may repeat q5mins for up three doses  "        Review of Systems   Constitutional: Positive for fatigue.   HENT: Positive for nosebleeds.    Respiratory: Negative for cough, shortness of breath and wheezing.    Cardiovascular: Negative for chest pain, palpitations and leg swelling.   Gastrointestinal: Negative for nausea and vomiting.   Neurological: Negative for dizziness and syncope.        Objective     /67   Pulse 80   Ht 177.8 cm (70\")   Wt 100 kg (221 lb)   BMI 31.71 kg/m²       Physical Exam    General : Alert, awake, no acute distress  Neck : Supple, no carotid bruit, no jugular venous distention  CVS : Regular rate and rhythm, no murmur, rubs or gallops  Lungs: Clear to auscultation bilaterally, no crackles or rhonchi  Abdomen: Soft, nontender, bowel sounds heard in all 4 quadrants  Extremities: Warm, well-perfused, no pedal edema    Result Review :     The following data was reviewed by: Vishal Denton MD on 04/19/2023:    CMP        3/25/2023    05:02 3/28/2023    11:16 4/14/2023    12:38   CMP   Glucose 197   171   135     BUN 19   30   15     Creatinine 0.98   1.24   1.22     EGFR 80.4   60.6   61.4     Sodium 136   139   137     Potassium 3.8   4.5   4.6     Chloride 100   104   103     Calcium 9.4   9.8   9.7     Total Protein   6.7     Total Protein   6.9     Albumin   4.0      3.4     Globulin   3.3     Globulin   2.9     Total Bilirubin   0.4     Alkaline Phosphatase   88     AST (SGOT)   13     ALT (SGPT)   13     Albumin/Globulin Ratio   1.4     BUN/Creatinine Ratio 19.4   24.2   12.3     Anion Gap 13.8   13.2   13.5       CBC        3/28/2023    11:16 4/4/2023    15:13 4/14/2023    12:38   CBC   WBC 10.85   7.96   7.21     RBC 3.57   3.92   3.95     Hemoglobin 9.0   9.8   10.4     Hematocrit 29.0   32.9   32.9     MCV 81.2   83.9   83.3     MCH 25.2   25.0   26.3     MCHC 31.0   29.8   31.6     RDW 15.3   16.6   16.5     Platelets 509   465   309       TSH        4/14/2023    12:38   TSH   TSH 1.900       Lipid Panel  "       6/10/2022    07:16 12/21/2022    07:10 3/17/2023    04:23   Lipid Panel   Total Cholesterol 116   131   121     Triglycerides 214   261   127     HDL Cholesterol 30   38   34     VLDL Cholesterol 35   41   23     LDL Cholesterol  51   52   64     LDL/HDL Ratio 1.44   1.07   1.81            Data reviewed: Cardiology studies        Results for orders placed during the hospital encounter of 03/16/23    Adult Transthoracic Echo Complete w/ Color, Spectral and Contrast if necessary per protocol    Interpretation Summary  •  Overall LV ejection fraction is 50 to 55% with mild inferolateral wall hypokinesis.  •  Left ventricular wall thickness is consistent with mild concentric hypertrophy.  •  Left ventricular diastolic function is consistent with (grade I) impaired relaxation.  •  Aortic valve is mildly calcified.  Mild aortic valve stenosis is present.                 Assessment and Plan        Diagnoses and all orders for this visit:    1. Coronary artery disease involving native coronary artery of native heart without angina pectoris (Primary)  Assessment & Plan:  He underwent recent angioplasty and stent placement to SVG to OM branch.  Currently chest pain-free, but continues to report fatigue and exertional shortness of breath.  Plavix was changed to Brilinta for suspected drug reaction.  However it was subsequently diagnosed with UTI and sepsis.  He did not have any skin rash.  He reports significant nosebleed while taking Brilinta.  The options were discussed with the patient.  We will change the medication back to Plavix 75 mg once daily.  We will discontinue Brilinta.  Continue beta-blocker and statin.    Orders:  -     clopidogrel (PLAVIX) 75 MG tablet; Take 1 tablet by mouth Daily.  Dispense: 90 tablet; Refill: 2    2. Paroxysmal atrial fibrillation  Assessment & Plan:  He is currently in sinus rhythm and remains palpitations.  Recent labs showed hemoglobin under 10 but stable.  Continue warfarin for  anticoagulation along with metoprolol for rate and rhythm management.      3. Essential hypertension  Assessment & Plan:  Blood pressure reasonably well controlled per home blood pressure goal and also in the office today.  Continue amlodipine, lisinopril, spironolactone and metoprolol without changes.  Recent labs showed stable creatinine and electrolytes.      4. Urinary retention  Assessment & Plan:  Patient developed urinary retention following recent angioplasty and had Kim catheter placement.  This was subsequently removed.  He was also treated for UTI.  Currently on Flomax.  We will refill the prescription.  Patient has appointment with urology soon, and further decisions regarding continuation of Flomax pending urology evaluation.      Other orders  -     tamsulosin (FLOMAX) 0.4 MG capsule 24 hr capsule; Take 1 capsule by mouth Daily for 180 days.  Dispense: 90 capsule; Refill: 1            Follow Up     Return in about 6 weeks (around 5/31/2023) for Next scheduled follow up.    Patient was given instructions and counseling regarding his condition or for health maintenance advice. Please see specific information pulled into the AVS if appropriate.

## 2023-04-19 NOTE — ASSESSMENT & PLAN NOTE
He is currently in sinus rhythm and remains palpitations.  Recent labs showed hemoglobin under 10 but stable.  Continue warfarin for anticoagulation along with metoprolol for rate and rhythm management.

## 2023-04-19 NOTE — ASSESSMENT & PLAN NOTE
He underwent recent angioplasty and stent placement to SVG to OM branch.  Currently chest pain-free, but continues to report fatigue and exertional shortness of breath.  Plavix was changed to Brilinta for suspected drug reaction.  However it was subsequently diagnosed with UTI and sepsis.  He did not have any skin rash.  He reports significant nosebleed while taking Brilinta.  The options were discussed with the patient.  We will change the medication back to Plavix 75 mg once daily.  We will discontinue Brilinta.  Continue beta-blocker and statin.

## 2023-04-19 NOTE — PROGRESS NOTES
Chief Complaint  Difficulty swallowing     Zachariah Cruz is a 78 y.o. male who presents to [facility] GASTROENTEROLOGY- [facility] on referral from NICOLE Perkins for a gastroenterology evaluation of difficulty swallowing       History of Present Illness  New patient presents to the office for intermittent difficulty swallowing, chronic anemia, and pancreatic cyst. Patient recently tested positive for C. Diff 3/30/23 and received treatment with Vancomycin. He has had improvement of diarrhea since treatment, finished last dose 4/10. He has loose/formed stools about 1-2 times a day. Denies abdominal pain, melena, and hematochezia. When he eats something very dry its hangs in his throat, occurs about 2-3 times a year. Denies heartburn, nausea, vomiting, and epigastric pain. Denies unintentional weight loss.     CT abdomen/pelvis w/ and w/o contrast 04/03/2023 - 1.3 cm lesion on right kidney, kidney cyst, stable 1cm pancreatic cyst    Cologuard 11/30/2020 - negative    Past Medical History:   Diagnosis Date   • Coronary artery disease    • Diabetes    • Hematuria    • Hyperlipidemia    • Hypertension        Past Surgical History:   Procedure Laterality Date   • APPENDECTOMY     • CARDIAC CATHETERIZATION     • CARDIAC CATHETERIZATION N/A 3/16/2023    Procedure: Left Heart Cath with possible angioplasty;  Surgeon: Vishal Denton MD;  Location: [facility] INVASIVE LOC[facility];  Service: Cardiovascular;  Laterality: N/A;   • CATARACT EXTRACTION     • CORONARY ARTERY BYPASS GRAFT  05/21/2010    LIMA to LAD, SVG to OM, SVG to PDA   • PROSTATE SURGERY      TRANSURETHRAL RESECTION OF THE PROSTATE         Current Outpatient Medications:   •  allopurinol (ZYLOPRIM) 100 MG tablet, Take 1 tablet by mouth Daily., Disp: 90 tablet, Rfl: 0  •  amLODIPine (NORVASC) 10 MG tablet, Take 1 tablet by mouth Daily., Disp: 90 tablet, Rfl: 3  •  clopidogrel (PLAVIX) 75 MG tablet, Take 1 tablet by mouth Daily.,  Disp: 90 tablet, Rfl: 2  •  glimepiride (AMARYL) 4 MG tablet, TAKE ONE TABLET BY MOUTH DAILY (Patient taking differently: Take 1 tablet by mouth Every Morning Before Breakfast.), Disp: 90 tablet, Rfl: 0  •  glucose blood (OneTouch Ultra) test strip, Test Glucose once day, Disp: 100 each, Rfl: 1  •  lisinopril (PRINIVIL,ZESTRIL) 20 MG tablet, TAKE ONE TABLET BY MOUTH TWICE A DAY, Disp: 180 tablet, Rfl: 0  •  metFORMIN ER (GLUCOPHAGE-XR) 500 MG 24 hr tablet, Take 4 tablets by mouth Daily. (Patient taking differently: Take 2 tablets by mouth 2 (Two) Times a Day.), Disp: 360 tablet, Rfl: 0  •  metoprolol succinate XL (TOPROL-XL) 25 MG 24 hr tablet, Take 1 tablet by mouth 2 (Two) Times a Day., Disp: 180 tablet, Rfl: 0  •  nitroglycerin (NITROSTAT) 0.4 MG SL tablet, 1 under the tongue as needed for angina, may repeat q5mins for up three doses, Disp: 100 tablet, Rfl: 11  •  simvastatin (ZOCOR) 40 MG tablet, TAKE ONE TABLET BY MOUTH DAILY (Patient taking differently: Take 1 tablet by mouth Every Night.), Disp: 90 tablet, Rfl: 0  •  spironolactone (ALDACTONE) 25 MG tablet, Take 1 tablet by mouth Daily., Disp: 30 tablet, Rfl: 11  •  tamsulosin (FLOMAX) 0.4 MG capsule 24 hr capsule, Take 1 capsule by mouth Daily for 180 days., Disp: 90 capsule, Rfl: 1  •  warfarin (Coumadin) 4 MG tablet, Take 1 tablet by mouth daily or as directed (Patient taking differently: Take 1 tablet by mouth Every Night. Home warfarin dose: 4mg qday Who monitors the patients INR? Valayam), Disp: 90 tablet, Rfl: 1     No Known Allergies    Family History   Problem Relation Age of Onset   • Hypertension Mother    • Stroke Mother    • Coronary artery disease Father    • Stroke Paternal Grandfather    • Coronary artery disease Other    • Colon cancer Neg Hx         Social History     Social History Narrative   • Not on file       Immunization:  Immunization History   Administered Date(s) Administered   • COVID-19 (PFIZER) BIVALENT BOOSTER 12+YRS 12/07/2022  "  • COVID-19 (PFIZER) PURPLE CAP 02/09/2021, 03/02/2021        Objective     Vital Signs:   /68 (BP Location: Right arm, Patient Position: Sitting, Cuff Size: Adult)   Pulse 75   Ht 177.8 cm (70\")   Wt 100 kg (220 lb 6.4 oz)   SpO2 99%   BMI 31.62 kg/m²       Physical Exam  Constitutional:       Appearance: Normal appearance.   HENT:      Head: Normocephalic.   Cardiovascular:      Rate and Rhythm: Normal rate and regular rhythm.      Heart sounds: Normal heart sounds.   Pulmonary:      Effort: Pulmonary effort is normal.      Breath sounds: Normal breath sounds.   Abdominal:      General: Bowel sounds are normal.      Palpations: Abdomen is soft.   Skin:     General: Skin is warm and dry.   Neurological:      Mental Status: He is alert and oriented to person, place, and time. Mental status is at baseline.   Psychiatric:         Mood and Affect: Mood normal.         Behavior: Behavior normal.         Thought Content: Thought content normal.         Judgment: Judgment normal.         Result Review :     CBC w/diff        3/28/2023    11:16 4/4/2023    15:13 4/14/2023    12:38   CBC w/Diff   WBC 10.85   7.96   7.21     RBC 3.57   3.92   3.95     Hemoglobin 9.0   9.8   10.4     Hematocrit 29.0   32.9   32.9     MCV 81.2   83.9   83.3     MCH 25.2   25.0   26.3     MCHC 31.0   29.8   31.6     RDW 15.3   16.6   16.5     Platelets 509   465   309     Neutrophil Rel %  78.1   74.8     Immature Granulocyte Rel %  0.4   0.1     Lymphocyte Rel %  13.7   15.3     Monocyte Rel %  5.9   7.2     Eosinophil Rel %  1.8   2.5     Basophil Rel %  0.1   0.1       CMP        3/25/2023    05:02 3/28/2023    11:16 4/14/2023    12:38   CMP   Glucose 197   171   135     BUN 19   30   15     Creatinine 0.98   1.24   1.22     EGFR 80.4   60.6   61.4     Sodium 136   139   137     Potassium 3.8   4.5   4.6     Chloride 100   104   103     Calcium 9.4   9.8   9.7     Total Protein   6.7     Total Protein   6.9     Albumin   4.0      " 3.4     Globulin   3.3     Globulin   2.9     Total Bilirubin   0.4     Alkaline Phosphatase   88     AST (SGOT)   13     ALT (SGPT)   13     Albumin/Globulin Ratio   1.4     BUN/Creatinine Ratio 19.4   24.2   12.3     Anion Gap 13.8   13.2   13.5               Assessment and Plan    Diagnoses and all orders for this visit:    1. Pancreatic cyst (Primary)  -     Ambulatory Referral to Gastroenterology    2. Dysphagia, unspecified type  -     Ambulatory Referral to Gastroenterology    3. Chronic anemia    4. History of Clostridioides difficile infection    He will call the office if diarrhea returns and we will retest for C.diff at that time. If positive will proceed with Dificid for treatment.   Reviewed most recent CT scan with the patient and his wife - discussed 1cm pancreatic cyst. Due to size of cyst I have recommended proceeding with EUS. Referral placed.  Advised patient to have colonoscopy due to anemia and being due for screening according to the records provided to me (last Cologuard 2020), patient declined. Patient wishes to verify date of Cologuard with PCP and if due proceed through her office.     Follow Up   No follow-ups on file.  Patient was given instructions and counseling regarding his condition or for health maintenance advice. Please see specific information pulled into the AVS if appropriate.

## 2023-04-19 NOTE — ASSESSMENT & PLAN NOTE
Patient developed urinary retention following recent angioplasty and had Kim catheter placement.  This was subsequently removed.  He was also treated for UTI.  Currently on Flomax.  We will refill the prescription.  Patient has appointment with urology soon, and further decisions regarding continuation of Flomax pending urology evaluation.

## 2023-04-19 NOTE — ASSESSMENT & PLAN NOTE
Blood pressure reasonably well controlled per home blood pressure goal and also in the office today.  Continue amlodipine, lisinopril, spironolactone and metoprolol without changes.  Recent labs showed stable creatinine and electrolytes.

## 2023-04-20 ENCOUNTER — OFFICE VISIT (OUTPATIENT)
Dept: GASTROENTEROLOGY | Facility: CLINIC | Age: 78
End: 2023-04-20
Payer: MEDICARE

## 2023-04-20 VITALS
SYSTOLIC BLOOD PRESSURE: 138 MMHG | DIASTOLIC BLOOD PRESSURE: 68 MMHG | HEART RATE: 75 BPM | WEIGHT: 220.4 LBS | OXYGEN SATURATION: 99 % | HEIGHT: 70 IN | BODY MASS INDEX: 31.55 KG/M2

## 2023-04-20 DIAGNOSIS — Z86.19 HISTORY OF CLOSTRIDIOIDES DIFFICILE INFECTION: ICD-10-CM

## 2023-04-20 DIAGNOSIS — K86.2 PANCREATIC CYST: Primary | ICD-10-CM

## 2023-04-20 DIAGNOSIS — R13.10 DYSPHAGIA, UNSPECIFIED TYPE: ICD-10-CM

## 2023-04-20 DIAGNOSIS — D64.9 CHRONIC ANEMIA: ICD-10-CM

## 2023-04-23 PROBLEM — N28.89 RENAL MASS: Status: ACTIVE | Noted: 2023-04-23

## 2023-04-23 NOTE — PROGRESS NOTES
Chief Complaint: Urologic complaint    Subjective         History of Present Illness  Zachariah Cruz is a 78 y.o. male       Enhancing renal mass  GH        3/22/2023   Enterococcus      Voiding without issue.  Was started on Flomax 0.4 mg daily in the hospital a month ago.  is not sure if this is helping.  No straining to void.    Had a catheter in the hospital       1/23  - 3 days of gross hematuria, asymptomatic    None since      4/3/2023 CT uro-- 1.3 cm lesion right kidney, mild enhancement  20 HUs.  No change since 12/21.  Consider MRI.  Additional bilateral renal cyst.  Stable 1 cm cyst neck of pancreas.  Cholelithiasis.      He is referred to get this pancreatic lesion looked at    2/22 cystoscopy-4 cm prostate, previous TUR with some regrowth on the median right lateral lobe.  Minor trabeculations, no pathology.  Delayed phase okay, images reviewed      2010, CABG, coronary stent placed in 3/23 patient does not smoke.   on Coumadin and Plavix      PVR    4/23   064      Previous    2022 episodes of asymptomatic  gross hematuria.        12/21 CT stone protocol - negative  11/21 urine culture-negative  10/21 creatinine 0.9, GFR 81    No urologic family history    Patient with acute kidney stones in the past, lithotripsy x1.      PVR    12/21   52    PSA    12/22    1.3  11/21 1.5  11/20 1.3  11/19 1.0       Bladder Scan interpretation 04/25/2023    Estimation of residual urine via BVI 3000 Verathon Bladder Scan  MA/nurse performing: LIBIA Son  Residual Urine: 64 ml  Indication: Gross hematuria    Renal mass    Benign prostatic hyperplasia with lower urinary tract symptoms, symptom details unspecified   Position: Supine  Examination: Incremental scanning of the suprapubic area using 2.0 MHz transducer using copious amounts of acoustic gel.   Findings: An anechoic area was demonstrated which represented the bladder, with measurement of residual urine as noted. I inspected this myself. In that the residual  urine was stable or insignificant, refer to plan for treatment and plan necessary at this time.         Objective     Past Medical History:   Diagnosis Date   • Coronary artery disease    • Diabetes    • Hematuria    • Hyperlipidemia    • Hypertension        Past Surgical History:   Procedure Laterality Date   • APPENDECTOMY     • CARDIAC CATHETERIZATION     • CARDIAC CATHETERIZATION N/A 3/16/2023    Procedure: Left Heart Cath with possible angioplasty;  Surgeon: Vishal Denton MD;  Location: [facility] CATH INVASIVE LOCATION;  Service: Cardiovascular;  Laterality: N/A;   • CATARACT EXTRACTION     • CORONARY ARTERY BYPASS GRAFT  05/21/2010    LIMA to LAD, SVG to OM, SVG to PDA   • PROSTATE SURGERY      TRANSURETHRAL RESECTION OF THE PROSTATE         Current Outpatient Medications:   •  allopurinol (ZYLOPRIM) 100 MG tablet, Take 1 tablet by mouth Daily., Disp: 90 tablet, Rfl: 0  •  amLODIPine (NORVASC) 10 MG tablet, Take 1 tablet by mouth Daily., Disp: 90 tablet, Rfl: 3  •  clopidogrel (PLAVIX) 75 MG tablet, Take 1 tablet by mouth Daily., Disp: 90 tablet, Rfl: 2  •  glimepiride (AMARYL) 4 MG tablet, TAKE ONE TABLET BY MOUTH DAILY (Patient taking differently: Take 1 tablet by mouth Every Morning Before Breakfast.), Disp: 90 tablet, Rfl: 0  •  glucose blood (OneTouch Ultra) test strip, Test Glucose once day, Disp: 100 each, Rfl: 1  •  lisinopril (PRINIVIL,ZESTRIL) 20 MG tablet, TAKE ONE TABLET BY MOUTH TWICE A DAY, Disp: 180 tablet, Rfl: 0  •  metFORMIN ER (GLUCOPHAGE-XR) 500 MG 24 hr tablet, Take 4 tablets by mouth Daily. (Patient taking differently: Take 2 tablets by mouth 2 (Two) Times a Day.), Disp: 360 tablet, Rfl: 0  •  metoprolol succinate XL (TOPROL-XL) 25 MG 24 hr tablet, Take 1 tablet by mouth 2 (Two) Times a Day., Disp: 180 tablet, Rfl: 0  •  nitroglycerin (NITROSTAT) 0.4 MG SL tablet, 1 under the tongue as needed for angina, may repeat q5mins for up three doses, Disp: 100 tablet, Rfl: 11  •  simvastatin  (ZOCOR) 40 MG tablet, TAKE ONE TABLET BY MOUTH DAILY (Patient taking differently: Take 1 tablet by mouth Every Night.), Disp: 90 tablet, Rfl: 0  •  spironolactone (ALDACTONE) 25 MG tablet, Take 1 tablet by mouth Daily., Disp: 30 tablet, Rfl: 11  •  tamsulosin (FLOMAX) 0.4 MG capsule 24 hr capsule, Take 1 capsule by mouth Daily for 180 days., Disp: 90 capsule, Rfl: 1  •  warfarin (Coumadin) 4 MG tablet, Take 1 tablet by mouth daily or as directed (Patient taking differently: Take 1 tablet by mouth Every Night. Home warfarin dose: 4mg qday Who monitors the patients INR? Valayam), Disp: 90 tablet, Rfl: 1    No Known Allergies     Family History   Problem Relation Age of Onset   • Hypertension Mother    • Stroke Mother    • Coronary artery disease Father    • Stroke Paternal Grandfather    • Coronary artery disease Other    • Colon cancer Neg Hx        Social History     Socioeconomic History   • Marital status:    Tobacco Use   • Smoking status: Former     Packs/day: 1.00     Years: 5.00     Pack years: 5.00     Types: Cigarettes     Quit date:      Years since quittin.3   • Smokeless tobacco: Never   Vaping Use   • Vaping Use: Never used   Substance and Sexual Activity   • Alcohol use: Not Currently     Comment: occ   • Drug use: Never   • Sexual activity: Defer       Vital Signs:   There were no vitals taken for this visit.              Assessment and Plan    Diagnoses and all orders for this visit:    1. Gross hematuria (Primary)    2. Renal mass      Records reviewed today and summarized in the chart.      Renal lesion    Discussed patient's lesion, I do recommend follow-up to rule out renal malignancy.  I will have him follow-up in 6 months with MRI abdomen with and without contrast        Recent UTI      Patient had UTI after catheter placement for heart catheterization.  We discussed this was likely the cause of his UTI.   Flomax did not seem to help, after discussion he will stop this  medication      Gross hematuria      Patient had a cystoscopy last year - after discussion of risk and benefits we will not proceed with cystoscopy at this time, patient voiced understanding and accepts the risk.      F/u in 6 months with MRI

## 2023-04-24 ENCOUNTER — LAB (OUTPATIENT)
Dept: LAB | Facility: HOSPITAL | Age: 78
End: 2023-04-24
Payer: MEDICARE

## 2023-04-24 ENCOUNTER — ANTICOAGULATION VISIT (OUTPATIENT)
Dept: CARDIOLOGY | Facility: CLINIC | Age: 78
End: 2023-04-24
Payer: MEDICARE

## 2023-04-24 DIAGNOSIS — E53.8 FOLATE DEFICIENCY: ICD-10-CM

## 2023-04-24 DIAGNOSIS — E53.8 B12 DEFICIENCY: ICD-10-CM

## 2023-04-24 DIAGNOSIS — I48.91 ATRIAL FIBRILLATION, UNSPECIFIED TYPE: ICD-10-CM

## 2023-04-24 DIAGNOSIS — I48.0 PAROXYSMAL ATRIAL FIBRILLATION: Primary | ICD-10-CM

## 2023-04-24 LAB
INR PPP: 3.35 (ref 0.86–1.15)
PROTHROMBIN TIME: 33.2 SECONDS (ref 11.8–14.9)

## 2023-04-24 PROCEDURE — 36415 COLL VENOUS BLD VENIPUNCTURE: CPT

## 2023-04-24 PROCEDURE — 83516 IMMUNOASSAY NONANTIBODY: CPT

## 2023-04-24 PROCEDURE — 85610 PROTHROMBIN TIME: CPT

## 2023-04-24 PROCEDURE — 86340 INTRINSIC FACTOR ANTIBODY: CPT

## 2023-04-24 PROCEDURE — 83921 ORGANIC ACID SINGLE QUANT: CPT

## 2023-04-24 NOTE — PROGRESS NOTES
That's reasonable to continue, but I would recommend to go ahead and check Friday morning so we can address before the weekend in case he continues to trend up.

## 2023-04-24 NOTE — PROGRESS NOTES
Lab Results   Component Value Date    INR 3.35 (H) 04/24/2023    INR 2.03 (H) 04/10/2023    INR 3.22 (H) 04/04/2023    PROTIME 33.2 (H) 04/24/2023    PROTIME 22.8 (H) 04/10/2023    PROTIME 32.3 (H) 04/04/2023        Dx:Atrial fibrillation   Dose:4mg  Range:2.0-3.0  Location:[facility]

## 2023-04-24 NOTE — PROGRESS NOTES
Patient would like to keep 4 mg daily for another week. Said they just made a lot of medication changes and wants to see if it had any effect. Please advise.

## 2023-04-25 ENCOUNTER — OFFICE VISIT (OUTPATIENT)
Dept: UROLOGY | Facility: CLINIC | Age: 78
End: 2023-04-25
Payer: MEDICARE

## 2023-04-25 VITALS — RESPIRATION RATE: 18 BRPM | BODY MASS INDEX: 31.94 KG/M2 | WEIGHT: 222.6 LBS

## 2023-04-25 DIAGNOSIS — R31.0 GROSS HEMATURIA: Primary | ICD-10-CM

## 2023-04-25 DIAGNOSIS — N40.1 BENIGN PROSTATIC HYPERPLASIA WITH LOWER URINARY TRACT SYMPTOMS, SYMPTOM DETAILS UNSPECIFIED: ICD-10-CM

## 2023-04-25 DIAGNOSIS — N28.89 RENAL MASS: ICD-10-CM

## 2023-04-25 LAB — PCA AB SER-ACNC: 2.2 UNITS (ref 0–20)

## 2023-04-25 PROCEDURE — 99214 OFFICE O/P EST MOD 30 MIN: CPT | Performed by: UROLOGY

## 2023-04-25 PROCEDURE — 1160F RVW MEDS BY RX/DR IN RCRD: CPT | Performed by: UROLOGY

## 2023-04-25 PROCEDURE — 1159F MED LIST DOCD IN RCRD: CPT | Performed by: UROLOGY

## 2023-04-25 PROCEDURE — 51798 US URINE CAPACITY MEASURE: CPT | Performed by: UROLOGY

## 2023-04-27 LAB — IF BLOCK AB SER-ACNC: 1 AU/ML (ref 0–1.1)

## 2023-04-28 ENCOUNTER — LAB (OUTPATIENT)
Dept: LAB | Facility: HOSPITAL | Age: 78
End: 2023-04-28
Payer: MEDICARE

## 2023-04-28 ENCOUNTER — ANTICOAGULATION VISIT (OUTPATIENT)
Dept: CARDIOLOGY | Facility: CLINIC | Age: 78
End: 2023-04-28
Payer: MEDICARE

## 2023-04-28 ENCOUNTER — OFFICE VISIT (OUTPATIENT)
Dept: ONCOLOGY | Facility: HOSPITAL | Age: 78
End: 2023-04-28
Payer: MEDICARE

## 2023-04-28 VITALS
RESPIRATION RATE: 18 BRPM | OXYGEN SATURATION: 100 % | TEMPERATURE: 98.7 F | WEIGHT: 225.09 LBS | BODY MASS INDEX: 32.3 KG/M2 | SYSTOLIC BLOOD PRESSURE: 147 MMHG | DIASTOLIC BLOOD PRESSURE: 68 MMHG | HEART RATE: 69 BPM

## 2023-04-28 DIAGNOSIS — R13.19 ESOPHAGEAL DYSPHAGIA: ICD-10-CM

## 2023-04-28 DIAGNOSIS — D64.9 ANEMIA, UNSPECIFIED TYPE: ICD-10-CM

## 2023-04-28 DIAGNOSIS — N28.89 RIGHT KIDNEY MASS: ICD-10-CM

## 2023-04-28 DIAGNOSIS — I48.91 ATRIAL FIBRILLATION, UNSPECIFIED TYPE: ICD-10-CM

## 2023-04-28 DIAGNOSIS — I48.0 PAROXYSMAL ATRIAL FIBRILLATION: Primary | ICD-10-CM

## 2023-04-28 DIAGNOSIS — E53.8 B12 DEFICIENCY: ICD-10-CM

## 2023-04-28 DIAGNOSIS — E53.8 FOLATE DEFICIENCY: Primary | ICD-10-CM

## 2023-04-28 LAB
INR PPP: 3.12 (ref 0.86–1.15)
METHYLMALONATE SERPL-SCNC: 220 NMOL/L (ref 0–378)
PROTHROMBIN TIME: 31.5 SECONDS (ref 11.8–14.9)

## 2023-04-28 PROCEDURE — 85610 PROTHROMBIN TIME: CPT

## 2023-04-28 PROCEDURE — 36415 COLL VENOUS BLD VENIPUNCTURE: CPT

## 2023-04-28 PROCEDURE — G0463 HOSPITAL OUTPT CLINIC VISIT: HCPCS | Performed by: INTERNAL MEDICINE

## 2023-04-28 RX ORDER — MULTIVIT WITH MINERALS/LUTEIN
250 TABLET ORAL DAILY
Qty: 90 TABLET | Refills: 1 | Status: SHIPPED | OUTPATIENT
Start: 2023-04-28

## 2023-04-28 RX ORDER — CHOLECALCIFEROL (VITAMIN D3) 125 MCG
500 CAPSULE ORAL DAILY
Qty: 90 TABLET | Refills: 1 | Status: SHIPPED | OUTPATIENT
Start: 2023-04-28

## 2023-04-28 RX ORDER — FOLIC ACID 1 MG/1
1 TABLET ORAL DAILY
Qty: 90 TABLET | Refills: 1 | Status: SHIPPED | OUTPATIENT
Start: 2023-04-28

## 2023-04-28 RX ORDER — FERROUS SULFATE 325(65) MG
325 TABLET ORAL
Qty: 90 TABLET | Refills: 1 | Status: SHIPPED | OUTPATIENT
Start: 2023-04-28

## 2023-04-28 RX ORDER — SIMVASTATIN 40 MG
TABLET ORAL
Qty: 90 TABLET | Refills: 0 | Status: SHIPPED | OUTPATIENT
Start: 2023-04-28

## 2023-04-28 NOTE — PROGRESS NOTES
Chief Complaint/Reason for Referral:  anemia    Nelia Frost, *  Nelia Frost, APRN    Records Obtained:  Records of the patients history including those obtained from  [facility] and patient information  were reviewed and summarized in detail.    Subjective    History of Present Illness   Mr. Keith Cruz presents with his wife for follow up for anemia.     PMH of RAMY (uses CPAP nightly), hypertension, type II DM, hyperlipidemia, MI with recent angioplasty / stent, anemia, gout.  He was started on He has 2 recent hospitalizations recently: # 1: 3/16/- 3/17/23 MI, Atrial flutter / fib/ angioplasty, #2 3/22-3/25/23: UTI (had catheter placed after heart cath) Found to have C-diff on 3/30/2023.     He is back on Plavix after having nose bleeds on Brillinta. He denies any bleeding in stool. Reports his dysphagia is seldom in occurrence but he does have upcoming upper GI scope to evaluate. Denies chronic diarrhea. No history of stomach surgery or gastric bypass.     Hematology History    He has had chronic normocytic anemia dating back to at least 3-4 years in the range 9.0 to 13.30. More recently, has had 2 lab with thrombocytosis up to 509K. Iron studies on 4/4/2023 with iron of 39, iron sat of 10%, TIBC of 389. Never had c-scope.     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    4/4/23: iron 39, iron sat 10%, TIBC 389, WBC 7.96, Hemoglobin 9.8, MCV 84, platelet count 465. Diff: with increase in neutrophil and lymphocyte % on recent lab work. Absolute values were normal.     4/14/23: Hgb 10.4, MCV 83.3, plt 309, WBC 7.21. Iron 29, ferritin 48.8, iron sat 7%, folate low at 3.31, B12 low at 155. Sed rate 53. Epo increased to 20.0. SPEP without M-spike, FLACA without monoclonality, Kappa FLC increased to 40.9 with kappa/lambda ratio of 1.84. Antiparietal and intrinsic  factor antibodies both negative.       Oncology/Hematology History    No history exists.       Review of Systems   Constitutional: Positive for fatigue. Negative for appetite change, diaphoresis, fever, unexpected weight gain and unexpected weight loss.   HENT: Negative for hearing loss, sore throat and voice change.    Eyes: Negative for blurred vision, double vision, pain, redness and visual disturbance.   Respiratory: Negative for cough, shortness of breath and wheezing.    Cardiovascular: Negative for chest pain, palpitations and leg swelling.   Gastrointestinal: Positive for diarrhea.   Endocrine: Negative for cold intolerance, heat intolerance, polydipsia and polyuria.   Genitourinary: Negative for decreased urine volume, difficulty urinating, frequency and urinary incontinence.   Musculoskeletal: Positive for arthralgias, back pain, joint swelling and myalgias.   Skin: Positive for bruise. Negative for color change, rash, skin lesions and wound.   Neurological: Negative for dizziness, seizures, numbness and headache.   Hematological: Negative for adenopathy. Bruises/bleeds easily.   Psychiatric/Behavioral: Negative for depressed mood. The patient is not nervous/anxious.        Current Outpatient Medications on File Prior to Visit   Medication Sig Dispense Refill   • allopurinol (ZYLOPRIM) 100 MG tablet Take 1 tablet by mouth Daily. 90 tablet 0   • amLODIPine (NORVASC) 10 MG tablet Take 1 tablet by mouth Daily. 90 tablet 3   • glimepiride (AMARYL) 4 MG tablet TAKE ONE TABLET BY MOUTH DAILY (Patient taking differently: Take 1 tablet by mouth Every Morning Before Breakfast.) 90 tablet 0   • glucose blood (OneTouch Ultra) test strip Test Glucose once day 100 each 1   • lisinopril (PRINIVIL,ZESTRIL) 20 MG tablet TAKE ONE TABLET BY MOUTH TWICE A  tablet 0   • metFORMIN ER (GLUCOPHAGE-XR) 500 MG 24 hr tablet Take 4 tablets by mouth Daily. (Patient taking differently: Take 2 tablets by mouth 2 (Two) Times a  Day.) 360 tablet 0   • metoprolol succinate XL (TOPROL-XL) 25 MG 24 hr tablet Take 1 tablet by mouth 2 (Two) Times a Day. 180 tablet 0   • nitroglycerin (NITROSTAT) 0.4 MG SL tablet 1 under the tongue as needed for angina, may repeat q5mins for up three doses 100 tablet 11   • spironolactone (ALDACTONE) 25 MG tablet Take 1 tablet by mouth Daily. 30 tablet 11   • warfarin (Coumadin) 4 MG tablet Take 1 tablet by mouth daily or as directed (Patient taking differently: Take 1 tablet by mouth Every Night. Home warfarin dose: 4mg qday  Who monitors the patients INR? Bertin) 90 tablet 1   • clopidogrel (PLAVIX) 75 MG tablet Take 1 tablet by mouth Daily. (Patient not taking: Reported on 4/25/2023) 90 tablet 2   • tamsulosin (FLOMAX) 0.4 MG capsule 24 hr capsule Take 1 capsule by mouth Daily for 180 days. (Patient not taking: Reported on 4/28/2023) 90 capsule 1   • [DISCONTINUED] simvastatin (ZOCOR) 40 MG tablet TAKE ONE TABLET BY MOUTH DAILY (Patient taking differently: Take 1 tablet by mouth Every Night.) 90 tablet 0     No current facility-administered medications on file prior to visit.       No Known Allergies  Past Medical History:   Diagnosis Date   • Coronary artery disease    • Diabetes    • Hematuria    • Hyperlipidemia    • Hypertension      Past Surgical History:   Procedure Laterality Date   • APPENDECTOMY     • CARDIAC CATHETERIZATION     • CARDIAC CATHETERIZATION N/A 3/16/2023    Procedure: Left Heart Cath with possible angioplasty;  Surgeon: Vishal Denton MD;  Location: [facility] CATH INVASIVE LOCATION;  Service: Cardiovascular;  Laterality: N/A;   • CATARACT EXTRACTION     • CORONARY ARTERY BYPASS GRAFT  05/21/2010    LIMA to LAD, SVG to OM, SVG to PDA   • PROSTATE SURGERY      TRANSURETHRAL RESECTION OF THE PROSTATE     Social History     Socioeconomic History   • Marital status:    Tobacco Use   • Smoking status: Former     Packs/day: 1.00     Years: 5.00     Pack years: 5.00     Types:  Cigarettes     Quit date:      Years since quittin.3   • Smokeless tobacco: Never   Vaping Use   • Vaping Use: Never used   Substance and Sexual Activity   • Alcohol use: Not Currently     Comment: occ   • Drug use: Never   • Sexual activity: Defer     Family History   Problem Relation Age of Onset   • Hypertension Mother    • Stroke Mother    • Coronary artery disease Father    • Stroke Paternal Grandfather    • Coronary artery disease Other    • Colon cancer Neg Hx      Immunization History   Administered Date(s) Administered   • COVID-19 (PFIZER) BIVALENT 12+YRS 2022   • COVID-19 (PFIZER) Purple Cap Monovalent 2021, 2021       Tobacco Use: Medium Risk   • Smoking Tobacco Use: Former   • Smokeless Tobacco Use: Never   • Passive Exposure: Not on file       Objective     Physical Exam  Well appearing patient in no acute distress on RA  Anicteric sclerae, no rash on exposed skin  Respirations non-labored  Awake, alert, and oriented x 4. Speech intact. No gross neurologic deficit  Appropriate mood and affect        Vitals:    23 1013   BP: 147/68   Pulse: 69   Resp: 18   Temp: 98.7 °F (37.1 °C)   TempSrc: Temporal   SpO2: 100%   Weight: 102 kg (225 lb 1.4 oz)   PainSc:   5   PainLoc: Foot  Comment: right       Wt Readings from Last 3 Encounters:   23 102 kg (225 lb 1.4 oz)   23 101 kg (222 lb 9.6 oz)   23 100 kg (220 lb 6.4 oz)             ECOG score: 0         ECOG: (0) Fully Active - Able to Carry On All Pre-disease Performance Without Restriction  Fall Risk Assessment was completed, and patient is at low risk for falls.  PHQ-9 Total Score:         The patient is  experiencing fatigue. Fatigue score: 5    PT/OT Functional Screening: PT fx screen: No needs identified  Speech Functional Screening: Speech fx screen: No needs identified  Rehab to be ordered: Rehab to be ordered: No needs identified        Result Review :  The following data was reviewed by: Jeremy  NÉSTOR Burgess MD on 04/14/2023:  Lab Results   Component Value Date    HGB 10.4 (L) 04/14/2023    HCT 32.9 (L) 04/14/2023    MCV 83.3 04/14/2023     04/14/2023    WBC 7.21 04/14/2023    NEUTROABS 5.39 04/14/2023    NEUTROABS 5.55 04/14/2023    LYMPHSABS 1.10 04/14/2023    MONOSABS 0.52 04/14/2023    EOSABS 0.18 04/14/2023    EOSABS 0.65 (H) 04/14/2023    BASOSABS 0.01 04/14/2023     Lab Results   Component Value Date    GLUCOSE 135 (H) 04/14/2023    BUN 15 04/14/2023    CREATININE 1.22 04/14/2023     04/14/2023    K 4.6 04/14/2023     04/14/2023    CO2 20.5 (L) 04/14/2023    CALCIUM 9.7 04/14/2023    PROTEINTOT 6.9 04/14/2023    ALBUMIN 4.0 04/14/2023    ALBUMIN 3.4 04/14/2023    BILITOT 0.4 04/14/2023    ALKPHOS 88 04/14/2023    AST 13 04/14/2023    ALT 13 04/14/2023     Lab Results   Component Value Date    FERRITIN 48.80 04/14/2023    [id number] 155 (L) 04/14/2023    FOLATE 3.31 (L) 04/14/2023     Lab Results   Component Value Date    IRON 29 (L) 04/14/2023    LABIRON 7 (L) 04/14/2023    TRANSFERRIN 273 04/14/2023    TIBC 407 04/14/2023     Lab Results   Component Value Date    FERRITIN 48.80 04/14/2023    [id number] 155 (L) 04/14/2023    FOLATE 3.31 (L) 04/14/2023     Lab Results   Component Value Date    PSA 1.380 12/21/2022       Labs personally reviewed and notable for low iron, B12, folate. Elevated Kappa FLC but normal SPEP/FLACA.          Urology note from 4/25/23 personally reviewed         Assessment and Plan:  Diagnoses and all orders for this visit:    1. Folate deficiency (Primary)  -     Folate; Future  -     folic acid (FOLVITE) 1 MG tablet; Take 1 tablet by mouth Daily.  Dispense: 90 tablet; Refill: 1    2. B12 deficiency  -     Vitamin B12; Future  -     vitamin B-12 (CYANOCOBALAMIN) 500 MCG tablet; Take 1 tablet by mouth Daily.  Dispense: 90 tablet; Refill: 1    3. Anemia, unspecified type  -     CBC & Differential; Future  -     Iron Profile; Future  -     Ferritin; Future  -      ferrous sulfate 325 (65 FE) MG tablet; Take 1 tablet by mouth Daily With Breakfast.  Dispense: 90 tablet; Refill: 1  -     vitamin C (ASCORBIC ACID) 250 MG tablet; Take 1 tablet by mouth Daily.  Dispense: 90 tablet; Refill: 1    4. Right kidney mass    5. Esophageal dysphagia    Chronic anemia for at least the last 3-4 years, but more recently seems to have worsened. Labs from 4/14/23 revealed iron, folate, and B12 deficiency. Antiparietal and intrinsic factor antibodies negative. He has never had a colonoscopy, but did have a cologard testing negative in August of 2021. He follows with cardiology for the INR checks for Coumadin. No ricardo bleeding noted. Certianly could have slow GI bleed as he is on antiplatelet as well as anticoagulant. Also likely component of anemia of chronic disease (sed rate elevated).     Replace iron orally once daily with vitamin C, can do every other day if GI distress occurs. Replace folate and B12 orally as well. Plan to repeat labs in 4 months for response. Can consider alternative replacement if no improvement or intolerability.     Right kidney mass  Follows with urology. Planning for MRI abdomen 10/2023    Dysphagia  Has upcoming EGD. This will help to evaluate for any upper GI blood loss as well.     Mild light chain MGUS  Can consider repeat testing in 12 months. No CRAB criteria met as anemia due to deficiencies most likely as above.     I spent 20 minutes caring for Zachariah on this date of service. This time includes time spent by me in the following activities:preparing for the visit, reviewing tests, obtaining and/or reviewing a separately obtained history, performing a medically appropriate examination and/or evaluation , counseling and educating the patient/family/caregiver, ordering medications, tests, or procedures, referring and communicating with other health care professionals , documenting information in the medical record, independently interpreting results and  communicating that information with the patient/family/caregiver and care coordination    Patient Follow Up: 4 months with labs     Patient was given instructions and counseling regarding his condition or for health maintenance advice. Please see specific information pulled into the AVS if appropriate.

## 2023-04-28 NOTE — PROGRESS NOTES
Lab Results   Component Value Date    INR 3.12 (H) 04/28/2023    INR 3.35 (H) 04/24/2023    INR 2.03 (H) 04/10/2023    PROTIME 31.5 (H) 04/28/2023    PROTIME 33.2 (H) 04/24/2023    PROTIME 22.8 (H) 04/10/2023     Dx:Atrial fibrillation   Dose:4mg  Range:2.0-3.0  Location:[facility]

## 2023-05-05 ENCOUNTER — LAB (OUTPATIENT)
Dept: LAB | Facility: HOSPITAL | Age: 78
End: 2023-05-05
Payer: MEDICARE

## 2023-05-05 ENCOUNTER — ANTICOAGULATION VISIT (OUTPATIENT)
Dept: CARDIOLOGY | Facility: CLINIC | Age: 78
End: 2023-05-05
Payer: MEDICARE

## 2023-05-05 DIAGNOSIS — D64.9 ANEMIA, UNSPECIFIED TYPE: ICD-10-CM

## 2023-05-05 DIAGNOSIS — I48.91 ATRIAL FIBRILLATION, UNSPECIFIED TYPE: ICD-10-CM

## 2023-05-05 DIAGNOSIS — E53.8 B12 DEFICIENCY: ICD-10-CM

## 2023-05-05 DIAGNOSIS — E53.8 FOLATE DEFICIENCY: ICD-10-CM

## 2023-05-05 DIAGNOSIS — I48.0 PAROXYSMAL ATRIAL FIBRILLATION: Primary | ICD-10-CM

## 2023-05-05 LAB
BASOPHILS # BLD AUTO: 0.02 10*3/MM3 (ref 0–0.2)
BASOPHILS NFR BLD AUTO: 0.3 % (ref 0–1.5)
DEPRECATED RDW RBC AUTO: 51.4 FL (ref 37–54)
EOSINOPHIL # BLD AUTO: 0.33 10*3/MM3 (ref 0–0.4)
EOSINOPHIL NFR BLD AUTO: 5.4 % (ref 0.3–6.2)
ERYTHROCYTE [DISTWIDTH] IN BLOOD BY AUTOMATED COUNT: 16.6 % (ref 12.3–15.4)
FERRITIN SERPL-MCNC: 59.31 NG/ML (ref 30–400)
FOLATE SERPL-MCNC: 16.4 NG/ML (ref 4.78–24.2)
HCT VFR BLD AUTO: 34.2 % (ref 37.5–51)
HGB BLD-MCNC: 10.3 G/DL (ref 13–17.7)
IMM GRANULOCYTES # BLD AUTO: 0.01 10*3/MM3 (ref 0–0.05)
IMM GRANULOCYTES NFR BLD AUTO: 0.2 % (ref 0–0.5)
INR PPP: 3.27 (ref 0.86–1.15)
IRON 24H UR-MRATE: 31 MCG/DL (ref 59–158)
IRON SATN MFR SERPL: 8 % (ref 20–50)
LYMPHOCYTES # BLD AUTO: 1.37 10*3/MM3 (ref 0.7–3.1)
LYMPHOCYTES NFR BLD AUTO: 22.3 % (ref 19.6–45.3)
MCH RBC QN AUTO: 25.6 PG (ref 26.6–33)
MCHC RBC AUTO-ENTMCNC: 30.1 G/DL (ref 31.5–35.7)
MCV RBC AUTO: 85.1 FL (ref 79–97)
MONOCYTES # BLD AUTO: 0.45 10*3/MM3 (ref 0.1–0.9)
MONOCYTES NFR BLD AUTO: 7.3 % (ref 5–12)
NEUTROPHILS NFR BLD AUTO: 3.97 10*3/MM3 (ref 1.7–7)
NEUTROPHILS NFR BLD AUTO: 64.5 % (ref 42.7–76)
PLATELET # BLD AUTO: 294 10*3/MM3 (ref 140–450)
PMV BLD AUTO: 10 FL (ref 6–12)
PROTHROMBIN TIME: 32.7 SECONDS (ref 11.8–14.9)
RBC # BLD AUTO: 4.02 10*6/MM3 (ref 4.14–5.8)
TIBC SERPL-MCNC: 370 MCG/DL (ref 298–536)
TRANSFERRIN SERPL-MCNC: 248 MG/DL (ref 200–360)
VIT B12 BLD-MCNC: 513 PG/ML (ref 211–946)
WBC NRBC COR # BLD: 6.15 10*3/MM3 (ref 3.4–10.8)

## 2023-05-05 PROCEDURE — 82728 ASSAY OF FERRITIN: CPT

## 2023-05-05 PROCEDURE — 36415 COLL VENOUS BLD VENIPUNCTURE: CPT

## 2023-05-05 PROCEDURE — 84466 ASSAY OF TRANSFERRIN: CPT

## 2023-05-05 PROCEDURE — 85610 PROTHROMBIN TIME: CPT

## 2023-05-05 PROCEDURE — 85025 COMPLETE CBC W/AUTO DIFF WBC: CPT

## 2023-05-05 PROCEDURE — 82746 ASSAY OF FOLIC ACID SERUM: CPT

## 2023-05-05 PROCEDURE — 82607 VITAMIN B-12: CPT

## 2023-05-05 PROCEDURE — 83540 ASSAY OF IRON: CPT

## 2023-05-05 NOTE — TELEPHONE ENCOUNTER
GURMEET patient. Patient states his pain just comes and goes as it has been doing. Informed patient prescription for Nitro SL has been sent to selected pharmacy. Patient is agreeable to stress test- ordered.   You can access the FollowMyHealth Patient Portal offered by [facility] by registering at the following website: [email or web address]. By joining [email or web address] FollowMyHealth portal, you will also be able to view your health information using other applications (apps) compatible with our system.

## 2023-05-05 NOTE — PROGRESS NOTES
Lab Results   Component Value Date    INR 3.27 (H) 05/05/2023    INR 3.12 (H) 04/28/2023    INR 3.35 (H) 04/24/2023    PROTIME 32.7 (H) 05/05/2023    PROTIME 31.5 (H) 04/28/2023    PROTIME 33.2 (H) 04/24/2023     Dx:Atrial fibrillation   Dose:4mg  Range:2.0-3.0  Location:[facility]

## 2023-05-10 ENCOUNTER — ANTICOAGULATION VISIT (OUTPATIENT)
Dept: CARDIOLOGY | Facility: CLINIC | Age: 78
End: 2023-05-10
Payer: MEDICARE

## 2023-05-10 ENCOUNTER — LAB (OUTPATIENT)
Dept: LAB | Facility: HOSPITAL | Age: 78
End: 2023-05-10
Payer: MEDICARE

## 2023-05-10 DIAGNOSIS — I48.91 ATRIAL FIBRILLATION, UNSPECIFIED TYPE: ICD-10-CM

## 2023-05-10 DIAGNOSIS — I48.0 PAROXYSMAL ATRIAL FIBRILLATION: Primary | ICD-10-CM

## 2023-05-10 LAB
INR PPP: 3.32 (ref 0.86–1.15)
PROTHROMBIN TIME: 33 SECONDS (ref 11.8–14.9)

## 2023-05-10 PROCEDURE — 36415 COLL VENOUS BLD VENIPUNCTURE: CPT

## 2023-05-10 PROCEDURE — 85610 PROTHROMBIN TIME: CPT

## 2023-05-10 NOTE — PROGRESS NOTES
Lab Results   Component Value Date    INR 3.32 (H) 05/10/2023    INR 3.27 (H) 05/05/2023    INR 3.12 (H) 04/28/2023    PROTIME 33.0 (H) 05/10/2023    PROTIME 32.7 (H) 05/05/2023    PROTIME 31.5 (H) 04/28/2023     Dx:Atrial fibrillation   Dose: 3mg/ 4mg/ 4mg  Range:2.0-3.0  Location:[facility]

## 2023-05-11 RX ORDER — WARFARIN SODIUM 3 MG/1
3 TABLET ORAL
Qty: 90 TABLET | Refills: 3 | Status: SHIPPED | OUTPATIENT
Start: 2023-05-11

## 2023-05-11 RX ORDER — WARFARIN SODIUM 1 MG/1
1 TABLET ORAL DAILY
Qty: 90 TABLET | Refills: 3 | Status: SHIPPED | OUTPATIENT
Start: 2023-05-11

## 2023-05-11 RX ORDER — LISINOPRIL 20 MG/1
TABLET ORAL
Qty: 180 TABLET | Refills: 0 | Status: SHIPPED | OUTPATIENT
Start: 2023-05-11

## 2023-05-15 ENCOUNTER — LAB (OUTPATIENT)
Dept: LAB | Facility: HOSPITAL | Age: 78
End: 2023-05-15
Payer: MEDICARE

## 2023-05-15 ENCOUNTER — ANTICOAGULATION VISIT (OUTPATIENT)
Dept: CARDIOLOGY | Facility: CLINIC | Age: 78
End: 2023-05-15
Payer: MEDICARE

## 2023-05-15 ENCOUNTER — TELEPHONE (OUTPATIENT)
Dept: CARDIOLOGY | Facility: CLINIC | Age: 78
End: 2023-05-15
Payer: MEDICARE

## 2023-05-15 DIAGNOSIS — I48.91 ATRIAL FIBRILLATION, UNSPECIFIED TYPE: ICD-10-CM

## 2023-05-15 DIAGNOSIS — I48.0 PAROXYSMAL ATRIAL FIBRILLATION: Primary | ICD-10-CM

## 2023-05-15 LAB
INR PPP: 2.69 (ref 0.86–1.15)
PROTHROMBIN TIME: 28.2 SECONDS (ref 11.8–14.9)

## 2023-05-15 PROCEDURE — 85610 PROTHROMBIN TIME: CPT

## 2023-05-15 PROCEDURE — 36415 COLL VENOUS BLD VENIPUNCTURE: CPT

## 2023-05-15 NOTE — TELEPHONE ENCOUNTER
[facility] received a fax that requires your attention. The document has been indexed to the patient’s chart for your review.      Reason for sending: SO AGUILAR- [facility] REC NOTIF     Documents Description: DRUG UTILIZATION REVIEW     Name of Sender: [employer]     Date Indexed: 5.13.23

## 2023-05-15 NOTE — PROGRESS NOTES
Lab Results   Component Value Date    INR 2.69 (H) 05/15/2023    INR 3.32 (H) 05/10/2023    INR 3.27 (H) 05/05/2023    PROTIME 28.2 (H) 05/15/2023    PROTIME 33.0 (H) 05/10/2023    PROTIME 32.7 (H) 05/05/2023     Dx:Atrial fibrillation   Dose: 3mg daily   Range:2.0-3.0  Location:[facility]

## 2023-05-22 ENCOUNTER — ANTICOAGULATION VISIT (OUTPATIENT)
Dept: CARDIOLOGY | Facility: CLINIC | Age: 78
End: 2023-05-22
Payer: MEDICARE

## 2023-05-22 ENCOUNTER — LAB (OUTPATIENT)
Dept: LAB | Facility: HOSPITAL | Age: 78
End: 2023-05-22
Payer: MEDICARE

## 2023-05-22 DIAGNOSIS — I48.91 ATRIAL FIBRILLATION, UNSPECIFIED TYPE: ICD-10-CM

## 2023-05-22 DIAGNOSIS — I48.0 PAROXYSMAL ATRIAL FIBRILLATION: Primary | ICD-10-CM

## 2023-05-22 LAB
INR PPP: 2.64 (ref 0.86–1.15)
PROTHROMBIN TIME: 27.7 SECONDS (ref 11.8–14.9)

## 2023-05-22 PROCEDURE — 36415 COLL VENOUS BLD VENIPUNCTURE: CPT

## 2023-05-22 PROCEDURE — 85610 PROTHROMBIN TIME: CPT

## 2023-05-22 NOTE — PROGRESS NOTES
Lab Results   Component Value Date    INR 2.64 (H) 05/22/2023    INR 2.69 (H) 05/15/2023    INR 3.32 (H) 05/10/2023    PROTIME 27.7 (H) 05/22/2023    PROTIME 28.2 (H) 05/15/2023    PROTIME 33.0 (H) 05/10/2023     Dx:Atrial fibrillation   Dose: 3mg daily   Range:2.0-3.0  Location:[facility]

## 2023-05-25 ENCOUNTER — TELEPHONE (OUTPATIENT)
Dept: FAMILY MEDICINE CLINIC | Age: 78
End: 2023-05-25
Payer: MEDICARE

## 2023-05-25 NOTE — TELEPHONE ENCOUNTER
PT HAS AN APPT ON 6/7/23 AND WOULD LIKE FOR LABS TO BE SCHEDULED A COUPLE OF DAYS BEFORE TO CHECK HIS IRON, VITAMIN B AND C

## 2023-05-26 DIAGNOSIS — D50.8 OTHER IRON DEFICIENCY ANEMIA: Primary | ICD-10-CM

## 2023-06-01 ENCOUNTER — LAB (OUTPATIENT)
Dept: LAB | Facility: HOSPITAL | Age: 78
End: 2023-06-01
Payer: MEDICARE

## 2023-06-01 DIAGNOSIS — D50.8 OTHER IRON DEFICIENCY ANEMIA: ICD-10-CM

## 2023-06-01 DIAGNOSIS — I48.91 ATRIAL FIBRILLATION, UNSPECIFIED TYPE: ICD-10-CM

## 2023-06-01 LAB
ALBUMIN SERPL-MCNC: 4.2 G/DL (ref 3.5–5.2)
ALBUMIN/GLOB SERPL: 1.7 G/DL
ALP SERPL-CCNC: 77 U/L (ref 39–117)
ALT SERPL W P-5'-P-CCNC: 20 U/L (ref 1–41)
ANION GAP SERPL CALCULATED.3IONS-SCNC: 11 MMOL/L (ref 5–15)
AST SERPL-CCNC: 15 U/L (ref 1–40)
BASOPHILS # BLD AUTO: 0.02 10*3/MM3 (ref 0–0.2)
BASOPHILS NFR BLD AUTO: 0.2 % (ref 0–1.5)
BILIRUB SERPL-MCNC: 0.2 MG/DL (ref 0–1.2)
BUN SERPL-MCNC: 27 MG/DL (ref 8–23)
BUN/CREAT SERPL: 24.1 (ref 7–25)
CALCIUM SPEC-SCNC: 9.5 MG/DL (ref 8.6–10.5)
CHLORIDE SERPL-SCNC: 105 MMOL/L (ref 98–107)
CO2 SERPL-SCNC: 24 MMOL/L (ref 22–29)
CREAT SERPL-MCNC: 1.12 MG/DL (ref 0.76–1.27)
DEPRECATED RDW RBC AUTO: 59.9 FL (ref 37–54)
EGFRCR SERPLBLD CKD-EPI 2021: 68.1 ML/MIN/1.73
EOSINOPHIL # BLD AUTO: 0.13 10*3/MM3 (ref 0–0.4)
EOSINOPHIL NFR BLD AUTO: 1.3 % (ref 0.3–6.2)
ERYTHROCYTE [DISTWIDTH] IN BLOOD BY AUTOMATED COUNT: 18.9 % (ref 12.3–15.4)
GLOBULIN UR ELPH-MCNC: 2.5 GM/DL
GLUCOSE SERPL-MCNC: 123 MG/DL (ref 65–99)
HCT VFR BLD AUTO: 37.5 % (ref 37.5–51)
HGB BLD-MCNC: 11.5 G/DL (ref 13–17.7)
IMM GRANULOCYTES # BLD AUTO: 0.04 10*3/MM3 (ref 0–0.05)
IMM GRANULOCYTES NFR BLD AUTO: 0.4 % (ref 0–0.5)
IRON 24H UR-MRATE: 37 MCG/DL (ref 59–158)
LYMPHOCYTES # BLD AUTO: 1.66 10*3/MM3 (ref 0.7–3.1)
LYMPHOCYTES NFR BLD AUTO: 16.5 % (ref 19.6–45.3)
MCH RBC QN AUTO: 26.6 PG (ref 26.6–33)
MCHC RBC AUTO-ENTMCNC: 30.7 G/DL (ref 31.5–35.7)
MCV RBC AUTO: 86.8 FL (ref 79–97)
MONOCYTES # BLD AUTO: 0.65 10*3/MM3 (ref 0.1–0.9)
MONOCYTES NFR BLD AUTO: 6.4 % (ref 5–12)
NEUTROPHILS NFR BLD AUTO: 7.59 10*3/MM3 (ref 1.7–7)
NEUTROPHILS NFR BLD AUTO: 75.2 % (ref 42.7–76)
PLATELET # BLD AUTO: 298 10*3/MM3 (ref 140–450)
PMV BLD AUTO: 10.5 FL (ref 6–12)
POTASSIUM SERPL-SCNC: 4.7 MMOL/L (ref 3.5–5.2)
PROT SERPL-MCNC: 6.7 G/DL (ref 6–8.5)
RBC # BLD AUTO: 4.32 10*6/MM3 (ref 4.14–5.8)
SODIUM SERPL-SCNC: 140 MMOL/L (ref 136–145)
WBC NRBC COR # BLD: 10.09 10*3/MM3 (ref 3.4–10.8)

## 2023-06-01 PROCEDURE — 80053 COMPREHEN METABOLIC PANEL: CPT

## 2023-06-01 PROCEDURE — 85025 COMPLETE CBC W/AUTO DIFF WBC: CPT

## 2023-06-01 PROCEDURE — 83540 ASSAY OF IRON: CPT

## 2023-06-01 PROCEDURE — 36415 COLL VENOUS BLD VENIPUNCTURE: CPT

## 2023-06-02 ENCOUNTER — TELEPHONE (OUTPATIENT)
Dept: CARDIOLOGY | Facility: CLINIC | Age: 78
End: 2023-06-02

## 2023-06-02 ENCOUNTER — TELEPHONE (OUTPATIENT)
Dept: GASTROENTEROLOGY | Facility: CLINIC | Age: 78
End: 2023-06-02

## 2023-06-02 DIAGNOSIS — R13.10 DYSPHAGIA, UNSPECIFIED TYPE: Primary | ICD-10-CM

## 2023-06-02 NOTE — TELEPHONE ENCOUNTER
GURMEET patient. Patient inquiring about surgical clearance. Explained to patient it is recommended patient not have procedure for 6 months post Cardiac Stenting- as the potential for stent occlusion is far greater. Patient verbalized understanding and appreciation.

## 2023-06-02 NOTE — TELEPHONE ENCOUNTER
We can order an esophagram to get some preliminary information regarding dysphagia, regarding pancreatic cyst plan - I will r/w Dr Neumann

## 2023-06-02 NOTE — TELEPHONE ENCOUNTER
Patient last seen on 4/20/2023 and was referred to Dr. Khan for EUS to further evaluate dysphagia.  Currently has a scheduled appointment on Monday, 6/5 but has not been off of his blood thinner.    Celine Rodrigez RN called as patient was denied clearance. He is on blood thinner because of a cardiac stent placed in March of 2023.      Per Cardiologist, Dr. Denton, patient needs to wait 6 months before coming off of the anticoagulant unless the procedure is emergently needed.  If so, there would need to be further discussion between the physicians.    Please advise if it is OK to hold off until mid to late September for the EUS, or if further planning is needed.

## 2023-06-05 NOTE — TELEPHONE ENCOUNTER
"Spoke to pt and informed of Carlie GRISSOM recommendation.     Educated on Esophagram.  Patient states they do not want to schedule at this time; \"have been poked and prodded on so much, I don't want to do it now\".  States he has a foot issue he would like to clear up first.  Advised him that this imaging would help further evaluate his dysphagia.  Patient verified understanding, but still would like to hold off.  Gave him my direct line and advised to contact me to schedule as soon as he is ready, or if any symptoms get worse.  States he is still having some difficulty with \"dry\" food.    When asked if EUS has been rescheduled for September, he states that it has not been, but that he does plan on following through with that procedure as soon as it can be done.    At this time Esophagram is not scheduled.    "

## 2023-06-06 RX ORDER — GLIMEPIRIDE 4 MG/1
TABLET ORAL
Qty: 90 TABLET | Refills: 0 | Status: SHIPPED | OUTPATIENT
Start: 2023-06-06

## 2023-06-07 ENCOUNTER — OFFICE VISIT (OUTPATIENT)
Dept: FAMILY MEDICINE CLINIC | Age: 78
End: 2023-06-07
Payer: MEDICARE

## 2023-06-07 VITALS
SYSTOLIC BLOOD PRESSURE: 163 MMHG | HEART RATE: 66 BPM | WEIGHT: 217.8 LBS | OXYGEN SATURATION: 97 % | DIASTOLIC BLOOD PRESSURE: 83 MMHG | BODY MASS INDEX: 31.18 KG/M2 | HEIGHT: 70 IN

## 2023-06-07 DIAGNOSIS — D64.9 ANEMIA, UNSPECIFIED TYPE: ICD-10-CM

## 2023-06-07 DIAGNOSIS — E11.9 TYPE 2 DIABETES MELLITUS WITHOUT COMPLICATION, WITHOUT LONG-TERM CURRENT USE OF INSULIN: ICD-10-CM

## 2023-06-07 DIAGNOSIS — I48.91 ATRIAL FIBRILLATION, UNSPECIFIED TYPE: Primary | ICD-10-CM

## 2023-06-07 DIAGNOSIS — M1A.0790 IDIOPATHIC CHRONIC GOUT OF FOOT WITHOUT TOPHUS, UNSPECIFIED LATERALITY: ICD-10-CM

## 2023-06-07 DIAGNOSIS — R13.10 DYSPHAGIA, UNSPECIFIED TYPE: ICD-10-CM

## 2023-06-07 DIAGNOSIS — N28.89 RENAL MASS: ICD-10-CM

## 2023-06-07 DIAGNOSIS — K86.2 PANCREATIC CYST: ICD-10-CM

## 2023-06-07 DIAGNOSIS — I10 ESSENTIAL HYPERTENSION: ICD-10-CM

## 2023-06-07 PROBLEM — E78.2 MIXED HYPERLIPIDEMIA: Status: ACTIVE | Noted: 2021-07-01

## 2023-06-07 RX ORDER — SPIRONOLACTONE 25 MG/1
25 TABLET ORAL DAILY
Qty: 90 TABLET | Refills: 1 | Status: SHIPPED | OUTPATIENT
Start: 2023-06-07

## 2023-06-07 RX ORDER — ALLOPURINOL 100 MG/1
100 TABLET ORAL DAILY
Qty: 90 TABLET | Refills: 1 | Status: SHIPPED | OUTPATIENT
Start: 2023-06-07

## 2023-06-07 NOTE — ASSESSMENT & PLAN NOTE
Reviewed labs with pt, discussed glucose readings, rx's, to continue to monitor glucose, work on diet, recheck labs next month

## 2023-06-07 NOTE — PROGRESS NOTES
Zachariah Cruz presents to [facility].    Chief Complaint:  Hypertension (6 month, pt states he got a cortisone shot and BP has been elevated since. ), Hyperlipidemia, and Diabetes         History of Present Illness:  Diabetes:  Current medication:  amaryl, metformin   Tolerating medication: Yes  Since his sugars have been down, he was wondering about lowering the metformin dose   At home BS ranges: 70's-100   Lab Results       Component                Value               Date                       HGBA1C                   7.90 (H)            03/28/2023                Hyperlipidemia  Current medication: zocor   Tolerating medication: Yes  Needs Refill: no     Lab Results       Component                Value               Date                       CHOL                     121                 03/17/2023                 CHLPL                    120                 04/23/2021                 TRIG                     127                 03/17/2023                 HDL                      34 (L)              03/17/2023                 LDL                      64                  03/17/2023                Hypertension:  Current medication:  norvasc, toprol XL, spirolactone, lisinopril   Tolerating Medication: Yes, bp up systolic since he got a steroid inj in foot recently 2 weeks ago   Checking BP at home and it is: was 120-140, yesterday 130/70 by home health   Needs refills: Yes needs spirolactone to [facility]   Labs:  Lab Results       Component                Value               Date                       GLUCOSE                  123 (H)             06/01/2023                 BUN                      27 (H)              06/01/2023                 CREATININE               1.12                06/01/2023                 EGFRIFNONA               75                  02/22/2022                 BCR                      24.1                06/01/2023                 K                        4.7                  2023                 CO2                      24.0                2023                 CALCIUM                  9.5                 2023                 PROTENTOTREF             6.7                 2023                 ALBUMIN                  4.2                 2023                 LABIL2                   1.1                 2023                 AST                      15                  2023                 ALT                      20                  2023              Gout rx due to [facility] soon    PAST MEDICAL HISTORY changes since 3-       covid +     AF, sees Dr Moffett    Sleep Apnea: uses CPAP;     Renal Stones: he has undergone ECSW lithotripsy and laser       Hospitalizations:  UTI/weaknss 3-22-23  Heart cath 3-16-23    Pneumonia admitted once on 10-20-20 (COVID +)         CURRENT MEDICAL PROVIDERS:    Orthopedist    Rheumatologist: Dr. Arvizu     urology : [facility]        PREVENTIVE HEALTH MAINTENANCE             COLORECTAL CANCER SCREENING: Up to date (colonoscopy q10y; sigmoidoscopy q5y; Cologuard q3y) was last done 2020, Results are in chart; Cologuard normal       Hepatitis C Medicare Screening: was last done ; negative            Surgical History:      Heart cath 3-16-23 and stenting   Cystoscopy     Left cataract     Appendectomy    Coronary Artery Bypass Graft: ;     Transurethral Resection of Prostate: ;     Cataract Removal: right; ;         Family History:     Father:  at age 91; Cause of death was CAD;  Coronary Artery Disease     Mother:  at age 88; Cause of death was CVA;  Hypertension     Paternal Grandfather: Cerebrovascular Accident         Social History:     Occupation:.   (Self-Employed) [employer]      Marital Status:      Children: 3 children            Review of Systems:  Review of Systems   Constitutional:  Negative for fatigue and fever.    Respiratory:  Negative for cough and shortness of breath.    Cardiovascular:  Positive for leg swelling (occ in left lower leg). Negative for chest pain and palpitations.   Gastrointestinal:         History pancreatic cyst, follow up re this has been delayed, he is not sure why   Occ dysphagia, but only certain foods, does not happen often / he saw NP with [facility] for this    Genitourinary:         Has renal cysts, Sees urology    Musculoskeletal:  Positive for arthralgias (right foot / ankle better, but some pain / has podiatry follow up today).   Neurological:  Negative for numbness.        Has been dg with peripheral neuropathy, sees podiatry today, went to [facility] for some dg testing related to this         Current Outpatient Medications:     allopurinol (ZYLOPRIM) 100 MG tablet, Take 1 tablet by mouth Daily., Disp: 90 tablet, Rfl: 1    spironolactone (ALDACTONE) 25 MG tablet, Take 1 tablet by mouth Daily., Disp: 90 tablet, Rfl: 1    amLODIPine (NORVASC) 10 MG tablet, Take 1 tablet by mouth Daily., Disp: 90 tablet, Rfl: 3    clopidogrel (PLAVIX) 75 MG tablet, Take 1 tablet by mouth Daily. (Patient not taking: Reported on 4/25/2023), Disp: 90 tablet, Rfl: 2    ferrous sulfate 325 (65 FE) MG tablet, Take 1 tablet by mouth Daily With Breakfast., Disp: 90 tablet, Rfl: 1    folic acid (FOLVITE) 1 MG tablet, Take 1 tablet by mouth Daily., Disp: 90 tablet, Rfl: 1    glimepiride (AMARYL) 4 MG tablet, TAKE ONE TABLET BY MOUTH DAILY, Disp: 90 tablet, Rfl: 0    glucose blood (OneTouch Ultra) test strip, Test Glucose once day, Disp: 100 each, Rfl: 1    lisinopril (PRINIVIL,ZESTRIL) 20 MG tablet, TAKE ONE TABLET BY MOUTH TWICE A DAY, Disp: 180 tablet, Rfl: 0    metFORMIN ER (GLUCOPHAGE-XR) 500 MG 24 hr tablet, Take 4 tablets by mouth Daily. (Patient taking differently: Take 2 tablets by mouth 2 (Two) Times a Day.), Disp: 360 tablet, Rfl: 0    metoprolol succinate XL (TOPROL-XL) 25 MG 24 hr tablet, Take 1 tablet by mouth 2  "(Two) Times a Day., Disp: 180 tablet, Rfl: 0    nitroglycerin (NITROSTAT) 0.4 MG SL tablet, 1 under the tongue as needed for angina, may repeat q5mins for up three doses, Disp: 100 tablet, Rfl: 11    simvastatin (ZOCOR) 40 MG tablet, TAKE ONE TABLET BY MOUTH DAILY, Disp: 90 tablet, Rfl: 0    tamsulosin (FLOMAX) 0.4 MG capsule 24 hr capsule, Take 1 capsule by mouth Daily for 180 days. (Patient not taking: Reported on 4/28/2023), Disp: 90 capsule, Rfl: 1    vitamin B-12 (CYANOCOBALAMIN) 500 MCG tablet, Take 1 tablet by mouth Daily., Disp: 90 tablet, Rfl: 1    vitamin C (ASCORBIC ACID) 250 MG tablet, Take 1 tablet by mouth Daily., Disp: 90 tablet, Rfl: 1    warfarin (COUMADIN) 1 MG tablet, Take 1 tablet by mouth Daily. Or as directed, Disp: 90 tablet, Rfl: 3    warfarin (COUMADIN) 3 MG tablet, Take 1 tablet by mouth Daily. Or as directed, Disp: 90 tablet, Rfl: 3    warfarin (Coumadin) 4 MG tablet, Take 1 tablet by mouth daily or as directed (Patient taking differently: Take 1 tablet by mouth Every Night. Home warfarin dose: 4mg qday Who monitors the patients INR? [facility]), Disp: 90 tablet, Rfl: 1    Vital Signs:   Vitals:    06/07/23 0943 06/07/23 1028   BP: 170/84 163/83   BP Location: Left arm Left arm   Patient Position: Sitting Sitting   Cuff Size: Small Adult Small Adult   Pulse: 67 66   SpO2: 97%  Comment: room air    Weight: 98.8 kg (217 lb 12.8 oz)    Height: 177.8 cm (70\")          Physical Exam:  Physical Exam  Vitals reviewed.   Constitutional:       General: He is not in acute distress.     Appearance: Normal appearance.   Neck:      Vascular: No carotid bruit.   Cardiovascular:      Rate and Rhythm: Normal rate and regular rhythm.      Heart sounds: Normal heart sounds. No murmur heard.  Pulmonary:      Effort: Pulmonary effort is normal. No respiratory distress.      Breath sounds: Normal breath sounds.   Musculoskeletal:      Right lower leg: No edema.      Left lower leg: Edema (trace) present. "   Neurological:      Mental Status: He is alert.   Psychiatric:         Mood and Affect: Mood normal.         Behavior: Behavior normal.       Result Review      The following data was reviewed by: JACIEL Tovar on 06/07/2023:    Results for orders placed or performed in visit on 06/01/23   Comprehensive Metabolic Panel    Specimen: Blood   Result Value Ref Range    Glucose 123 (H) 65 - 99 mg/dL    BUN 27 (H) 8 - 23 mg/dL    Creatinine 1.12 0.76 - 1.27 mg/dL    Sodium 140 136 - 145 mmol/L    Potassium 4.7 3.5 - 5.2 mmol/L    Chloride 105 98 - 107 mmol/L    CO2 24.0 22.0 - 29.0 mmol/L    Calcium 9.5 8.6 - 10.5 mg/dL    Total Protein 6.7 6.0 - 8.5 g/dL    Albumin 4.2 3.5 - 5.2 g/dL    ALT (SGPT) 20 1 - 41 U/L    AST (SGOT) 15 1 - 40 U/L    Alkaline Phosphatase 77 39 - 117 U/L    Total Bilirubin 0.2 0.0 - 1.2 mg/dL    Globulin 2.5 gm/dL    A/G Ratio 1.7 g/dL    BUN/Creatinine Ratio 24.1 7.0 - 25.0    Anion Gap 11.0 5.0 - 15.0 mmol/L    eGFR 68.1 >60.0 mL/min/1.73   Iron level    Specimen: Blood   Result Value Ref Range    Iron 37 (L) 59 - 158 mcg/dL   CBC Auto Differential    Specimen: Blood   Result Value Ref Range    WBC 10.09 3.40 - 10.80 10*3/mm3    RBC 4.32 4.14 - 5.80 10*6/mm3    Hemoglobin 11.5 (L) 13.0 - 17.7 g/dL    Hematocrit 37.5 37.5 - 51.0 %    MCV 86.8 79.0 - 97.0 fL    MCH 26.6 26.6 - 33.0 pg    MCHC 30.7 (L) 31.5 - 35.7 g/dL    RDW 18.9 (H) 12.3 - 15.4 %    RDW-SD 59.9 (H) 37.0 - 54.0 fl    MPV 10.5 6.0 - 12.0 fL    Platelets 298 140 - 450 10*3/mm3    Neutrophil % 75.2 42.7 - 76.0 %    Lymphocyte % 16.5 (L) 19.6 - 45.3 %    Monocyte % 6.4 5.0 - 12.0 %    Eosinophil % 1.3 0.3 - 6.2 %    Basophil % 0.2 0.0 - 1.5 %    Immature Grans % 0.4 0.0 - 0.5 %    Neutrophils, Absolute 7.59 (H) 1.70 - 7.00 10*3/mm3    Lymphocytes, Absolute 1.66 0.70 - 3.10 10*3/mm3    Monocytes, Absolute 0.65 0.10 - 0.90 10*3/mm3    Eosinophils, Absolute 0.13 0.00 - 0.40 10*3/mm3    Basophils, Absolute 0.02 0.00 - 0.20  10*3/mm3    Immature Grans, Absolute 0.04 0.00 - 0.05 10*3/mm3               Assessment and Plan:          Diagnoses and all orders for this visit:    1. Atrial fibrillation, unspecified type (Primary)  Assessment & Plan:  AF followed by cardiology       2. Essential hypertension  Assessment & Plan:  continue to check bp, continue current medications, elevate legs, watch salt     Orders:  -     spironolactone (ALDACTONE) 25 MG tablet; Take 1 tablet by mouth Daily.  Dispense: 90 tablet; Refill: 1    3. Type 2 diabetes mellitus without complication, without long-term current use of insulin  Assessment & Plan:  Reviewed labs with pt, discussed glucose readings, rx's, to continue to monitor glucose, work on diet, recheck labs next month     Orders:  -     Hemoglobin A1c; Future  -     Comprehensive metabolic panel; Future    4. Renal mass  Assessment & Plan:  Follow up with urology as directed       5. Pancreatic cyst  Assessment & Plan:  Pancreatic specialist check with cardiology about have procedure for this: Explained to patient it is recommended patient not have procedure for 6 months post Cardiac Stenting- as the potential for stent occlusion is far greater.   Reviewed chart, due to his dysphagia and the 1 cm cyst, and he has anemia, wanted to do a Endoscopic ultrasonography (Upper) (EUD) but cardiology wants to hold off until 6 months from his stents       6. Idiopathic chronic gout of foot without tophus, unspecified laterality  Assessment & Plan:  continue rx     Orders:  -     allopurinol (ZYLOPRIM) 100 MG tablet; Take 1 tablet by mouth Daily.  Dispense: 90 tablet; Refill: 1    7. Dysphagia, unspecified type  Assessment & Plan:  checked with cardiology : Dr. Khan for EUS to further evaluate dysphagia. Wants to wait until 6 months past he cardiac stents   Reviewed chart, due to his dysphagia and the 1 cm cyst, and he has anemia, wanted to do a Endoscopic ultrasonography (Upper) (EUD) but cardiology wants to hold  off until 6 months from his stents       8. Anemia, unspecified type  Assessment & Plan:  Reviewed previous recent labs             Follow Up   Return for fasting for labs.  Patient was given instructions and counseling regarding his condition or for health maintenance advice. Please see specific information pulled into the AVS if appropriate.

## 2023-06-07 NOTE — ASSESSMENT & PLAN NOTE
checked with cardiology : Dr. Khan for EUS to further evaluate dysphagia. Wants to wait until 6 months past he cardiac stents   Reviewed chart, due to his dysphagia and the 1 cm cyst, and he has anemia, wanted to do a Endoscopic ultrasonography (Upper) (EUD) but cardiology wants to hold off until 6 months from his stents

## 2023-06-07 NOTE — ASSESSMENT & PLAN NOTE
Pancreatic specialist check with cardiology about have procedure for this: Explained to patient it is recommended patient not have procedure for 6 months post Cardiac Stenting- as the potential for stent occlusion is far greater.   Reviewed chart, due to his dysphagia and the 1 cm cyst, and he has anemia, wanted to do a Endoscopic ultrasonography (Upper) (EUD) but cardiology wants to hold off until 6 months from his stents

## 2023-06-07 NOTE — Clinical Note
Talk to me : call pt:  Reviewed chart, due to his dysphagia and the 1 cm cyst, and he has anemia, wanted to do a Endoscopic ultrasonography (Upper) (EUD) but cardiology wants to hold off until 6 months from his stents

## 2023-06-11 NOTE — PROGRESS NOTES
Re medicated with Ativan , CIWA remains elevated. Family at bedside, Dr Guzman had been on room to updated pt and family   Referral placed. Lab order placed.

## 2023-06-14 DIAGNOSIS — E11.9 TYPE 2 DIABETES MELLITUS WITHOUT COMPLICATION, WITHOUT LONG-TERM CURRENT USE OF INSULIN: ICD-10-CM

## 2023-06-14 RX ORDER — BLOOD SUGAR DIAGNOSTIC
STRIP MISCELLANEOUS
Qty: 100 EACH | Refills: 1 | Status: SHIPPED | OUTPATIENT
Start: 2023-06-14

## 2023-06-19 ENCOUNTER — ANTICOAGULATION VISIT (OUTPATIENT)
Dept: CARDIOLOGY | Facility: CLINIC | Age: 78
End: 2023-06-19
Payer: MEDICARE

## 2023-06-19 DIAGNOSIS — I48.0 PAROXYSMAL ATRIAL FIBRILLATION: Primary | ICD-10-CM

## 2023-06-19 NOTE — PROGRESS NOTES
Lab Results   Component Value Date    INR 1.30 (H) 06/19/2023    INR 2.64 (H) 05/22/2023    INR 2.69 (H) 05/15/2023    PROTIME 16.2 (H) 06/19/2023    PROTIME 27.7 (H) 05/22/2023    PROTIME 28.2 (H) 05/15/2023       Dx:Atrial fibrillation   Dose: 3mg daily   Range:2.0-3.0  Location:[facility]

## 2023-07-20 ENCOUNTER — LAB (OUTPATIENT)
Dept: LAB | Facility: HOSPITAL | Age: 78
End: 2023-07-20
Payer: MEDICARE

## 2023-07-20 ENCOUNTER — TELEPHONE (OUTPATIENT)
Dept: FAMILY MEDICINE CLINIC | Age: 78
End: 2023-07-20

## 2023-07-20 DIAGNOSIS — R23.3 PETECHIAE: ICD-10-CM

## 2023-07-20 DIAGNOSIS — Z11.59 ENCOUNTER FOR SCREENING FOR OTHER VIRAL DISEASES: ICD-10-CM

## 2023-07-20 PROBLEM — M10.9 GOUT: Status: ACTIVE | Noted: 2023-07-20

## 2023-07-20 PROBLEM — U07.1 DISEASE DUE TO SEVERE ACUTE RESPIRATORY SYNDROME CORONAVIRUS 2 (SARS-COV-2): Status: ACTIVE | Noted: 2023-07-20
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
## 2023-07-20 PROBLEM — G62.9 NEUROPATHY: Status: ACTIVE | Noted: 2023-07-20

## 2023-07-20 PROBLEM — Z92.29 HISTORY OF VACCINATION: Status: ACTIVE | Noted: 2023-07-20

## 2023-07-20 PROBLEM — N28.9 KIDNEY LESION: Status: ACTIVE | Noted: 2023-07-20

## 2023-07-20 PROBLEM — R52 PAIN: Status: ACTIVE | Noted: 2023-07-20

## 2023-07-20 PROBLEM — R04.0 EPISTAXIS: Status: ACTIVE | Noted: 2023-07-20

## 2023-07-20 PROBLEM — G47.33 OSA ON CPAP: Status: ACTIVE | Noted: 2023-07-20

## 2023-07-20 PROBLEM — Z99.89 OSA ON CPAP: Status: ACTIVE | Noted: 2023-07-20

## 2023-07-20 PROBLEM — A49.8 CLOSTRIDIOIDES DIFFICILE INFECTION: Status: ACTIVE | Noted: 2023-03-30

## 2023-07-20 LAB
ALBUMIN SERPL-MCNC: 4 G/DL (ref 3.5–5.2)
ALBUMIN/GLOB SERPL: 1.5 G/DL
ALP SERPL-CCNC: 70 U/L (ref 39–117)
ALT SERPL W P-5'-P-CCNC: 15 U/L (ref 1–41)
ANION GAP SERPL CALCULATED.3IONS-SCNC: 11.7 MMOL/L (ref 5–15)
AST SERPL-CCNC: 13 U/L (ref 1–40)
BILIRUB SERPL-MCNC: <0.2 MG/DL (ref 0–1.2)
BUN SERPL-MCNC: 12 MG/DL (ref 8–23)
BUN/CREAT SERPL: 14.1 (ref 7–25)
C3 SERPL-MCNC: 159 MG/DL (ref 82–167)
C4 SERPL-MCNC: 29 MG/DL (ref 14–44)
CALCIUM SPEC-SCNC: 9.6 MG/DL (ref 8.6–10.5)
CHLORIDE SERPL-SCNC: 106 MMOL/L (ref 98–107)
CHROMATIN AB SERPL-ACNC: <10 IU/ML (ref 0–14)
CO2 SERPL-SCNC: 24.3 MMOL/L (ref 22–29)
CREAT SERPL-MCNC: 0.85 MG/DL (ref 0.76–1.27)
EGFRCR SERPLBLD CKD-EPI 2021: 90.1 ML/MIN/1.73
GLOBULIN UR ELPH-MCNC: 2.7 GM/DL
GLUCOSE SERPL-MCNC: 128 MG/DL (ref 65–99)
POTASSIUM SERPL-SCNC: 3.7 MMOL/L (ref 3.5–5.2)
PROT SERPL-MCNC: 6.7 G/DL (ref 6–8.5)
SODIUM SERPL-SCNC: 142 MMOL/L (ref 136–145)

## 2023-07-20 PROCEDURE — 83516 IMMUNOASSAY NONANTIBODY: CPT

## 2023-07-20 PROCEDURE — G0432 EIA HIV-1/HIV-2 SCREEN: HCPCS

## 2023-07-20 PROCEDURE — 86431 RHEUMATOID FACTOR QUANT: CPT

## 2023-07-20 PROCEDURE — 86803 HEPATITIS C AB TEST: CPT

## 2023-07-20 PROCEDURE — 86037 ANCA TITER EACH ANTIBODY: CPT

## 2023-07-20 PROCEDURE — 86160 COMPLEMENT ANTIGEN: CPT

## 2023-07-20 PROCEDURE — 86235 NUCLEAR ANTIGEN ANTIBODY: CPT

## 2023-07-20 PROCEDURE — 36415 COLL VENOUS BLD VENIPUNCTURE: CPT

## 2023-07-20 PROCEDURE — G0499 HEPB SCREEN HIGH RISK INDIV: HCPCS

## 2023-07-20 PROCEDURE — 80053 COMPREHEN METABOLIC PANEL: CPT

## 2023-07-20 PROCEDURE — 86038 ANTINUCLEAR ANTIBODIES: CPT

## 2023-07-20 PROCEDURE — 86162 COMPLEMENT TOTAL (CH50): CPT

## 2023-07-20 NOTE — TELEPHONE ENCOUNTER
"    Caller: Zachariah Cruz \"Keith\"    Relationship to patient: Self    Best call back number: [phone]    Chief complaint: N/A    Type of visit: OFFICE VISIT    Requested date: ASAP     If rescheduling, when is the original appointment: N/A     Additional notes:PATIENT HAS BRUISES ON HIS ARMS THAT TURN INTO BLOOD BLISTERS. HE WAS SEEN BY A CARDIOLOGIST. THIS CARDIOLOGIST RAN LABS AND REFERRED PATIENT BACK TO PCP. HE WANTED TO BE SEEN ASAP DID NOT WANT TO WAIT UNTIL MONDAY. 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 PLEASE CALL PATIENT BACK AND SCHEDULE/ADVISE.           "

## 2023-07-21 ENCOUNTER — LAB (OUTPATIENT)
Dept: LAB | Facility: HOSPITAL | Age: 78
End: 2023-07-21
Payer: MEDICARE

## 2023-07-21 DIAGNOSIS — R23.3 PETECHIAE: ICD-10-CM

## 2023-07-21 LAB
BILIRUB UR QL STRIP: NEGATIVE
CLARITY UR: CLEAR
COLOR UR: YELLOW
GLUCOSE UR STRIP-MCNC: NEGATIVE MG/DL
HGB UR QL STRIP.AUTO: NEGATIVE
HIV1+2 AB SER QL: NORMAL
KETONES UR QL STRIP: NEGATIVE
LEUKOCYTE ESTERASE UR QL STRIP.AUTO: NEGATIVE
NITRITE UR QL STRIP: NEGATIVE
PH UR STRIP.AUTO: 5.5 [PH] (ref 5–8)
PROT UR QL STRIP: NEGATIVE
SP GR UR STRIP: 1.02 (ref 1–1.03)
UROBILINOGEN UR QL STRIP: NORMAL

## 2023-07-21 PROCEDURE — 81003 URINALYSIS AUTO W/O SCOPE: CPT

## 2023-07-22 LAB
HBV CORE AB SERPL QL IA: NEGATIVE
HBV SURFACE AB SER QL: NON REACTIVE
HBV SURFACE AG SERPL QL IA: NEGATIVE
HCV AB SERPL QL IA: NORMAL
HCV IGG SERPL QL IA: NON REACTIVE

## 2023-07-24 LAB
ANA SER QL: NEGATIVE
CH50 SERPL-ACNC: >60 U/ML
ENA RNP AB SER-ACNC: <0.2 AI (ref 0–0.9)
ENA SM AB SER-ACNC: <0.2 AI (ref 0–0.9)
ENA SS-A AB SER-ACNC: <0.2 AI (ref 0–0.9)
ENA SS-B AB SER-ACNC: <0.2 AI (ref 0–0.9)

## 2023-07-24 RX ORDER — SIMVASTATIN 40 MG
TABLET ORAL
Qty: 90 TABLET | Refills: 0 | Status: SHIPPED | OUTPATIENT
Start: 2023-07-24

## 2023-07-25 LAB
C-ANCA TITR SER IF: NORMAL TITER
MYELOPEROXIDASE AB SER IA-ACNC: <0.2 UNITS (ref 0–0.9)
P-ANCA ATYPICAL TITR SER IF: NORMAL TITER
P-ANCA TITR SER IF: NORMAL TITER
PROTEINASE3 AB SER IA-ACNC: <0.2 UNITS (ref 0–0.9)

## 2023-08-04 ENCOUNTER — LAB (OUTPATIENT)
Dept: LAB | Facility: HOSPITAL | Age: 78
End: 2023-08-04
Payer: MEDICARE

## 2023-08-04 ENCOUNTER — TELEPHONE (OUTPATIENT)
Dept: CARDIOLOGY | Facility: CLINIC | Age: 78
End: 2023-08-04
Payer: MEDICARE

## 2023-08-04 DIAGNOSIS — I48.0 PAROXYSMAL ATRIAL FIBRILLATION: Primary | ICD-10-CM

## 2023-08-04 DIAGNOSIS — I48.0 PAROXYSMAL ATRIAL FIBRILLATION: ICD-10-CM

## 2023-08-04 LAB
INR PPP: 2.94 (ref 0.86–1.15)
PROTHROMBIN TIME: 30.1 SECONDS (ref 11.8–14.9)

## 2023-08-04 PROCEDURE — 85610 PROTHROMBIN TIME: CPT

## 2023-08-04 PROCEDURE — 36415 COLL VENOUS BLD VENIPUNCTURE: CPT

## 2023-08-07 ENCOUNTER — ANTICOAGULATION VISIT (OUTPATIENT)
Dept: CARDIOLOGY | Facility: CLINIC | Age: 78
End: 2023-08-07
Payer: MEDICARE

## 2023-08-07 DIAGNOSIS — I48.0 PAROXYSMAL ATRIAL FIBRILLATION: Primary | ICD-10-CM

## 2023-08-07 DIAGNOSIS — E11.9 TYPE 2 DIABETES MELLITUS WITHOUT COMPLICATION, WITHOUT LONG-TERM CURRENT USE OF INSULIN: ICD-10-CM

## 2023-08-07 RX ORDER — BLOOD SUGAR DIAGNOSTIC
STRIP MISCELLANEOUS
Qty: 100 EACH | Refills: 1 | Status: SHIPPED | OUTPATIENT
Start: 2023-08-07

## 2023-08-07 NOTE — PROGRESS NOTES
Lab Results   Component Value Date    INR 2.94 (H) 08/04/2023    INR 2.77 (H) 07/18/2023    INR 2.96 (H) 07/07/2023    PROTIME 30.1 (H) 08/04/2023    PROTIME 28.8 (H) 07/18/2023    PROTIME 30.3 (H) 07/07/2023     Dx:Atrial fibrillation   Dose:4mg  Range:2.0-3.0  Location:Located within [facility]            Pt aware of results and plan no question or concerns

## 2023-08-07 NOTE — TELEPHONE ENCOUNTER
Rx Refill Note  Requested Prescriptions      No prescriptions requested or ordered in this encounter      Last office visit with prescribing clinician: 6/7/2023   Last telemedicine visit with prescribing clinician: Visit date not found   Next office visit with prescribing clinician: 12/11/2023  Pharmacy needs a new script for Medicare billing.     Marissa Oliveira LPN  08/07/23, 15:19 EDT

## 2023-08-08 ENCOUNTER — OUTSIDE FACILITY SERVICE (OUTPATIENT)
Dept: FAMILY MEDICINE CLINIC | Age: 78
End: 2023-08-08
Payer: MEDICARE

## 2023-08-09 RX ORDER — LISINOPRIL 20 MG/1
TABLET ORAL
Qty: 180 TABLET | Refills: 0 | Status: SHIPPED | OUTPATIENT
Start: 2023-08-09

## 2023-08-18 ENCOUNTER — ANTICOAGULATION VISIT (OUTPATIENT)
Dept: CARDIOLOGY | Facility: CLINIC | Age: 78
End: 2023-08-18
Payer: MEDICARE

## 2023-08-18 ENCOUNTER — LAB (OUTPATIENT)
Dept: LAB | Facility: HOSPITAL | Age: 78
End: 2023-08-18
Payer: MEDICARE

## 2023-08-18 DIAGNOSIS — I48.0 PAROXYSMAL ATRIAL FIBRILLATION: Primary | ICD-10-CM

## 2023-08-18 DIAGNOSIS — I48.0 PAROXYSMAL ATRIAL FIBRILLATION: ICD-10-CM

## 2023-08-18 LAB
INR PPP: 2.72 (ref 0.86–1.15)
PROTHROMBIN TIME: 28.3 SECONDS (ref 11.8–14.9)

## 2023-08-18 PROCEDURE — 36415 COLL VENOUS BLD VENIPUNCTURE: CPT

## 2023-08-18 PROCEDURE — 85610 PROTHROMBIN TIME: CPT

## 2023-08-18 RX ORDER — WARFARIN SODIUM 4 MG/1
TABLET ORAL
Qty: 90 TABLET | Refills: 1 | Status: SHIPPED | OUTPATIENT
Start: 2023-08-18

## 2023-08-18 NOTE — PROGRESS NOTES
Lab Results   Component Value Date    INR 2.72 (H) 08/18/2023    INR 2.94 (H) 08/04/2023    INR 2.77 (H) 07/18/2023    PROTIME 28.3 (H) 08/18/2023    PROTIME 30.1 (H) 08/04/2023    PROTIME 28.8 (H) 07/18/2023     Dx:a-fib  Dose:4 mg everyday  Range:2.0-3.0  Location:[facility]          Informed pt of inr results and plan   Prescription sent to pharmacy 4mg warfarin

## 2023-08-25 ENCOUNTER — LAB (OUTPATIENT)
Dept: ONCOLOGY | Facility: HOSPITAL | Age: 78
End: 2023-08-25
Payer: MEDICARE

## 2023-08-25 ENCOUNTER — OFFICE VISIT (OUTPATIENT)
Dept: ONCOLOGY | Facility: HOSPITAL | Age: 78
End: 2023-08-25
Payer: MEDICARE

## 2023-08-25 VITALS
WEIGHT: 219.8 LBS | RESPIRATION RATE: 16 BRPM | TEMPERATURE: 97.9 F | OXYGEN SATURATION: 97 % | BODY MASS INDEX: 31.54 KG/M2 | DIASTOLIC BLOOD PRESSURE: 68 MMHG | HEART RATE: 66 BPM | SYSTOLIC BLOOD PRESSURE: 153 MMHG

## 2023-08-25 DIAGNOSIS — E53.8 B12 DEFICIENCY: ICD-10-CM

## 2023-08-25 DIAGNOSIS — E53.8 FOLATE DEFICIENCY: Primary | ICD-10-CM

## 2023-08-25 DIAGNOSIS — L57.0 ACTINIC KERATOSES: ICD-10-CM

## 2023-08-25 DIAGNOSIS — D64.9 ANEMIA, UNSPECIFIED TYPE: ICD-10-CM

## 2023-08-25 DIAGNOSIS — E53.8 FOLATE DEFICIENCY: ICD-10-CM

## 2023-08-25 LAB
BASOPHILS # BLD AUTO: 0.02 10*3/MM3 (ref 0–0.2)
BASOPHILS NFR BLD AUTO: 0.2 % (ref 0–1.5)
DEPRECATED RDW RBC AUTO: 51.3 FL (ref 37–54)
EOSINOPHIL # BLD AUTO: 0.2 10*3/MM3 (ref 0–0.4)
EOSINOPHIL NFR BLD AUTO: 2.3 % (ref 0.3–6.2)
ERYTHROCYTE [DISTWIDTH] IN BLOOD BY AUTOMATED COUNT: 15.8 % (ref 12.3–15.4)
FERRITIN SERPL-MCNC: 47.53 NG/ML (ref 30–400)
FOLATE SERPL-MCNC: >20 NG/ML (ref 4.78–24.2)
HCT VFR BLD AUTO: 40.6 % (ref 37.5–51)
HGB BLD-MCNC: 13.1 G/DL (ref 13–17.7)
IMM GRANULOCYTES # BLD AUTO: 0.02 10*3/MM3 (ref 0–0.05)
IMM GRANULOCYTES NFR BLD AUTO: 0.2 % (ref 0–0.5)
IRON 24H UR-MRATE: 76 MCG/DL (ref 59–158)
IRON SATN MFR SERPL: 18 % (ref 20–50)
LYMPHOCYTES # BLD AUTO: 1.27 10*3/MM3 (ref 0.7–3.1)
LYMPHOCYTES NFR BLD AUTO: 14.4 % (ref 19.6–45.3)
MCH RBC QN AUTO: 28.5 PG (ref 26.6–33)
MCHC RBC AUTO-ENTMCNC: 32.3 G/DL (ref 31.5–35.7)
MCV RBC AUTO: 88.5 FL (ref 79–97)
MONOCYTES # BLD AUTO: 0.57 10*3/MM3 (ref 0.1–0.9)
MONOCYTES NFR BLD AUTO: 6.5 % (ref 5–12)
NEUTROPHILS NFR BLD AUTO: 6.75 10*3/MM3 (ref 1.7–7)
NEUTROPHILS NFR BLD AUTO: 76.4 % (ref 42.7–76)
PLATELET # BLD AUTO: 254 10*3/MM3 (ref 140–450)
PMV BLD AUTO: 10.4 FL (ref 6–12)
RBC # BLD AUTO: 4.59 10*6/MM3 (ref 4.14–5.8)
TIBC SERPL-MCNC: 422 MCG/DL (ref 298–536)
TRANSFERRIN SERPL-MCNC: 283 MG/DL (ref 200–360)
VIT B12 BLD-MCNC: 1216 PG/ML (ref 211–946)
WBC NRBC COR # BLD: 8.83 10*3/MM3 (ref 3.4–10.8)

## 2023-08-25 PROCEDURE — G0463 HOSPITAL OUTPT CLINIC VISIT: HCPCS | Performed by: INTERNAL MEDICINE

## 2023-08-25 PROCEDURE — 82728 ASSAY OF FERRITIN: CPT

## 2023-08-25 PROCEDURE — 84466 ASSAY OF TRANSFERRIN: CPT

## 2023-08-25 PROCEDURE — 82746 ASSAY OF FOLIC ACID SERUM: CPT

## 2023-08-25 PROCEDURE — 83540 ASSAY OF IRON: CPT

## 2023-08-25 PROCEDURE — 85025 COMPLETE CBC W/AUTO DIFF WBC: CPT

## 2023-08-25 PROCEDURE — 82607 VITAMIN B-12: CPT

## 2023-08-25 PROCEDURE — 36415 COLL VENOUS BLD VENIPUNCTURE: CPT

## 2023-08-25 NOTE — PROGRESS NOTES
Chief Complaint/Reason for Referral:  anemia    Nelia Frost, *  Nelia Frost, APRN    Records Obtained:  Records of the patients history including those obtained from  [facility] and patient information  were reviewed and summarized in detail.    Subjective    History of Present Illness   Mr. Keith Cruz presents with his wife for follow up for anemia.     PMH of RAMY (uses CPAP nightly), hypertension, type II DM, hyperlipidemia, MI with angioplasty / stent, anemia, gout.  He has 2 recent hospitalizations recently: # 1: 3/16/- 3/17/23 MI, Atrial flutter / fib/ angioplasty, #2 3/22-3/25/23: UTI (had catheter placed after heart cath) Found to have C-diff on 3/30/2023.     He is back on Plavix after having nose bleeds on Brillinta. He is off aspirin. Bruising on his arm is better but still present. He denies any bleeding in stool. Denies chronic diarrhea. No history of stomach surgery or gastric bypass. He is feeling better. He has been taking oral iron daily as well as B12 and folate.     Hematology History    He has had chronic normocytic anemia dating back to at least 3-4 years in the range 9.0 to 13.30. More recently, has had 2 lab with thrombocytosis up to 509K. Iron studies on 4/4/2023 with iron of 39, iron sat of 10%, TIBC of 389. Never had c-scope.     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    4/4/23: iron 39, iron sat 10%, TIBC 389, WBC 7.96, Hemoglobin 9.8, MCV 84, platelet count 465. Diff: with increase in neutrophil and lymphocyte % on recent lab work. Absolute values were normal.     4/14/23: Hgb 10.4, MCV 83.3, plt 309, WBC 7.21. Iron 29, ferritin 48.8, iron sat 7%, folate low at 3.31, B12 low at 155. Sed rate 53. Epo increased to 20.0. SPEP without M-spike, FLACA without monoclonality, Kappa FLC increased to 40.9 with kappa/lambda ratio of 1.84.  Antiparietal and intrinsic factor antibodies both negative.     4/28/23: OP heme visit: Oral iron, B12, folate daily started.     5/5/23: B12 513, folate 16.4, iron sat 8%, ferritin 59.3, iron 31    7/18/23: Hgb 12.3, plt 246, WBC 8.03      Oncology/Hematology History    No history exists.       Review of Systems   Constitutional:  Positive for fatigue. Negative for appetite change, diaphoresis, fever, unexpected weight gain and unexpected weight loss.   HENT:  Negative for hearing loss, sore throat and voice change.    Eyes:  Negative for blurred vision, double vision, pain, redness and visual disturbance.   Respiratory:  Negative for cough, shortness of breath and wheezing.    Cardiovascular:  Negative for chest pain, palpitations and leg swelling.   Gastrointestinal:  Positive for diarrhea.   Endocrine: Negative for cold intolerance, heat intolerance, polydipsia and polyuria.   Genitourinary:  Negative for decreased urine volume, difficulty urinating, frequency and urinary incontinence.   Musculoskeletal:  Positive for arthralgias, back pain, joint swelling and myalgias.   Skin:  Positive for bruise. Negative for color change, rash, skin lesions and wound.   Neurological:  Negative for dizziness, seizures, numbness and headache.   Hematological:  Negative for adenopathy. Bruises/bleeds easily.   Psychiatric/Behavioral:  Negative for depressed mood. The patient is not nervous/anxious.      Current Outpatient Medications on File Prior to Visit   Medication Sig Dispense Refill    allopurinol (ZYLOPRIM) 100 MG tablet Take 1 tablet by mouth Daily. 90 tablet 1    amLODIPine (NORVASC) 10 MG tablet Take 1 tablet by mouth Daily. 90 tablet 3    clopidogrel (PLAVIX) 75 MG tablet Take 1 tablet by mouth Daily. 90 tablet 2    ferrous sulfate 325 (65 FE) MG tablet Take 1 tablet by mouth Daily With Breakfast. 90 tablet 1    folic acid (FOLVITE) 1 MG tablet Take 1 tablet by mouth Daily. 90 tablet 1    glimepiride (AMARYL) 4 MG  tablet TAKE ONE TABLET BY MOUTH DAILY 90 tablet 0    glucose blood (OneTouch Ultra) test strip Test Glucose once day 100 each 1    lisinopril (PRINIVIL,ZESTRIL) 20 MG tablet TAKE ONE TABLET BY MOUTH TWICE A  tablet 0    metFORMIN ER (GLUCOPHAGE-XR) 500 MG 24 hr tablet Take 4 tablets by mouth Daily. (Patient taking differently: Take 2 tablets by mouth 2 (Two) Times a Day.) 360 tablet 0    metoprolol succinate XL (TOPROL-XL) 25 MG 24 hr tablet TAKE ONE TABLET BY MOUTH TWICE A  tablet 1    nitroglycerin (NITROSTAT) 0.4 MG SL tablet 1 under the tongue as needed for angina, may repeat q5mins for up three doses 100 tablet 11    simvastatin (ZOCOR) 40 MG tablet TAKE ONE TABLET BY MOUTH DAILY 90 tablet 0    spironolactone (ALDACTONE) 25 MG tablet Take 1 tablet by mouth Daily. 90 tablet 1    vitamin B-12 (CYANOCOBALAMIN) 500 MCG tablet Take 1 tablet by mouth Daily. 90 tablet 1    vitamin C (ASCORBIC ACID) 250 MG tablet Take 1 tablet by mouth Daily. 90 tablet 1    warfarin (COUMADIN) 1 MG tablet Take 1 tablet by mouth Daily. Or as directed 90 tablet 3    warfarin (COUMADIN) 3 MG tablet Take 1 tablet by mouth Daily. Or as directed 90 tablet 3    warfarin (Coumadin) 4 MG tablet Take 1 tablet by mouth daily or as directed 90 tablet 1     No current facility-administered medications on file prior to visit.       No Known Allergies  Past Medical History:   Diagnosis Date    Coronary artery disease     Diabetes     Hematuria     Hyperlipidemia     Hypertension      Past Surgical History:   Procedure Laterality Date    APPENDECTOMY      CARDIAC CATHETERIZATION      CARDIAC CATHETERIZATION N/A 3/16/2023    Procedure: Left Heart Cath with possible angioplasty;  Surgeon: Vishal Denton MD;  Location: [facility] CATH INVASIVE LOCATION;  Service: Cardiovascular;  Laterality: N/A;    CATARACT EXTRACTION      CORONARY ARTERY BYPASS GRAFT  05/21/2010    LIMA to LAD, SVG to OM, SVG to PDA    PROSTATE SURGERY      TRANSURETHRAL  RESECTION OF THE PROSTATE     Social History     Socioeconomic History    Marital status:    Tobacco Use    Smoking status: Former     Packs/day: 1.00     Years: 5.00     Pack years: 5.00     Types: Cigarettes     Quit date:      Years since quittin.6    Smokeless tobacco: Never   Vaping Use    Vaping Use: Never used   Substance and Sexual Activity    Alcohol use: Not Currently     Comment: occ    Drug use: Never    Sexual activity: Defer     Family History   Problem Relation Age of Onset    Hypertension Mother     Stroke Mother     Coronary artery disease Father     Stroke Paternal Grandfather     Coronary artery disease Other     Colon cancer Neg Hx      Immunization History   Administered Date(s) Administered    COVID-19 (PFIZER) BIVALENT 12+YRS 2022    COVID-19 (PFIZER) Purple Cap Monovalent 2021, 2021       Tobacco Use: Medium Risk    Smoking Tobacco Use: Former    Smokeless Tobacco Use: Never    Passive Exposure: Not on file       Objective     Physical Exam  Well appearing patient in no acute distress on RA  Anicteric sclerae, multiple ecchymoses on bilateral arms, few scattered actinic keratoses noted as well on hands and arms  Respirations non-labored  Awake, alert, and oriented x 4. Speech intact. No gross neurologic deficit  Appropriate mood and affect    Vitals:    23 1001   BP: 153/68   Pulse: 66   Resp: 16   Temp: 97.9 øF (36.6 øC)   TempSrc: Temporal   SpO2: 97%   Weight: 99.7 kg (219 lb 12.8 oz)   PainSc: 0-No pain       Wt Readings from Last 3 Encounters:   23 103 kg (227 lb 3.2 oz)   23 99.3 kg (219 lb)   23 98.8 kg (217 lb 12.8 oz)                  ECOG: (0) Fully Active - Able to Carry On All Pre-disease Performance Without Restriction  Fall Risk Assessment was completed, and patient is at low risk for falls.  PHQ-9 Total Score:         The patient is  experiencing fatigue. Fatigue score: 5    PT/OT Functional Screening: PT fx screen: No  needs identified  Speech Functional Screening: Speech fx screen: No needs identified  Rehab to be ordered: Rehab to be ordered: No needs identified        Result Review :  The following data was reviewed by: Jeremy Burgess MD on 08/25/23:  Lab Results   Component Value Date    HGB 12.3 (L) 07/18/2023    HCT 38.7 07/18/2023    MCV 88.4 07/18/2023     07/18/2023    WBC 8.03 07/18/2023    NEUTROABS 7.59 (H) 06/01/2023    LYMPHSABS 1.66 06/01/2023    MONOSABS 0.65 06/01/2023    EOSABS 0.13 06/01/2023    BASOSABS 0.02 06/01/2023     Lab Results   Component Value Date    GLUCOSE 128 (H) 07/20/2023    BUN 12 07/20/2023    CREATININE 0.85 07/20/2023     07/20/2023    K 3.7 07/20/2023     07/20/2023    CO2 24.3 07/20/2023    CALCIUM 9.6 07/20/2023    PROTEINTOT 6.7 07/20/2023    ALBUMIN 4.0 07/20/2023    BILITOT <0.2 07/20/2023    ALKPHOS 70 07/20/2023    AST 13 07/20/2023    ALT 15 07/20/2023     Lab Results   Component Value Date    FERRITIN 59.31 05/05/2023    [id number] 513 05/05/2023    FOLATE 16.40 05/05/2023     Lab Results   Component Value Date    IRON 37 (L) 06/01/2023    LABIRON 8 (L) 05/05/2023    TRANSFERRIN 248 05/05/2023    TIBC 370 05/05/2023     Lab Results   Component Value Date    FERRITIN 59.31 05/05/2023    [id number] 513 05/05/2023    FOLATE 16.40 05/05/2023     Lab Results   Component Value Date    PSA 1.380 12/21/2022       Labs personally reviewed and notable for improved vitamin levels. Elevated Kappa FLC but normal SPEP/FLACA.          Urology note from 4/25/23 personally reviewed         Assessment and Plan:  Diagnoses and all orders for this visit:    1. Folate deficiency (Primary)  -     Folate; Future  -     Folate; Future    2. B12 deficiency  -     Vitamin B12; Future  -     Vitamin B12; Future    3. Anemia, unspecified type  -     CBC & Differential; Future  -     Iron Profile; Future  -     Ferritin; Future  -     CBC & Differential; Future  -     Iron Profile;  Future  -     Ferritin; Future    4. Actinic keratoses    Chronic anemia for at least the last 3-4 years, but more recently seems to have worsened. Labs from 4/14/23 revealed iron, folate, and B12 deficiency. Antiparietal and intrinsic factor antibodies negative. He has never had a colonoscopy, but did have a cologard testing negative in August of 2021. He follows with cardiology for the INR checks for Coumadin. No ricardo bleeding noted. Certianly could have slow GI bleed as he is on antiplatelet as well as anticoagulant. Also likely component of anemia of chronic disease (sed rate elevated).     4/2023 started on iron orally once daily with vitamin C, can do every other day if GI distress occurs as well as folate and B12 orally. Plan to repeat labs today and again in 6 months. Can continue until repeat in 6 months.  Can consider alternative replacement if no improvement or intolerability.     Right kidney mass  Follows with urology. Planning for MRI abdomen 10/2023    Mild light chain MGUS  Can consider repeat testing in around 4/2024. No CRAB criteria met as anemia due to deficiencies most likely as above.     AK  Follow up with dermatology for local treatment.     I spent 20 minutes caring for Zachariah on this date of service. This time includes time spent by me in the following activities:preparing for the visit, reviewing tests, obtaining and/or reviewing a separately obtained history, performing a medically appropriate examination and/or evaluation , counseling and educating the patient/family/caregiver, ordering medications, tests, or procedures, referring and communicating with other health care professionals , documenting information in the medical record, independently interpreting results and communicating that information with the patient/family/caregiver and care coordination    Patient Follow Up: 6 months with labs     Patient was given instructions and counseling regarding his condition or for health  maintenance advice. Please see specific information pulled into the AVS if appropriate.

## 2023-09-01 RX ORDER — METOPROLOL SUCCINATE 25 MG/1
TABLET, EXTENDED RELEASE ORAL
Qty: 270 TABLET | Refills: 1 | Status: SHIPPED | OUTPATIENT
Start: 2023-09-01

## 2023-09-05 RX ORDER — GLIMEPIRIDE 4 MG/1
TABLET ORAL
Qty: 90 TABLET | Refills: 0 | Status: SHIPPED | OUTPATIENT
Start: 2023-09-05

## 2023-09-07 ENCOUNTER — TELEPHONE (OUTPATIENT)
Dept: CARDIOLOGY | Facility: CLINIC | Age: 78
End: 2023-09-07
Payer: MEDICARE

## 2023-09-07 NOTE — TELEPHONE ENCOUNTER
Procedure: Endoscopic Ultrasonography- (pancreatic cyst)    Medication Directive: Coumadin and plavix 5-7 days prior    PMH: CAD s/p PCI- 03/16/23, HLD, HTN, PAF     Last Seen:  06/21/23    [facility] w/ PCI: 03/16/23    Native three-vessel coronary artery disease with 100% occlusion mid LAD artery, 100% mid left circumflex artery and 100% distal RCA   Patent LIMA graft to the LAD with no stenosis.  Patent SVG graft to RCA with 50% stenosis at anastomosis.  95% tight stenosis of the proximal segment of SVG to OM branch, which is the culprit lesion for patient presentation with unstable angina.  Normal left ventricular end-diastolic pressure  Successful percutaneous coronary intervention to SVG to OM branch with placement of 3.0x18 Xience bing point drug-eluting stent, postdilated using 3.25 noncompliant balloon with good angiographic results.

## 2023-09-08 NOTE — TELEPHONE ENCOUNTER
He may proceed with upcoming procedure at moderate risk for perioperative cardiac events.  He may hold warfarin and Plavix for 5 days prior to procedure.  Following the procedure, he can restart warfarin at 6 mg daily for 2 days, and recheck INR on the third day.

## 2023-09-08 NOTE — TELEPHONE ENCOUNTER
GURMEET patient. Went over warfarin directive. Patient verbalized understanding and appreciation.

## 2023-09-15 ENCOUNTER — TELEPHONE (OUTPATIENT)
Dept: FAMILY MEDICINE CLINIC | Age: 78
End: 2023-09-15
Payer: MEDICARE

## 2023-09-15 ENCOUNTER — LAB (OUTPATIENT)
Dept: LAB | Facility: HOSPITAL | Age: 78
End: 2023-09-15
Payer: MEDICARE

## 2023-09-15 ENCOUNTER — ANTICOAGULATION VISIT (OUTPATIENT)
Dept: CARDIOLOGY | Facility: CLINIC | Age: 78
End: 2023-09-15
Payer: MEDICARE

## 2023-09-15 DIAGNOSIS — I48.0 PAROXYSMAL ATRIAL FIBRILLATION: ICD-10-CM

## 2023-09-15 DIAGNOSIS — I48.0 PAROXYSMAL ATRIAL FIBRILLATION: Primary | ICD-10-CM

## 2023-09-15 LAB
INR PPP: 2.69 (ref 0.86–1.15)
PROTHROMBIN TIME: 28.1 SECONDS (ref 11.8–14.9)

## 2023-09-15 PROCEDURE — 85610 PROTHROMBIN TIME: CPT

## 2023-09-15 PROCEDURE — 36415 COLL VENOUS BLD VENIPUNCTURE: CPT

## 2023-09-15 NOTE — PROGRESS NOTES
Lab Results   Component Value Date    INR 2.69 (H) 09/15/2023    INR 2.72 (H) 08/18/2023    INR 2.94 (H) 08/04/2023    PROTIME 28.1 (H) 09/15/2023    PROTIME 28.3 (H) 08/18/2023    PROTIME 30.1 (H) 08/04/2023    Atrial fibrillation   Range 2.0-3.0  4 mg  [facility] lab    Pt is aware of results and instructions.

## 2023-09-15 NOTE — TELEPHONE ENCOUNTER
Shannon with [facility] health reports pt has been having some low blood sugars and he is taking Metformin ER 500mg two bid and Glimepiride 4mg daily.   Fasting blood sugar: 84, 112, 106, 114, 129.   Pt doesn't report and weakness from low sugar.  Please advise if he needs to continue with these meds.

## 2023-09-18 RX ORDER — GLIMEPIRIDE 4 MG/1
2 TABLET ORAL DAILY
Qty: 90 TABLET | Refills: 0
Start: 2023-09-18

## 2023-09-18 NOTE — TELEPHONE ENCOUNTER
Lower glimepridie to 2 mg daily and monitor sugars this week, could consider lowering to 1 mg or stopping that rx

## 2023-09-20 DIAGNOSIS — E11.9 TYPE 2 DIABETES MELLITUS WITHOUT COMPLICATION, WITHOUT LONG-TERM CURRENT USE OF INSULIN: ICD-10-CM

## 2023-09-20 RX ORDER — METFORMIN HYDROCHLORIDE 500 MG/1
TABLET, EXTENDED RELEASE ORAL
Qty: 360 TABLET | Refills: 0 | Status: SHIPPED | OUTPATIENT
Start: 2023-09-20

## 2023-09-20 NOTE — TELEPHONE ENCOUNTER
Rx Refill Note  Requested Prescriptions     Pending Prescriptions Disp Refills    metFORMIN ER (GLUCOPHAGE-XR) 500 MG 24 hr tablet [Pharmacy Med Name: METFORMIN HCL  MG TABLET] 360 tablet 0     Sig: TAKE 4 TABLETS BY MOUTH ONCE DAILY      Last office visit with prescribing clinician: 6/7/2023   Last telemedicine visit with prescribing clinician: Visit date not found   Next office visit with prescribing clinician: 12/11/2023  Hemoglobin A1c (07/03/2023 07:10)     Marissa Oliveira LPN  09/20/23, 12:03 EDT

## 2023-09-28 ENCOUNTER — ANTICOAGULATION VISIT (OUTPATIENT)
Dept: CARDIOLOGY | Facility: CLINIC | Age: 78
End: 2023-09-28
Payer: MEDICARE

## 2023-09-28 ENCOUNTER — LAB (OUTPATIENT)
Dept: LAB | Facility: HOSPITAL | Age: 78
End: 2023-09-28
Payer: MEDICARE

## 2023-09-28 DIAGNOSIS — I48.0 PAROXYSMAL ATRIAL FIBRILLATION: ICD-10-CM

## 2023-09-28 DIAGNOSIS — I48.0 PAROXYSMAL ATRIAL FIBRILLATION: Primary | ICD-10-CM

## 2023-09-28 LAB
INR PPP: 1.06 (ref 0.86–1.15)
PROTHROMBIN TIME: 13.9 SECONDS (ref 11.8–14.9)

## 2023-09-28 PROCEDURE — 85610 PROTHROMBIN TIME: CPT

## 2023-09-28 PROCEDURE — 36415 COLL VENOUS BLD VENIPUNCTURE: CPT

## 2023-09-28 NOTE — PROGRESS NOTES
Level is too low.  Have him take 6 mg today, then increase to 5 mg / 4 mg, and recheck INR on Monday.

## 2023-09-28 NOTE — PROGRESS NOTES
Lab Results   Component Value Date    INR 1.06 09/28/2023    INR 2.69 (H) 09/15/2023    INR 2.72 (H) 08/18/2023    PROTIME 13.9 09/28/2023    PROTIME 28.1 (H) 09/15/2023    PROTIME 28.3 (H) 08/18/2023      Atrial fibrillation   Range 2.0-3.0  4 mg  [facility] lab

## 2023-09-28 NOTE — PROGRESS NOTES
Patient says he had a surgical clearance and said he has been holding and the last 2 days have been 6 mg. Clearance in phone encounter 09-07-23

## 2023-09-28 NOTE — PROGRESS NOTES
Recommend to change the plan, have him take 7 mg today, 6 mg tomorrow, and resume 4 mg daily over the weekend, and recheck INR on Monday.

## 2023-09-29 ENCOUNTER — OUTSIDE FACILITY SERVICE (OUTPATIENT)
Dept: FAMILY MEDICINE CLINIC | Age: 78
End: 2023-09-29
Payer: MEDICARE

## 2023-10-02 ENCOUNTER — ANTICOAGULATION VISIT (OUTPATIENT)
Dept: CARDIOLOGY | Facility: CLINIC | Age: 78
End: 2023-10-02
Payer: MEDICARE

## 2023-10-02 ENCOUNTER — LAB (OUTPATIENT)
Dept: LAB | Facility: HOSPITAL | Age: 78
End: 2023-10-02
Payer: MEDICARE

## 2023-10-02 DIAGNOSIS — I48.0 PAROXYSMAL ATRIAL FIBRILLATION: Primary | ICD-10-CM

## 2023-10-02 DIAGNOSIS — I48.0 PAROXYSMAL ATRIAL FIBRILLATION: ICD-10-CM

## 2023-10-02 LAB
INR PPP: 1.6 (ref 0.86–1.15)
PROTHROMBIN TIME: 19 SECONDS (ref 11.8–14.9)

## 2023-10-02 PROCEDURE — 85610 PROTHROMBIN TIME: CPT

## 2023-10-02 PROCEDURE — 36415 COLL VENOUS BLD VENIPUNCTURE: CPT

## 2023-10-02 NOTE — PROGRESS NOTES
Dx:a fib  Dose:4mg  Range:2.0-3.0  Location:[facility]     Lab Results   Component Value Date    INR 1.60 (H) 10/02/2023    INR 1.06 09/28/2023    INR 2.69 (H) 09/15/2023    PROTIME 19.0 (H) 10/02/2023    PROTIME 13.9 09/28/2023    PROTIME 28.1 (H) 09/15/2023           Informed pt of inr results and plan no question or concerns

## 2023-10-05 ENCOUNTER — LAB (OUTPATIENT)
Dept: LAB | Facility: HOSPITAL | Age: 78
End: 2023-10-05
Payer: MEDICARE

## 2023-10-05 ENCOUNTER — TELEPHONE (OUTPATIENT)
Dept: FAMILY MEDICINE CLINIC | Age: 78
End: 2023-10-05
Payer: MEDICARE

## 2023-10-05 ENCOUNTER — ANTICOAGULATION VISIT (OUTPATIENT)
Dept: CARDIOLOGY | Facility: CLINIC | Age: 78
End: 2023-10-05
Payer: MEDICARE

## 2023-10-05 DIAGNOSIS — M25.561 RIGHT KNEE PAIN, UNSPECIFIED CHRONICITY: Primary | ICD-10-CM

## 2023-10-05 DIAGNOSIS — I48.0 PAROXYSMAL ATRIAL FIBRILLATION: ICD-10-CM

## 2023-10-05 DIAGNOSIS — E78.2 MIXED HYPERLIPIDEMIA: ICD-10-CM

## 2023-10-05 DIAGNOSIS — I48.0 PAROXYSMAL ATRIAL FIBRILLATION: Primary | ICD-10-CM

## 2023-10-05 LAB
INR PPP: 1.69 (ref 0.86–1.15)
PROTHROMBIN TIME: 19.8 SECONDS (ref 11.8–14.9)

## 2023-10-05 PROCEDURE — 36415 COLL VENOUS BLD VENIPUNCTURE: CPT

## 2023-10-05 PROCEDURE — 85610 PROTHROMBIN TIME: CPT

## 2023-10-05 NOTE — TELEPHONE ENCOUNTER
Rhonda with [facility] health called and said pt has been complaining of pain in his right knee.  There is no redness or warmth in the knee.  He was off his blood thinner for 5 days for a procedure he had done.   He said he fell on his right knee back in January and its hurt him on and off since then.   Please advise.

## 2023-10-05 NOTE — TELEPHONE ENCOUNTER
Pt does have allopurinol to take daily but nothing for pain.  Okay to place order for xray.  He will come to complete.

## 2023-10-05 NOTE — PROGRESS NOTES
Lab Results   Component Value Date    INR 1.69 (H) 10/05/2023    INR 1.60 (H) 10/02/2023    INR 1.06 09/28/2023    PROTIME 19.8 (H) 10/05/2023    PROTIME 19.0 (H) 10/02/2023    PROTIME 13.9 09/28/2023    Atrial fibrillation   Range 2.0-3.0  4/4/5  [facility] lab    Pt is aware of results and instructions.

## 2023-10-05 NOTE — TELEPHONE ENCOUNTER
He has a history of gout, is he off rx, taking anything for his knee? Can place an order for an x-ray of his knee.

## 2023-10-09 ENCOUNTER — ANTICOAGULATION VISIT (OUTPATIENT)
Dept: CARDIOLOGY | Facility: CLINIC | Age: 78
End: 2023-10-09
Payer: MEDICARE

## 2023-10-09 ENCOUNTER — LAB (OUTPATIENT)
Dept: LAB | Facility: HOSPITAL | Age: 78
End: 2023-10-09
Payer: MEDICARE

## 2023-10-09 DIAGNOSIS — I48.0 PAROXYSMAL ATRIAL FIBRILLATION: Primary | ICD-10-CM

## 2023-10-09 DIAGNOSIS — I48.0 PAROXYSMAL ATRIAL FIBRILLATION: ICD-10-CM

## 2023-10-09 LAB
INR PPP: 2.61 (ref 0.86–1.15)
PROTHROMBIN TIME: 27.5 SECONDS (ref 11.8–14.9)

## 2023-10-09 PROCEDURE — 85610 PROTHROMBIN TIME: CPT

## 2023-10-09 PROCEDURE — 36415 COLL VENOUS BLD VENIPUNCTURE: CPT

## 2023-10-16 ENCOUNTER — LAB (OUTPATIENT)
Dept: LAB | Facility: HOSPITAL | Age: 78
End: 2023-10-16
Payer: MEDICARE

## 2023-10-16 ENCOUNTER — ANTICOAGULATION VISIT (OUTPATIENT)
Dept: CARDIOLOGY | Facility: CLINIC | Age: 78
End: 2023-10-16
Payer: MEDICARE

## 2023-10-16 DIAGNOSIS — I48.0 PAROXYSMAL ATRIAL FIBRILLATION: Primary | ICD-10-CM

## 2023-10-16 DIAGNOSIS — E78.2 MIXED HYPERLIPIDEMIA: ICD-10-CM

## 2023-10-16 DIAGNOSIS — I48.0 PAROXYSMAL ATRIAL FIBRILLATION: ICD-10-CM

## 2023-10-16 LAB
ALBUMIN SERPL-MCNC: 4.4 G/DL (ref 3.5–5.2)
ALBUMIN/GLOB SERPL: 1.8 G/DL
ALP SERPL-CCNC: 74 U/L (ref 39–117)
ALT SERPL W P-5'-P-CCNC: 17 U/L (ref 1–41)
ANION GAP SERPL CALCULATED.3IONS-SCNC: 10 MMOL/L (ref 5–15)
AST SERPL-CCNC: 14 U/L (ref 1–40)
BILIRUB SERPL-MCNC: 0.4 MG/DL (ref 0–1.2)
BUN SERPL-MCNC: 20 MG/DL (ref 8–23)
BUN/CREAT SERPL: 20.6 (ref 7–25)
CALCIUM SPEC-SCNC: 10.2 MG/DL (ref 8.6–10.5)
CHLORIDE SERPL-SCNC: 105 MMOL/L (ref 98–107)
CHOLEST SERPL-MCNC: 122 MG/DL (ref 0–200)
CO2 SERPL-SCNC: 27 MMOL/L (ref 22–29)
CREAT SERPL-MCNC: 0.97 MG/DL (ref 0.76–1.27)
EGFRCR SERPLBLD CKD-EPI 2021: 80.9 ML/MIN/1.73
GLOBULIN UR ELPH-MCNC: 2.5 GM/DL
GLUCOSE SERPL-MCNC: 144 MG/DL (ref 65–99)
HDLC SERPL-MCNC: 37 MG/DL (ref 40–60)
INR PPP: 2.6 (ref 0.86–1.15)
LDLC SERPL CALC-MCNC: 58 MG/DL (ref 0–100)
LDLC/HDLC SERPL: 1.44 {RATIO}
POTASSIUM SERPL-SCNC: 4.8 MMOL/L (ref 3.5–5.2)
PROT SERPL-MCNC: 6.9 G/DL (ref 6–8.5)
PROTHROMBIN TIME: 27.4 SECONDS (ref 11.8–14.9)
SODIUM SERPL-SCNC: 142 MMOL/L (ref 136–145)
TRIGL SERPL-MCNC: 158 MG/DL (ref 0–150)
VLDLC SERPL-MCNC: 27 MG/DL (ref 5–40)

## 2023-10-16 PROCEDURE — 85610 PROTHROMBIN TIME: CPT

## 2023-10-16 PROCEDURE — 80053 COMPREHEN METABOLIC PANEL: CPT

## 2023-10-16 PROCEDURE — 80061 LIPID PANEL: CPT

## 2023-10-16 PROCEDURE — 36415 COLL VENOUS BLD VENIPUNCTURE: CPT

## 2023-10-16 NOTE — PROGRESS NOTES
Dx:Atrial fibrillation   Dose:5-5-4  Range: 2.0-.3.0  Location:[facility]          Lab Results   Component Value Date    INR 2.60 (H) 10/16/2023    INR 2.61 (H) 10/09/2023    INR 1.69 (H) 10/05/2023    PROTIME 27.4 (H) 10/16/2023    PROTIME 27.5 (H) 10/09/2023    PROTIME 19.8 (H) 10/05/2023

## 2023-10-20 RX ORDER — SIMVASTATIN 40 MG
40 TABLET ORAL DAILY
Qty: 90 TABLET | Refills: 0 | Status: SHIPPED | OUTPATIENT
Start: 2023-10-20

## 2023-10-22 DIAGNOSIS — E53.8 FOLATE DEFICIENCY: ICD-10-CM

## 2023-10-23 ENCOUNTER — HOSPITAL ENCOUNTER (OUTPATIENT)
Dept: MRI IMAGING | Facility: HOSPITAL | Age: 78
Discharge: HOME | End: 2023-10-23
Admitting: UROLOGY
Payer: MEDICARE

## 2023-10-23 DIAGNOSIS — R31.0 GROSS HEMATURIA: ICD-10-CM

## 2023-10-23 DIAGNOSIS — N28.89 RENAL MASS: ICD-10-CM

## 2023-10-23 DIAGNOSIS — N40.1 BENIGN PROSTATIC HYPERPLASIA WITH LOWER URINARY TRACT SYMPTOMS, SYMPTOM DETAILS UNSPECIFIED: ICD-10-CM

## 2023-10-23 PROCEDURE — A9577 INJ MULTIHANCE: HCPCS | Performed by: UROLOGY

## 2023-10-23 PROCEDURE — 74183 MRI ABD W/O CNTR FLWD CNTR: CPT

## 2023-10-23 PROCEDURE — 0 GADOBENATE DIMEGLUMINE 529 MG/ML SOLUTION: Performed by: UROLOGY

## 2023-10-23 RX ORDER — FOLIC ACID 1 MG/1
1000 TABLET ORAL DAILY
Qty: 90 TABLET | Refills: 1 | Status: SHIPPED | OUTPATIENT
Start: 2023-10-23

## 2023-10-23 RX ADMIN — GADOBENATE DIMEGLUMINE 20 ML: 529 INJECTION, SOLUTION INTRAVENOUS at 09:34

## 2023-10-23 NOTE — PROGRESS NOTES
Chief Complaint: Urologic complaint    Subjective         History of Present Illness  Zachariah Cruz is a 78 y.o. male       Enhancing renal mass  GH      Voiding okay, no straining.  No prostate meds    Flomax 0.4 - was not sure if it was helping      No gross hematuria/dysuria    10/23/2023 MRI abdomen with and without - multiple Bosniak 1 and 2 renal cyst.  Similar in size compared to 2021.  Previously noted dominant left upper pole simple cyst completely collapsed/ruptured.  Cholelithiasis.  Small pancreatic cystic lesion.  MRI follow-up in 1 to 2 years to be considered      3/22/2023   Enterococcus -this was preclosed after having Kim for heart catheterization      Patient does have MD in [place] following pancreatic lesion      Previous      1/23  - 3 days of gross hematuria, asymptomatic        4/3/2023 CT uro-- 1.3 cm lesion right kidney, mild enhancement  20 HUs.  No change since 12/21.  Consider MRI.  Additional bilateral renal cyst.  Stable 1 cm cyst neck of pancreas.  Cholelithiasis.      He is referred to get this pancreatic lesion looked at    2/22 cystoscopy-4 cm prostate, previous TUR with some regrowth on the median right lateral lobe.  Minor trabeculations, no pathology.  Delayed phase okay, images reviewed      2010, CABG, coronary stent placed in 3/23 patient does not smoke.   on Coumadin and Plavix      PVR    4/23   064        Previous    2022 episodes of asymptomatic  gross hematuria.        12/21 CT stone protocol - negative  11/21 urine culture-negative  10/21 creatinine 0.9, GFR 81    No urologic family history    Patient with acute kidney stones in the past, lithotripsy x1.      PVR    12/21   52    PSA    12/22    1.3  11/21 1.5  11/20 1.3  11/19 1.0           Objective     Past Medical History:   Diagnosis Date    Coronary artery disease     Diabetes     Hematuria     Hyperlipidemia     Hypertension        Past Surgical History:   Procedure Laterality Date    APPENDECTOMY       CARDIAC CATHETERIZATION      CARDIAC CATHETERIZATION N/A 3/16/2023    Procedure: Left Heart Cath with possible angioplasty;  Surgeon: Vishal Denton MD;  Location: [facility] CATH INVASIVE LOCATION;  Service: Cardiovascular;  Laterality: N/A;    CATARACT EXTRACTION      CORONARY ARTERY BYPASS GRAFT  05/21/2010    LIMA to LAD, SVG to OM, SVG to PDA    PROSTATE SURGERY      TRANSURETHRAL RESECTION OF THE PROSTATE         Current Outpatient Medications:     folic acid (FOLVITE) 1 MG tablet, TAKE ONE TABLET BY MOUTH DAILY, Disp: 90 tablet, Rfl: 1    allopurinol (ZYLOPRIM) 100 MG tablet, Take 1 tablet by mouth Daily., Disp: 90 tablet, Rfl: 1    amLODIPine (NORVASC) 10 MG tablet, Take 1 tablet by mouth Daily., Disp: 90 tablet, Rfl: 3    clopidogrel (PLAVIX) 75 MG tablet, Take 1 tablet by mouth Daily., Disp: 90 tablet, Rfl: 2    ferrous sulfate 325 (65 FE) MG tablet, Take 1 tablet by mouth Daily With Breakfast., Disp: 90 tablet, Rfl: 1    glimepiride (AMARYL) 4 MG tablet, Take 0.5 tablets by mouth Daily., Disp: 90 tablet, Rfl: 0    glucose blood (OneTouch Ultra) test strip, Test Glucose once day, Disp: 100 each, Rfl: 1    lisinopril (PRINIVIL,ZESTRIL) 20 MG tablet, TAKE ONE TABLET BY MOUTH TWICE A DAY, Disp: 180 tablet, Rfl: 0    metFORMIN ER (GLUCOPHAGE-XR) 500 MG 24 hr tablet, TAKE 4 TABLETS BY MOUTH ONCE DAILY, Disp: 360 tablet, Rfl: 0    metoprolol succinate XL (TOPROL-XL) 25 MG 24 hr tablet, Take 2 tablets by mouth every morning and 1 tablet by mouth every evening, Disp: 270 tablet, Rfl: 1    nitroglycerin (NITROSTAT) 0.4 MG SL tablet, 1 under the tongue as needed for angina, may repeat q5mins for up three doses, Disp: 100 tablet, Rfl: 11    simvastatin (ZOCOR) 40 MG tablet, TAKE 1 TABLET BY MOUTH DAILY, Disp: 90 tablet, Rfl: 0    spironolactone (ALDACTONE) 25 MG tablet, Take 1 tablet by mouth Daily., Disp: 90 tablet, Rfl: 1    vitamin B-12 (CYANOCOBALAMIN) 500 MCG tablet, Take 1 tablet by mouth Daily., Disp: 90  tablet, Rfl: 1    vitamin C (ASCORBIC ACID) 250 MG tablet, Take 1 tablet by mouth Daily., Disp: 90 tablet, Rfl: 1    warfarin (COUMADIN) 1 MG tablet, Take 1 tablet by mouth Daily. Or as directed, Disp: 90 tablet, Rfl: 3    warfarin (COUMADIN) 3 MG tablet, Take 1 tablet by mouth Daily. Or as directed, Disp: 90 tablet, Rfl: 3    warfarin (Coumadin) 4 MG tablet, Take 1 tablet by mouth daily or as directed, Disp: 90 tablet, Rfl: 1  No current facility-administered medications for this visit.    No Known Allergies     Family History   Problem Relation Age of Onset    Hypertension Mother     Stroke Mother     Coronary artery disease Father     Stroke Paternal Grandfather     Coronary artery disease Other     Colon cancer Neg Hx        Social History     Socioeconomic History    Marital status:    Tobacco Use    Smoking status: Former     Packs/day: 1.00     Years: 5.00     Additional pack years: 0.00     Total pack years: 5.00     Types: Cigarettes     Quit date:      Years since quittin.8    Smokeless tobacco: Never   Vaping Use    Vaping Use: Never used   Substance and Sexual Activity    Alcohol use: Not Currently     Comment: occ    Drug use: Never    Sexual activity: Defer       Vital Signs:   There were no vitals taken for this visit.              Assessment and Plan    Diagnoses and all orders for this visit:    1. Gross hematuria (Primary)    2. Renal mass          Renal lesion    MRI shows Bosniak 1 and 2 renal lesions.  No risk of malignant degeneration.  Patient given reassurance.          BPH      Voiding without issue        Patient at this time not wanting close follow-up.  We discussed risk and benefits    Follow-up with NP in 3 years for annual.

## 2023-10-24 ENCOUNTER — OFFICE VISIT (OUTPATIENT)
Dept: CARDIOLOGY | Facility: CLINIC | Age: 78
End: 2023-10-24
Payer: MEDICARE

## 2023-10-24 VITALS
WEIGHT: 223 LBS | HEIGHT: 70 IN | SYSTOLIC BLOOD PRESSURE: 135 MMHG | BODY MASS INDEX: 31.92 KG/M2 | DIASTOLIC BLOOD PRESSURE: 60 MMHG | HEART RATE: 63 BPM

## 2023-10-24 DIAGNOSIS — I35.0 MILD AORTIC VALVE STENOSIS: ICD-10-CM

## 2023-10-24 DIAGNOSIS — I10 ESSENTIAL HYPERTENSION: ICD-10-CM

## 2023-10-24 DIAGNOSIS — I25.10 CORONARY ARTERY DISEASE INVOLVING NATIVE CORONARY ARTERY OF NATIVE HEART WITHOUT ANGINA PECTORIS: ICD-10-CM

## 2023-10-24 DIAGNOSIS — E78.2 MIXED HYPERLIPIDEMIA: ICD-10-CM

## 2023-10-24 DIAGNOSIS — I48.0 PAROXYSMAL ATRIAL FIBRILLATION: Primary | ICD-10-CM

## 2023-10-24 PROCEDURE — 3078F DIAST BP <80 MM HG: CPT | Performed by: FAMILY MEDICINE

## 2023-10-24 PROCEDURE — 3075F SYST BP GE 130 - 139MM HG: CPT | Performed by: FAMILY MEDICINE

## 2023-10-24 PROCEDURE — 99214 OFFICE O/P EST MOD 30 MIN: CPT | Performed by: FAMILY MEDICINE

## 2023-10-24 NOTE — PROGRESS NOTES
Chief Complaint  Coronary Artery Disease and Follow-up (He is doing good, no problems)    Subjective        History of Present Illness  Zachariah Cruz presents to [facility] CARDIOLOGY   Mr. Cruz is a 78-year-old male patient coming in today for routine cardiac follow-up.  At last office visit his blood pressures were on the higher side, and dose of metoprolol was increased.  Reports he has had improved blood pressure control since.  In office today is 135/60.  He has no complaints today of recent episodes of chest pain, and denies any significant shortness.overall he is doing well, with no specific concerns today.      Past History:     1) Coronary artery disease status post 3-vessel coronary artery bypass grafting on 05/21/2010 by Dr. Garcia at [facility] (LIMA graft to the LAD, SVG graft to OM, SVG graft to posterior descending branch of RCA); cardiac cath done on 3/16/2023 showed patent LIMA to LAD, 95% stenosis of proximal segment of SVG to OM branch and 50% stenosis of SVG to RCA at anastomosis.  He underwent angioplasty and stent placement to SVG to OM branch.  2) Diabetes mellitus; 3) Hypertension; 4) Hyperlipidemia; 5) Gout; 6) Obstructive sleep apnea, on CPAP; 7) Paroxysmal atrial flutter, detected on 24-hour Holter monitor study done in October 2019.      Past Medical History:   Diagnosis Date    Coronary artery disease     Diabetes     Hematuria     Hyperlipidemia     Hypertension        No Known Allergies     Past Surgical History:   Procedure Laterality Date    APPENDECTOMY      CARDIAC CATHETERIZATION      CARDIAC CATHETERIZATION N/A 3/16/2023    Procedure: Left Heart Cath with possible angioplasty;  Surgeon: Vishal Dneton MD;  Location: [facility] CATH INVASIVE LOCATION;  Service: Cardiovascular;  Laterality: N/A;    CATARACT EXTRACTION      CORONARY ARTERY BYPASS GRAFT  05/21/2010    LIMA to LAD, SVG to OM, SVG to PDA    PROSTATE SURGERY      TRANSURETHRAL RESECTION  OF THE PROSTATE        Social History  He  reports that he quit smoking about 54 years ago. His smoking use included cigarettes. He has a 5.00 pack-year smoking history. He has never used smokeless tobacco. He reports that he does not currently use alcohol. He reports that he does not use drugs.    Family History  His family history includes Coronary artery disease in his father and another family member; Hypertension in his mother; Stroke in his mother and paternal grandfather.       Current Outpatient Medications on File Prior to Visit   Medication Sig    allopurinol (ZYLOPRIM) 100 MG tablet Take 1 tablet by mouth Daily.    amLODIPine (NORVASC) 10 MG tablet Take 1 tablet by mouth Daily.    clopidogrel (PLAVIX) 75 MG tablet Take 1 tablet by mouth Daily.    ferrous sulfate 325 (65 FE) MG tablet Take 1 tablet by mouth Daily With Breakfast.    folic acid (FOLVITE) 1 MG tablet TAKE ONE TABLET BY MOUTH DAILY    glimepiride (AMARYL) 4 MG tablet Take 0.5 tablets by mouth Daily.    glucose blood (OneTouch Ultra) test strip Test Glucose once day    lisinopril (PRINIVIL,ZESTRIL) 20 MG tablet TAKE ONE TABLET BY MOUTH TWICE A DAY    metFORMIN ER (GLUCOPHAGE-XR) 500 MG 24 hr tablet TAKE 4 TABLETS BY MOUTH ONCE DAILY    metoprolol succinate XL (TOPROL-XL) 25 MG 24 hr tablet Take 2 tablets by mouth every morning and 1 tablet by mouth every evening (Patient taking differently: 25mg in AM and 50 mg at night)    nitroglycerin (NITROSTAT) 0.4 MG SL tablet 1 under the tongue as needed for angina, may repeat q5mins for up three doses    simvastatin (ZOCOR) 40 MG tablet TAKE 1 TABLET BY MOUTH DAILY    spironolactone (ALDACTONE) 25 MG tablet Take 1 tablet by mouth Daily.    vitamin B-12 (CYANOCOBALAMIN) 500 MCG tablet Take 1 tablet by mouth Daily.    vitamin C (ASCORBIC ACID) 250 MG tablet Take 1 tablet by mouth Daily.    warfarin (COUMADIN) 1 MG tablet Take 1 tablet by mouth Daily. Or as directed    warfarin (COUMADIN) 3 MG tablet Take  "1 tablet by mouth Daily. Or as directed    warfarin (Coumadin) 4 MG tablet Take 1 tablet by mouth daily or as directed     No current facility-administered medications on file prior to visit.         Review of Systems   Constitutional:  Negative for fatigue.   Respiratory:  Negative for cough, chest tightness and shortness of breath.    Cardiovascular:  Negative for chest pain, palpitations and leg swelling.   Gastrointestinal:  Negative for nausea and vomiting.   Neurological:  Negative for dizziness and syncope.        Objective   Vitals:    10/24/23 1021 10/24/23 1022   BP: 141/64 135/60   Pulse: 62 63   Weight: 101 kg (223 lb)    Height: 177.8 cm (70\")          Physical Exam  General : Alert, awake, no acute distress  Neck : Supple, no carotid bruit, no jugular venous distention  CVS : Regular rate and rhythm, no murmur, rubs or gallops  Lungs: Clear to auscultation bilaterally, no crackles or rhonchi  Abdomen: Soft, nontender, bowel sounds active  Extremities: Warm, well-perfused, no pedal edema      Result Review     The following data was reviewed by JACIEL Cotton  No results found for: \"PROBNP\"  CMP          7/3/2023    07:10 7/20/2023    15:15 10/16/2023    07:13   CMP   Glucose 100  128  144    BUN 16  12  20    Creatinine 1.02  0.85  0.97    EGFR 76.2  90.1  80.9    Sodium 142  142  142    Potassium 4.6  3.7  4.8    Chloride 107  106  105    Calcium 10.3  9.6  10.2    Total Protein 6.9  6.7  6.9    Albumin 4.2  4.0  4.4    Globulin 2.7  2.7  2.5    Total Bilirubin 0.2  <0.2  0.4    Alkaline Phosphatase 72  70  74    AST (SGOT) 12  13  14    ALT (SGPT) 18  15  17    Albumin/Globulin Ratio 1.6  1.5  1.8    BUN/Creatinine Ratio 15.7  14.1  20.6    Anion Gap 10.4  11.7  10.0      CBC w/diff          6/1/2023    07:20 7/18/2023    15:36 8/25/2023    10:55   CBC w/Diff   WBC 10.09  8.03  8.83    RBC 4.32  4.38  4.59    Hemoglobin 11.5  12.3  13.1    Hematocrit 37.5  38.7  40.6    MCV 86.8  88.4  88.5  " "  MCH 26.6  28.1  28.5    MCHC 30.7  31.8  32.3    RDW 18.9  17.2  15.8    Platelets 298  246  254    Neutrophil Rel % 75.2   76.4    Immature Granulocyte Rel % 0.4   0.2    Lymphocyte Rel % 16.5   14.4    Monocyte Rel % 6.4   6.5    Eosinophil Rel % 1.3   2.3    Basophil Rel % 0.2   0.2       Lab Results   Component Value Date    TSH 1.900 04/14/2023      No results found for: \"FREET4\"   No results found for: \"DDIMERQUANT\"  Magnesium   Date Value Ref Range Status   03/25/2023 1.7 1.6 - 2.4 mg/dL Final      No results found for: \"DIGOXIN\"   Lab Results   Component Value Date    TROPONINT 61 (C) 03/22/2023           Lipid Panel          12/21/2022    07:10 3/17/2023    04:23 10/16/2023    07:13   Lipid Panel   Total Cholesterol 131  121  122    Triglycerides 261  127  158    HDL Cholesterol 38  34  37    VLDL Cholesterol 41  23  27    LDL Cholesterol  52  64  58    LDL/HDL Ratio 1.07  1.81  1.44        Results for orders placed during the hospital encounter of 03/16/23    Adult Transthoracic Echo Complete w/ Color, Spectral and Contrast if necessary per protocol    Interpretation Summary    Overall LV ejection fraction is 50 to 55% with mild inferolateral wall hypokinesis.    Left ventricular wall thickness is consistent with mild concentric hypertrophy.    Left ventricular diastolic function is consistent with (grade I) impaired relaxation.    Aortic valve is mildly calcified.  Mild aortic valve stenosis is present.    Results for orders placed during the hospital encounter of 02/22/23    Stress Test With Myocardial Perfusion One Day    Interpretation Summary    There is a moderate sized infarct in the basal-mid inferolateral walls with a small-moderate amount of duglas-infarct ischemia..    Left ventricular ejection fraction is moderately reduced (Calculated EF = 38%).    Abnormal LV wall motion consistent with severe hypokinesis of the lateral wall.    Impressions are consistent with an intermediate risk study.    " Findings consistent with an indeterminate ECG stress test.       Assessment and Plan   Diagnoses and all orders for this visit:    1. Paroxysmal atrial fibrillation (Primary)  Assessment & Plan:  He remains in sinus rhythm and denies any palpitations.  Continue metoprolol for rate and rhythm control.  Continue warfarin for anticoagulation, recent INR is therapeutic, he will have it rechecked in 1 more week.    Orders:  -     CBC (No Diff); Future    2. Coronary artery disease involving native coronary artery of native heart without angina pectoris  Assessment & Plan:  He is stable without any anginal symptoms.  Continue aspirin, beta-blocker and statin therapy.  He is not on dual antiplatelet with aspirin to avoid triple therapy as he is concurrently on Eliquis for atrial fibrillation.      3. Essential hypertension  Assessment & Plan:  Blood pressure control improved, continue current dose of lisinopril, amlodipine, and metoprolol.  Recommend to continue monitoring home BP, notify us if he has any concerns.    Orders:  -     Comprehensive Metabolic Panel; Future    4. Mixed hyperlipidemia  Assessment & Plan:  Recent LDL is below goal at 58.  Continue current dose simvastatin 40 mg nightly.  Repeat lipid profile before next routine follow-up.    Orders:  -     Lipid Panel; Future  -     Comprehensive Metabolic Panel; Future    5. Mild aortic valve stenosis  Assessment & Plan:  There is mild aortic valve stenosis and calcification noted on echocardiogram in March of this year.  He does not have any symptoms of worsening lesion, we will plan to repeat in 2 years or sooner if worsening symptoms.                  Follow Up   Return in about 6 months (around 4/24/2024) for with Dr. Denton.    Patient was given instructions and counseling regarding his condition or for health maintenance advice. Please see specific information pulled into the AVS if appropriate.     Signed,  Maia Carbajal, APRN  10/24/2023     Dictated  Utilizing Dragon Dictation: Please note that portions of this note were completed with a voice recognition program.  Part of this note may be an electronic transcription/translation of spoken language to printed text using the Dragon Dictation System.

## 2023-10-24 NOTE — ASSESSMENT & PLAN NOTE
Recent LDL is below goal at 58.  Continue current dose simvastatin 40 mg nightly.  Repeat lipid profile before next routine follow-up.

## 2023-10-24 NOTE — ASSESSMENT & PLAN NOTE
He remains in sinus rhythm and denies any palpitations.  Continue metoprolol for rate and rhythm control.  Continue warfarin for anticoagulation, recent INR is therapeutic, he will have it rechecked in 1 more week.

## 2023-10-24 NOTE — ASSESSMENT & PLAN NOTE
There is mild aortic valve stenosis and calcification noted on echocardiogram in March of this year.  He does not have any symptoms of worsening lesion, we will plan to repeat in 2 years or sooner if worsening symptoms.

## 2023-10-24 NOTE — ASSESSMENT & PLAN NOTE
Blood pressure control improved, continue current dose of lisinopril, amlodipine, and metoprolol.  Recommend to continue monitoring home BP, notify us if he has any concerns.

## 2023-10-24 NOTE — ASSESSMENT & PLAN NOTE
He is stable without any anginal symptoms.  Continue aspirin, beta-blocker and statin therapy.  He is not on dual antiplatelet with aspirin to avoid triple therapy as he is concurrently on Eliquis for atrial fibrillation.

## 2023-10-27 ENCOUNTER — OFFICE VISIT (OUTPATIENT)
Dept: UROLOGY | Facility: CLINIC | Age: 78
End: 2023-10-27
Payer: MEDICARE

## 2023-10-27 VITALS — RESPIRATION RATE: 16 BRPM | WEIGHT: 221.4 LBS | BODY MASS INDEX: 31.7 KG/M2 | HEIGHT: 70 IN

## 2023-10-27 DIAGNOSIS — D64.9 ANEMIA, UNSPECIFIED TYPE: ICD-10-CM

## 2023-10-27 DIAGNOSIS — N28.89 RENAL MASS: ICD-10-CM

## 2023-10-27 DIAGNOSIS — R31.0 GROSS HEMATURIA: Primary | ICD-10-CM

## 2023-10-27 RX ORDER — FERROUS SULFATE 325(65) MG
1 TABLET ORAL
Qty: 90 TABLET | Refills: 1 | Status: SHIPPED | OUTPATIENT
Start: 2023-10-27

## 2023-10-30 ENCOUNTER — HOSPITAL ENCOUNTER (OUTPATIENT)
Dept: GENERAL RADIOLOGY | Facility: HOSPITAL | Age: 78
Discharge: HOME | End: 2023-10-30
Payer: MEDICARE

## 2023-10-30 ENCOUNTER — LAB (OUTPATIENT)
Dept: LAB | Facility: HOSPITAL | Age: 78
End: 2023-10-30
Payer: MEDICARE

## 2023-10-30 DIAGNOSIS — I48.0 PAROXYSMAL ATRIAL FIBRILLATION: ICD-10-CM

## 2023-10-30 DIAGNOSIS — M25.561 RIGHT KNEE PAIN, UNSPECIFIED CHRONICITY: ICD-10-CM

## 2023-10-30 LAB
INR PPP: 2.72 (ref 0.86–1.15)
PROTHROMBIN TIME: 28.3 SECONDS (ref 11.8–14.9)

## 2023-10-30 PROCEDURE — 36415 COLL VENOUS BLD VENIPUNCTURE: CPT

## 2023-10-30 PROCEDURE — 73562 X-RAY EXAM OF KNEE 3: CPT

## 2023-10-30 PROCEDURE — 85610 PROTHROMBIN TIME: CPT

## 2023-11-01 ENCOUNTER — ANTICOAGULATION VISIT (OUTPATIENT)
Dept: CARDIOLOGY | Facility: CLINIC | Age: 78
End: 2023-11-01
Payer: MEDICARE

## 2023-11-01 DIAGNOSIS — I48.0 PAROXYSMAL ATRIAL FIBRILLATION: Primary | ICD-10-CM

## 2023-11-01 NOTE — PROGRESS NOTES
Lab Results   Component Value Date    INR 2.72 (H) 10/30/2023    INR 2.60 (H) 10/16/2023    INR 2.61 (H) 10/09/2023    PROTIME 28.3 (H) 10/30/2023    PROTIME 27.4 (H) 10/16/2023    PROTIME 27.5 (H) 10/09/2023     Dx:Atrial fibrillation   Dose:5-5-4  Range: 2.0-.3.0  Location:[facility]

## 2023-11-03 ENCOUNTER — OFFICE VISIT (OUTPATIENT)
Dept: GASTROENTEROLOGY | Facility: CLINIC | Age: 78
End: 2023-11-03
Payer: MEDICARE

## 2023-11-03 VITALS
HEIGHT: 70 IN | SYSTOLIC BLOOD PRESSURE: 137 MMHG | BODY MASS INDEX: 32.13 KG/M2 | DIASTOLIC BLOOD PRESSURE: 66 MMHG | WEIGHT: 224.4 LBS | HEART RATE: 61 BPM

## 2023-11-03 DIAGNOSIS — K86.2 PANCREATIC CYST: Primary | ICD-10-CM

## 2023-11-03 RX ORDER — WARFARIN SODIUM 5 MG/1
5 TABLET ORAL
COMMUNITY

## 2023-11-03 NOTE — PROGRESS NOTES
Chief Complaint  EUS (Follow up )    Zachariah Cruz is a 78 y.o. male who presents to [facility]STRO[facility]OLOG[facility] for EUS follow up    History of present Illness  Established care 4/20/23 for anemia, dysphagia, and pancreatic cyst. History of C.diff 3/30/23 treated with Vancomycin. No return of diarrhea. Referred to EUS.    CT abdomen/pelvis w/ and w/o contrast 04/03/2023 - 1.3 cm lesion on right kidney, kidney cyst, stable 1cm pancreatic cyst  Cologuard 11/30/2020 - negative    EUS 9/25/23 by Dr. Khan - normal CBD and PD. 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 Recommend follow-up imaging in 1 year.     Patient presents to the office for EUS follow up.  Patient denies nausea, vomiting, persistent heartburn, epigastric pain, and dysphagia. Denies change in bowel habits, diarrhea, constipation, hematochezia, melena, and abdominal pain. Denies unintentional weight loss.    Past Medical History:   Diagnosis Date    Coronary artery disease     Diabetes     Hematuria     Hyperlipidemia     Hypertension        Past Surgical History:   Procedure Laterality Date    APPENDECTOMY      CARDIAC CATHETERIZATION      CARDIAC CATHETERIZATION N/A 03/16/2023    Procedure: Left Heart Cath with possible angioplasty;  Surgeon: Vishal Denton MD;  Location: [facility] CATH INVASIVE LOCATION;  Service: Cardiovascular;  Laterality: N/A;    CATARACT EXTRACTION      CORONARY ARTERY BYPASS GRAFT  05/21/2010    LIMA to LAD, SVG to OM, SVG to PDA    PROSTATE SURGERY      TRANSURETHRAL RESECTION OF THE PROSTATE    UPPER GASTROINTESTINAL ENDOSCOPY           Current Outpatient Medications:     allopurinol (ZYLOPRIM) 100 MG tablet, Take 1 tablet by mouth Daily., Disp: 90 tablet, Rfl: 1    amLODIPine (NORVASC) 10 MG tablet, Take 1 tablet by mouth Daily., Disp: 90 tablet, Rfl: 3    clopidogrel (PLAVIX) 75 MG tablet, Take 1 tablet by mouth Daily., Disp: 90 tablet, Rfl: 2    FeroSul 325 (65  Fe) MG tablet, TAKE ONE TABLET BY MOUTH DAILY WITH BREAKFAST, Disp: 90 tablet, Rfl: 1    folic acid (FOLVITE) 1 MG tablet, TAKE ONE TABLET BY MOUTH DAILY, Disp: 90 tablet, Rfl: 1    glimepiride (AMARYL) 4 MG tablet, Take 0.5 tablets by mouth Daily., Disp: 90 tablet, Rfl: 0    glucose blood (OneTouch Ultra) test strip, Test Glucose once day, Disp: 100 each, Rfl: 1    lisinopril (PRINIVIL,ZESTRIL) 20 MG tablet, TAKE ONE TABLET BY MOUTH TWICE A DAY, Disp: 180 tablet, Rfl: 0    metFORMIN ER (GLUCOPHAGE-XR) 500 MG 24 hr tablet, TAKE 4 TABLETS BY MOUTH ONCE DAILY, Disp: 360 tablet, Rfl: 0    metoprolol succinate XL (TOPROL-XL) 25 MG 24 hr tablet, Take 2 tablets by mouth every morning and 1 tablet by mouth every evening (Patient taking differently: 25mg in AM and 50 mg at night), Disp: 270 tablet, Rfl: 1    simvastatin (ZOCOR) 40 MG tablet, TAKE 1 TABLET BY MOUTH DAILY, Disp: 90 tablet, Rfl: 0    spironolactone (ALDACTONE) 25 MG tablet, Take 1 tablet by mouth Daily., Disp: 90 tablet, Rfl: 1    vitamin B-12 (CYANOCOBALAMIN) 500 MCG tablet, Take 1 tablet by mouth Daily., Disp: 90 tablet, Rfl: 1    vitamin C (ASCORBIC ACID) 250 MG tablet, Take 1 tablet by mouth Daily., Disp: 90 tablet, Rfl: 1    warfarin (Coumadin) 4 MG tablet, Take 1 tablet by mouth daily or as directed, Disp: 90 tablet, Rfl: 1    warfarin (COUMADIN) 5 MG tablet, Take 1 tablet by mouth Daily., Disp: , Rfl:     nitroglycerin (NITROSTAT) 0.4 MG SL tablet, 1 under the tongue as needed for angina, may repeat q5mins for up three doses (Patient not taking: Reported on 11/3/2023), Disp: 100 tablet, Rfl: 11    warfarin (COUMADIN) 1 MG tablet, Take 1 tablet by mouth Daily. Or as directed (Patient not taking: Reported on 11/3/2023), Disp: 90 tablet, Rfl: 3    warfarin (COUMADIN) 3 MG tablet, Take 1 tablet by mouth Daily. Or as directed (Patient not taking: Reported on 11/3/2023), Disp: 90 tablet, Rfl: 3     No Known Allergies    Family History   Problem Relation Age  "of Onset    Hypertension Mother     Stroke Mother     Coronary artery disease Father     Stroke Paternal Grandfather     Coronary artery disease Other     Colon cancer Neg Hx         Social History     Social History Narrative    Not on file       Objective       Vital Signs:   /66 (BP Location: Left arm, Patient Position: Sitting, Cuff Size: Adult)   Pulse 61   Ht 177.8 cm (70\")   Wt 102 kg (224 lb 6.4 oz)   BMI 32.20 kg/m²       Physical Exam  Constitutional:       Appearance: Normal appearance.   HENT:      Head: Normocephalic.   Skin:     General: Skin is warm and dry.   Neurological:      Mental Status: He is alert and oriented to person, place, and time. Mental status is at baseline.   Psychiatric:         Mood and Affect: Mood normal.         Behavior: Behavior normal.         Thought Content: Thought content normal.         Judgment: Judgment normal.       Result Review :       CBC w/diff          6/1/2023    07:20 7/18/2023    15:36 8/25/2023    10:55   CBC w/Diff   WBC 10.09  8.03  8.83    RBC 4.32  4.38  4.59    Hemoglobin 11.5  12.3  13.1    Hematocrit 37.5  38.7  40.6    MCV 86.8  88.4  88.5    MCH 26.6  28.1  28.5    MCHC 30.7  31.8  32.3    RDW 18.9  17.2  15.8    Platelets 298  246  254    Neutrophil Rel % 75.2   76.4    Immature Granulocyte Rel % 0.4   0.2    Lymphocyte Rel % 16.5   14.4    Monocyte Rel % 6.4   6.5    Eosinophil Rel % 1.3   2.3    Basophil Rel % 0.2   0.2      CMP          7/3/2023    07:10 7/20/2023    15:15 10/16/2023    07:13   CMP   Glucose 100  128  144    BUN 16  12  20    Creatinine 1.02  0.85  0.97    EGFR 76.2  90.1  80.9    Sodium 142  142  142    Potassium 4.6  3.7  4.8    Chloride 107  106  105    Calcium 10.3  9.6  10.2    Total Protein 6.9  6.7  6.9    Albumin 4.2  4.0  4.4    Globulin 2.7  2.7  2.5    Total Bilirubin 0.2  <0.2  0.4    Alkaline Phosphatase 72  70  74    AST (SGOT) 12  13  14    ALT (SGPT) 18  15  17    Albumin/Globulin Ratio 1.6  1.5  1.8  "   BUN/Creatinine Ratio 15.7  14.1  20.6    Anion Gap 10.4  11.7  10.0              Assessment and Plan    Diagnoses and all orders for this visit:    1. Pancreatic cyst (Primary)  -     MRI abdomen w wo contrast mrcp; Future    Reviewed most recent EUS report with patient and wife, expressed understanding  Plan for repeat imaging in 1 year to monitor stability of cyst.   Notified patient that he is due for colon cancer screening. Discussed colonoscopy however patient wishes to proceed with Cologuard. Recommend discussing with PCP at next follow up in December    Follow Up   Return if symptoms worsen or fail to improve.  Patient was given instructions and counseling regarding his condition or for health maintenance advice. Please see specific information pulled into the AVS if appropriate.

## 2023-11-05 DIAGNOSIS — I10 ESSENTIAL HYPERTENSION: ICD-10-CM

## 2023-11-06 RX ORDER — AMLODIPINE BESYLATE 10 MG/1
10 TABLET ORAL DAILY
Qty: 90 TABLET | Refills: 3 | Status: SHIPPED | OUTPATIENT
Start: 2023-11-06

## 2023-11-06 RX ORDER — LISINOPRIL 20 MG/1
20 TABLET ORAL 2 TIMES DAILY
Qty: 180 TABLET | Refills: 0 | Status: SHIPPED | OUTPATIENT
Start: 2023-11-06

## 2023-11-10 DIAGNOSIS — E11.9 TYPE 2 DIABETES MELLITUS WITHOUT COMPLICATION, WITHOUT LONG-TERM CURRENT USE OF INSULIN: Primary | ICD-10-CM

## 2023-11-17 RX ORDER — WARFARIN SODIUM 3 MG/1
3 TABLET ORAL DAILY
Qty: 90 TABLET | Refills: 3 | Status: SHIPPED | OUTPATIENT
Start: 2023-11-17

## 2023-11-27 ENCOUNTER — LAB (OUTPATIENT)
Dept: LAB | Facility: HOSPITAL | Age: 78
End: 2023-11-27
Payer: MEDICARE

## 2023-11-27 ENCOUNTER — ANTICOAGULATION VISIT (OUTPATIENT)
Dept: CARDIOLOGY | Facility: CLINIC | Age: 78
End: 2023-11-27
Payer: MEDICARE

## 2023-11-27 DIAGNOSIS — E11.9 TYPE 2 DIABETES MELLITUS WITHOUT COMPLICATION, WITHOUT LONG-TERM CURRENT USE OF INSULIN: ICD-10-CM

## 2023-11-27 DIAGNOSIS — I48.0 PAROXYSMAL ATRIAL FIBRILLATION: Primary | ICD-10-CM

## 2023-11-27 DIAGNOSIS — I48.0 PAROXYSMAL ATRIAL FIBRILLATION: ICD-10-CM

## 2023-11-27 LAB
INR PPP: 2.68 (ref 0.86–1.15)
PROTHROMBIN TIME: 28.9 SECONDS (ref 11.8–14.9)

## 2023-11-27 PROCEDURE — 85610 PROTHROMBIN TIME: CPT

## 2023-11-27 PROCEDURE — 36415 COLL VENOUS BLD VENIPUNCTURE: CPT

## 2023-11-27 NOTE — PROGRESS NOTES
Dx:Atrial fibrillation   Dose:5-5-4  Range:2.0-3.0  Location:[facility]        Lab Results   Component Value Date    INR 2.68 (H) 11/27/2023    INR 2.72 (H) 10/30/2023    INR 2.60 (H) 10/16/2023    PROTIME 28.9 (H) 11/27/2023    PROTIME 28.3 (H) 10/30/2023    PROTIME 27.4 (H) 10/16/2023     Informed pt of inr results and plan no question or concerns

## 2023-11-28 ENCOUNTER — LAB (OUTPATIENT)
Dept: LAB | Facility: HOSPITAL | Age: 78
End: 2023-11-28
Payer: MEDICARE

## 2023-11-28 DIAGNOSIS — I48.0 PAROXYSMAL ATRIAL FIBRILLATION: ICD-10-CM

## 2023-11-28 DIAGNOSIS — E11.9 TYPE 2 DIABETES MELLITUS WITHOUT COMPLICATION, WITHOUT LONG-TERM CURRENT USE OF INSULIN: ICD-10-CM

## 2023-11-28 LAB
ALBUMIN UR-MCNC: 5.9 MG/DL
CREAT UR-MCNC: 186.7 MG/DL
MICROALBUMIN/CREAT UR: 31.6 MG/G (ref 0–29)

## 2023-11-28 PROCEDURE — 82570 ASSAY OF URINE CREATININE: CPT

## 2023-11-28 PROCEDURE — 82043 UR ALBUMIN QUANTITATIVE: CPT

## 2023-12-04 RX ORDER — GLIMEPIRIDE 4 MG/1
4 TABLET ORAL DAILY
Qty: 90 TABLET | Refills: 0 | Status: SHIPPED | OUTPATIENT
Start: 2023-12-04

## 2023-12-11 ENCOUNTER — OFFICE VISIT (OUTPATIENT)
Dept: FAMILY MEDICINE CLINIC | Age: 78
End: 2023-12-11
Payer: MEDICARE

## 2023-12-11 VITALS
DIASTOLIC BLOOD PRESSURE: 63 MMHG | HEIGHT: 70 IN | HEART RATE: 62 BPM | OXYGEN SATURATION: 98 % | WEIGHT: 227 LBS | BODY MASS INDEX: 32.5 KG/M2 | SYSTOLIC BLOOD PRESSURE: 151 MMHG

## 2023-12-11 DIAGNOSIS — M1A.0790 IDIOPATHIC CHRONIC GOUT OF FOOT WITHOUT TOPHUS, UNSPECIFIED LATERALITY: ICD-10-CM

## 2023-12-11 DIAGNOSIS — E11.9 TYPE 2 DIABETES MELLITUS WITHOUT COMPLICATION, WITHOUT LONG-TERM CURRENT USE OF INSULIN: ICD-10-CM

## 2023-12-11 DIAGNOSIS — Z12.11 SCREEN FOR COLON CANCER: ICD-10-CM

## 2023-12-11 DIAGNOSIS — Z00.00 ROUTINE GENERAL MEDICAL EXAMINATION AT A HEALTH CARE FACILITY: Primary | ICD-10-CM

## 2023-12-11 DIAGNOSIS — Z12.5 SCREENING FOR MALIGNANT NEOPLASM OF PROSTATE: ICD-10-CM

## 2023-12-11 PROBLEM — M54.42 ACUTE BILATERAL LOW BACK PAIN WITH LEFT-SIDED SCIATICA: Status: RESOLVED | Noted: 2021-08-02 | Resolved: 2023-12-11

## 2023-12-11 RX ORDER — ALLOPURINOL 100 MG/1
100 TABLET ORAL DAILY
Qty: 90 TABLET | Refills: 1 | Status: SHIPPED | OUTPATIENT
Start: 2023-12-11

## 2023-12-11 NOTE — ASSESSMENT & PLAN NOTE
Advise regular exercise, healthy eating, always wear seat belts. Living will discussed, to bring in a copy, fall prevention discussed.  Immunizations discussed. Declines today, to check with pharmacy on coverage of some of these vaccines if he decides to get   To continue yearly optometry and dental exams.

## 2023-12-11 NOTE — PROGRESS NOTES
The ABCs of the Annual Wellness Visit  Subsequent Medicare Wellness Visit    Subjective    Zachariah Cruz is a 78 y.o. male who presents for a Subsequent Medicare Wellness Visit.    Medicare wellness HPI  Exercises regularly: daily   Eats healthy: tries   Last PSA: 12-21-22 normal   Wears seatbelts:no  Living will:he has one, to bring one   Optometrist:2-2023, kristian   Dentist:dr watt   Tobacco:none   Alcohol intake: rarely   Drugs:none   Falls:2-3 falls this injury  Colonoscopy: cologuard 11- negative   Immunizations:had 3 covid vaccines     The following portions of the patient's history were reviewed and   updated as appropriate: allergies, current medications, past family history, past medical history, past social history, past surgical history, and problem list.    Compared to one year ago, the patient feels his physical   health is better.    Compared to one year ago, the patient feels his mental   health is the same.    Recent Hospitalizations:  This patient has had [facility] [place] [facility] on file within the last 365 days.    Current Medical Providers:  Patient Care Team:  Nelia Frost APRN as PCP - General (Family Medicine)  Chelsie Atkinson APRN as Nurse Practitioner (Gastroenterology)    Outpatient Medications Prior to Visit   Medication Sig Dispense Refill    amLODIPine (NORVASC) 10 MG tablet TAKE ONE TABLET BY MOUTH DAILY 90 tablet 3    clopidogrel (PLAVIX) 75 MG tablet Take 1 tablet by mouth Daily. 90 tablet 2    FeroSul 325 (65 Fe) MG tablet TAKE ONE TABLET BY MOUTH DAILY WITH BREAKFAST 90 tablet 1    folic acid (FOLVITE) 1 MG tablet TAKE ONE TABLET BY MOUTH DAILY 90 tablet 1    glimepiride (AMARYL) 4 MG tablet TAKE 1 TABLET BY MOUTH DAILY (Patient taking differently: Take 0.5 tablets by mouth Daily.) 90 tablet 0    glucose blood (OneTouch Ultra) test strip Test Glucose once day 100 each 1    lisinopril (PRINIVIL,ZESTRIL) 20 MG tablet TAKE 1 TABLET BY MOUTH TWICE  A  tablet 0    metFORMIN ER (GLUCOPHAGE-XR) 500 MG 24 hr tablet TAKE 4 TABLETS BY MOUTH ONCE DAILY 360 tablet 0    metoprolol succinate XL (TOPROL-XL) 25 MG 24 hr tablet Take 2 tablets by mouth every morning and 1 tablet by mouth every evening (Patient taking differently: 50mg in AM and  25mg at night) 270 tablet 1    nitroglycerin (NITROSTAT) 0.4 MG SL tablet 1 under the tongue as needed for angina, may repeat q5mins for up three doses 100 tablet 11    simvastatin (ZOCOR) 40 MG tablet TAKE 1 TABLET BY MOUTH DAILY 90 tablet 0    spironolactone (ALDACTONE) 25 MG tablet Take 1 tablet by mouth Daily. 90 tablet 1    vitamin B-12 (CYANOCOBALAMIN) 500 MCG tablet Take 1 tablet by mouth Daily. 90 tablet 1    vitamin C (ASCORBIC ACID) 250 MG tablet Take 1 tablet by mouth Daily. 90 tablet 1    warfarin (COUMADIN) 1 MG tablet Take 1 tablet by mouth Daily. Or as directed 90 tablet 3    warfarin (COUMADIN) 3 MG tablet TAKE ONE TABLET BY MOUTH DAILY OR AS DIRECTED 90 tablet 3    warfarin (Coumadin) 4 MG tablet Take 1 tablet by mouth daily or as directed 90 tablet 1    warfarin (COUMADIN) 5 MG tablet Take 1 tablet by mouth Daily.      allopurinol (ZYLOPRIM) 100 MG tablet Take 1 tablet by mouth Daily. 90 tablet 1     No facility-administered medications prior to visit.       No opioid medication identified on active medication list. I have reviewed chart for other potential  high risk medication/s and harmful drug interactions in the elderly.        Aspirin is not on active medication list.  Aspirin use is not indicated based on review of current medical condition/s. Risk of harm outweighs potential benefits.  .    Patient Active Problem List   Diagnosis    Atrial fibrillation    Diabetes mellitus    Essential hypertension    Mixed hyperlipidemia    Left leg swelling    Left leg pain    Uncontrolled diabetes mellitus with complications    Screening for prostate cancer    Chronic right-sided low back pain without sciatica     "Personal history of COVID-19    Idiopathic chronic gout of foot without tophus    Other specific arthropathies, not elsewhere classified, multiple sites    Gross hematuria    Coronary artery disease involving native coronary artery of native heart without angina pectoris    Type 2 diabetes mellitus without complication, without long-term current use of insulin    Tinnitus    Routine general medical examination at a health care facility    Screening for malignant neoplasm of prostate    Absolute anemia    Acute cough    Weakness    Urinary tract infection without hematuria    Diarrhea    Arthralgia    Urinary retention    Renal mass    Benign prostatic hyperplasia with lower urinary tract symptoms    Pancreatic cyst    Dysphagia    Disease due to severe acute respiratory syndrome coronavirus 2 (SARS-CoV-2)    Clostridioides difficile infection    Epistaxis    Fall    Gout    History of vaccination    Kidney lesion    Neuropathy    RAMY on CPAP    Pain    Mild aortic valve stenosis    Screen for colon cancer     Advance Care Planning   Advance Care Planning     Advance Directive is not on file.  ACP discussion was held with the patient during this visit. Patient has an advance directive (not in EMR), copy requested.     Objective    Vitals:    12/11/23 1035   BP: 151/63   BP Location: Right arm   Patient Position: Sitting   Cuff Size: Large Adult   Pulse: 62   SpO2: 98%   Weight: 103 kg (227 lb)   Height: 177.8 cm (70\")     Estimated body mass index is 32.57 kg/m² as calculated from the following:    Height as of this encounter: 177.8 cm (70\").    Weight as of this encounter: 103 kg (227 lb).    BMI is >= 30 and <35. (Class 1 Obesity). The following options were offered after discussion;: exercise counseling/recommendations and nutrition counseling/recommendations      Does the patient have evidence of cognitive impairment? No    Lab Results   Component Value Date    TRIG 158 (H) 10/16/2023    HDL 37 (L) 10/16/2023    " LDL 58 10/16/2023    VLDL 27 10/16/2023        HEALTH RISK ASSESSMENT    Smoking Status:  Social History     Tobacco Use   Smoking Status Former    Packs/day: 1.00    Years: 5.00    Additional pack years: 0.00    Total pack years: 5.00    Types: Cigarettes    Quit date:     Years since quittin.9   Smokeless Tobacco Never     Alcohol Consumption:  Social History     Substance and Sexual Activity   Alcohol Use Not Currently    Comment: occ     Fall Risk Screen:    [facility] Fall Risk Assessment was completed, and patient is at HIGH risk for falls. Assessment completed on:2023    Depression Screenin/11/2023    10:40 AM   PHQ-2/PHQ-9 Depression Screening   Little Interest or Pleasure in Doing Things 0-->not at all   Feeling Down, Depressed or Hopeless 0-->not at all   PHQ-9: Brief Depression Severity Measure Score 0       Health Habits and Functional and Cognitive Screenin/11/2023    10:40 AM   Functional & Cognitive Status   Do you have difficulty preparing food and eating? No   Do you have difficulty bathing yourself, getting dressed or grooming yourself? No   Do you have difficulty using the toilet? No   Do you have difficulty moving around from place to place? Yes   Do you have trouble with steps or getting out of a bed or a chair? Yes   Current Diet Well Balanced Diet   Dental Exam Up to date   Eye Exam Up to date   Exercise (times per week) 5 times per week   Current Exercises Include Other;Walking   Do you need help using the phone?  No   Are you deaf or do you have serious difficulty hearing?  No   Do you need help to go to places out of walking distance? No   Do you need help shopping? No   Do you need help preparing meals?  No   Do you need help with housework?  No   Do you need help with laundry? No   Do you need help taking your medications? No   Do you need help managing money? No   Do you ever drive or ride in a car without wearing a seat belt? Yes   Have you felt unusual  stress, anger or loneliness in the last month? No   Who do you live with? Spouse   If you need help, do you have trouble finding someone available to you? No   Have you been bothered in the last four weeks by sexual problems? No   Do you have difficulty concentrating, remembering or making decisions? No       Age-appropriate Screening Schedule:  Refer to the list below for future screening recommendations based on patient's age, sex and/or medical conditions. Orders for these recommended tests are listed in the plan section. The patient has been provided with a written plan.    Health Maintenance   Topic Date Due    Pneumococcal Vaccine 65+ (1 - PCV) Never done    TDAP/TD VACCINES (1 - Tdap) Never done    ZOSTER VACCINE (1 of 2) Never done    INFLUENZA VACCINE  Never done    COVID-19 Vaccine (4 - 2023-24 season) 09/01/2023    BMI FOLLOWUP  12/07/2023    HEMOGLOBIN A1C  01/03/2024    DIABETIC EYE EXAM  02/01/2024    LIPID PANEL  10/16/2024    URINE MICROALBUMIN  11/28/2024    ANNUAL WELLNESS VISIT  12/11/2024    HEPATITIS C SCREENING  Completed    COLORECTAL CANCER SCREENING  Discontinued                  CMS Preventative Services Quick Reference  Risk Factors Identified During Encounter  Immunizations Discussed/Encouraged: Tdap, Influenza, Prevnar 20 (Pneumococcal 20-valent conjugate), Shingrix, COVID19, and RSV (Respiratory Syncytial Virus)  The above risks/problems have been discussed with the patient.  Pertinent information has been shared with the patient in the After Visit Summary.  An After Visit Summary and PPPS were made available to the patient.    Follow Up:   Next Medicare Wellness visit to be scheduled in 1 year.       Additional E&M Note during same encounter follows:  Patient has multiple medical problems which are significant and separately identifiable that require additional work above and beyond the Medicare Wellness Visit.      Chief Complaint  Medicare Wellness-subsequent    Subjective         Diabetes:  Current medication: metformin and amaryl   Tolerating medication: Yes on lower amaryl dose   Last eye exam: 2-2023    At home FBS ranges:  110-130  Lab Results       Component                Value               Date                       HGBA1C                   6.10 (H)            07/03/2023                Hyperlipidemia  Current medication: zocor  Tolerating medication: Yes  Needs Refill: no    Lab Results       Component                Value               Date                       CHOL                     122                 10/16/2023                 CHLPL                    120                 04/23/2021                 TRIG                     158 (H)             10/16/2023                 HDL                      37 (L)              10/16/2023                 LDL                      58                  10/16/2023                Hypertension:  Current medication: norvasc, toprol XL, spirolactone, lisinopril   Tolerating Medication: Yes  Needs refills: No but uses [facility]  Labs:  Lab Results       Component                Value               Date                       GLUCOSE                  144 (H)             10/16/2023                 BUN                      20                  10/16/2023                 CREATININE               0.97                10/16/2023                 EGFRIFNONA               75                  02/22/2022                 BCR                      20.6                10/16/2023                 K                        4.8                 10/16/2023                 CO2                      27.0                10/16/2023                 CALCIUM                  10.2                10/16/2023                 PROTENTOTREF             6.7                 04/14/2023                 ALBUMIN                  4.4                 10/16/2023                 LABIL2                   1.1                 04/14/2023                 AST                      14                   10/16/2023                 ALT                      17                  10/16/2023              Gout rx needs refills to ana    PAST MEDICAL HISTORY changes since        covid +     AF, sees Dr Moffett    Sleep Apnea: uses CPAP;     Renal Stones: he has undergone ECSW lithotripsy and laser       Hospitalizations:  UTI/weaknss 3-22-23  Heart cath 3-16-23    Pneumonia admitted once on 10-20-20 (COVID +)         CURRENT MEDICAL PROVIDERS:  Cardiology: Cher[facility]    Orthopedist    Rheumatologist: Dr. Arvizu     urology : [facility] /last seen    Sees Dr Manuel for pancreas follow up       PREVENTIVE HEALTH MAINTENANCE             COLORECTAL CANCER SCREENING: Up to date (colonoscopy q10y; sigmoidoscopy q5y; Cologuard q3y) was last done 2020, Results are in chart; Cologuard normal       Hepatitis C Medicare Screening: was last done ; negative            Surgical History:      Heart cath 3-16-23 and stenting   Cystoscopy     Left cataract     Appendectomy    Coronary Artery Bypass Graft: ;     Transurethral Resection of Prostate: ;     Cataract Removal: right; ;         Family History:     Father:  at age 91; Cause of death was CAD;  Coronary Artery Disease     Mother:  at age 88; Cause of death was CVA;  Hypertension     Paternal Grandfather: Cerebrovascular Accident         Social History:     Occupation:.   (Self-Employed) [employer]     Marital Status:      Children: 3 children                     Zachariah Cruz is also being seen today for wellness and follow up     Review of Systems   Constitutional:  Negative for fatigue and fever.   HENT:  Negative for sore throat.    Eyes:  Negative for visual disturbance.   Respiratory:  Negative for cough and shortness of breath.    Cardiovascular:  Positive for leg swelling (left lower leg, chronic issue, compression hose helps and elevation helps). Negative for chest pain and palpitations.  "  Gastrointestinal:  Negative for abdominal pain.   Genitourinary:  Negative for difficulty urinating.   Musculoskeletal:  Negative for arthralgias.   Skin:  Negative for wound.   Hematological:  Negative for adenopathy.   Psychiatric/Behavioral:  Negative for sleep disturbance.        Objective   Vital Signs:  /63 (BP Location: Right arm, Patient Position: Sitting, Cuff Size: Large Adult)   Pulse 62   Ht 177.8 cm (70\")   Wt 103 kg (227 lb)   SpO2 98%   BMI 32.57 kg/m²     Physical Exam  Vitals reviewed.   Constitutional:       Appearance: Normal appearance. He is well-developed.   HENT:      Head: Normocephalic and atraumatic.      Right Ear: External ear normal.      Left Ear: External ear normal.      Mouth/Throat:      Pharynx: No oropharyngeal exudate.   Eyes:      Conjunctiva/sclera: Conjunctivae normal.      Pupils: Pupils are equal, round, and reactive to light.   Cardiovascular:      Rate and Rhythm: Normal rate and regular rhythm.      Heart sounds: No murmur heard.     No friction rub. No gallop.   Pulmonary:      Effort: Pulmonary effort is normal.      Breath sounds: Normal breath sounds. No wheezing or rhonchi.   Abdominal:      General: Bowel sounds are normal. There is no distension.      Palpations: Abdomen is soft.      Tenderness: There is no abdominal tenderness.      Comments:       Musculoskeletal:      Left lower leg: Edema (trace) present.   Skin:     General: Skin is warm and dry.   Neurological:      Mental Status: He is alert and oriented to person, place, and time.      Cranial Nerves: No cranial nerve deficit.   Psychiatric:         Mood and Affect: Mood and affect normal.         Behavior: Behavior normal.         Thought Content: Thought content normal.         Judgment: Judgment normal.                         Assessment and Plan   Diagnoses and all orders for this visit:    1. Routine general medical examination at a health care facility (Primary)  Assessment & " Plan:  Advise regular exercise, healthy eating, always wear seat belts. Living will discussed, to bring in a copy, fall prevention discussed.  Immunizations discussed. Declines today, to check with pharmacy on coverage of some of these vaccines if he decides to get   To continue yearly optometry and dental exams.          2. Screening for malignant neoplasm of prostate  Assessment & Plan:  Discussed screening, will come in after 12-22-23 for fasting labs and will check a PSA    Orders:  -     PSA SCREENING; Future    3. Idiopathic chronic gout of foot without tophus, unspecified laterality  Assessment & Plan:  continue allopurinol     Orders:  -     allopurinol (ZYLOPRIM) 100 MG tablet; Take 1 tablet by mouth Daily.  Dispense: 90 tablet; Refill: 1    4. Screen for colon cancer  Assessment & Plan:  Discussed screening for Colon cancer, ordered cologuard / reviewed last cologuard     Orders:  -     Cologuard - Stool, Per Rectum; Future    5. Type 2 diabetes mellitus without complication, without long-term current use of insulin  Assessment & Plan:  To work on diet, regular exercise, continue to monitor sugars, he has some labs pending, return fasting later this month for labs                 Follow Up   Return for fasting for labs.  Patient was given instructions and counseling regarding his condition or for health maintenance advice. Please see specific information pulled into the AVS if appropriate.

## 2023-12-11 NOTE — ASSESSMENT & PLAN NOTE
To work on diet, regular exercise, continue to monitor sugars, he has some labs pending, return fasting later this month for labs

## 2023-12-18 DIAGNOSIS — E11.9 TYPE 2 DIABETES MELLITUS WITHOUT COMPLICATION, WITHOUT LONG-TERM CURRENT USE OF INSULIN: ICD-10-CM

## 2023-12-18 DIAGNOSIS — I10 ESSENTIAL HYPERTENSION: ICD-10-CM

## 2023-12-18 RX ORDER — METFORMIN HYDROCHLORIDE 500 MG/1
2000 TABLET, EXTENDED RELEASE ORAL DAILY
Qty: 360 TABLET | Refills: 1 | Status: SHIPPED | OUTPATIENT
Start: 2023-12-18

## 2023-12-18 RX ORDER — SPIRONOLACTONE 25 MG/1
25 TABLET ORAL DAILY
Qty: 90 TABLET | Refills: 1 | Status: SHIPPED | OUTPATIENT
Start: 2023-12-18

## 2023-12-22 ENCOUNTER — ANTICOAGULATION VISIT (OUTPATIENT)
Dept: CARDIOLOGY | Facility: CLINIC | Age: 78
End: 2023-12-22
Payer: MEDICARE

## 2023-12-22 ENCOUNTER — TELEPHONE (OUTPATIENT)
Dept: FAMILY MEDICINE CLINIC | Age: 78
End: 2023-12-22
Payer: MEDICARE

## 2023-12-22 ENCOUNTER — LAB (OUTPATIENT)
Dept: LAB | Facility: HOSPITAL | Age: 78
End: 2023-12-22
Payer: MEDICARE

## 2023-12-22 DIAGNOSIS — I48.0 PAROXYSMAL ATRIAL FIBRILLATION: Primary | ICD-10-CM

## 2023-12-22 DIAGNOSIS — Z12.5 SCREENING FOR MALIGNANT NEOPLASM OF PROSTATE: ICD-10-CM

## 2023-12-22 DIAGNOSIS — I48.0 PAROXYSMAL ATRIAL FIBRILLATION: ICD-10-CM

## 2023-12-22 DIAGNOSIS — E11.9 TYPE 2 DIABETES MELLITUS WITHOUT COMPLICATION, WITHOUT LONG-TERM CURRENT USE OF INSULIN: ICD-10-CM

## 2023-12-22 LAB
HBA1C MFR BLD: 7.1 % (ref 4.8–5.6)
INR PPP: 2.87 (ref 0.86–1.15)
PROTHROMBIN TIME: 30.5 SECONDS (ref 11.8–14.9)
PSA SERPL-MCNC: 0.82 NG/ML (ref 0–4)

## 2023-12-22 PROCEDURE — 83036 HEMOGLOBIN GLYCOSYLATED A1C: CPT

## 2023-12-22 PROCEDURE — 36415 COLL VENOUS BLD VENIPUNCTURE: CPT

## 2023-12-22 PROCEDURE — 85610 PROTHROMBIN TIME: CPT

## 2023-12-22 PROCEDURE — G0103 PSA SCREENING: HCPCS

## 2023-12-22 RX ORDER — WARFARIN SODIUM 4 MG/1
TABLET ORAL
Qty: 90 TABLET | Refills: 1 | Status: SHIPPED | OUTPATIENT
Start: 2023-12-22

## 2023-12-22 RX ORDER — BLOOD SUGAR DIAGNOSTIC
STRIP MISCELLANEOUS
Qty: 100 EACH | Refills: 3 | Status: SHIPPED | OUTPATIENT
Start: 2023-12-22

## 2023-12-22 NOTE — TELEPHONE ENCOUNTER
"     Caller: Zachariah Cruz \"Keith\"    Relationship: Self    Best call back number: [phone]    Requested Prescriptions:   Requested Prescriptions      No prescriptions requested or ordered in this encounter      TEST STRIPS    Pharmacy where request should be sent: [id number] - [place], [place] - [address] AT [facility] OF [address] &  - [phone]  - [phone] FX     Last office visit with prescribing clinician: 12/11/2023   Last telemedicine visit with prescribing clinician: Visit date not found   Next office visit with prescribing clinician: Visit date not found     Additional details provided by patient:      Does the patient have less than a 3 day supply:  [] Yes  [x] No    Would you like a call back once the refill request has been completed: [] Yes [x] No    If the office needs to give you a call back, can they leave a voicemail: [] Yes [x] No    Sabino Fitch Rep   12/22/23 14:24 EST        "

## 2023-12-22 NOTE — PROGRESS NOTES
INR - 2.87    Currently taking 5/5/4mg rotation.  Last checked 11/27/2023 with a result of 2.68.  Range: 2.0-3.0  Testing done at [facility].

## 2023-12-22 NOTE — PROGRESS NOTES
Pre Maia Tania Carbajal APRN Level is therapeutic. Continue same dose and recheck in 4 weeks.     Patient aware of instructions. Also needed refill on warfarin 4mg. I sent them to his pharmacy.

## 2024-01-02 DIAGNOSIS — R19.5 POSITIVE COLORECTAL CANCER SCREENING USING COLOGUARD TEST: Primary | ICD-10-CM

## 2024-01-18 RX ORDER — SIMVASTATIN 40 MG
40 TABLET ORAL DAILY
Qty: 90 TABLET | Refills: 0 | Status: SHIPPED | OUTPATIENT
Start: 2024-01-18

## 2024-01-19 ENCOUNTER — ANTICOAGULATION VISIT (OUTPATIENT)
Dept: CARDIOLOGY | Facility: CLINIC | Age: 79
End: 2024-01-19
Payer: MEDICARE

## 2024-01-19 ENCOUNTER — LAB (OUTPATIENT)
Dept: LAB | Facility: HOSPITAL | Age: 79
End: 2024-01-19
Payer: MEDICARE

## 2024-01-19 DIAGNOSIS — I48.0 PAROXYSMAL ATRIAL FIBRILLATION: Primary | ICD-10-CM

## 2024-01-19 DIAGNOSIS — I48.0 PAROXYSMAL ATRIAL FIBRILLATION: ICD-10-CM

## 2024-01-19 LAB
INR PPP: 2.53 (ref 0.86–1.15)
PROTHROMBIN TIME: 27.7 SECONDS (ref 11.8–14.9)

## 2024-01-19 PROCEDURE — 85610 PROTHROMBIN TIME: CPT

## 2024-01-19 PROCEDURE — 36415 COLL VENOUS BLD VENIPUNCTURE: CPT

## 2024-01-19 NOTE — PROGRESS NOTES
Lab Results   Component Value Date    INR 2.53 (H) 01/19/2024    INR 2.87 (H) 12/22/2023    INR 2.68 (H) 11/27/2023    PROTIME 27.7 (H) 01/19/2024    PROTIME 30.5 (H) 12/22/2023    PROTIME 28.9 (H) 11/27/2023     Dx:Atrial fibrillation   Dose:5-5-4  Range: 2.0-.3.0  Location:[facility]

## 2024-01-19 NOTE — PROGRESS NOTES
Hold warfarin for 5 days before colonsocpy.   As long as surgeon is okay with resuming, then beginning the day after colonoscopy take 8 mg, 6mg , then resume 5 mg /5 mg/4 mg, rechecking INR on Monday following colonoscopy.

## 2024-01-22 ENCOUNTER — TELEPHONE (OUTPATIENT)
Dept: GASTROENTEROLOGY | Facility: CLINIC | Age: 79
End: 2024-01-22
Payer: MEDICARE

## 2024-01-22 NOTE — TELEPHONE ENCOUNTER
Left message with patient asking for a returned call to follow up on overdue results for imaging studies.

## 2024-01-22 NOTE — TELEPHONE ENCOUNTER
----- Message from Bethanie Joyce MA sent at 12/14/2023  8:53 AM EST -----    ----- Message -----  From: SYSTEM  Sent: 6/14/2023   1:21 AM EST  To: [facility]

## 2024-01-29 ENCOUNTER — LAB (OUTPATIENT)
Dept: LAB | Facility: HOSPITAL | Age: 79
End: 2024-01-29
Payer: MEDICARE

## 2024-01-29 ENCOUNTER — ANTICOAGULATION VISIT (OUTPATIENT)
Dept: CARDIOLOGY | Facility: CLINIC | Age: 79
End: 2024-01-29
Payer: MEDICARE

## 2024-01-29 DIAGNOSIS — I48.0 PAROXYSMAL ATRIAL FIBRILLATION: ICD-10-CM

## 2024-01-29 DIAGNOSIS — I48.0 PAROXYSMAL ATRIAL FIBRILLATION: Primary | ICD-10-CM

## 2024-01-29 LAB
INR PPP: 1.53 (ref 0.86–1.15)
PROTHROMBIN TIME: 18.7 SECONDS (ref 11.8–14.9)

## 2024-01-29 PROCEDURE — 85610 PROTHROMBIN TIME: CPT

## 2024-01-29 PROCEDURE — 36415 COLL VENOUS BLD VENIPUNCTURE: CPT

## 2024-01-29 NOTE — PROGRESS NOTES
Lab Results   Component Value Date    INR 1.53 (H) 01/29/2024    INR 2.53 (H) 01/19/2024    INR 2.87 (H) 12/22/2023    PROTIME 18.7 (H) 01/29/2024    PROTIME 27.7 (H) 01/19/2024    PROTIME 30.5 (H) 12/22/2023     Dx:Atrial fibrillation   Dose:5-5-4  Range: 2.0-.3.0  Location:[facility]    Just had colonoscopy so was on clearance instructions

## 2024-02-01 ENCOUNTER — ANTICOAGULATION VISIT (OUTPATIENT)
Dept: CARDIOLOGY | Facility: CLINIC | Age: 79
End: 2024-02-01
Payer: MEDICARE

## 2024-02-01 ENCOUNTER — LAB (OUTPATIENT)
Dept: LAB | Facility: HOSPITAL | Age: 79
End: 2024-02-01
Payer: MEDICARE

## 2024-02-01 DIAGNOSIS — I48.0 PAROXYSMAL ATRIAL FIBRILLATION: ICD-10-CM

## 2024-02-01 DIAGNOSIS — I48.0 PAROXYSMAL ATRIAL FIBRILLATION: Primary | ICD-10-CM

## 2024-02-01 LAB
INR PPP: 2.74 (ref 0.86–1.15)
PROTHROMBIN TIME: 29.5 SECONDS (ref 11.8–14.9)

## 2024-02-01 PROCEDURE — 36415 COLL VENOUS BLD VENIPUNCTURE: CPT

## 2024-02-01 PROCEDURE — 85610 PROTHROMBIN TIME: CPT

## 2024-02-01 NOTE — PROGRESS NOTES
Patient was called with results and says he has been doing 5-4-4 and not 5-5-4. Also said Dr. Denton had told him he would stop plavix in March, please advise.

## 2024-02-01 NOTE — PROGRESS NOTES
Lab Results   Component Value Date    INR 2.74 (H) 02/01/2024    INR 1.53 (H) 01/29/2024    INR 2.53 (H) 01/19/2024    PROTIME 29.5 (H) 02/01/2024    PROTIME 18.7 (H) 01/29/2024    PROTIME 27.7 (H) 01/19/2024       Dx:Atrial fibrillation   Dose:5-5-4  Range: 2.0-.3.0  Location:[facility]

## 2024-02-02 NOTE — PROGRESS NOTES
He may continue warfarin as he has been taken, and recheck INR in 1 week.  Yes after one year of plavix in March, he may stop plavix, and start a daily 81 mg aspirin instead.  He will still continue warfarin.

## 2024-02-05 RX ORDER — LISINOPRIL 20 MG/1
20 TABLET ORAL 2 TIMES DAILY
Qty: 180 TABLET | Refills: 1 | Status: SHIPPED | OUTPATIENT
Start: 2024-02-05

## 2024-02-16 ENCOUNTER — ANTICOAGULATION VISIT (OUTPATIENT)
Dept: CARDIOLOGY | Facility: CLINIC | Age: 79
End: 2024-02-16
Payer: MEDICARE

## 2024-02-16 ENCOUNTER — LAB (OUTPATIENT)
Dept: LAB | Facility: HOSPITAL | Age: 79
End: 2024-02-16
Payer: MEDICARE

## 2024-02-16 DIAGNOSIS — E53.8 FOLATE DEFICIENCY: ICD-10-CM

## 2024-02-16 DIAGNOSIS — E53.8 B12 DEFICIENCY: ICD-10-CM

## 2024-02-16 DIAGNOSIS — I48.0 PAROXYSMAL ATRIAL FIBRILLATION: Primary | ICD-10-CM

## 2024-02-16 DIAGNOSIS — D64.9 ANEMIA, UNSPECIFIED TYPE: ICD-10-CM

## 2024-02-16 DIAGNOSIS — I48.0 PAROXYSMAL ATRIAL FIBRILLATION: ICD-10-CM

## 2024-02-16 LAB
BASOPHILS # BLD AUTO: 0.02 10*3/MM3 (ref 0–0.2)
BASOPHILS NFR BLD AUTO: 0.3 % (ref 0–1.5)
DEPRECATED RDW RBC AUTO: 48.3 FL (ref 37–54)
EOSINOPHIL # BLD AUTO: 0.26 10*3/MM3 (ref 0–0.4)
EOSINOPHIL NFR BLD AUTO: 3.4 % (ref 0.3–6.2)
ERYTHROCYTE [DISTWIDTH] IN BLOOD BY AUTOMATED COUNT: 14 % (ref 12.3–15.4)
FERRITIN SERPL-MCNC: 58.57 NG/ML (ref 30–400)
FOLATE SERPL-MCNC: >20 NG/ML (ref 4.78–24.2)
HCT VFR BLD AUTO: 39.4 % (ref 37.5–51)
HGB BLD-MCNC: 12.6 G/DL (ref 13–17.7)
IMM GRANULOCYTES # BLD AUTO: 0.03 10*3/MM3 (ref 0–0.05)
IMM GRANULOCYTES NFR BLD AUTO: 0.4 % (ref 0–0.5)
INR PPP: 2.26 (ref 0.86–1.15)
IRON 24H UR-MRATE: 43 MCG/DL (ref 59–158)
IRON SATN MFR SERPL: 12 % (ref 20–50)
LYMPHOCYTES # BLD AUTO: 1.33 10*3/MM3 (ref 0.7–3.1)
LYMPHOCYTES NFR BLD AUTO: 17.5 % (ref 19.6–45.3)
MCH RBC QN AUTO: 29.9 PG (ref 26.6–33)
MCHC RBC AUTO-ENTMCNC: 32 G/DL (ref 31.5–35.7)
MCV RBC AUTO: 93.6 FL (ref 79–97)
MONOCYTES # BLD AUTO: 0.56 10*3/MM3 (ref 0.1–0.9)
MONOCYTES NFR BLD AUTO: 7.4 % (ref 5–12)
NEUTROPHILS NFR BLD AUTO: 5.39 10*3/MM3 (ref 1.7–7)
NEUTROPHILS NFR BLD AUTO: 71 % (ref 42.7–76)
PLATELET # BLD AUTO: 243 10*3/MM3 (ref 140–450)
PMV BLD AUTO: 11 FL (ref 6–12)
PROTHROMBIN TIME: 25.3 SECONDS (ref 11.8–14.9)
RBC # BLD AUTO: 4.21 10*6/MM3 (ref 4.14–5.8)
TIBC SERPL-MCNC: 365 MCG/DL (ref 298–536)
TRANSFERRIN SERPL-MCNC: 245 MG/DL (ref 200–360)
VIT B12 BLD-MCNC: 1620 PG/ML (ref 211–946)
WBC NRBC COR # BLD AUTO: 7.59 10*3/MM3 (ref 3.4–10.8)

## 2024-02-16 PROCEDURE — 85610 PROTHROMBIN TIME: CPT

## 2024-02-16 PROCEDURE — 82607 VITAMIN B-12: CPT

## 2024-02-16 PROCEDURE — 84466 ASSAY OF TRANSFERRIN: CPT

## 2024-02-16 PROCEDURE — 82746 ASSAY OF FOLIC ACID SERUM: CPT

## 2024-02-16 PROCEDURE — 83540 ASSAY OF IRON: CPT

## 2024-02-16 PROCEDURE — 85025 COMPLETE CBC W/AUTO DIFF WBC: CPT

## 2024-02-16 PROCEDURE — 82728 ASSAY OF FERRITIN: CPT

## 2024-02-16 PROCEDURE — 36415 COLL VENOUS BLD VENIPUNCTURE: CPT

## 2024-02-19 ENCOUNTER — OFFICE VISIT (OUTPATIENT)
Dept: ONCOLOGY | Facility: HOSPITAL | Age: 79
End: 2024-02-19
Payer: MEDICARE

## 2024-02-19 VITALS
WEIGHT: 226.19 LBS | BODY MASS INDEX: 32.46 KG/M2 | RESPIRATION RATE: 16 BRPM | DIASTOLIC BLOOD PRESSURE: 69 MMHG | SYSTOLIC BLOOD PRESSURE: 143 MMHG | HEART RATE: 66 BPM | TEMPERATURE: 98.9 F | OXYGEN SATURATION: 96 %

## 2024-02-19 DIAGNOSIS — D64.9 ANEMIA, UNSPECIFIED TYPE: Primary | ICD-10-CM

## 2024-02-19 DIAGNOSIS — E53.8 B12 DEFICIENCY: ICD-10-CM

## 2024-02-19 DIAGNOSIS — N28.1 RENAL CYST, RIGHT: ICD-10-CM

## 2024-02-19 PROCEDURE — 3078F DIAST BP <80 MM HG: CPT | Performed by: INTERNAL MEDICINE

## 2024-02-19 PROCEDURE — 3077F SYST BP >= 140 MM HG: CPT | Performed by: INTERNAL MEDICINE

## 2024-02-19 PROCEDURE — 99214 OFFICE O/P EST MOD 30 MIN: CPT | Performed by: INTERNAL MEDICINE

## 2024-02-19 PROCEDURE — G0463 HOSPITAL OUTPT CLINIC VISIT: HCPCS | Performed by: INTERNAL MEDICINE

## 2024-02-19 PROCEDURE — 1126F AMNT PAIN NOTED NONE PRSNT: CPT | Performed by: INTERNAL MEDICINE

## 2024-02-19 NOTE — PROGRESS NOTES
Chief Complaint/Reason for Referral:  anemia    Nelia Frost, *  Nelia Frost, APRN    Records Obtained:  Records of the patients history including those obtained from  [facility] and patient information  were reviewed and summarized in detail.    Subjective    History of Present Illness   Mr. Keith Cruz presents with his wife for follow up for anemia.     PMH of RAMY (uses CPAP nightly), hypertension, type II DM, hyperlipidemia, MI with angioplasty / stent, anemia, gout. He is back on Plavix after having nose bleeds on Brillinta. He is off aspirin. Hoping to be switched from plavix to aspirin at his cardiology visit next month. Does have some nose bleeding in the mornings but this is minimal. Breathing is stable. No bleeding per rectum or melena.  He has been taking oral iron daily as well as B12 and folate.     Hematology History    He has had chronic normocytic anemia dating back to at least 3-4 years in the range 9.0 to 13.30. More recently, has had 2 lab with thrombocytosis up to 509K. Iron studies on 4/4/2023 with iron of 39, iron sat of 10%, TIBC of 389. Never had c-scope.     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    4/4/23: iron 39, iron sat 10%, TIBC 389, WBC 7.96, Hemoglobin 9.8, MCV 84, platelet count 465. Diff: with increase in neutrophil and lymphocyte % on recent lab work. Absolute values were normal.     4/14/23: Hgb 10.4, MCV 83.3, plt 309, WBC 7.21. Iron 29, ferritin 48.8, iron sat 7%, folate low at 3.31, B12 low at 155. Sed rate 53. Epo increased to 20.0. SPEP without M-spike, FLACA without monoclonality, Kappa FLC increased to 40.9 with kappa/lambda ratio of 1.84. Antiparietal and intrinsic factor antibodies both negative.     4/28/23: OP heme visit: Oral iron, B12, folate daily started.     5/5/23: B12 513, folate 16.4, iron sat 8%,  ferritin 59.3, iron 31    7/18/23: Hgb 12.3, plt 246, WBC 8.03    2/16/24: Iron sat 12%, b12 1620, folate >20, ferritin 58, hgb 12.6. Change to every other day iron dosing.       Oncology/Hematology History    No history exists.       Review of Systems   Constitutional:  Positive for fatigue. Negative for appetite change, diaphoresis, fever, unexpected weight gain and unexpected weight loss.   HENT:  Negative for hearing loss, sore throat and voice change.    Eyes:  Negative for blurred vision, double vision, pain, redness and visual disturbance.   Respiratory:  Negative for cough, shortness of breath and wheezing.    Cardiovascular:  Negative for chest pain, palpitations and leg swelling.   Gastrointestinal:  Positive for diarrhea.   Endocrine: Negative for cold intolerance, heat intolerance, polydipsia and polyuria.   Genitourinary:  Negative for decreased urine volume, difficulty urinating, frequency and urinary incontinence.   Musculoskeletal:  Positive for arthralgias, back pain, joint swelling and myalgias.   Skin:  Positive for bruise. Negative for color change, rash, skin lesions and wound.   Neurological:  Negative for dizziness, seizures, numbness and headache.   Hematological:  Negative for adenopathy. Bruises/bleeds easily.   Psychiatric/Behavioral:  Negative for depressed mood. The patient is not nervous/anxious.      Current Outpatient Medications on File Prior to Visit   Medication Sig Dispense Refill    allopurinol (ZYLOPRIM) 100 MG tablet Take 1 tablet by mouth Daily. 90 tablet 1    amLODIPine (NORVASC) 10 MG tablet TAKE ONE TABLET BY MOUTH DAILY 90 tablet 3    clopidogrel (PLAVIX) 75 MG tablet Take 1 tablet by mouth Daily. 90 tablet 2    FeroSul 325 (65 Fe) MG tablet TAKE ONE TABLET BY MOUTH DAILY WITH BREAKFAST 90 tablet 1    folic acid (FOLVITE) 1 MG tablet TAKE ONE TABLET BY MOUTH DAILY 90 tablet 1    glimepiride (AMARYL) 4 MG tablet TAKE 1 TABLET BY MOUTH DAILY (Patient taking differently: Take  0.5 tablets by mouth Daily.) 90 tablet 0    glucose blood (OneTouch Ultra) test strip Test Glucose once day 100 each 3    lisinopril (PRINIVIL,ZESTRIL) 20 MG tablet TAKE 1 TABLET BY MOUTH TWICE A  tablet 1    metFORMIN ER (GLUCOPHAGE-XR) 500 MG 24 hr tablet TAKE FOUR TABLETS BY MOUTH DAILY 360 tablet 1    metoprolol succinate XL (TOPROL-XL) 25 MG 24 hr tablet Take 2 tablets by mouth every morning and 1 tablet by mouth every evening (Patient taking differently: 50mg in AM and  25mg at night) 270 tablet 1    nitroglycerin (NITROSTAT) 0.4 MG SL tablet 1 under the tongue as needed for angina, may repeat q5mins for up three doses 100 tablet 11    simvastatin (ZOCOR) 40 MG tablet TAKE 1 TABLET BY MOUTH DAILY 90 tablet 0    spironolactone (ALDACTONE) 25 MG tablet TAKE 1 TABLET BY MOUTH DAILY 90 tablet 1    vitamin B-12 (CYANOCOBALAMIN) 500 MCG tablet Take 1 tablet by mouth Daily. 90 tablet 1    vitamin C (ASCORBIC ACID) 250 MG tablet Take 1 tablet by mouth Daily. 90 tablet 1    warfarin (COUMADIN) 1 MG tablet Take 1 tablet by mouth Daily. Or as directed 90 tablet 3    warfarin (COUMADIN) 3 MG tablet TAKE ONE TABLET BY MOUTH DAILY OR AS DIRECTED 90 tablet 3    warfarin (Coumadin) 4 MG tablet Take 1 tablet by mouth daily or as directed 90 tablet 1    warfarin (COUMADIN) 5 MG tablet Take 1 tablet by mouth Daily.       No current facility-administered medications on file prior to visit.       No Known Allergies  Past Medical History:   Diagnosis Date    Coronary artery disease     Diabetes     Hematuria     Hyperlipidemia     Hypertension      Past Surgical History:   Procedure Laterality Date    APPENDECTOMY      CARDIAC CATHETERIZATION      CARDIAC CATHETERIZATION N/A 03/16/2023    Procedure: Left Heart Cath with possible angioplasty;  Surgeon: Vishal Denton MD;  Location: [facility] CATH INVASIVE LOCATION;  Service: Cardiovascular;  Laterality: N/A;    CATARACT EXTRACTION      CORONARY ARTERY BYPASS GRAFT   2010    LIMA to LAD, SVG to OM, SVG to PDA    PROSTATE SURGERY      TRANSURETHRAL RESECTION OF THE PROSTATE    UPPER GASTROINTESTINAL ENDOSCOPY       Social History     Socioeconomic History    Marital status:    Tobacco Use    Smoking status: Former     Packs/day: 1.00     Years: 5.00     Additional pack years: 0.00     Total pack years: 5.00     Types: Cigarettes     Quit date:      Years since quittin.1    Smokeless tobacco: Never   Vaping Use    Vaping Use: Never used   Substance and Sexual Activity    Alcohol use: Not Currently     Comment: occ    Drug use: Never    Sexual activity: Defer     Family History   Problem Relation Age of Onset    Hypertension Mother     Stroke Mother     Coronary artery disease Father     Stroke Paternal Grandfather     Coronary artery disease Other     Colon cancer Neg Hx      Immunization History   Administered Date(s) Administered    COVID-19 (PFIZER) BIVALENT 12+YRS 2022    COVID-19 (PFIZER) Purple Cap Monovalent 2021, 2021       Tobacco Use: Medium Risk (2024)    Patient History     Smoking Tobacco Use: Former     Smokeless Tobacco Use: Never     Passive Exposure: Not on file       Objective     Physical Exam  Well appearing patient in no acute distress on RA  Anicteric sclerae,  few scattered actinic keratoses noted as well on hands and arms  Respirations non-labored  Awake, alert, and oriented x 4. Speech intact. No gross neurologic deficit  Appropriate mood and affect    Vitals:    24 1118   BP: 143/69   Pulse: 66   Resp: 16   Temp: 98.9 °F (37.2 °C)   TempSrc: Temporal   SpO2: 96%   Weight: 103 kg (226 lb 3.1 oz)   PainSc: 0-No pain         Wt Readings from Last 3 Encounters:   23 103 kg (227 lb)   23 102 kg (224 lb 6.4 oz)   10/27/23 100 kg (221 lb 6.4 oz)                  ECOG: (0) Fully Active - Able to Carry On All Pre-disease Performance Without Restriction  Fall Risk Assessment was completed, and patient  is at low risk for falls.  PHQ-9 Total Score:         The patient is  experiencing fatigue. Fatigue score: 5    PT/OT Functional Screening: PT fx screen: No needs identified  Speech Functional Screening: Speech fx screen: No needs identified  Rehab to be ordered: Rehab to be ordered: No needs identified        Result Review :  The following data was reviewed by: Jeremy Burgess MD on 02/19/24:  Lab Results   Component Value Date    HGB 12.6 (L) 02/16/2024    HCT 39.4 02/16/2024    MCV 93.6 02/16/2024     02/16/2024    WBC 7.59 02/16/2024    NEUTROABS 5.39 02/16/2024    LYMPHSABS 1.33 02/16/2024    MONOSABS 0.56 02/16/2024    EOSABS 0.26 02/16/2024    BASOSABS 0.02 02/16/2024     Lab Results   Component Value Date    GLUCOSE 144 (H) 10/16/2023    BUN 20 10/16/2023    CREATININE 0.97 10/16/2023     10/16/2023    K 4.8 10/16/2023     10/16/2023    CO2 27.0 10/16/2023    CALCIUM 10.2 10/16/2023    PROTEINTOT 6.9 10/16/2023    ALBUMIN 4.4 10/16/2023    BILITOT 0.4 10/16/2023    ALKPHOS 74 10/16/2023    AST 14 10/16/2023    ALT 17 10/16/2023     Lab Results   Component Value Date    FERRITIN 58.57 02/16/2024    [id number] 1,620 (H) 02/16/2024    FOLATE >20.00 02/16/2024     Lab Results   Component Value Date    IRON 43 (L) 02/16/2024    LABIRON 12 (L) 02/16/2024    TRANSFERRIN 245 02/16/2024    TIBC 365 02/16/2024     Lab Results   Component Value Date    FERRITIN 58.57 02/16/2024    [id number] 1,620 (H) 02/16/2024    FOLATE >20.00 02/16/2024     Lab Results   Component Value Date    PSA 0.818 12/22/2023       Labs personally reviewed and notable for worse iron sat and anemia         12/11/23 PCP note personally reviewed         Assessment and Plan:  Diagnoses and all orders for this visit:    1. Anemia, unspecified type (Primary)  -     CBC & Differential; Future  -     Ferritin; Future  -     Iron Profile; Future    2. B12 deficiency    3. Renal cyst, right      Chronic anemia for at least the  last 3-4 years, but more recently seems to have worsened. Labs from 4/14/23 revealed iron, folate, and B12 deficiency. Antiparietal and intrinsic factor antibodies negative. He has never had a colonoscopy, but did have a cologard testing negative in August of 2021. He follows with cardiology for the INR checks for Coumadin. No ricardo bleeding noted. Certianly could have slow GI bleed as he is on antiplatelet as well as anticoagulant. Also likely component of anemia of chronic disease (sed rate elevated).     4/2023 started on iron orally once daily with vitamin C. Also on B12 and folate. Repeat labs in August with improved iron but now as of 2/16/24, labs worse again. Anemia present with hgb 12.6. As no improvement, discussed IV iron. Patient would like to hold off if possible. Will change to every othre day dsoing and repeat labs in 3 months. If no improvement then, will proceed with IV iron. RTC at that time.      B12 and folate now normal. Continue oral replacement.    Right kidney mass  Follows with urology. MRI abdomen 10/2023 demonstrating renal cysts    Mild light chain MGUS  Can consider repeat testing in around 4/2024. No CRAB criteria met as anemia due to deficiencies most likely as above.         I spent 20 minutes caring for Zachariah on this date of service. This time includes time spent by me in the following activities:preparing for the visit, reviewing tests, obtaining and/or reviewing a separately obtained history, performing a medically appropriate examination and/or evaluation , counseling and educating the patient/family/caregiver, ordering medications, tests, or procedures, referring and communicating with other health care professionals , documenting information in the medical record, independently interpreting results and communicating that information with the patient/family/caregiver and care coordination    Patient Follow Up: 3 months with labs     Patient was given instructions and counseling  regarding his condition or for health maintenance advice. Please see specific information pulled into the AVS if appropriate.

## 2024-02-27 RX ORDER — METOPROLOL SUCCINATE 25 MG/1
TABLET, EXTENDED RELEASE ORAL
Qty: 270 TABLET | Refills: 1 | Status: SHIPPED | OUTPATIENT
Start: 2024-02-27

## 2024-02-29 RX ORDER — WARFARIN SODIUM 5 MG/1
TABLET ORAL
Qty: 90 TABLET | Refills: 1 | Status: SHIPPED | OUTPATIENT
Start: 2024-02-29

## 2024-03-01 ENCOUNTER — ANTICOAGULATION VISIT (OUTPATIENT)
Dept: CARDIOLOGY | Facility: CLINIC | Age: 79
End: 2024-03-01
Payer: MEDICARE

## 2024-03-01 ENCOUNTER — LAB (OUTPATIENT)
Dept: LAB | Facility: HOSPITAL | Age: 79
End: 2024-03-01
Payer: MEDICARE

## 2024-03-01 DIAGNOSIS — I10 ESSENTIAL HYPERTENSION: ICD-10-CM

## 2024-03-01 DIAGNOSIS — I48.0 PAROXYSMAL ATRIAL FIBRILLATION: ICD-10-CM

## 2024-03-01 DIAGNOSIS — E78.2 MIXED HYPERLIPIDEMIA: ICD-10-CM

## 2024-03-01 DIAGNOSIS — I48.0 PAROXYSMAL ATRIAL FIBRILLATION: Primary | ICD-10-CM

## 2024-03-01 LAB
INR PPP: 3.17 (ref 0.86–1.15)
PROTHROMBIN TIME: 33 SECONDS (ref 11.8–14.9)

## 2024-03-01 PROCEDURE — 85610 PROTHROMBIN TIME: CPT

## 2024-03-01 PROCEDURE — 36415 COLL VENOUS BLD VENIPUNCTURE: CPT

## 2024-03-01 RX ORDER — GLIMEPIRIDE 4 MG/1
4 TABLET ORAL DAILY
Qty: 90 TABLET | Refills: 0 | Status: SHIPPED | OUTPATIENT
Start: 2024-03-01

## 2024-03-01 NOTE — PROGRESS NOTES
Dx:Atrial fibrillation   Dose:4-4-5  Range:2.0-3.0  Location:[facility]        Lab Results   Component Value Date    INR 3.17 (H) 03/01/2024    INR 2.26 (H) 02/16/2024    INR 2.74 (H) 02/01/2024    PROTIME 33.0 (H) 03/01/2024    PROTIME 25.3 (H) 02/16/2024    PROTIME 29.5 (H) 02/01/2024           Informed pt of results and plan no question or concerns   
Level is a little elevated.  Decrease dose to 4 mg daily.  Recheck INR in 1 week.  
Bilateral xrays of feet ordered w/out evidence of osteo  Offloading boots bilaterally while in bed  Appreciate continued wound care

## 2024-03-08 ENCOUNTER — LAB (OUTPATIENT)
Dept: LAB | Facility: HOSPITAL | Age: 79
End: 2024-03-08
Payer: MEDICARE

## 2024-03-08 ENCOUNTER — ANTICOAGULATION VISIT (OUTPATIENT)
Dept: CARDIOLOGY | Facility: CLINIC | Age: 79
End: 2024-03-08
Payer: MEDICARE

## 2024-03-08 DIAGNOSIS — I48.0 PAROXYSMAL ATRIAL FIBRILLATION: ICD-10-CM

## 2024-03-08 DIAGNOSIS — I48.0 PAROXYSMAL ATRIAL FIBRILLATION: Primary | ICD-10-CM

## 2024-03-08 LAB
INR PPP: 2.35 (ref 0.86–1.15)
PROTHROMBIN TIME: 26.1 SECONDS (ref 11.8–14.9)

## 2024-03-08 PROCEDURE — 36415 COLL VENOUS BLD VENIPUNCTURE: CPT

## 2024-03-08 PROCEDURE — 85610 PROTHROMBIN TIME: CPT

## 2024-03-08 NOTE — PROGRESS NOTES
Dx:Atrial fibrillation   Dose:4 mg  Range:2.0-3.0  Location:[facility]          Lab Results   Component Value Date    INR 2.35 (H) 03/08/2024    INR 3.17 (H) 03/01/2024    INR 2.26 (H) 02/16/2024    PROTIME 26.1 (H) 03/08/2024    PROTIME 33.0 (H) 03/01/2024    PROTIME 25.3 (H) 02/16/2024           Informed pt of inr results and plan no question or concerns

## 2024-03-15 ENCOUNTER — ANTICOAGULATION VISIT (OUTPATIENT)
Dept: CARDIOLOGY | Facility: CLINIC | Age: 79
End: 2024-03-15
Payer: MEDICARE

## 2024-03-15 ENCOUNTER — LAB (OUTPATIENT)
Dept: LAB | Facility: HOSPITAL | Age: 79
End: 2024-03-15
Payer: MEDICARE

## 2024-03-15 DIAGNOSIS — I48.0 PAROXYSMAL ATRIAL FIBRILLATION: ICD-10-CM

## 2024-03-15 DIAGNOSIS — E78.2 MIXED HYPERLIPIDEMIA: ICD-10-CM

## 2024-03-15 DIAGNOSIS — I10 ESSENTIAL HYPERTENSION: ICD-10-CM

## 2024-03-15 DIAGNOSIS — I48.0 PAROXYSMAL ATRIAL FIBRILLATION: Primary | ICD-10-CM

## 2024-03-15 LAB
INR PPP: 2.4 (ref 0.86–1.15)
PROTHROMBIN TIME: 26.5 SECONDS (ref 11.8–14.9)

## 2024-03-15 PROCEDURE — 36415 COLL VENOUS BLD VENIPUNCTURE: CPT

## 2024-03-15 PROCEDURE — 85610 PROTHROMBIN TIME: CPT

## 2024-03-15 NOTE — PROGRESS NOTES
Lab Results   Component Value Date    INR 2.40 (H) 03/15/2024    INR 2.35 (H) 03/08/2024    INR 3.17 (H) 03/01/2024    PROTIME 26.5 (H) 03/15/2024    PROTIME 26.1 (H) 03/08/2024    PROTIME 33.0 (H) 03/01/2024     Dx:Atrial fibrillation     Dose:4 MG    Range: 2.0-.3.0    Location:[facility]

## 2024-03-29 ENCOUNTER — LAB (OUTPATIENT)
Dept: LAB | Facility: HOSPITAL | Age: 79
End: 2024-03-29
Payer: MEDICARE

## 2024-03-29 ENCOUNTER — ANTICOAGULATION VISIT (OUTPATIENT)
Dept: CARDIOLOGY | Facility: CLINIC | Age: 79
End: 2024-03-29
Payer: MEDICARE

## 2024-03-29 DIAGNOSIS — I48.0 PAROXYSMAL ATRIAL FIBRILLATION: Primary | ICD-10-CM

## 2024-03-29 DIAGNOSIS — I48.0 PAROXYSMAL ATRIAL FIBRILLATION: ICD-10-CM

## 2024-03-29 LAB
INR PPP: 2.22 (ref 0.86–1.15)
PROTHROMBIN TIME: 24.9 SECONDS (ref 11.8–14.9)

## 2024-03-29 PROCEDURE — 85610 PROTHROMBIN TIME: CPT

## 2024-03-29 PROCEDURE — 36415 COLL VENOUS BLD VENIPUNCTURE: CPT

## 2024-03-29 NOTE — PROGRESS NOTES
Lab Results   Component Value Date    INR 2.22 (H) 03/29/2024    INR 2.40 (H) 03/15/2024    INR 2.35 (H) 03/08/2024    PROTIME 24.9 (H) 03/29/2024    PROTIME 26.5 (H) 03/15/2024    PROTIME 26.1 (H) 03/08/2024     Dx: afib  Pt taking 4  Range: 2.0-3.0  [facility]

## 2024-04-17 DIAGNOSIS — E53.8 FOLATE DEFICIENCY: ICD-10-CM

## 2024-04-17 RX ORDER — SIMVASTATIN 40 MG
40 TABLET ORAL DAILY
Qty: 90 TABLET | Refills: 1 | Status: SHIPPED | OUTPATIENT
Start: 2024-04-17

## 2024-04-18 RX ORDER — FOLIC ACID 1 MG/1
1000 TABLET ORAL DAILY
Qty: 90 TABLET | Refills: 1 | Status: SHIPPED | OUTPATIENT
Start: 2024-04-18

## 2024-04-23 NOTE — PROGRESS NOTES
CARDIOLOGY FOLLOW-UP PROGRESS NOTE        Chief Complaint  Coronary Artery Disease, Hyperlipidemia, and Hypertension    Subjective            Zachariah Cruz presents to [facility] CARDIOLOGY  History of Present Illness      Mr. Cruz is here for routine 6-month follow-up visit.  He denies any recent episodes of chest pain, shortness of breath or palpitations.  He is currently off Plavix and taking aspirin along with Coumadin.  He reports intermittent tinnitus.      Past History:    1) Coronary artery disease status post 3-vessel coronary artery bypass grafting on 05/21/2010 by Dr. Garcia at [facility] (LIMA graft to the LAD, SVG graft to OM, SVG graft to posterior descending branch of RCA); cardiac cath done on 3/16/2023 showed patent LIMA to LAD, 95% stenosis of proximal segment of SVG to OM branch and 50% stenosis of SVG to RCA at anastomosis.  He underwent angioplasty and stent placement to SVG to OM branch.  2) Diabetes mellitus; 3) Hypertension; 4) Hyperlipidemia; 5) Gout; 6) Obstructive sleep apnea, on CPAP; 7) Paroxysmal atrial flutter, detected on 24-hour Holter monitor study done in October 2019.      Medical History:  Past Medical History:   Diagnosis Date    Coronary artery disease     Diabetes     Hematuria     Hyperlipidemia     Hypertension        Surgical History: has a past surgical history that includes Appendectomy; Cataract extraction; Prostate surgery; Coronary artery bypass graft (05/21/2010); Cardiac catheterization; Cardiac catheterization (N/A, 03/16/2023); and Upper gastrointestinal endoscopy.     Family History: family history includes Coronary artery disease in his father and another family member; Hypertension in his mother; Stroke in his mother and paternal grandfather.     Social History: reports that he quit smoking about 55 years ago. His smoking use included cigarettes. He started smoking about 60 years ago. He has a 5 pack-year smoking history. He has  "never used smokeless tobacco. He reports that he does not currently use alcohol. He reports that he does not use drugs.    Allergies: Patient has no known allergies.    Current Outpatient Medications on File Prior to Visit   Medication Sig    allopurinol (ZYLOPRIM) 100 MG tablet Take 1 tablet by mouth Daily.    amLODIPine (NORVASC) 10 MG tablet TAKE ONE TABLET BY MOUTH DAILY    aspirin 81 MG EC tablet Take 1 tablet by mouth Daily.    FeroSul 325 (65 Fe) MG tablet TAKE ONE TABLET BY MOUTH DAILY WITH BREAKFAST    folic acid (FOLVITE) 1 MG tablet TAKE 1 TABLET BY MOUTH DAILY    glimepiride (AMARYL) 4 MG tablet TAKE 1 TABLET BY MOUTH DAILY (Patient taking differently: Take 0.5 tablets by mouth Daily.)    lisinopril (PRINIVIL,ZESTRIL) 20 MG tablet TAKE 1 TABLET BY MOUTH TWICE A DAY    metFORMIN ER (GLUCOPHAGE-XR) 500 MG 24 hr tablet TAKE FOUR TABLETS BY MOUTH DAILY (Patient taking differently: Take 2 tablets by mouth 2 (Two) Times a Day.)    metoprolol succinate XL (TOPROL-XL) 25 MG 24 hr tablet TAKE 2 TABLETS BY MOUTH EVERY MORNING AND TAKE 1 TABLET EVERY EVENING    simvastatin (ZOCOR) 40 MG tablet TAKE 1 TABLET BY MOUTH DAILY    spironolactone (ALDACTONE) 25 MG tablet TAKE 1 TABLET BY MOUTH DAILY    vitamin B-12 (CYANOCOBALAMIN) 500 MCG tablet Take 1 tablet by mouth Daily.    vitamin C (ASCORBIC ACID) 250 MG tablet Take 1 tablet by mouth Daily.    warfarin (Coumadin) 4 MG tablet Take 1 tablet by mouth daily or as directed         Review of Systems   HENT:  Positive for tinnitus.    Respiratory:  Negative for cough, shortness of breath and wheezing.    Cardiovascular:  Negative for chest pain, palpitations and leg swelling.   Gastrointestinal:  Negative for nausea and vomiting.   Neurological:  Negative for dizziness and syncope.        Objective     /61   Pulse 63   Ht 177.8 cm (70\")   Wt 103 kg (226 lb)   BMI 32.43 kg/m²       Physical Exam    General : Alert, awake, no acute distress  Neck : Supple, no " carotid bruit, no jugular venous distention  CVS : Regular rate and rhythm, no murmur, rubs or gallops  Lungs: Clear to auscultation bilaterally, no crackles or rhonchi  Abdomen: Soft, nontender, bowel sounds heard in all 4 quadrants  Extremities: Warm, well-perfused, no pedal edema    Result Review :     The following data was reviewed by: Vishal Denton MD on 04/24/2024:    CMP          7/3/2023    07:10 7/20/2023    15:15 10/16/2023    07:13   CMP   Glucose 100  128  144    BUN 16  12  20    Creatinine 1.02  0.85  0.97    EGFR 76.2  90.1  80.9    Sodium 142  142  142    Potassium 4.6  3.7  4.8    Chloride 107  106  105    Calcium 10.3  9.6  10.2    Total Protein 6.9  6.7  6.9    Albumin 4.2  4.0  4.4    Globulin 2.7  2.7  2.5    Total Bilirubin 0.2  <0.2  0.4    Alkaline Phosphatase 72  70  74    AST (SGOT) 12  13  14    ALT (SGPT) 18  15  17    Albumin/Globulin Ratio 1.6  1.5  1.8    BUN/Creatinine Ratio 15.7  14.1  20.6    Anion Gap 10.4  11.7  10.0      CBC          7/18/2023    15:36 8/25/2023    10:55 2/16/2024    07:30   CBC   WBC 8.03  8.83  7.59    RBC 4.38  4.59  4.21    Hemoglobin 12.3  13.1  12.6    Hematocrit 38.7  40.6  39.4    MCV 88.4  88.5  93.6    MCH 28.1  28.5  29.9    MCHC 31.8  32.3  32.0    RDW 17.2  15.8  14.0    Platelets 246  254  243        Lipid Panel          10/16/2023    07:13   Lipid Panel   Total Cholesterol 122    Triglycerides 158    HDL Cholesterol 37    VLDL Cholesterol 27    LDL Cholesterol  58    LDL/HDL Ratio 1.44           Data reviewed: Cardiology studies        Results for orders placed during the hospital encounter of 03/16/23    Adult Transthoracic Echo Complete w/ Color, Spectral and Contrast if necessary per protocol    Interpretation Summary    Overall LV ejection fraction is 50 to 55% with mild inferolateral wall hypokinesis.    Left ventricular wall thickness is consistent with mild concentric hypertrophy.    Left ventricular diastolic function is consistent  with (grade I) impaired relaxation.    Aortic valve is mildly calcified.  Mild aortic valve stenosis is present.      Results for orders placed during the hospital encounter of 02/22/23    Stress Test With Myocardial Perfusion One Day    Interpretation Summary    There is a moderate sized infarct in the basal-mid inferolateral walls with a small-moderate amount of duglas-infarct ischemia..    Left ventricular ejection fraction is moderately reduced (Calculated EF = 38%).    Abnormal LV wall motion consistent with severe hypokinesis of the lateral wall.    Impressions are consistent with an intermediate risk study.    Findings consistent with an indeterminate ECG stress test.               Assessment and Plan        Diagnoses and all orders for this visit:    1. Coronary artery disease involving native coronary artery of native heart without angina pectoris (Primary)  Assessment & Plan:  He is currently stable with no anginal-like symptoms.  Will continue aspirin, beta-blocker and statins.    Orders:  -     CBC (No Diff); Future    2. Paroxysmal atrial fibrillation  Assessment & Plan:  He remains in normal sinus rhythm and denies any palpitations.  Continue warfarin for anticoagulation, INR is therapeutic.  Continue metoprolol for rate and rhythm control.      3. Essential hypertension  Assessment & Plan:  Well-controlled, continue current regimen      4. Mixed hyperlipidemia  Assessment & Plan:  LDL previously at goal.  Continue simvastatin 40 mg daily.  We will repeat lipid panel with next lab draw and adjust the dose if needed.    Orders:  -     Comprehensive Metabolic Panel; Future  -     Lipid Panel; Future              Follow Up     Return in about 6 months (around 10/24/2024) for Next scheduled follow up, with Maia GRISSOM.    Patient was given instructions and counseling regarding his condition or for health maintenance advice. Please see specific information pulled into the AVS if appropriate.

## 2024-04-24 ENCOUNTER — OFFICE VISIT (OUTPATIENT)
Dept: CARDIOLOGY | Facility: CLINIC | Age: 79
End: 2024-04-24
Payer: MEDICARE

## 2024-04-24 VITALS
SYSTOLIC BLOOD PRESSURE: 143 MMHG | DIASTOLIC BLOOD PRESSURE: 61 MMHG | BODY MASS INDEX: 32.35 KG/M2 | HEART RATE: 63 BPM | HEIGHT: 70 IN | WEIGHT: 226 LBS

## 2024-04-24 DIAGNOSIS — I10 ESSENTIAL HYPERTENSION: ICD-10-CM

## 2024-04-24 DIAGNOSIS — E78.2 MIXED HYPERLIPIDEMIA: ICD-10-CM

## 2024-04-24 DIAGNOSIS — I25.10 CORONARY ARTERY DISEASE INVOLVING NATIVE CORONARY ARTERY OF NATIVE HEART WITHOUT ANGINA PECTORIS: Primary | ICD-10-CM

## 2024-04-24 DIAGNOSIS — I48.0 PAROXYSMAL ATRIAL FIBRILLATION: ICD-10-CM

## 2024-04-24 PROCEDURE — 1160F RVW MEDS BY RX/DR IN RCRD: CPT | Performed by: INTERNAL MEDICINE

## 2024-04-24 PROCEDURE — 1159F MED LIST DOCD IN RCRD: CPT | Performed by: INTERNAL MEDICINE

## 2024-04-24 PROCEDURE — 3078F DIAST BP <80 MM HG: CPT | Performed by: INTERNAL MEDICINE

## 2024-04-24 PROCEDURE — 3077F SYST BP >= 140 MM HG: CPT | Performed by: INTERNAL MEDICINE

## 2024-04-24 PROCEDURE — 99214 OFFICE O/P EST MOD 30 MIN: CPT | Performed by: INTERNAL MEDICINE

## 2024-04-24 RX ORDER — ASPIRIN 81 MG/1
81 TABLET ORAL DAILY
COMMUNITY

## 2024-04-24 NOTE — ASSESSMENT & PLAN NOTE
He remains in normal sinus rhythm and denies any palpitations.  Continue warfarin for anticoagulation, INR is therapeutic.  Continue metoprolol for rate and rhythm control.

## 2024-04-24 NOTE — ASSESSMENT & PLAN NOTE
LDL previously at goal.  Continue simvastatin 40 mg daily.  We will repeat lipid panel with next lab draw and adjust the dose if needed.

## 2024-04-24 NOTE — ASSESSMENT & PLAN NOTE
He is currently stable with no anginal-like symptoms.  Will continue aspirin, beta-blocker and statins.   Patient is a 85y old  Female who presents with a chief complaint of hypoglycemia, anemia, failure to thrive (18 Jan 2022 17:22)      INTERVAL HPI/OVERNIGHT EVENTS: hypothermia resolved     T(C): 36.3 (01-18-22 @ 12:59), Max: 36.3 (01-18-22 @ 12:59)  HR: 81 (01-18-22 @ 12:59) (81 - 87)  BP: 144/58 (01-18-22 @ 12:59) (144/58 - 149/84)  RR: 18 (01-18-22 @ 12:59) (18 - 18)  SpO2: 100% (01-18-22 @ 12:59) (100% - 100%)  Wt(kg): --  I&O's Summary    17 Jan 2022 07:01  -  18 Jan 2022 07:00  --------------------------------------------------------  IN: 400 mL / OUT: 1400 mL / NET: -1000 mL        PAST MEDICAL & SURGICAL HISTORY:  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    Essential hypertension    Type 2 diabetes mellitus    Dementia  history of sundowning    Paroxysmal atrial fibrillation    AVF (arteriovenous fistula)  LUE        SOCIAL HISTORY  Alcohol:  Tobacco:  Illicit substance use:    FAMILY HISTORY:    REVIEW OF SYSTEMS:  CONSTITUTIONAL: No fever, weight loss, or fatigue  EYES: No eye pain, visual disturbances, or discharge  ENMT:  No difficulty hearing, tinnitus, vertigo; No sinus or throat pain  NECK: No pain or stiffness  RESPIRATORY: No cough, wheezing, chills or hemoptysis; No shortness of breath  CARDIOVASCULAR: No chest pain, palpitations, dizziness, or leg swelling  GASTROINTESTINAL: No abdominal or epigastric pain. No nausea, vomiting, or hematemesis; No diarrhea or constipation. No melena or hematochezia.  GENITOURINARY: No dysuria, frequency, hematuria, or incontinence  NEUROLOGICAL: No headaches, memory loss, loss of strength, numbness, or tremors  SKIN: No itching, burning, rashes, or lesions   LYMPH NODES: No enlarged glands  ENDOCRINE: No heat or cold intolerance; No hair loss  MUSCULOSKELETAL: No joint pain or swelling; No muscle, back, or extremity pain  PSYCHIATRIC: No depression, anxiety, mood swings, or difficulty sleeping  HEME/LYMPH: No easy bruising, or bleeding gums  ALLERY AND IMMUNOLOGIC: No hives or eczema    RADIOLOGY & ADDITIONAL TESTS:    Imaging Personally Reviewed:  [ ] YES  [ ] NO    Consultant(s) Notes Reviewed:  [ ] YES  [ ] NO    PHYSICAL EXAM:  GENERAL: NAD, well-groomed, well-developed  HEAD:  Atraumatic, Normocephalic  EYES: EOMI, PERRLA, conjunctiva and sclera clear  ENMT: No tonsillar erythema, exudates, or enlargement; Moist mucous membranes, Good dentition, No lesions  NECK: Supple, No JVD, Normal thyroid  NERVOUS SYSTEM:  Alert & Oriented X3, Good concentration; Motor Strength 5/5 B/L upper and lower extremities; DTRs 2+ intact and symmetric  CHEST/LUNG: Clear to percussion bilaterally; No rales, rhonchi, wheezing, or rubs  HEART: Regular rate and rhythm; No murmurs, rubs, or gallops  ABDOMEN: Soft, Nontender, Nondistended; Bowel sounds present  EXTREMITIES:  2+ Peripheral Pulses, No clubbing, cyanosis, or edema  LYMPH: No lymphadenopathy noted  SKIN: No rashes or lesions    LABS:                        10.5   7.12  )-----------( 134      ( 18 Jan 2022 06:50 )             31.6     01-18    133<L>  |  98  |  11  ----------------------------<  68<L>  3.6   |  28  |  1.65<H>    Ca    8.5      18 Jan 2022 06:50  Phos  3.3     01-18  Mg     1.80     01-18          CAPILLARY BLOOD GLUCOSE      POCT Blood Glucose.: 118 mg/dL (18 Jan 2022 16:34)  POCT Blood Glucose.: 130 mg/dL (18 Jan 2022 11:22)  POCT Blood Glucose.: 78 mg/dL (18 Jan 2022 07:40)  POCT Blood Glucose.: 78 mg/dL (17 Jan 2022 21:07)            MEDICATIONS  (STANDING):  apixaban 2.5 milliGRAM(s) Oral two times a day  chlorhexidine 2% Cloths 1 Application(s) Topical daily  Dakins Solution - 1/4 Strength 1 Application(s) Topical two times a day  dextrose 40% Gel 15 Gram(s) Oral once  dextrose 5%. 1000 milliLiter(s) (100 mL/Hr) IV Continuous <Continuous>  dextrose 5%. 1000 milliLiter(s) (50 mL/Hr) IV Continuous <Continuous>  dextrose 50% Injectable 25 Gram(s) IV Push once  dextrose 50% Injectable 12.5 Gram(s) IV Push once  dextrose 50% Injectable 25 Gram(s) IV Push once  diltiazem    milliGRAM(s) Oral daily  donepezil 10 milliGRAM(s) Oral at bedtime  glucagon  Injectable 1 milliGRAM(s) IntraMuscular once  memantine 10 milliGRAM(s) Oral two times a day  pantoprazole  Injectable 40 milliGRAM(s) IV Push every 12 hours  piperacillin/tazobactam IVPB.. 3.375 Gram(s) IV Intermittent every 12 hours  simvastatin 40 milliGRAM(s) Oral at bedtime    MEDICATIONS  (PRN):      Care Discussed with Consultants/Other Providers [ ] YES  [ ] NO

## 2024-04-26 ENCOUNTER — ANTICOAGULATION VISIT (OUTPATIENT)
Dept: CARDIOLOGY | Facility: CLINIC | Age: 79
End: 2024-04-26
Payer: MEDICARE

## 2024-04-26 ENCOUNTER — LAB (OUTPATIENT)
Dept: LAB | Facility: HOSPITAL | Age: 79
End: 2024-04-26
Payer: MEDICARE

## 2024-04-26 DIAGNOSIS — I48.0 PAROXYSMAL ATRIAL FIBRILLATION: ICD-10-CM

## 2024-04-26 DIAGNOSIS — E78.2 MIXED HYPERLIPIDEMIA: ICD-10-CM

## 2024-04-26 DIAGNOSIS — I25.10 CORONARY ARTERY DISEASE INVOLVING NATIVE CORONARY ARTERY OF NATIVE HEART WITHOUT ANGINA PECTORIS: ICD-10-CM

## 2024-04-26 DIAGNOSIS — I10 ESSENTIAL HYPERTENSION: ICD-10-CM

## 2024-04-26 DIAGNOSIS — I48.0 PAROXYSMAL ATRIAL FIBRILLATION: Primary | ICD-10-CM

## 2024-04-26 DIAGNOSIS — E11.9 TYPE 2 DIABETES MELLITUS WITHOUT COMPLICATION, WITHOUT LONG-TERM CURRENT USE OF INSULIN: ICD-10-CM

## 2024-04-26 LAB
ALBUMIN SERPL-MCNC: 4.2 G/DL (ref 3.5–5.2)
ALBUMIN/GLOB SERPL: 1.8 G/DL
ALP SERPL-CCNC: 75 U/L (ref 39–117)
ALT SERPL W P-5'-P-CCNC: 17 U/L (ref 1–41)
ANION GAP SERPL CALCULATED.3IONS-SCNC: 11.3 MMOL/L (ref 5–15)
AST SERPL-CCNC: 17 U/L (ref 1–40)
BILIRUB SERPL-MCNC: 0.3 MG/DL (ref 0–1.2)
BUN SERPL-MCNC: 18 MG/DL (ref 8–23)
BUN/CREAT SERPL: 18.2 (ref 7–25)
CALCIUM SPEC-SCNC: 9.6 MG/DL (ref 8.6–10.5)
CHLORIDE SERPL-SCNC: 107 MMOL/L (ref 98–107)
CHOLEST SERPL-MCNC: 113 MG/DL (ref 0–200)
CO2 SERPL-SCNC: 24.7 MMOL/L (ref 22–29)
CREAT SERPL-MCNC: 0.99 MG/DL (ref 0.76–1.27)
DEPRECATED RDW RBC AUTO: 47.8 FL (ref 37–54)
EGFRCR SERPLBLD CKD-EPI 2021: 78.5 ML/MIN/1.73
ERYTHROCYTE [DISTWIDTH] IN BLOOD BY AUTOMATED COUNT: 13.8 % (ref 12.3–15.4)
GLOBULIN UR ELPH-MCNC: 2.3 GM/DL
GLUCOSE SERPL-MCNC: 127 MG/DL (ref 65–99)
HCT VFR BLD AUTO: 40.8 % (ref 37.5–51)
HDLC SERPL-MCNC: 31 MG/DL (ref 40–60)
HGB BLD-MCNC: 12.9 G/DL (ref 13–17.7)
INR PPP: 2.36 (ref 0.86–1.15)
LDLC SERPL CALC-MCNC: 41 MG/DL (ref 0–100)
LDLC/HDLC SERPL: 0.95 {RATIO}
MCH RBC QN AUTO: 29.7 PG (ref 26.6–33)
MCHC RBC AUTO-ENTMCNC: 31.6 G/DL (ref 31.5–35.7)
MCV RBC AUTO: 93.8 FL (ref 79–97)
PLATELET # BLD AUTO: 267 10*3/MM3 (ref 140–450)
PMV BLD AUTO: 10.9 FL (ref 6–12)
POTASSIUM SERPL-SCNC: 4.6 MMOL/L (ref 3.5–5.2)
PROT SERPL-MCNC: 6.5 G/DL (ref 6–8.5)
PROTHROMBIN TIME: 26.2 SECONDS (ref 11.8–14.9)
RBC # BLD AUTO: 4.35 10*6/MM3 (ref 4.14–5.8)
SODIUM SERPL-SCNC: 143 MMOL/L (ref 136–145)
TRIGL SERPL-MCNC: 262 MG/DL (ref 0–150)
VLDLC SERPL-MCNC: 41 MG/DL (ref 5–40)
WBC NRBC COR # BLD AUTO: 7.58 10*3/MM3 (ref 3.4–10.8)

## 2024-04-26 PROCEDURE — 80053 COMPREHEN METABOLIC PANEL: CPT

## 2024-04-26 PROCEDURE — 36415 COLL VENOUS BLD VENIPUNCTURE: CPT

## 2024-04-26 PROCEDURE — 85610 PROTHROMBIN TIME: CPT

## 2024-04-26 PROCEDURE — 83036 HEMOGLOBIN GLYCOSYLATED A1C: CPT

## 2024-04-26 PROCEDURE — 85027 COMPLETE CBC AUTOMATED: CPT

## 2024-04-26 PROCEDURE — 80061 LIPID PANEL: CPT

## 2024-04-26 NOTE — PROGRESS NOTES
Lab Results   Component Value Date    INR 2.36 (H) 04/26/2024    INR 2.22 (H) 03/29/2024    INR 2.40 (H) 03/15/2024    PROTIME 26.2 (H) 04/26/2024    PROTIME 24.9 (H) 03/29/2024    PROTIME 26.5 (H) 03/15/2024      Dx: afib  Pt taking 4  Range: 2.0-3.0  [facility]

## 2024-04-27 DIAGNOSIS — E11.9 TYPE 2 DIABETES MELLITUS WITHOUT COMPLICATION, WITHOUT LONG-TERM CURRENT USE OF INSULIN: Primary | ICD-10-CM

## 2024-04-29 LAB — HBA1C MFR BLD: 7.3 % (ref 4.8–5.6)

## 2024-05-01 ENCOUNTER — TELEPHONE (OUTPATIENT)
Dept: CARDIOLOGY | Facility: CLINIC | Age: 79
End: 2024-05-01
Payer: MEDICARE

## 2024-05-01 NOTE — TELEPHONE ENCOUNTER
----- Message from Serene HECTOR sent at 5/1/2024  9:32 AM EDT -----    ----- Message -----  From: Vishal Denton MD  Sent: 5/1/2024   8:38 AM EDT  To: Nelia Redd RN    Recent labs showed well-controlled cholesterol levels.  LDL, bad cholesterol is at 41 which is at goal.  Kidney functions, liver enzymes sodium and potassium are within normal limits.  Blood counts are also at his baseline.    No medication changes needed at this time.  Follow-up as scheduled earlier.      Electronically signed by Vishal Denton MD, 05/01/24, 8:38 AM EDT.

## 2024-05-01 NOTE — PROGRESS NOTES
Chief Complaint  Establish Care and Anxiety    Subjective         Veronica Zazueta presents to [facility] for Establishment of care, physical exam, discussion about anxiety.    Patient is here to establish care, she reports that she has not had any significant care and many years, probably more than 20.  Patient reports that she does not recall any significant diagnosed medical problems, she states that when she was a teenager she was worked up for possible PCOS, she is unclear if this was ever a formal diagnosis, it started with her developing facial hair at age 17.  She was told that she would have a very difficult time conceiving children, however she has had 3 children since.  Patient reports that she has always had the facial hair, she has just learned to live with it.  She feels like she has other small male features, but she ignores them.  Patient's primary complaint today is anxiety, she reports that she has been having increased anxiety and irritability over the last several years.  She is finally to the point to where she feels like she has to do something.  She has reacting inappropriately to things that should not warrant such a reaction.  She is concerned about her eating habits, she tries eat healthy throughout the day, then she will binge eat throughout the night.  Patient is a recovering methamphetamine addict, she has been sober for 4-1/2 years.  She does admit to intermittent marijuana use to calm her nerves, primarily at night.  She is not currently on any other medications at this time.  She did have some basic labs performed in the emergency room in March when she was there.  Patient reports that sometimes when her anxiety gets very worked up she notices chest pains in the left chest, she was not sure what those were.  She does not report any other significant symptoms like shortness of breath, the pain does not radiate.    Patient would like to start a journey towards  Recent labs showed well-controlled cholesterol levels.  LDL, bad cholesterol is at 41 which is at goal.  Kidney functions, liver enzymes sodium and potassium are within normal limits.  Blood counts are also at his baseline.    No medication changes needed at this time.  Follow-up as scheduled earlier.      Electronically signed by Vishal Denton MD, 05/01/24, 8:38 AM EDT.   "better health, so her first step was coming here today.  Otherwise there are no significant problems at this time.         Review of Systems    Objective   Vital Signs:   /80   Pulse 89   Temp 96.8 °F (36 °C)   Ht 175.3 cm (69\")   Wt (!) 152 kg (336 lb)   SpO2 96%   BMI 49.62 kg/m²     Physical Exam  Vitals and nursing note reviewed.   Constitutional:       Appearance: Normal appearance. She is obese.   HENT:      Head: Normocephalic and atraumatic.   Eyes:      Conjunctiva/sclera: Conjunctivae normal.      Pupils: Pupils are equal, round, and reactive to light.   Cardiovascular:      Rate and Rhythm: Normal rate and regular rhythm.   Pulmonary:      Effort: Pulmonary effort is normal.      Breath sounds: Normal breath sounds.   Neurological:      General: No focal deficit present.      Mental Status: She is alert.   Psychiatric:         Mood and Affect: Mood normal.         Thought Content: Thought content normal.        Result Review :            ECG 12 Lead    Date/Time: 5/3/2022 2:22 PM  Performed by: Ravinder Fink APRN  Authorized by: Ravinder Fink APRN   Comparison: compared with previous ECG from 3/25/2022  Similar to previous ECG  Rhythm: sinus rhythm  Rate: normal  Conduction: conduction normal  ST Segments: ST segments normal  T Waves: T waves normal  QRS axis: normal    Clinical impression: normal ECG                Diagnoses and all orders for this visit:    1. Establishing care with new doctor, encounter for (Primary)    2. Hirsutism  Assessment & Plan:  We will start with lab work today, follow-up based on results.    Orders:  -     Testosterone, Free, Total  -     Vitamin D 25 Hydroxy  -     Hemoglobin A1c  -     Cancel: Testosterone  -     17-Hydroxyprogesterone  -     DHEA-Sulfate  -     Androstenedione  -     Sex Horm Binding Globulin  -     Cortisol    3. Annual physical exam  -     Comprehensive Metabolic Panel  -     CBC & Differential    4. Lipid screening  -     Lipid Panel    5. " Thyroid disorder screen  -     T4, Free  -     T3, Free  -     TSH    6. Generalized anxiety disorder  Assessment & Plan:  Psychological condition is newly identified.  Medication changes per orders.  Psychological condition  will be reassessed in 3 months.      7. Morbid (severe) obesity due to excess calories (HCC)  Assessment & Plan:  Patient's (Body mass index is 49.62 kg/m².) indicates that they are morbidly obese (BMI > 40 or > 35 with obesity - related health condition) with health conditions that include none . Weight is newly identified. BMI is is above average; BMI management plan is completed. We discussed portion control and increasing exercise.       8. Chest pain, unspecified type  Assessment & Plan:  Twelve-lead normal in the office, we will also check some labs today, likely due to anxiety and stress, we will follow-up as more information is gathered.  If symptoms worsen go directly to ER.      Other orders  -     FLUoxetine (PROzac) 10 MG capsule; Take 1 capsule by mouth Daily.  Dispense: 90 capsule; Refill: 0  -     hydrOXYzine (ATARAX) 25 MG tablet; Take 1 tablet by mouth 3 (Three) Times a Day As Needed for Anxiety.  Dispense: 90 tablet; Refill: 0  -     ECG 12 Lead      I spent 70 minutes caring for Veronica on this date of service. This time includes time spent by me in the following activities:preparing for the visit, reviewing tests, obtaining and/or reviewing a separately obtained history, performing a medically appropriate examination and/or evaluation , counseling and educating the patient/family/caregiver, ordering medications, tests, or procedures, documenting information in the medical record and independently interpreting results and communicating that information with the patient/family/caregiver  Follow Up   Return in about 3 months (around 8/3/2022) for Recheck.  Patient was given instructions and counseling regarding her condition or for health maintenance advice. Please see specific  information pulled into the AVS if appropriate.     Ravinder Fink, BRAD  5/3/2022  This note was electronically signed.

## 2024-05-01 NOTE — TELEPHONE ENCOUNTER
Recent labs showed well-controlled cholesterol levels.  LDL, bad cholesterol is at 41 which is at goal.  Kidney functions, liver enzymes sodium and potassium are within normal limits.  Blood counts are also at his baseline.     No medication changes needed at this time.  Follow-up as scheduled earlier.     Pt advised

## 2024-05-06 ENCOUNTER — OFFICE VISIT (OUTPATIENT)
Dept: FAMILY MEDICINE CLINIC | Age: 79
End: 2024-05-06
Payer: MEDICARE

## 2024-05-06 VITALS
TEMPERATURE: 97.5 F | DIASTOLIC BLOOD PRESSURE: 67 MMHG | WEIGHT: 231.6 LBS | SYSTOLIC BLOOD PRESSURE: 154 MMHG | HEART RATE: 59 BPM | OXYGEN SATURATION: 97 % | BODY MASS INDEX: 33.16 KG/M2 | HEIGHT: 70 IN

## 2024-05-06 DIAGNOSIS — E11.9 TYPE 2 DIABETES MELLITUS WITHOUT COMPLICATION, WITHOUT LONG-TERM CURRENT USE OF INSULIN: ICD-10-CM

## 2024-05-06 DIAGNOSIS — R31.9 HEMATURIA, UNSPECIFIED TYPE: Primary | ICD-10-CM

## 2024-05-06 LAB
BACTERIA UR QL AUTO: ABNORMAL /HPF
BILIRUB UR QL STRIP: NEGATIVE
CLARITY UR: ABNORMAL
COLOR UR: ABNORMAL
GLUCOSE UR STRIP-MCNC: NEGATIVE MG/DL
HGB UR QL STRIP.AUTO: ABNORMAL
KETONES UR QL STRIP: NEGATIVE
LEUKOCYTE ESTERASE UR QL STRIP.AUTO: NEGATIVE
NITRITE UR QL STRIP: NEGATIVE
PH UR STRIP.AUTO: 6 [PH] (ref 5–8)
PROT UR QL STRIP: ABNORMAL
RBC # UR STRIP: ABNORMAL /HPF
REF LAB TEST METHOD: ABNORMAL
SP GR UR STRIP: 1.01 (ref 1–1.03)
SQUAMOUS #/AREA URNS HPF: ABNORMAL /HPF
UROBILINOGEN UR QL STRIP: ABNORMAL
WBC # UR STRIP: ABNORMAL /HPF

## 2024-05-06 PROCEDURE — 87086 URINE CULTURE/COLONY COUNT: CPT | Performed by: NURSE PRACTITIONER

## 2024-05-06 PROCEDURE — 3077F SYST BP >= 140 MM HG: CPT | Performed by: NURSE PRACTITIONER

## 2024-05-06 PROCEDURE — 99213 OFFICE O/P EST LOW 20 MIN: CPT | Performed by: NURSE PRACTITIONER

## 2024-05-06 PROCEDURE — 3078F DIAST BP <80 MM HG: CPT | Performed by: NURSE PRACTITIONER

## 2024-05-06 PROCEDURE — 81001 URINALYSIS AUTO W/SCOPE: CPT | Performed by: NURSE PRACTITIONER

## 2024-05-07 ENCOUNTER — APPOINTMENT (OUTPATIENT)
Dept: CT IMAGING | Facility: HOSPITAL | Age: 79
End: 2024-05-07
Payer: MEDICARE

## 2024-05-07 ENCOUNTER — HOSPITAL ENCOUNTER (EMERGENCY)
Facility: HOSPITAL | Age: 79
Discharge: HOME | End: 2024-05-07
Attending: EMERGENCY MEDICINE | Admitting: EMERGENCY MEDICINE
Payer: MEDICARE

## 2024-05-07 ENCOUNTER — TELEPHONE (OUTPATIENT)
Dept: UROLOGY | Facility: CLINIC | Age: 79
End: 2024-05-07
Payer: MEDICARE

## 2024-05-07 VITALS
DIASTOLIC BLOOD PRESSURE: 77 MMHG | TEMPERATURE: 98 F | SYSTOLIC BLOOD PRESSURE: 153 MMHG | BODY MASS INDEX: 30.08 KG/M2 | OXYGEN SATURATION: 97 % | WEIGHT: 210.1 LBS | HEART RATE: 65 BPM | HEIGHT: 70 IN | RESPIRATION RATE: 18 BRPM

## 2024-05-07 DIAGNOSIS — R10.32 ACUTE LEFT LOWER QUADRANT PAIN: ICD-10-CM

## 2024-05-07 DIAGNOSIS — K57.32 DIVERTICULITIS OF LARGE INTESTINE WITHOUT PERFORATION OR ABSCESS WITHOUT BLEEDING: Primary | ICD-10-CM

## 2024-05-07 DIAGNOSIS — R31.9 HEMATURIA, UNSPECIFIED TYPE: ICD-10-CM

## 2024-05-07 LAB
ALBUMIN SERPL-MCNC: 3.9 G/DL (ref 3.5–5.2)
ALBUMIN/GLOB SERPL: 1.6 G/DL
ALP SERPL-CCNC: 71 U/L (ref 39–117)
ALT SERPL W P-5'-P-CCNC: 13 U/L (ref 1–41)
ANION GAP SERPL CALCULATED.3IONS-SCNC: 13 MMOL/L (ref 5–15)
AST SERPL-CCNC: 11 U/L (ref 1–40)
BACTERIA UR QL AUTO: ABNORMAL /HPF
BASOPHILS # BLD AUTO: 0.02 10*3/MM3 (ref 0–0.2)
BASOPHILS NFR BLD AUTO: 0.2 % (ref 0–1.5)
BILIRUB SERPL-MCNC: 0.3 MG/DL (ref 0–1.2)
BILIRUB UR QL STRIP: NEGATIVE
BUN SERPL-MCNC: 14 MG/DL (ref 8–23)
BUN/CREAT SERPL: 11.9 (ref 7–25)
CALCIUM SPEC-SCNC: 9.3 MG/DL (ref 8.6–10.5)
CHLORIDE SERPL-SCNC: 104 MMOL/L (ref 98–107)
CLARITY UR: ABNORMAL
CO2 SERPL-SCNC: 23 MMOL/L (ref 22–29)
COLOR UR: YELLOW
CREAT SERPL-MCNC: 1.18 MG/DL (ref 0.76–1.27)
DEPRECATED RDW RBC AUTO: 45.6 FL (ref 37–54)
EGFRCR SERPLBLD CKD-EPI 2021: 63.6 ML/MIN/1.73
EOSINOPHIL # BLD AUTO: 0.1 10*3/MM3 (ref 0–0.4)
EOSINOPHIL NFR BLD AUTO: 0.9 % (ref 0.3–6.2)
ERYTHROCYTE [DISTWIDTH] IN BLOOD BY AUTOMATED COUNT: 14 % (ref 12.3–15.4)
GLOBULIN UR ELPH-MCNC: 2.4 GM/DL
GLUCOSE SERPL-MCNC: 207 MG/DL (ref 65–99)
GLUCOSE UR STRIP-MCNC: NEGATIVE MG/DL
HCT VFR BLD AUTO: 38 % (ref 37.5–51)
HGB BLD-MCNC: 12.7 G/DL (ref 13–17.7)
HGB UR QL STRIP.AUTO: ABNORMAL
HOLD SPECIMEN: NORMAL
HOLD SPECIMEN: NORMAL
HYALINE CASTS UR QL AUTO: ABNORMAL /LPF
IMM GRANULOCYTES # BLD AUTO: 0.05 10*3/MM3 (ref 0–0.05)
IMM GRANULOCYTES NFR BLD AUTO: 0.4 % (ref 0–0.5)
INR PPP: 2.5 (ref 0.86–1.15)
KETONES UR QL STRIP: NEGATIVE
LEUKOCYTE ESTERASE UR QL STRIP.AUTO: ABNORMAL
LYMPHOCYTES # BLD AUTO: 0.77 10*3/MM3 (ref 0.7–3.1)
LYMPHOCYTES NFR BLD AUTO: 6.7 % (ref 19.6–45.3)
MCH RBC QN AUTO: 30 PG (ref 26.6–33)
MCHC RBC AUTO-ENTMCNC: 33.4 G/DL (ref 31.5–35.7)
MCV RBC AUTO: 89.8 FL (ref 79–97)
MONOCYTES # BLD AUTO: 0.66 10*3/MM3 (ref 0.1–0.9)
MONOCYTES NFR BLD AUTO: 5.7 % (ref 5–12)
NEUTROPHILS NFR BLD AUTO: 86.1 % (ref 42.7–76)
NEUTROPHILS NFR BLD AUTO: 9.95 10*3/MM3 (ref 1.7–7)
NITRITE UR QL STRIP: NEGATIVE
NRBC BLD AUTO-RTO: 0 /100 WBC (ref 0–0.2)
PH UR STRIP.AUTO: 6.5 [PH] (ref 5–8)
PLATELET # BLD AUTO: 227 10*3/MM3 (ref 140–450)
PMV BLD AUTO: 10.3 FL (ref 6–12)
POTASSIUM SERPL-SCNC: 4.5 MMOL/L (ref 3.5–5.2)
PROT SERPL-MCNC: 6.3 G/DL (ref 6–8.5)
PROT UR QL STRIP: ABNORMAL
PROTHROMBIN TIME: 27.4 SECONDS (ref 11.8–14.9)
RBC # BLD AUTO: 4.23 10*6/MM3 (ref 4.14–5.8)
RBC # UR STRIP: ABNORMAL /HPF
REF LAB TEST METHOD: ABNORMAL
SODIUM SERPL-SCNC: 140 MMOL/L (ref 136–145)
SP GR UR STRIP: 1.01 (ref 1–1.03)
SQUAMOUS #/AREA URNS HPF: ABNORMAL /HPF
UROBILINOGEN UR QL STRIP: ABNORMAL
WBC # UR STRIP: ABNORMAL /HPF
WBC NRBC COR # BLD AUTO: 11.55 10*3/MM3 (ref 3.4–10.8)
WHOLE BLOOD HOLD COAG: NORMAL
WHOLE BLOOD HOLD SPECIMEN: NORMAL

## 2024-05-07 PROCEDURE — 99285 EMERGENCY DEPT VISIT HI MDM: CPT

## 2024-05-07 PROCEDURE — 25810000003 SODIUM CHLORIDE 0.9 % SOLUTION: Performed by: NURSE PRACTITIONER

## 2024-05-07 PROCEDURE — 81001 URINALYSIS AUTO W/SCOPE: CPT | Performed by: NURSE PRACTITIONER

## 2024-05-07 PROCEDURE — 85025 COMPLETE CBC W/AUTO DIFF WBC: CPT

## 2024-05-07 PROCEDURE — 80053 COMPREHEN METABOLIC PANEL: CPT

## 2024-05-07 PROCEDURE — 25510000001 IOPAMIDOL PER 1 ML: Performed by: EMERGENCY MEDICINE

## 2024-05-07 PROCEDURE — 74177 CT ABD & PELVIS W/CONTRAST: CPT

## 2024-05-07 PROCEDURE — 87086 URINE CULTURE/COLONY COUNT: CPT | Performed by: NURSE PRACTITIONER

## 2024-05-07 PROCEDURE — 85610 PROTHROMBIN TIME: CPT | Performed by: NURSE PRACTITIONER

## 2024-05-07 PROCEDURE — 96360 HYDRATION IV INFUSION INIT: CPT

## 2024-05-07 RX ORDER — SODIUM CHLORIDE 0.9 % (FLUSH) 0.9 %
10 SYRINGE (ML) INJECTION AS NEEDED
Status: DISCONTINUED | OUTPATIENT
Start: 2024-05-07 | End: 2024-05-07 | Stop reason: HOSPADM

## 2024-05-07 RX ORDER — AMOXICILLIN AND CLAVULANATE POTASSIUM 875; 125 MG/1; MG/1
1 TABLET, FILM COATED ORAL 2 TIMES DAILY
Qty: 20 TABLET | Refills: 0 | Status: SHIPPED | OUTPATIENT
Start: 2024-05-07 | End: 2024-05-17

## 2024-05-07 RX ADMIN — SODIUM CHLORIDE 1000 ML: 9 INJECTION, SOLUTION INTRAVENOUS at 15:07

## 2024-05-07 RX ADMIN — IOPAMIDOL 90 ML: 755 INJECTION, SOLUTION INTRAVENOUS at 15:01

## 2024-05-08 ENCOUNTER — TELEPHONE (OUTPATIENT)
Dept: UROLOGY | Facility: CLINIC | Age: 79
End: 2024-05-08
Payer: MEDICARE

## 2024-05-08 LAB — BACTERIA SPEC AEROBE CULT: NO GROWTH

## 2024-05-09 ENCOUNTER — TELEPHONE (OUTPATIENT)
Dept: CARDIOLOGY | Facility: CLINIC | Age: 79
End: 2024-05-09
Payer: MEDICARE

## 2024-05-09 ENCOUNTER — TELEPHONE (OUTPATIENT)
Dept: FAMILY MEDICINE CLINIC | Age: 79
End: 2024-05-09
Payer: MEDICARE

## 2024-05-09 LAB — BACTERIA SPEC AEROBE CULT: NO GROWTH

## 2024-05-09 NOTE — TELEPHONE ENCOUNTER
Pt informed.  He said that he started having a pinkish/ red urine flow last night.  He saw a black scab looking thing that passed once.  Advised him to contact the urology office to let them know and see if they want to see him sooner or put on a cancellation list.    yes

## 2024-05-09 NOTE — TELEPHONE ENCOUNTER
"  Caller: Zachariah Cruz \"Keith\"    Relationship: Self    Best call back number: [phone]    What is the best time to reach you: ANYTIME    Who are you requesting to speak with (clinical staff, provider,  specific staff member): PRASHANTH       What was the call regarding: PATIENT IS CALLING LETTING PCP KNOW THAT HE HAS AN APPOINTMENT WITH UROLOGIST ON JUNE 4TH, BUT HE IS STILL PASSING BLOOD AND THE ER DIDN'T SEEM TO CONCERNED ABOUT IT.      "

## 2024-05-13 ENCOUNTER — ANTICOAGULATION VISIT (OUTPATIENT)
Dept: CARDIOLOGY | Facility: CLINIC | Age: 79
End: 2024-05-13
Payer: MEDICARE

## 2024-05-13 ENCOUNTER — LAB (OUTPATIENT)
Dept: LAB | Facility: HOSPITAL | Age: 79
End: 2024-05-13
Payer: MEDICARE

## 2024-05-13 DIAGNOSIS — D64.9 ANEMIA, UNSPECIFIED TYPE: ICD-10-CM

## 2024-05-13 DIAGNOSIS — I48.0 PAROXYSMAL ATRIAL FIBRILLATION: Primary | ICD-10-CM

## 2024-05-13 DIAGNOSIS — I48.0 PAROXYSMAL ATRIAL FIBRILLATION: ICD-10-CM

## 2024-05-13 LAB
BASOPHILS # BLD AUTO: 0.01 10*3/MM3 (ref 0–0.2)
BASOPHILS NFR BLD AUTO: 0.1 % (ref 0–1.5)
DEPRECATED RDW RBC AUTO: 48.5 FL (ref 37–54)
EOSINOPHIL # BLD AUTO: 0.27 10*3/MM3 (ref 0–0.4)
EOSINOPHIL NFR BLD AUTO: 3.1 % (ref 0.3–6.2)
ERYTHROCYTE [DISTWIDTH] IN BLOOD BY AUTOMATED COUNT: 14 % (ref 12.3–15.4)
FERRITIN SERPL-MCNC: 57.53 NG/ML (ref 30–400)
HCT VFR BLD AUTO: 40.9 % (ref 37.5–51)
HGB BLD-MCNC: 13 G/DL (ref 13–17.7)
IMM GRANULOCYTES # BLD AUTO: 0.04 10*3/MM3 (ref 0–0.05)
IMM GRANULOCYTES NFR BLD AUTO: 0.5 % (ref 0–0.5)
INR PPP: 1.35 (ref 0.86–1.15)
IRON 24H UR-MRATE: 64 MCG/DL (ref 59–158)
IRON SATN MFR SERPL: 17 % (ref 20–50)
LYMPHOCYTES # BLD AUTO: 1.71 10*3/MM3 (ref 0.7–3.1)
LYMPHOCYTES NFR BLD AUTO: 19.7 % (ref 19.6–45.3)
MCH RBC QN AUTO: 29.5 PG (ref 26.6–33)
MCHC RBC AUTO-ENTMCNC: 31.8 G/DL (ref 31.5–35.7)
MCV RBC AUTO: 93 FL (ref 79–97)
MONOCYTES # BLD AUTO: 0.56 10*3/MM3 (ref 0.1–0.9)
MONOCYTES NFR BLD AUTO: 6.5 % (ref 5–12)
NEUTROPHILS NFR BLD AUTO: 6.09 10*3/MM3 (ref 1.7–7)
NEUTROPHILS NFR BLD AUTO: 70.1 % (ref 42.7–76)
PLATELET # BLD AUTO: 263 10*3/MM3 (ref 140–450)
PMV BLD AUTO: 10.9 FL (ref 6–12)
PROTHROMBIN TIME: 16.9 SECONDS (ref 11.8–14.9)
RBC # BLD AUTO: 4.4 10*6/MM3 (ref 4.14–5.8)
TIBC SERPL-MCNC: 380 MCG/DL (ref 298–536)
TRANSFERRIN SERPL-MCNC: 255 MG/DL (ref 200–360)
WBC NRBC COR # BLD AUTO: 8.68 10*3/MM3 (ref 3.4–10.8)

## 2024-05-13 PROCEDURE — 85025 COMPLETE CBC W/AUTO DIFF WBC: CPT

## 2024-05-13 PROCEDURE — 36415 COLL VENOUS BLD VENIPUNCTURE: CPT

## 2024-05-13 PROCEDURE — 83540 ASSAY OF IRON: CPT

## 2024-05-13 PROCEDURE — 85610 PROTHROMBIN TIME: CPT

## 2024-05-13 PROCEDURE — 82728 ASSAY OF FERRITIN: CPT

## 2024-05-13 PROCEDURE — 84466 ASSAY OF TRANSFERRIN: CPT

## 2024-05-13 NOTE — PROGRESS NOTES
Level is low, he had actually held dose and decreased to 2 mg due to hematuria and treatment with antibiotic. (Please see separate telephone encounter) recommended resume 4 mg daily, and recheck INR on Thursday.

## 2024-05-20 ENCOUNTER — LAB (OUTPATIENT)
Dept: LAB | Facility: HOSPITAL | Age: 79
End: 2024-05-20
Payer: MEDICARE

## 2024-05-20 ENCOUNTER — ANTICOAGULATION VISIT (OUTPATIENT)
Dept: CARDIOLOGY | Facility: CLINIC | Age: 79
End: 2024-05-20
Payer: MEDICARE

## 2024-05-20 DIAGNOSIS — I48.0 PAROXYSMAL ATRIAL FIBRILLATION: ICD-10-CM

## 2024-05-20 DIAGNOSIS — I48.0 PAROXYSMAL ATRIAL FIBRILLATION: Primary | ICD-10-CM

## 2024-05-20 LAB
INR PPP: 1.8 (ref 0.86–1.15)
PROTHROMBIN TIME: 21.2 SECONDS (ref 11.8–14.9)

## 2024-05-20 PROCEDURE — 36415 COLL VENOUS BLD VENIPUNCTURE: CPT

## 2024-05-20 PROCEDURE — 85610 PROTHROMBIN TIME: CPT

## 2024-05-20 NOTE — PROGRESS NOTES
Dx:Atrial fibrillation   Dose:4 mg  Range:2.0-3.0  Location:[facility]        Lab Results   Component Value Date    INR 1.80 (H) 05/20/2024    INR 1.35 (H) 05/13/2024    INR 2.50 (H) 05/07/2024    PROTIME 21.2 (H) 05/20/2024    PROTIME 16.9 (H) 05/13/2024    PROTIME 27.4 (H) 05/07/2024         Informed pt of inr results and plan no question or concerns

## 2024-05-21 ENCOUNTER — OFFICE VISIT (OUTPATIENT)
Dept: ONCOLOGY | Facility: HOSPITAL | Age: 79
End: 2024-05-21
Payer: MEDICARE

## 2024-05-21 VITALS
HEART RATE: 71 BPM | OXYGEN SATURATION: 97 % | RESPIRATION RATE: 18 BRPM | DIASTOLIC BLOOD PRESSURE: 62 MMHG | SYSTOLIC BLOOD PRESSURE: 152 MMHG | BODY MASS INDEX: 32.89 KG/M2 | TEMPERATURE: 98.4 F | WEIGHT: 229.2 LBS

## 2024-05-21 DIAGNOSIS — D64.9 ANEMIA, UNSPECIFIED TYPE: Primary | ICD-10-CM

## 2024-05-21 DIAGNOSIS — D89.89 LIGHT CHAIN DISEASE, LAMBDA TYPE: ICD-10-CM

## 2024-05-21 PROCEDURE — 1159F MED LIST DOCD IN RCRD: CPT | Performed by: NURSE PRACTITIONER

## 2024-05-21 PROCEDURE — 99214 OFFICE O/P EST MOD 30 MIN: CPT | Performed by: NURSE PRACTITIONER

## 2024-05-21 PROCEDURE — 1160F RVW MEDS BY RX/DR IN RCRD: CPT | Performed by: NURSE PRACTITIONER

## 2024-05-21 PROCEDURE — 3077F SYST BP >= 140 MM HG: CPT | Performed by: NURSE PRACTITIONER

## 2024-05-21 PROCEDURE — 1126F AMNT PAIN NOTED NONE PRSNT: CPT | Performed by: NURSE PRACTITIONER

## 2024-05-21 PROCEDURE — 3078F DIAST BP <80 MM HG: CPT | Performed by: NURSE PRACTITIONER

## 2024-05-21 PROCEDURE — G0463 HOSPITAL OUTPT CLINIC VISIT: HCPCS | Performed by: NURSE PRACTITIONER

## 2024-05-21 NOTE — PROGRESS NOTES
Chief Complaint/Reason for Referral:  anemia    Nelia Frost, *  Nelia Frost, APRN        Subjective    History of Present Illness    Mr. Zachariah Cruz presents for follow up with his wife. Last seen in February with dr. Burgess. Reports he was in the ER recently with left sided flank pain and thinking he had a kidney stone. Was noted to have increased blood in his urine.     Follows here for mild anemia. Reports he is taking oral iron 3 days a week. Recent lab work shows anemia has improved and is in the 13.0 range. Iron studies show improvement, but iron sat is still mildly low but improved from last visit in February.     Mild elevation in the previous light chain with the ratio elevated, but no monoclonality. Creatinine is 1.18 with gfr of 64.     Follows with urology. CT scan in ER showed acute diverticulitis. Reports was given a round of amoxicillin.         Oncology/Hematology History    No history exists.       Review of Systems   Constitutional:  Positive for fatigue. Negative for appetite change, diaphoresis, fever, unexpected weight gain and unexpected weight loss.   HENT:  Negative for hearing loss, sore throat and voice change.    Eyes:  Negative for blurred vision, double vision, pain, redness and visual disturbance.   Respiratory:  Negative for cough, shortness of breath and wheezing.    Cardiovascular:  Negative for chest pain, palpitations and leg swelling.   Endocrine: Negative for cold intolerance, heat intolerance, polydipsia and polyuria.   Genitourinary:  Negative for decreased urine volume, difficulty urinating, frequency and urinary incontinence.   Musculoskeletal:  Negative for arthralgias, back pain, joint swelling and myalgias.   Skin:  Negative for color change, rash, skin lesions and wound.   Neurological:  Negative for dizziness, seizures, numbness and headache.   Hematological:  Negative for adenopathy. Does not bruise/bleed easily.   Psychiatric/Behavioral:   Negative for depressed mood. The patient is not nervous/anxious.    All other systems reviewed and are negative.      Current Outpatient Medications on File Prior to Visit   Medication Sig Dispense Refill    allopurinol (ZYLOPRIM) 100 MG tablet Take 1 tablet by mouth Daily. 90 tablet 1    amLODIPine (NORVASC) 10 MG tablet TAKE ONE TABLET BY MOUTH DAILY 90 tablet 3    aspirin 81 MG EC tablet Take 1 tablet by mouth Daily.      FeroSul 325 (65 Fe) MG tablet TAKE ONE TABLET BY MOUTH DAILY WITH BREAKFAST 90 tablet 1    folic acid (FOLVITE) 1 MG tablet TAKE 1 TABLET BY MOUTH DAILY 90 tablet 1    glimepiride (AMARYL) 4 MG tablet TAKE 1 TABLET BY MOUTH DAILY (Patient taking differently: Take 0.5 tablets by mouth Daily.) 90 tablet 0    lisinopril (PRINIVIL,ZESTRIL) 20 MG tablet TAKE 1 TABLET BY MOUTH TWICE A  tablet 1    metFORMIN ER (GLUCOPHAGE-XR) 500 MG 24 hr tablet TAKE FOUR TABLETS BY MOUTH DAILY (Patient taking differently: Take 2 tablets by mouth 2 (Two) Times a Day.) 360 tablet 1    metoprolol succinate XL (TOPROL-XL) 25 MG 24 hr tablet TAKE 2 TABLETS BY MOUTH EVERY MORNING AND TAKE 1 TABLET EVERY EVENING 270 tablet 1    simvastatin (ZOCOR) 40 MG tablet TAKE 1 TABLET BY MOUTH DAILY 90 tablet 1    spironolactone (ALDACTONE) 25 MG tablet TAKE 1 TABLET BY MOUTH DAILY 90 tablet 1    vitamin B-12 (CYANOCOBALAMIN) 500 MCG tablet Take 1 tablet by mouth Daily. 90 tablet 1    vitamin C (ASCORBIC ACID) 250 MG tablet Take 1 tablet by mouth Daily. 90 tablet 1    warfarin (Coumadin) 4 MG tablet Take 1 tablet by mouth daily or as directed 90 tablet 1     No current facility-administered medications on file prior to visit.       No Known Allergies  Past Medical History:   Diagnosis Date    Coronary artery disease     Diabetes     Hematuria     Hyperlipidemia     Hypertension      Past Surgical History:   Procedure Laterality Date    APPENDECTOMY      CARDIAC CATHETERIZATION      CARDIAC CATHETERIZATION N/A 03/16/2023     Procedure: Left Heart Cath with possible angioplasty;  Surgeon: Vishal Denton MD;  Location: [facility] CATH INVASIVE LOC[facility];  Service: Cardiovascular;  Laterality: N/A;    CATARACT EXTRACTION      CORONARY ARTERY BYPASS GRAFT  2010    LIMA to LAD, SVG to OM, SVG to PDA    PROSTATE SURGERY      TRANSURETHRAL RESECTION OF THE PROSTATE    UPPER GASTROINTESTINAL ENDOSCOPY       Social History     Socioeconomic History    Marital status:    Tobacco Use    Smoking status: Former     Current packs/day: 0.00     Average packs/day: 1 pack/day for 5.0 years (5.0 ttl pk-yrs)     Types: Cigarettes     Start date:      Quit date:      Years since quittin.4    Smokeless tobacco: Never   Vaping Use    Vaping status: Never Used   Substance and Sexual Activity    Alcohol use: Not Currently     Comment: occ    Drug use: Never    Sexual activity: Defer     Family History   Problem Relation Age of Onset    Hypertension Mother     Stroke Mother     Coronary artery disease Father     Stroke Paternal Grandfather     Coronary artery disease Other     Colon cancer Neg Hx      Immunization History   Administered Date(s) Administered    COVID-19 (PFIZER) BIVALENT 12+YRS 2022    COVID-19 (PFIZER) Purple Cap Monovalent 2021, 2021       Tobacco Use: Medium Risk (2024)    Patient History     Smoking Tobacco Use: Former     Smokeless Tobacco Use: Never     Passive Exposure: Not on file       Objective     Physical Exam  Vitals and nursing note reviewed.   Constitutional:       Appearance: Normal appearance.   HENT:      Head: Normocephalic.      Nose: Nose normal.      Mouth/Throat:      Mouth: Mucous membranes are moist.   Eyes:      Pupils: Pupils are equal, round, and reactive to light.   Cardiovascular:      Rate and Rhythm: Normal rate and regular rhythm.      Pulses: Normal pulses.      Heart sounds: Normal heart sounds. No murmur heard.  Pulmonary:      Effort: Pulmonary effort is normal.  No respiratory distress.      Breath sounds: Normal breath sounds.   Abdominal:      General: Bowel sounds are normal. There is no distension.      Palpations: Abdomen is soft.   Musculoskeletal:         General: Normal range of motion.      Cervical back: Normal range of motion and neck supple.   Skin:     General: Skin is warm and dry.      Capillary Refill: Capillary refill takes less than 2 seconds.   Neurological:      General: No focal deficit present.      Mental Status: He is alert and oriented to person, place, and time.   Psychiatric:         Mood and Affect: Mood normal.         Behavior: Behavior normal.         Thought Content: Thought content normal.         Judgment: Judgment normal.         Vitals:    05/21/24 1455   BP: 152/62   Pulse: 71   Resp: 18   Temp: 98.4 °F (36.9 °C)   TempSrc: Temporal   SpO2: 97%   Weight: 104 kg (229 lb 3.2 oz)   PainSc: 0-No pain       Wt Readings from Last 3 Encounters:   05/21/24 104 kg (229 lb 3.2 oz)   05/07/24 95.3 kg (210 lb 1.6 oz)   05/06/24 105 kg (231 lb 9.6 oz)      ECOG score: 0         ECOG: (0) Fully Active - Able to Carry On All Pre-disease Performance Without Restriction  Fall Risk Assessment was completed, and patient is at low risk for falls.  PHQ-9 Total Score: 0       The patient is  experiencing fatigue. Fatigue score: 0    PT/OT Functional Screening: PT fx screen : No needs identified  Speech Functional Screening: Speech fx screen : No needs identified  Rehab to be ordered: Rehab to be ordered : No needs identified        Result Review :  The following data was reviewed by: JACIEL Millard on 05/21/2024:  Lab Results   Component Value Date    HGB 13.0 05/13/2024    HCT 40.9 05/13/2024    MCV 93.0 05/13/2024     05/13/2024    WBC 8.68 05/13/2024    NEUTROABS 6.09 05/13/2024    LYMPHSABS 1.71 05/13/2024    MONOSABS 0.56 05/13/2024    EOSABS 0.27 05/13/2024    BASOSABS 0.01 05/13/2024     Lab Results   Component Value Date    GLUCOSE  207 (H) 05/07/2024    BUN 14 05/07/2024    CREATININE 1.18 05/07/2024     05/07/2024    K 4.5 05/07/2024     05/07/2024    CO2 23.0 05/07/2024    CALCIUM 9.3 05/07/2024    PROTEINTOT 6.3 05/07/2024    ALBUMIN 3.9 05/07/2024    BILITOT 0.3 05/07/2024    ALKPHOS 71 05/07/2024    AST 11 05/07/2024    ALT 13 05/07/2024     Lab Results   Component Value Date    FERRITIN 57.53 05/13/2024    BQMDKBHA50 1,620 (H) 02/16/2024    FOLATE >20.00 02/16/2024     Lab Results   Component Value Date    IRON 64 05/13/2024    LABIRON 17 (L) 05/13/2024    TRANSFERRIN 255 05/13/2024    TIBC 380 05/13/2024     Lab Results   Component Value Date    FERRITIN 57.53 05/13/2024    BOVZXGDF57 1,620 (H) 02/16/2024    FOLATE >20.00 02/16/2024     Lab Results   Component Value Date    PSA 0.818 12/22/2023       CT Abdomen Pelvis With Contrast    Result Date: 5/7/2024  Impression:  1. Cholelithiasis. No CT evidence of acute cholecystitis or biliary tract obstruction. 2. Findings consistent with acute sigmoid diverticulitis. No evidence of perforation or abscess. 3. 13 mm low-density mass within the head of the pancreas most likely a cyst, pseudocyst, or IPMN. Repeat CT scan of the abdomen with contrast would be recommended in 6-12 months to document stability.    Electronically Signed By-Mayito Hobbs MD On:5/7/2024 3:15 PM             Assessment and Plan:  Diagnoses and all orders for this visit:    1. Anemia, unspecified type (Primary)  -     Iron Profile; Future  -     Ferritin; Future  -     CBC & Differential; Future  -     FLACA, PE & Free LT Chains, Ser; Future    2. Light chain disease, lambda type  -     FLACA, PE & Free LT Chains, Ser; Future    Mild anemia with iron labs stable. History of recent blood loss in the urine. Went to ER after thinking he was having a kidney stone. No kidney stones bu the CT showed acute diverticulitis and started on Amoxicillin.     Hemoglobin is stable in the 12.7 to 13.0 range. Iron studies,  ferritin  are normal, but iron sat mildly low but improved from February of 2024. Continue oral iron 3 days a week.     Recheck labs in 6 months with CBC, iron panel, ferritin.     Mild light chain disease. Will recheck myeloma labs in 6 months. Ratio of 1.86. immunoglobulins are normal. SPEP with no monoclonality. Recent scopes were normal except for diverticulosis noted.     I spent 20 minutes caring for Zachariah on this date of service. This time includes time spent by me in the following activities:preparing for the visit, reviewing tests, obtaining and/or reviewing a separately obtained history, performing a medically appropriate examination and/or evaluation , counseling and educating the patient/family/caregiver, ordering medications, tests, or procedures, referring and communicating with other health care professionals , documenting information in the medical record, and independently interpreting results and communicating that information with the patient/family/caregiver    Patient Follow Up: 6 months with Dr. Burgess.     Patient was given instructions and counseling regarding his condition or for health maintenance advice. Please see specific information pulled into the AVS if appropriate.     Josy Cotton, APRN    5/21/2024    .tob

## 2024-05-29 ENCOUNTER — LAB (OUTPATIENT)
Dept: LAB | Facility: HOSPITAL | Age: 79
End: 2024-05-29
Payer: MEDICARE

## 2024-05-29 ENCOUNTER — ANTICOAGULATION VISIT (OUTPATIENT)
Dept: CARDIOLOGY | Facility: CLINIC | Age: 79
End: 2024-05-29
Payer: MEDICARE

## 2024-05-29 DIAGNOSIS — I48.0 PAROXYSMAL ATRIAL FIBRILLATION: ICD-10-CM

## 2024-05-29 DIAGNOSIS — I48.0 PAROXYSMAL ATRIAL FIBRILLATION: Primary | ICD-10-CM

## 2024-05-29 LAB
INR PPP: 2.64 (ref 0.86–1.15)
PROTHROMBIN TIME: 28.6 SECONDS (ref 11.8–14.9)

## 2024-05-29 PROCEDURE — 36415 COLL VENOUS BLD VENIPUNCTURE: CPT

## 2024-05-29 PROCEDURE — 85610 PROTHROMBIN TIME: CPT

## 2024-05-29 NOTE — PROGRESS NOTES
Dx: a-Fib  Dose:5-4  Range:2.0-3.0  Location:[facility]            Lab Results   Component Value Date    INR 2.64 (H) 05/29/2024    INR 1.80 (H) 05/20/2024    INR 1.35 (H) 05/13/2024    PROTIME 28.6 (H) 05/29/2024    PROTIME 21.2 (H) 05/20/2024    PROTIME 16.9 (H) 05/13/2024       Informed pt of results and plan

## 2024-06-01 NOTE — PROGRESS NOTES
Chief Complaint: Urologic complaint    Subjective         History of Present Illness  Zachariah Cruz is a 79 y.o. male         GH  BPH      Voiding okay, no straining.  No prostate meds    5/24 -gross hematuria times 2 days.  Asymptomatic.  He did have some left flank pain and went to the ED.  Treated for diverticulitis      5/7/2024 CT abdomen/pelvis with -acute sigmoid diverticulitis, cholelithiasis.  13 mm low-density mass within the head of the pancreas likely pseudocyst needs repeat imaging in 6 to 12 months.  Delayed phase good correction down the ureter, images reviewed    Pancreatic lesion has been worked up    5/7/2024   TNTC RBCs, no bacteria  5/7/2024 urine culture-negative, 1.1, GFR 63      2010, CABG, coronary stent placed in 3/23 patient does not smoke.   on Coumadin and ASA 81      PVR    4/23   064        Previous    Flomax 0.4 - was not sure if it was helping        10/23/2023 MRI abdomen with and without - multiple Bosniak 1 and 2 renal cyst.  Similar in size compared to 2021.  Previously noted dominant left upper pole simple cyst completely collapsed/ruptured.  Cholelithiasis.  Small pancreatic cystic lesion.  MRI follow-up in 1 to 2 years to be considered      3/22/2023   Enterococcus -this was preclosed after having Kim for heart catheterization      Patient does have MD in [place] following pancreatic lesion      1/23  - 3 days of gross hematuria, asymptomatic        4/3/2023 CT uro-- 1.3 cm lesion right kidney, mild enhancement  20 HUs.  No change since 12/21.  Consider MRI.  Additional bilateral renal cyst.  Stable 1 cm cyst neck of pancreas.  Cholelithiasis.      He is referred to get this pancreatic lesion looked at    2/22 cystoscopy-4 cm prostate, previous TUR with some regrowth on the median right lateral lobe.  Minor trabeculations, no pathology.  Delayed phase okay, images reviewed      2022 episodes of asymptomatic  gross hematuria.        12/21 CT stone protocol -  negative  11/21 urine culture-negative  10/21 creatinine 0.9, GFR 81    No urologic family history    Patient with acute kidney stones in the past, lithotripsy x1.      PVR    12/21   52    PSA    12/22    1.3  11/21 1.5  11/20 1.3  11/19 1.0           Objective     Past Medical History:   Diagnosis Date    Coronary artery disease     Diabetes     Hematuria     Hyperlipidemia     Hypertension        Past Surgical History:   Procedure Laterality Date    APPENDECTOMY      CARDIAC CATHETERIZATION      CARDIAC CATHETERIZATION N/A 03/16/2023    Procedure: Left Heart Cath with possible angioplasty;  Surgeon: Vishal Denton MD;  Location: [facility] CATH INVASIVE LOCATION;  Service: Cardiovascular;  Laterality: N/A;    CATARACT EXTRACTION      CORONARY ARTERY BYPASS GRAFT  05/21/2010    LIMA to LAD, SVG to OM, SVG to PDA    PROSTATE SURGERY      TRANSURETHRAL RESECTION OF THE PROSTATE    UPPER GASTROINTESTINAL ENDOSCOPY               Vital Signs:   There were no vitals taken for this visit.              Assessment and Plan    Diagnoses and all orders for this visit:    1. Benign prostatic hyperplasia with lower urinary tract symptoms, symptom details unspecified (Primary)        GH      Discussed labs reviewed CT with patient.  No etiology for his gross hematuria.  We also discussed his risk of having a bladder pathology that could be detrimental to his health.  We discussed risk and benefits of going ahead with cystoscopy today.  At this time after discussion patient would like to hold off he understands the risk.  He will let me know if he has more blood and would like to move forward in the future        BPH      Follow-up with NP in 3 years for annual.

## 2024-06-04 ENCOUNTER — OFFICE VISIT (OUTPATIENT)
Dept: UROLOGY | Facility: CLINIC | Age: 79
End: 2024-06-04
Payer: MEDICARE

## 2024-06-04 VITALS — HEIGHT: 70 IN | BODY MASS INDEX: 32.78 KG/M2 | WEIGHT: 229 LBS

## 2024-06-04 DIAGNOSIS — N40.1 BENIGN PROSTATIC HYPERPLASIA WITH LOWER URINARY TRACT SYMPTOMS, SYMPTOM DETAILS UNSPECIFIED: Primary | ICD-10-CM

## 2024-06-04 PROCEDURE — 99213 OFFICE O/P EST LOW 20 MIN: CPT | Performed by: UROLOGY

## 2024-06-04 PROCEDURE — 1160F RVW MEDS BY RX/DR IN RCRD: CPT | Performed by: UROLOGY

## 2024-06-04 PROCEDURE — 1159F MED LIST DOCD IN RCRD: CPT | Performed by: UROLOGY

## 2024-06-05 RX ORDER — GLIMEPIRIDE 4 MG/1
4 TABLET ORAL DAILY
Qty: 90 TABLET | Refills: 0 | Status: SHIPPED | OUTPATIENT
Start: 2024-06-05

## 2024-06-07 ENCOUNTER — LAB (OUTPATIENT)
Dept: LAB | Facility: HOSPITAL | Age: 79
End: 2024-06-07
Payer: MEDICARE

## 2024-06-07 ENCOUNTER — ANTICOAGULATION VISIT (OUTPATIENT)
Dept: CARDIOLOGY | Facility: CLINIC | Age: 79
End: 2024-06-07
Payer: MEDICARE

## 2024-06-07 DIAGNOSIS — I48.0 PAROXYSMAL ATRIAL FIBRILLATION: ICD-10-CM

## 2024-06-07 DIAGNOSIS — I48.0 PAROXYSMAL ATRIAL FIBRILLATION: Primary | ICD-10-CM

## 2024-06-07 DIAGNOSIS — M1A.0790 IDIOPATHIC CHRONIC GOUT OF FOOT WITHOUT TOPHUS, UNSPECIFIED LATERALITY: ICD-10-CM

## 2024-06-07 LAB
INR PPP: 2.54 (ref 0.86–1.15)
PROTHROMBIN TIME: 27.7 SECONDS (ref 11.8–14.9)

## 2024-06-07 PROCEDURE — 85610 PROTHROMBIN TIME: CPT

## 2024-06-07 PROCEDURE — 36415 COLL VENOUS BLD VENIPUNCTURE: CPT

## 2024-06-07 NOTE — PROGRESS NOTES
Lab Results   Component Value Date    INR 2.54 (H) 06/07/2024    INR 2.64 (H) 05/29/2024    INR 1.80 (H) 05/20/2024    PROTIME 27.7 (H) 06/07/2024    PROTIME 28.6 (H) 05/29/2024    PROTIME 21.2 (H) 05/20/2024     DX  Atrial fibrillation   Taking   5/4 mg every 2 days  Range 2.0-3.0  [facility]

## 2024-06-10 RX ORDER — ALLOPURINOL 100 MG/1
100 TABLET ORAL DAILY
Qty: 90 TABLET | Refills: 1 | Status: SHIPPED | OUTPATIENT
Start: 2024-06-10

## 2024-06-10 NOTE — TELEPHONE ENCOUNTER
Rx Refill Note  Requested Prescriptions     Pending Prescriptions Disp Refills    allopurinol (ZYLOPRIM) 100 MG tablet [Pharmacy Med Name: ALLOPURINOL 100 MG TABLET] 90 tablet 1     Sig: TAKE 1 TABLET BY MOUTH DAILY      Last office visit with prescribing clinician: 5/6/2024   Last telemedicine visit with prescribing clinician: Visit date not found   Next office visit with prescribing clinician: 12/11/2024  Gout Agent Protocol Failed     Marissa Oliveira LPN  06/10/24, 09:50 EDT

## 2024-06-13 DIAGNOSIS — I10 ESSENTIAL HYPERTENSION: ICD-10-CM

## 2024-06-13 DIAGNOSIS — E11.9 TYPE 2 DIABETES MELLITUS WITHOUT COMPLICATION, WITHOUT LONG-TERM CURRENT USE OF INSULIN: ICD-10-CM

## 2024-06-14 RX ORDER — SPIRONOLACTONE 25 MG/1
25 TABLET ORAL DAILY
Qty: 90 TABLET | Refills: 1 | Status: SHIPPED | OUTPATIENT
Start: 2024-06-14

## 2024-06-14 RX ORDER — METFORMIN HYDROCHLORIDE 500 MG/1
1000 TABLET, EXTENDED RELEASE ORAL 2 TIMES DAILY
Qty: 360 TABLET | Refills: 1 | Status: SHIPPED | OUTPATIENT
Start: 2024-06-14

## 2024-06-21 ENCOUNTER — ANTICOAGULATION VISIT (OUTPATIENT)
Dept: CARDIOLOGY | Facility: CLINIC | Age: 79
End: 2024-06-21
Payer: MEDICARE

## 2024-06-21 ENCOUNTER — LAB (OUTPATIENT)
Dept: LAB | Facility: HOSPITAL | Age: 79
End: 2024-06-21
Payer: MEDICARE

## 2024-06-21 DIAGNOSIS — I48.0 PAROXYSMAL ATRIAL FIBRILLATION: Primary | ICD-10-CM

## 2024-06-21 DIAGNOSIS — I48.0 PAROXYSMAL ATRIAL FIBRILLATION: ICD-10-CM

## 2024-06-21 LAB
INR PPP: 2.72 (ref 0.86–1.15)
PROTHROMBIN TIME: 29.3 SECONDS (ref 11.8–14.9)

## 2024-06-21 PROCEDURE — 36415 COLL VENOUS BLD VENIPUNCTURE: CPT

## 2024-06-21 PROCEDURE — 85610 PROTHROMBIN TIME: CPT

## 2024-06-21 NOTE — PROGRESS NOTES
Dx:Atrial fibrillation   Dose:5-4  Range:2.0-3.0  Location:[facility]          Lab Results   Component Value Date    INR 2.72 (H) 06/21/2024    INR 2.54 (H) 06/07/2024    INR 2.64 (H) 05/29/2024    PROTIME 29.3 (H) 06/21/2024    PROTIME 27.7 (H) 06/07/2024    PROTIME 28.6 (H) 05/29/2024     Informed pt of results  and plan no question or concerns

## 2024-06-28 ENCOUNTER — PATIENT MESSAGE (OUTPATIENT)
Dept: FAMILY MEDICINE CLINIC | Age: 79
End: 2024-06-28
Payer: MEDICARE

## 2024-07-25 DIAGNOSIS — D64.9 ANEMIA, UNSPECIFIED TYPE: ICD-10-CM

## 2024-07-25 RX ORDER — FERROUS SULFATE 325(65) MG
1 TABLET ORAL
Qty: 90 TABLET | Refills: 1 | Status: SHIPPED | OUTPATIENT
Start: 2024-07-25

## 2024-07-29 ENCOUNTER — ANTICOAGULATION VISIT (OUTPATIENT)
Dept: CARDIOLOGY | Facility: CLINIC | Age: 79
End: 2024-07-29
Payer: MEDICARE

## 2024-07-29 ENCOUNTER — LAB (OUTPATIENT)
Dept: LAB | Facility: HOSPITAL | Age: 79
End: 2024-07-29
Payer: MEDICARE

## 2024-07-29 DIAGNOSIS — I48.0 PAROXYSMAL ATRIAL FIBRILLATION: Primary | ICD-10-CM

## 2024-07-29 DIAGNOSIS — I48.0 PAROXYSMAL ATRIAL FIBRILLATION: ICD-10-CM

## 2024-07-29 LAB
INR PPP: 3.46 (ref 0.86–1.15)
PROTHROMBIN TIME: 35.3 SECONDS (ref 11.8–14.9)

## 2024-07-29 PROCEDURE — 85610 PROTHROMBIN TIME: CPT

## 2024-07-29 PROCEDURE — 36415 COLL VENOUS BLD VENIPUNCTURE: CPT

## 2024-07-29 NOTE — PROGRESS NOTES
Dx:Atrial fibrillation   Dose:5-4  Range:2.0-3.0  Location:[facility]          Lab Results   Component Value Date    INR 3.46 (H) 07/29/2024    INR 2.72 (H) 06/21/2024    INR 2.54 (H) 06/07/2024    PROTIME 35.3 (H) 07/29/2024    PROTIME 29.3 (H) 06/21/2024    PROTIME 27.7 (H) 06/07/2024     Informed pt of results and plan

## 2024-08-01 RX ORDER — LISINOPRIL 20 MG/1
20 TABLET ORAL 2 TIMES DAILY
Qty: 180 TABLET | Refills: 1 | Status: SHIPPED | OUTPATIENT
Start: 2024-08-01

## 2024-08-05 ENCOUNTER — TELEPHONE (OUTPATIENT)
Dept: CARDIOLOGY | Facility: CLINIC | Age: 79
End: 2024-08-05
Payer: MEDICARE

## 2024-08-05 DIAGNOSIS — I48.0 PAROXYSMAL ATRIAL FIBRILLATION: Primary | ICD-10-CM

## 2024-08-06 ENCOUNTER — ANTICOAGULATION VISIT (OUTPATIENT)
Dept: CARDIOLOGY | Facility: CLINIC | Age: 79
End: 2024-08-06
Payer: MEDICARE

## 2024-08-06 ENCOUNTER — LAB (OUTPATIENT)
Dept: LAB | Facility: HOSPITAL | Age: 79
End: 2024-08-06
Payer: MEDICARE

## 2024-08-06 DIAGNOSIS — I48.0 PAROXYSMAL ATRIAL FIBRILLATION: ICD-10-CM

## 2024-08-06 DIAGNOSIS — I48.0 PAROXYSMAL ATRIAL FIBRILLATION: Primary | ICD-10-CM

## 2024-08-06 LAB
INR PPP: 1.78 (ref 0.86–1.15)
PROTHROMBIN TIME: 21.1 SECONDS (ref 11.8–14.9)

## 2024-08-06 PROCEDURE — 36415 COLL VENOUS BLD VENIPUNCTURE: CPT

## 2024-08-06 PROCEDURE — 85610 PROTHROMBIN TIME: CPT

## 2024-08-06 NOTE — PROGRESS NOTES
Lab Results   Component Value Date    INR 1.78 (H) 08/06/2024    INR 3.46 (H) 07/29/2024    INR 2.72 (H) 06/21/2024    PROTIME 21.1 (H) 08/06/2024    PROTIME 35.3 (H) 07/29/2024    PROTIME 29.3 (H) 06/21/2024     Dx: afib  Pt taking 4  Range: 2.0-3.0  [facility]      Patient held dose on Sunday because he reported he had blood in his urine and took 4 mg last night

## 2024-08-09 ENCOUNTER — ANTICOAGULATION VISIT (OUTPATIENT)
Dept: CARDIOLOGY | Facility: CLINIC | Age: 79
End: 2024-08-09
Payer: MEDICARE

## 2024-08-09 ENCOUNTER — LAB (OUTPATIENT)
Dept: LAB | Facility: HOSPITAL | Age: 79
End: 2024-08-09
Payer: MEDICARE

## 2024-08-09 DIAGNOSIS — I48.0 PAROXYSMAL ATRIAL FIBRILLATION: ICD-10-CM

## 2024-08-09 DIAGNOSIS — I48.0 PAROXYSMAL ATRIAL FIBRILLATION: Primary | ICD-10-CM

## 2024-08-09 LAB
INR PPP: 1.97 (ref 0.86–1.15)
PROTHROMBIN TIME: 22.7 SECONDS (ref 11.8–14.9)

## 2024-08-09 PROCEDURE — 36415 COLL VENOUS BLD VENIPUNCTURE: CPT

## 2024-08-09 PROCEDURE — 85610 PROTHROMBIN TIME: CPT

## 2024-08-09 NOTE — PROGRESS NOTES
Dx:Atrial fibrillation   Dose:4 mg  Range:2.0-3.0  Location:[facility]      Lab Results   Component Value Date    INR 1.97 (H) 08/09/2024    INR 1.78 (H) 08/06/2024    INR 3.46 (H) 07/29/2024    PROTIME 22.7 (H) 08/09/2024    PROTIME 21.1 (H) 08/06/2024    PROTIME 35.3 (H) 07/29/2024     Informed pt of inr results and plan no question or concerns

## 2024-08-16 ENCOUNTER — LAB (OUTPATIENT)
Dept: LAB | Facility: HOSPITAL | Age: 79
End: 2024-08-16
Payer: MEDICARE

## 2024-08-16 ENCOUNTER — TELEPHONE (OUTPATIENT)
Dept: CARDIOLOGY | Facility: CLINIC | Age: 79
End: 2024-08-16
Payer: MEDICARE

## 2024-08-16 ENCOUNTER — ANTICOAGULATION VISIT (OUTPATIENT)
Dept: CARDIOLOGY | Facility: CLINIC | Age: 79
End: 2024-08-16
Payer: MEDICARE

## 2024-08-16 DIAGNOSIS — I48.0 PAROXYSMAL ATRIAL FIBRILLATION: ICD-10-CM

## 2024-08-16 DIAGNOSIS — I48.0 PAROXYSMAL ATRIAL FIBRILLATION: Primary | ICD-10-CM

## 2024-08-16 LAB
INR PPP: 2.03 (ref 0.86–1.15)
PROTHROMBIN TIME: 23.3 SECONDS (ref 11.8–14.9)

## 2024-08-16 PROCEDURE — 36415 COLL VENOUS BLD VENIPUNCTURE: CPT

## 2024-08-16 PROCEDURE — 85610 PROTHROMBIN TIME: CPT

## 2024-08-16 NOTE — PROGRESS NOTES
Dx:Atrial fibrillation   Dose:5-4-4  Range:2.0-3.0  Location:[facility]        Lab Results   Component Value Date    INR 2.03 (H) 08/16/2024    INR 1.97 (H) 08/09/2024    INR 1.78 (H) 08/06/2024    PROTIME 23.3 (H) 08/16/2024    PROTIME 22.7 (H) 08/09/2024    PROTIME 21.1 (H) 08/06/2024     Informed pt of results and plan

## 2024-08-16 NOTE — TELEPHONE ENCOUNTER
----- Message from Maia Carbajal sent at 8/16/2024  1:22 PM EDT -----        ----- Message -----  From: Lorelei Arias MA  Sent: 8/16/2024   1:07 PM EDT  To: JACIEL Cotton

## 2024-08-16 NOTE — TELEPHONE ENCOUNTER
From a medical standpoint it would be reasonable to use a medication such as Eliquis or  Xarelto for management.  However both of these medications are often cost prohibitive.  I would recommend he consider checking with his insurance regarding coverage before trying to make the change.  And he will want to be specific with the insurance, and asked not only about whether or not it is actually covered but what the cost arterial level is.  Because sometimes they will say yes it is covered, but it will be at a higher tier and the cost is significant.

## 2024-08-16 NOTE — TELEPHONE ENCOUNTER
Pt requesting something else to take besides warfarin, states he has slight blood in urine couple days ago.

## 2024-08-16 NOTE — TELEPHONE ENCOUNTER
Spoke with pt states he will get in contact with insurance company then will give a call back pt states he tried eliquis before and he had to pay 600 dollars. For the first month.

## 2024-08-22 ENCOUNTER — TELEPHONE (OUTPATIENT)
Dept: CARDIOLOGY | Facility: CLINIC | Age: 79
End: 2024-08-22
Payer: MEDICARE

## 2024-08-22 NOTE — TELEPHONE ENCOUNTER
If this only lasted the 1 day, this may be associated with something simple such as a as a viral infection.  If he is not symptomatic, and his blood pressures are remaining within normal then I would just continue to monitor.  He should reach back out to us if he has reoccurring symptoms especially symptoms of chest pains, shortness of breath or lightheadedness or dizziness.

## 2024-08-22 NOTE — TELEPHONE ENCOUNTER
Patient called stating he experienced an event on Saturday that lasted almost the entire day. His BP was low 106/60-50 throughout the day. Patient states he was lightheaded, not dizzy was diaphoretic throughout the day and tired. BG was within normal limits. This was a one day occurrence but was very worrisome. Patient denied being sick or taking any new medications.     Patient states it has not happened since, wants to know if there is anything he should be concerned about.     Please advise

## 2024-08-23 ENCOUNTER — LAB (OUTPATIENT)
Dept: LAB | Facility: HOSPITAL | Age: 79
End: 2024-08-23
Payer: MEDICARE

## 2024-08-23 ENCOUNTER — ANTICOAGULATION VISIT (OUTPATIENT)
Dept: CARDIOLOGY | Facility: CLINIC | Age: 79
End: 2024-08-23
Payer: MEDICARE

## 2024-08-23 DIAGNOSIS — I48.0 PAROXYSMAL ATRIAL FIBRILLATION: ICD-10-CM

## 2024-08-23 DIAGNOSIS — I48.0 PAROXYSMAL ATRIAL FIBRILLATION: Primary | ICD-10-CM

## 2024-08-23 LAB
INR PPP: 2.41 (ref 0.86–1.15)
PROTHROMBIN TIME: 26.6 SECONDS (ref 11.8–14.9)

## 2024-08-23 PROCEDURE — 36415 COLL VENOUS BLD VENIPUNCTURE: CPT

## 2024-08-23 PROCEDURE — 85610 PROTHROMBIN TIME: CPT

## 2024-08-23 NOTE — PROGRESS NOTES
Dx:Atrial fibrillation   Dose:5-4-4  Range:2.0-3.0  Location:[facility]      Lab Results   Component Value Date    INR 2.41 (H) 08/23/2024    INR 2.03 (H) 08/16/2024    INR 1.97 (H) 08/09/2024    PROTIME 26.6 (H) 08/23/2024    PROTIME 23.3 (H) 08/16/2024    PROTIME 22.7 (H) 08/09/2024     Verbal orders pt therapeutic and recheck in 2 weeks per Mrs. Raven GRISSOM.        Pt aware of results and plan

## 2024-08-26 RX ORDER — METOPROLOL SUCCINATE 25 MG/1
TABLET, EXTENDED RELEASE ORAL
Qty: 270 TABLET | Refills: 0 | Status: SHIPPED | OUTPATIENT
Start: 2024-08-26

## 2024-09-03 ENCOUNTER — HOSPITAL ENCOUNTER (OUTPATIENT)
Dept: MRI IMAGING | Facility: HOSPITAL | Age: 79
Discharge: HOME | End: 2024-09-03
Payer: MEDICARE

## 2024-09-09 ENCOUNTER — LAB (OUTPATIENT)
Dept: LAB | Facility: HOSPITAL | Age: 79
End: 2024-09-09
Payer: MEDICARE

## 2024-09-09 ENCOUNTER — ANTICOAGULATION VISIT (OUTPATIENT)
Dept: CARDIOLOGY | Facility: CLINIC | Age: 79
End: 2024-09-09
Payer: MEDICARE

## 2024-09-09 DIAGNOSIS — I48.0 PAROXYSMAL ATRIAL FIBRILLATION: Primary | ICD-10-CM

## 2024-09-09 DIAGNOSIS — I48.0 PAROXYSMAL ATRIAL FIBRILLATION: ICD-10-CM

## 2024-09-09 LAB
INR PPP: 2.75 (ref 0.86–1.15)
PROTHROMBIN TIME: 29.6 SECONDS (ref 11.8–14.9)

## 2024-09-09 PROCEDURE — 85610 PROTHROMBIN TIME: CPT

## 2024-09-09 PROCEDURE — 36415 COLL VENOUS BLD VENIPUNCTURE: CPT

## 2024-09-09 NOTE — PROGRESS NOTES
Dx:Atrial fibrillation   Dose:5-4-4  Range:2.0-3.0  Location: [facility]      Lab Results   Component Value Date    INR 2.75 (H) 09/09/2024    INR 2.41 (H) 08/23/2024    INR 2.03 (H) 08/16/2024    PROTIME 29.6 (H) 09/09/2024    PROTIME 26.6 (H) 08/23/2024    PROTIME 23.3 (H) 08/16/2024     Informed pt of results and plan no question or concerns

## 2024-09-26 ENCOUNTER — TELEPHONE (OUTPATIENT)
Dept: FAMILY MEDICINE CLINIC | Age: 79
End: 2024-09-26
Payer: MEDICARE

## 2024-10-08 ENCOUNTER — ANTICOAGULATION VISIT (OUTPATIENT)
Dept: CARDIOLOGY | Facility: CLINIC | Age: 79
End: 2024-10-08
Payer: MEDICARE

## 2024-10-08 ENCOUNTER — LAB (OUTPATIENT)
Dept: LAB | Facility: HOSPITAL | Age: 79
End: 2024-10-08
Payer: MEDICARE

## 2024-10-08 DIAGNOSIS — I48.0 PAROXYSMAL ATRIAL FIBRILLATION: Primary | ICD-10-CM

## 2024-10-08 DIAGNOSIS — I48.0 PAROXYSMAL ATRIAL FIBRILLATION: ICD-10-CM

## 2024-10-08 LAB
INR PPP: 1.96 (ref 0.86–1.15)
PROTHROMBIN TIME: 22.6 SECONDS (ref 11.8–14.9)

## 2024-10-08 PROCEDURE — 36415 COLL VENOUS BLD VENIPUNCTURE: CPT

## 2024-10-08 PROCEDURE — 85610 PROTHROMBIN TIME: CPT

## 2024-10-08 NOTE — PROGRESS NOTES
Dx:Atrial fibrillation   Dose:5-4-4  Range:2.0-3.0  Location:[facility]        Lab Results   Component Value Date    INR 1.96 (H) 10/08/2024    INR 2.75 (H) 09/09/2024    INR 2.41 (H) 08/23/2024    PROTIME 22.6 (H) 10/08/2024    PROTIME 29.6 (H) 09/09/2024    PROTIME 26.6 (H) 08/23/2024     Informed pt of results and plan verbal understanding

## 2024-10-14 ENCOUNTER — ANTICOAGULATION VISIT (OUTPATIENT)
Dept: CARDIOLOGY | Facility: CLINIC | Age: 79
End: 2024-10-14
Payer: MEDICARE

## 2024-10-14 ENCOUNTER — LAB (OUTPATIENT)
Dept: LAB | Facility: HOSPITAL | Age: 79
End: 2024-10-14
Payer: MEDICARE

## 2024-10-14 DIAGNOSIS — I48.0 PAROXYSMAL ATRIAL FIBRILLATION: ICD-10-CM

## 2024-10-14 DIAGNOSIS — I48.0 PAROXYSMAL ATRIAL FIBRILLATION: Primary | ICD-10-CM

## 2024-10-14 LAB
INR PPP: 2.81 (ref 0.86–1.15)
PROTHROMBIN TIME: 30 SECONDS (ref 11.8–14.9)

## 2024-10-14 PROCEDURE — 36415 COLL VENOUS BLD VENIPUNCTURE: CPT

## 2024-10-14 PROCEDURE — 85610 PROTHROMBIN TIME: CPT

## 2024-10-14 NOTE — PROGRESS NOTES
Dx:Atrial fibrillation   Dose:5-5-4  Range:2.0-3.0  Location:[facility]        Lab Results   Component Value Date    INR 2.81 (H) 10/14/2024    INR 1.96 (H) 10/08/2024    INR 2.75 (H) 09/09/2024    PROTIME 30.0 (H) 10/14/2024    PROTIME 22.6 (H) 10/08/2024    PROTIME 29.6 (H) 09/09/2024     Informed pt of results and plan

## 2024-10-17 RX ORDER — SIMVASTATIN 40 MG
40 TABLET ORAL DAILY
Qty: 90 TABLET | Refills: 1 | Status: SHIPPED | OUTPATIENT
Start: 2024-10-17

## 2024-10-21 ENCOUNTER — LAB (OUTPATIENT)
Dept: LAB | Facility: HOSPITAL | Age: 79
End: 2024-10-21
Payer: MEDICARE

## 2024-10-21 ENCOUNTER — ANTICOAGULATION VISIT (OUTPATIENT)
Dept: CARDIOLOGY | Facility: CLINIC | Age: 79
End: 2024-10-21
Payer: MEDICARE

## 2024-10-21 DIAGNOSIS — I48.0 PAROXYSMAL ATRIAL FIBRILLATION: ICD-10-CM

## 2024-10-21 DIAGNOSIS — I48.0 PAROXYSMAL ATRIAL FIBRILLATION: Primary | ICD-10-CM

## 2024-10-21 LAB
INR PPP: 3.34 (ref 0.86–1.15)
PROTHROMBIN TIME: 34.4 SECONDS (ref 11.8–14.9)

## 2024-10-21 PROCEDURE — 36415 COLL VENOUS BLD VENIPUNCTURE: CPT

## 2024-10-21 PROCEDURE — 85610 PROTHROMBIN TIME: CPT

## 2024-10-21 NOTE — PROGRESS NOTES
Lab Results   Component Value Date    INR 3.34 (H) 10/21/2024    INR 2.81 (H) 10/14/2024    INR 1.96 (H) 10/08/2024    PROTIME 34.4 (H) 10/21/2024    PROTIME 30.0 (H) 10/14/2024    PROTIME 22.6 (H) 10/08/2024     Dx: afib    Dose 5-5-4    Range: 2.0-3.0    [facility]

## 2024-10-22 ENCOUNTER — OFFICE VISIT (OUTPATIENT)
Dept: CARDIOLOGY | Facility: CLINIC | Age: 79
End: 2024-10-22
Payer: MEDICARE

## 2024-10-22 VITALS
BODY MASS INDEX: 32.21 KG/M2 | HEART RATE: 63 BPM | DIASTOLIC BLOOD PRESSURE: 58 MMHG | HEIGHT: 70 IN | SYSTOLIC BLOOD PRESSURE: 140 MMHG | WEIGHT: 225 LBS | OXYGEN SATURATION: 98 %

## 2024-10-22 DIAGNOSIS — I10 ESSENTIAL HYPERTENSION: ICD-10-CM

## 2024-10-22 DIAGNOSIS — I25.10 CORONARY ARTERY DISEASE INVOLVING NATIVE CORONARY ARTERY OF NATIVE HEART WITHOUT ANGINA PECTORIS: Primary | ICD-10-CM

## 2024-10-22 DIAGNOSIS — I35.0 MILD AORTIC VALVE STENOSIS: ICD-10-CM

## 2024-10-22 DIAGNOSIS — I48.0 PAROXYSMAL ATRIAL FIBRILLATION: ICD-10-CM

## 2024-10-22 DIAGNOSIS — E78.2 MIXED HYPERLIPIDEMIA: ICD-10-CM

## 2024-10-22 PROCEDURE — 99214 OFFICE O/P EST MOD 30 MIN: CPT | Performed by: FAMILY MEDICINE

## 2024-10-22 PROCEDURE — 1159F MED LIST DOCD IN RCRD: CPT | Performed by: FAMILY MEDICINE

## 2024-10-22 PROCEDURE — 3078F DIAST BP <80 MM HG: CPT | Performed by: FAMILY MEDICINE

## 2024-10-22 PROCEDURE — 1160F RVW MEDS BY RX/DR IN RCRD: CPT | Performed by: FAMILY MEDICINE

## 2024-10-22 PROCEDURE — 3077F SYST BP >= 140 MM HG: CPT | Performed by: FAMILY MEDICINE

## 2024-10-22 RX ORDER — BLOOD SUGAR DIAGNOSTIC
1 STRIP MISCELLANEOUS DAILY
COMMUNITY
Start: 2024-07-29

## 2024-10-22 NOTE — PROGRESS NOTES
Chief Complaint  Follow-up (Paroxysmal atrial fibrillation/)    Subjective        History of Present Illness  Zachariah Cruz presents to [facility] CARDIOLOGY   Mr. Cruz is a 79-year-old male coming in for routine cardiac follow-up.  He states a few months ago he had a spell where his blood pressure dropped on him, but it has not happened again, overall his BP is well-controlled but he does see some fluctuation.  He has no complaints of chest pains, shortness of breath, palpitations lightheadedness or dizziness.  He does like to adjust his INR several times  recently, states he thinks he will be on the 5/4 regiment, but will be having his INR rechecked this week.  He is interested in seeing if he could get Eliquis at a reasonable cost.  Cardiac wise he denies any concerns, since his last visit he did have an ER visit related to diverticulitis, but no other major health changes.      Past History:     1) Coronary artery disease status post 3-vessel coronary artery bypass grafting on 05/21/2010 by Dr. Garcia at [facility] (LIMA graft to the LAD, SVG graft to OM, SVG graft to posterior descending branch of RCA); cardiac cath done on 3/16/2023 showed patent LIMA to LAD, 95% stenosis of proximal segment of SVG to OM branch and 50% stenosis of SVG to RCA at anastomosis.  He underwent angioplasty and stent placement to SVG to OM branch.  2) Diabetes mellitus; 3) Hypertension; 4) Hyperlipidemia; 5) Gout; 6) Obstructive sleep apnea, on CPAP; 7) Paroxysmal atrial flutter, detected on 24-hour Holter monitor study done in October 2019.      Past Medical History:   Diagnosis Date    Coronary artery disease     Diabetes     Hematuria     Hyperlipidemia     Hypertension        No Known Allergies     Past Surgical History:   Procedure Laterality Date    APPENDECTOMY      CARDIAC CATHETERIZATION      CARDIAC CATHETERIZATION N/A 03/16/2023    Procedure: Left Heart Cath with possible angioplasty;   Surgeon: Vishal Denton MD;  Location: [facility] INVASIVE LOCATION;  Service: Cardiovascular;  Laterality: N/A;    CATARACT EXTRACTION      CORONARY ARTERY BYPASS GRAFT  05/21/2010    LIMA to LAD, SVG to OM, SVG to PDA    PROSTATE SURGERY      TRANSURETHRAL RESECTION OF THE PROSTATE    UPPER GASTROINTESTINAL ENDOSCOPY          Social History  He  reports that he quit smoking about 55 years ago. His smoking use included cigarettes. He started smoking about 60 years ago. He has a 5 pack-year smoking history. He has been exposed to tobacco smoke. He has never used smokeless tobacco. He reports that he does not currently use alcohol. He reports that he does not use drugs.    Family History  His family history includes Coronary artery disease in his father and another family member; Hypertension in his mother; Stroke in his mother and paternal grandfather.       Current Outpatient Medications on File Prior to Visit   Medication Sig    allopurinol (ZYLOPRIM) 100 MG tablet TAKE 1 TABLET BY MOUTH DAILY    aspirin 81 MG EC tablet Take 1 tablet by mouth Daily.    FeroSul 325 (65 Fe) MG tablet TAKE 1 TABLET BY MOUTH DAILY WITH BREAKFAST    folic acid (FOLVITE) 1 MG tablet TAKE 1 TABLET BY MOUTH DAILY    glimepiride (AMARYL) 4 MG tablet TAKE 1 TABLET BY MOUTH DAILY (Patient taking differently: Take 0.5 tablets by mouth Daily. 3 mg)    lisinopril (PRINIVIL,ZESTRIL) 20 MG tablet TAKE 1 TABLET BY MOUTH TWICE A DAY    metFORMIN ER (GLUCOPHAGE-XR) 500 MG 24 hr tablet Take 2 tablets by mouth 2 (Two) Times a Day.    metoprolol succinate XL (TOPROL-XL) 25 MG 24 hr tablet TAKE 2 TABLETS BY MOUTH EVERY MORNING AND TAKE ONE TABLET BY MOUTH EVERY EVENING    OneTouch Ultra test strip 1 each by Other route Daily. Check blood sugar    simvastatin (ZOCOR) 40 MG tablet TAKE 1 TABLET BY MOUTH DAILY    vitamin B-12 (CYANOCOBALAMIN) 500 MCG tablet Take 1 tablet by mouth Daily.    vitamin C (ASCORBIC ACID) 250 MG tablet Take 1 tablet by mouth  "Daily.    spironolactone (ALDACTONE) 25 MG tablet TAKE 1 TABLET BY MOUTH DAILY (Patient not taking: Reported on 10/22/2024)    warfarin (Coumadin) 4 MG tablet Take 1 tablet by mouth daily or as directed (Patient taking differently: Take 1 tablet by mouth Every Night. 2 - 5 - 4 per patient)     No current facility-administered medications on file prior to visit.         Review of Systems   Constitutional:  Negative for fatigue.   Respiratory:  Negative for cough, chest tightness and shortness of breath.    Cardiovascular:  Negative for chest pain, palpitations and leg swelling.   Gastrointestinal:  Negative for nausea and vomiting.   Neurological:  Negative for dizziness and syncope.        Objective   Vitals:    10/22/24 0915   BP: 140/58   BP Location: Left arm   Patient Position: Sitting   Cuff Size: Large Adult   Pulse: 63   SpO2: 98%   Weight: 102 kg (225 lb)   Height: 177.8 cm (70\")         Physical Exam  General : Alert, awake, no acute distress  Neck : Supple, no carotid bruit, no jugular venous distention  CVS : Regular rate and rhythm, no murmur, no rubs or gallops  Lungs: Clear to auscultation bilaterally, no crackles or rhonchi  Abdomen: Soft, nontender, bowel sounds active  Extremities: Warm, well-perfused, no pedal edema      Result Review     The following data was reviewed by JACIEL oCtton  No results found for: \"PROBNP\"  CMP          4/26/2024    07:18 5/7/2024    14:16 11/1/2024    07:15   CMP   Glucose 127  207     BUN 18  14     Creatinine 0.99  1.18     EGFR 78.5  63.6     Sodium 143  140     Potassium 4.6  4.5     Chloride 107  104     Calcium 9.6  9.3     Total Protein   6.5    Total Protein 6.5  6.3     Albumin 4.2  3.9  3.6    Globulin   2.9    Globulin 2.3  2.4     Total Bilirubin 0.3  0.3     Alkaline Phosphatase 75  71     AST (SGOT) 17  11     ALT (SGPT) 17  13     Albumin/Globulin Ratio 1.8  1.6     BUN/Creatinine Ratio 18.2  11.9     Anion Gap 11.3  13.0       CBC w/diff          " "4/26/2024    07:18 5/7/2024    14:16 5/13/2024    07:16   CBC w/Diff   WBC 7.58  11.55  8.68    RBC 4.35  4.23  4.40    Hemoglobin 12.9  12.7  13.0    Hematocrit 40.8  38.0  40.9    MCV 93.8  89.8  93.0    MCH 29.7  30.0  29.5    MCHC 31.6  33.4  31.8    RDW 13.8  14.0  14.0    Platelets 267  227  263    Neutrophil Rel %  86.1  70.1    Immature Granulocyte Rel %  0.4  0.5    Lymphocyte Rel %  6.7  19.7    Monocyte Rel %  5.7  6.5    Eosinophil Rel %  0.9  3.1    Basophil Rel %  0.2  0.1       Lab Results   Component Value Date    TSH 1.900 04/14/2023      No results found for: \"FREET4\"   No results found for: \"DDIMERQUANT\"  Magnesium   Date Value Ref Range Status   03/25/2023 1.7 1.6 - 2.4 mg/dL Final      No results found for: \"DIGOXIN\"   Lab Results   Component Value Date    TROPONINT 61 (C) 03/22/2023           Lipid Panel          4/26/2024    07:18   Lipid Panel   Total Cholesterol 113    Triglycerides 262    HDL Cholesterol 31    VLDL Cholesterol 41    LDL Cholesterol  41    LDL/HDL Ratio 0.95        Results for orders placed during the hospital encounter of 03/16/23    Adult Transthoracic Echo Complete w/ Color, Spectral and Contrast if necessary per protocol    Interpretation Summary    Overall LV ejection fraction is 50 to 55% with mild inferolateral wall hypokinesis.    Left ventricular wall thickness is consistent with mild concentric hypertrophy.    Left ventricular diastolic function is consistent with (grade I) impaired relaxation.    Aortic valve is mildly calcified.  Mild aortic valve stenosis is present.    Results for orders placed during the hospital encounter of 02/22/23    Stress Test With Myocardial Perfusion One Day    Interpretation Summary    There is a moderate sized infarct in the basal-mid inferolateral walls with a small-moderate amount of duglas-infarct ischemia..    Left ventricular ejection fraction is moderately reduced (Calculated EF = 38%).    Abnormal LV wall motion consistent with " severe hypokinesis of the lateral wall.    Impressions are consistent with an intermediate risk study.    Findings consistent with an indeterminate ECG stress test.           Assessment and Plan   Diagnoses and all orders for this visit:    1. Coronary artery disease involving native coronary artery of native heart without angina pectoris (Primary)  Assessment & Plan:  He is stable without symptoms of angina.  Continue aspirin, beta-blocker and statin therapy.      2. Paroxysmal atrial fibrillation  Assessment & Plan:  He is in normal sinus rhythm without any complaints of palpitations.  Continue metoprolol rate and rhythm control.  Overall his INR is generally therapeutic.  For now we will continue warfarin for anticoagulation.  He is considering switching to a DOAC, but wants to look into whether or not he would qualify for patient assistance due to cost prohibitive for this.        3. Essential hypertension  Assessment & Plan:  Blood pressure appears to be reasonably well-controlled, continue current medications.      4. Mild aortic valve stenosis  Assessment & Plan:  Previous echocardiogram shows mild aortic valve stenosis, he does not have any shortness of breath to suggest worsening valvular function.  We will plan on repeating echocardiogram in 2026, or sooner if he develops symptoms of worsening valvular function.      5. Mixed hyperlipidemia  Assessment & Plan:  Overall his cholesterol levels are reasonably well-controlled, LDL is at goal at 41.  His triglycerides are elevated.  Continue current dose simvastatin, if triglycerides continue to titrate up we may need to add a secondary agent.              Follow Up   Return in about 6 months (around 4/22/2025) for with Dr. Denton.    Patient was given instructions and counseling regarding his condition or for health maintenance advice. Please see specific information pulled into the AVS if appropriate.     Signed,  Maia Carbajal, APRN  10/22/2024      Dictated Utilizing Dragon Dictation: Please note that portions of this note were completed with a voice recognition program.  Part of this note may be an electronic transcription/translation of spoken language to printed text using the Dragon Dictation System.

## 2024-10-24 ENCOUNTER — TELEPHONE (OUTPATIENT)
Dept: CARDIOLOGY | Facility: CLINIC | Age: 79
End: 2024-10-24
Payer: MEDICARE

## 2024-10-24 NOTE — TELEPHONE ENCOUNTER
GURMEET PT.  PT WOULD LIKE TO SEE IF HE WILL QUALIFY FOR THE [employer] PT ASSISTANCE PROGRAM.  PROVIDED PT EUGENIO ESPINO PROGRAM INFO, CONTACT PHONE NUMBER, AND WAYS HE CAN APPLY.  PT IS GOING TO APPLY FOR THE PROGRAM.

## 2024-10-25 ENCOUNTER — ANTICOAGULATION VISIT (OUTPATIENT)
Dept: CARDIOLOGY | Facility: CLINIC | Age: 79
End: 2024-10-25
Payer: MEDICARE

## 2024-10-25 ENCOUNTER — LAB (OUTPATIENT)
Dept: LAB | Facility: HOSPITAL | Age: 79
End: 2024-10-25
Payer: MEDICARE

## 2024-10-25 DIAGNOSIS — I48.0 PAROXYSMAL ATRIAL FIBRILLATION: ICD-10-CM

## 2024-10-25 DIAGNOSIS — I48.0 PAROXYSMAL ATRIAL FIBRILLATION: Primary | ICD-10-CM

## 2024-10-25 LAB
INR PPP: 2.05 (ref 0.86–1.15)
PROTHROMBIN TIME: 23.5 SECONDS (ref 11.8–14.9)

## 2024-10-25 PROCEDURE — 36415 COLL VENOUS BLD VENIPUNCTURE: CPT

## 2024-10-25 PROCEDURE — 85610 PROTHROMBIN TIME: CPT

## 2024-10-25 NOTE — PROGRESS NOTES
Lab Results   Component Value Date    INR 2.05 (H) 10/25/2024    INR 3.34 (H) 10/21/2024    INR 2.81 (H) 10/14/2024    PROTIME 23.5 (H) 10/25/2024    PROTIME 34.4 (H) 10/21/2024    PROTIME 30.0 (H) 10/14/2024     Dx: afib     Dose 4/5     Range: 2.0-3.0     [facility]

## 2024-11-01 ENCOUNTER — LAB (OUTPATIENT)
Dept: LAB | Facility: HOSPITAL | Age: 79
End: 2024-11-01
Payer: MEDICARE

## 2024-11-01 DIAGNOSIS — D89.89 LIGHT CHAIN DISEASE, LAMBDA TYPE: ICD-10-CM

## 2024-11-01 DIAGNOSIS — I48.0 PAROXYSMAL ATRIAL FIBRILLATION: ICD-10-CM

## 2024-11-01 DIAGNOSIS — D64.9 ANEMIA, UNSPECIFIED TYPE: ICD-10-CM

## 2024-11-01 DIAGNOSIS — I10 ESSENTIAL HYPERTENSION: ICD-10-CM

## 2024-11-01 LAB
BASOPHILS # BLD AUTO: 0.01 10*3/MM3 (ref 0–0.2)
BASOPHILS NFR BLD AUTO: 0.1 % (ref 0–1.5)
DEPRECATED RDW RBC AUTO: 49.5 FL (ref 37–54)
EOSINOPHIL # BLD AUTO: 0.31 10*3/MM3 (ref 0–0.4)
EOSINOPHIL NFR BLD AUTO: 4.1 % (ref 0.3–6.2)
ERYTHROCYTE [DISTWIDTH] IN BLOOD BY AUTOMATED COUNT: 14.4 % (ref 12.3–15.4)
FERRITIN SERPL-MCNC: 52.88 NG/ML (ref 30–400)
HCT VFR BLD AUTO: 41.9 % (ref 37.5–51)
HGB BLD-MCNC: 13.5 G/DL (ref 13–17.7)
IMM GRANULOCYTES # BLD AUTO: 0.02 10*3/MM3 (ref 0–0.05)
IMM GRANULOCYTES NFR BLD AUTO: 0.3 % (ref 0–0.5)
INR PPP: 2.57 (ref 0.86–1.15)
IRON 24H UR-MRATE: 48 MCG/DL (ref 59–158)
IRON SATN MFR SERPL: 12 % (ref 20–50)
LYMPHOCYTES # BLD AUTO: 1.44 10*3/MM3 (ref 0.7–3.1)
LYMPHOCYTES NFR BLD AUTO: 18.9 % (ref 19.6–45.3)
MCH RBC QN AUTO: 30 PG (ref 26.6–33)
MCHC RBC AUTO-ENTMCNC: 32.2 G/DL (ref 31.5–35.7)
MCV RBC AUTO: 93.1 FL (ref 79–97)
MONOCYTES # BLD AUTO: 0.61 10*3/MM3 (ref 0.1–0.9)
MONOCYTES NFR BLD AUTO: 8 % (ref 5–12)
NEUTROPHILS NFR BLD AUTO: 5.22 10*3/MM3 (ref 1.7–7)
NEUTROPHILS NFR BLD AUTO: 68.6 % (ref 42.7–76)
PLATELET # BLD AUTO: 250 10*3/MM3 (ref 140–450)
PMV BLD AUTO: 11.4 FL (ref 6–12)
PROTHROMBIN TIME: 28 SECONDS (ref 11.8–14.9)
RBC # BLD AUTO: 4.5 10*6/MM3 (ref 4.14–5.8)
TIBC SERPL-MCNC: 395 MCG/DL (ref 298–536)
TRANSFERRIN SERPL-MCNC: 265 MG/DL (ref 200–360)
WBC NRBC COR # BLD AUTO: 7.61 10*3/MM3 (ref 3.4–10.8)

## 2024-11-01 PROCEDURE — 82784 ASSAY IGA/IGD/IGG/IGM EACH: CPT

## 2024-11-01 PROCEDURE — 83521 IG LIGHT CHAINS FREE EACH: CPT

## 2024-11-01 PROCEDURE — 84466 ASSAY OF TRANSFERRIN: CPT

## 2024-11-01 PROCEDURE — 86334 IMMUNOFIX E-PHORESIS SERUM: CPT

## 2024-11-01 PROCEDURE — 84165 PROTEIN E-PHORESIS SERUM: CPT

## 2024-11-01 PROCEDURE — 36415 COLL VENOUS BLD VENIPUNCTURE: CPT

## 2024-11-01 PROCEDURE — 83540 ASSAY OF IRON: CPT

## 2024-11-01 PROCEDURE — 82728 ASSAY OF FERRITIN: CPT

## 2024-11-01 PROCEDURE — 85025 COMPLETE CBC W/AUTO DIFF WBC: CPT

## 2024-11-01 PROCEDURE — 84155 ASSAY OF PROTEIN SERUM: CPT

## 2024-11-01 PROCEDURE — 85610 PROTHROMBIN TIME: CPT

## 2024-11-04 ENCOUNTER — ANTICOAGULATION VISIT (OUTPATIENT)
Dept: CARDIOLOGY | Facility: CLINIC | Age: 79
End: 2024-11-04
Payer: MEDICARE

## 2024-11-04 DIAGNOSIS — I48.0 PAROXYSMAL ATRIAL FIBRILLATION: Primary | ICD-10-CM

## 2024-11-04 NOTE — PROGRESS NOTES
Lab Results   Component Value Date    INR 2.57 (H) 11/01/2024    INR 2.05 (H) 10/25/2024    INR 3.34 (H) 10/21/2024    PROTIME 28.0 (H) 11/01/2024    PROTIME 23.5 (H) 10/25/2024    PROTIME 34.4 (H) 10/21/2024     Atrial fibrillation     Range: 2.0-3.0    5/4    [facility]    Spoke with patient, patient is aware and verbalized understanding.

## 2024-11-05 LAB
ALBUMIN SERPL ELPH-MCNC: 3.6 G/DL (ref 2.9–4.4)
ALBUMIN/GLOB SERPL: 1.3 {RATIO} (ref 0.7–1.7)
ALPHA1 GLOB SERPL ELPH-MCNC: 0.3 G/DL (ref 0–0.4)
ALPHA2 GLOB SERPL ELPH-MCNC: 1 G/DL (ref 0.4–1)
B-GLOBULIN SERPL ELPH-MCNC: 1.1 G/DL (ref 0.7–1.3)
GAMMA GLOB SERPL ELPH-MCNC: 0.5 G/DL (ref 0.4–1.8)
GLOBULIN SER-MCNC: 2.9 G/DL (ref 2.2–3.9)
IGA SERPL-MCNC: 233 MG/DL (ref 61–437)
IGG SERPL-MCNC: 629 MG/DL (ref 603–1613)
IGM SERPL-MCNC: 29 MG/DL (ref 15–143)
INTERPRETATION SERPL IEP-IMP: ABNORMAL
KAPPA LC FREE SER-MCNC: 25.7 MG/L (ref 3.3–19.4)
KAPPA LC FREE/LAMBDA FREE SER: 1.46 {RATIO} (ref 0.26–1.65)
LABORATORY COMMENT REPORT: ABNORMAL
LAMBDA LC FREE SERPL-MCNC: 17.6 MG/L (ref 5.7–26.3)
M PROTEIN SERPL ELPH-MCNC: ABNORMAL G/DL
PROT SERPL-MCNC: 6.5 G/DL (ref 6–8.5)

## 2024-11-05 RX ORDER — AMLODIPINE BESYLATE 10 MG/1
10 TABLET ORAL DAILY
Qty: 90 TABLET | Refills: 3 | Status: SHIPPED | OUTPATIENT
Start: 2024-11-05

## 2024-11-09 NOTE — ASSESSMENT & PLAN NOTE
Overall his cholesterol levels are reasonably well-controlled, LDL is at goal at 41.  His triglycerides are elevated.  Continue current dose simvastatin, if triglycerides continue to titrate up we may need to add a secondary agent.

## 2024-11-09 NOTE — ASSESSMENT & PLAN NOTE
He is in normal sinus rhythm without any complaints of palpitations.  Continue metoprolol rate and rhythm control.  Overall his INR is generally therapeutic.  For now we will continue warfarin for anticoagulation.  He is considering switching to a DOAC, but wants to look into whether or not he would qualify for patient assistance due to cost prohibitive for this.

## 2024-11-09 NOTE — ASSESSMENT & PLAN NOTE
Previous echocardiogram shows mild aortic valve stenosis, he does not have any shortness of breath to suggest worsening valvular function.  We will plan on repeating echocardiogram in 2026, or sooner if he develops symptoms of worsening valvular function.

## 2024-11-14 DIAGNOSIS — E53.8 FOLATE DEFICIENCY: ICD-10-CM

## 2024-11-18 ENCOUNTER — TELEPHONE (OUTPATIENT)
Dept: ONCOLOGY | Facility: HOSPITAL | Age: 79
End: 2024-11-18
Payer: MEDICARE

## 2024-11-18 ENCOUNTER — LAB (OUTPATIENT)
Dept: LAB | Facility: HOSPITAL | Age: 79
End: 2024-11-18
Payer: MEDICARE

## 2024-11-18 DIAGNOSIS — I48.0 PAROXYSMAL ATRIAL FIBRILLATION: ICD-10-CM

## 2024-11-18 NOTE — TELEPHONE ENCOUNTER
This nurse called the pt and informed him that he already had his labs drawn 2 weeks ago. The pt voiced understanding.

## 2024-11-18 NOTE — TELEPHONE ENCOUNTER
"  Caller: Zachariah Cruz \"Keith\"    Relationship: Self    Best call back number: [phone]    What is the best time to reach you: ANY    Who are you requesting to speak with (clinical staff, provider,  specific staff member): CLINICAL    What was the call regarding: KEITH WENT TO [place] TO GET HIS LABS DONE THIS MORNING  AND THEY DID NOT HAVE AN ORDER    PLEASE FAX ORDER TO # [phone]      PLEASE CALL PATIENT WHEN ORDERS ARE FAXED   "

## 2024-11-21 ENCOUNTER — OFFICE VISIT (OUTPATIENT)
Dept: ONCOLOGY | Facility: HOSPITAL | Age: 79
End: 2024-11-21
Payer: MEDICARE

## 2024-11-21 VITALS
OXYGEN SATURATION: 98 % | DIASTOLIC BLOOD PRESSURE: 74 MMHG | WEIGHT: 230.82 LBS | BODY MASS INDEX: 33.05 KG/M2 | TEMPERATURE: 98.3 F | HEART RATE: 64 BPM | RESPIRATION RATE: 18 BRPM | SYSTOLIC BLOOD PRESSURE: 160 MMHG | HEIGHT: 70 IN

## 2024-11-21 DIAGNOSIS — E53.8 FOLATE DEFICIENCY: ICD-10-CM

## 2024-11-21 DIAGNOSIS — I48.0 PAROXYSMAL ATRIAL FIBRILLATION: ICD-10-CM

## 2024-11-21 DIAGNOSIS — D64.9 ANEMIA, UNSPECIFIED TYPE: Primary | ICD-10-CM

## 2024-11-21 DIAGNOSIS — D47.2 MGUS (MONOCLONAL GAMMOPATHY OF UNKNOWN SIGNIFICANCE): ICD-10-CM

## 2024-11-21 DIAGNOSIS — N28.89 RIGHT KIDNEY MASS: ICD-10-CM

## 2024-11-21 PROCEDURE — G0463 HOSPITAL OUTPT CLINIC VISIT: HCPCS | Performed by: INTERNAL MEDICINE

## 2024-11-21 RX ORDER — FOLIC ACID 1 MG/1
1000 TABLET ORAL DAILY
Qty: 90 TABLET | Refills: 1 | OUTPATIENT
Start: 2024-11-21

## 2024-11-21 RX ORDER — FOLIC ACID 1 MG/1
1000 TABLET ORAL DAILY
Qty: 90 TABLET | Refills: 1 | Status: SHIPPED | OUTPATIENT
Start: 2024-11-21

## 2024-11-21 NOTE — PROGRESS NOTES
Chief Complaint/Reason for Referral:  anemia    Nelia Frost, *  Nelia Frost, APRN    Records Obtained:  Records of the patients history including those obtained from  [facility] and patient information  were reviewed and summarized in detail.    Subjective    History of Present Illness   Mr. Keith Cruz presents with his wife for follow up for anemia.     PMH of RAMY (uses CPAP nightly), hypertension, type II DM, hyperlipidemia, MI with angioplasty / stent, anemia, gout. On coumadin for a fib.  He reports 2 episodes last 6 months of blood in urine.  Denies any flank pain.  Primarily short-lived.  He chalks it up to being on warfarin.  He has been taking oral iron as well as B12 and folate.  On oral iron every other day. Tolerating this well.  Denies other bleeding.  Energy level stable.  Iron labs stable.  Hemoglobin normal.  Results discussed.  Plan to continue oral iron    Hematology History    He has had chronic normocytic anemia dating back to at least 3-4 years in the range 9.0 to 13.30. More recently, has had 2 lab with thrombocytosis up to 509K. Iron studies on 4/4/2023 with iron of 39, iron sat of 10%, TIBC of 389. Never had c-scope.     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    4/4/23: iron 39, iron sat 10%, TIBC 389, WBC 7.96, Hemoglobin 9.8, MCV 84, platelet count 465. Diff: with increase in neutrophil and lymphocyte % on recent lab work. Absolute values were normal.     4/14/23: Hgb 10.4, MCV 83.3, plt 309, WBC 7.21. Iron 29, ferritin 48.8, iron sat 7%, folate low at 3.31, B12 low at 155. Sed rate 53. Epo increased to 20.0. SPEP without M-spike, FLACA without monoclonality, Kappa FLC increased to 40.9 with kappa/lambda ratio of 1.84. Antiparietal and intrinsic factor antibodies both negative.     4/28/23: OP heme visit: Oral iron, B12, folate daily  started.     5/5/23: B12 513, folate 16.4, iron sat 8%, ferritin 59.3, iron 31    7/18/23: Hgb 12.3, plt 246, WBC 8.03    2/16/24: Iron sat 12%, b12 1620, folate >20, ferritin 58, hgb 12.6. Change to every other day iron dosing.    11/1/24: SPEP/FLACA with normal kappa/kris ratio, no monoclonality on FLACA, no M-spike. Hgb 13.5, plt 250, MCV 93.1, WBC 7.61, iron sat 12%, ferritin 52          Oncology/Hematology History    No history exists.       Review of Systems   Constitutional:  Positive for fatigue. Negative for appetite change, diaphoresis, fever, unexpected weight gain and unexpected weight loss.   HENT:  Negative for hearing loss, sore throat and voice change.    Eyes:  Negative for blurred vision, double vision, pain, redness and visual disturbance.   Respiratory:  Negative for cough, shortness of breath and wheezing.    Cardiovascular:  Negative for chest pain, palpitations and leg swelling.   Gastrointestinal:  Positive for diarrhea.   Endocrine: Negative for cold intolerance, heat intolerance, polydipsia and polyuria.   Genitourinary:  Negative for decreased urine volume, difficulty urinating, frequency and urinary incontinence.   Musculoskeletal:  Positive for arthralgias, back pain, joint swelling and myalgias.   Skin:  Positive for bruise. Negative for color change, rash, skin lesions and wound.   Neurological:  Negative for dizziness, seizures, numbness and headache.   Hematological:  Negative for adenopathy. Bruises/bleeds easily.   Psychiatric/Behavioral:  Negative for depressed mood. The patient is not nervous/anxious.        Current Outpatient Medications on File Prior to Visit   Medication Sig Dispense Refill    allopurinol (ZYLOPRIM) 100 MG tablet TAKE 1 TABLET BY MOUTH DAILY 90 tablet 1    amLODIPine (NORVASC) 10 MG tablet TAKE 1 TABLET BY MOUTH DAILY 90 tablet 3    aspirin 81 MG EC tablet Take 1 tablet by mouth Daily.      FeroSul 325 (65 Fe) MG tablet TAKE 1 TABLET BY MOUTH DAILY WITH BREAKFAST  90 tablet 1    folic acid (FOLVITE) 1 MG tablet TAKE 1 TABLET BY MOUTH DAILY 90 tablet 1    glimepiride (AMARYL) 4 MG tablet TAKE 1 TABLET BY MOUTH DAILY (Patient taking differently: Take 0.5 tablets by mouth Daily. 3 mg) 90 tablet 0    lisinopril (PRINIVIL,ZESTRIL) 20 MG tablet TAKE 1 TABLET BY MOUTH TWICE A  tablet 1    metFORMIN ER (GLUCOPHAGE-XR) 500 MG 24 hr tablet Take 2 tablets by mouth 2 (Two) Times a Day. 360 tablet 1    metoprolol succinate XL (TOPROL-XL) 25 MG 24 hr tablet TAKE 2 TABLETS BY MOUTH EVERY MORNING AND TAKE ONE TABLET BY MOUTH EVERY EVENING 270 tablet 0    OneTouch Ultra test strip 1 each by Other route Daily. Check blood sugar      simvastatin (ZOCOR) 40 MG tablet TAKE 1 TABLET BY MOUTH DAILY 90 tablet 1    spironolactone (ALDACTONE) 25 MG tablet TAKE 1 TABLET BY MOUTH DAILY (Patient not taking: Reported on 10/22/2024) 90 tablet 1    vitamin B-12 (CYANOCOBALAMIN) 500 MCG tablet Take 1 tablet by mouth Daily. 90 tablet 1    vitamin C (ASCORBIC ACID) 250 MG tablet Take 1 tablet by mouth Daily. 90 tablet 1    warfarin (Coumadin) 4 MG tablet Take 1 tablet by mouth daily or as directed (Patient taking differently: Take 1 tablet by mouth Every Night. 2 - 5 - 4 per patient) 90 tablet 1     No current facility-administered medications on file prior to visit.       No Known Allergies  Past Medical History:   Diagnosis Date    Coronary artery disease     Diabetes     Hematuria     Hyperlipidemia     Hypertension      Past Surgical History:   Procedure Laterality Date    APPENDECTOMY      CARDIAC CATHETERIZATION      CARDIAC CATHETERIZATION N/A 03/16/2023    Procedure: Left Heart Cath with possible angioplasty;  Surgeon: Vishal Denton MD;  Location: [facility] CATH INVASIVE LOCATION;  Service: Cardiovascular;  Laterality: N/A;    CATARACT EXTRACTION      CORONARY ARTERY BYPASS GRAFT  05/21/2010    LIMA to LAD, SVG to OM, SVG to PDA    PROSTATE SURGERY      TRANSURETHRAL RESECTION OF THE PROSTATE  "   UPPER GASTROINTESTINAL ENDOSCOPY       Social History     Socioeconomic History    Marital status:    Tobacco Use    Smoking status: Former     Current packs/day: 0.00     Average packs/day: 1 pack/day for 5.0 years (5.0 ttl pk-yrs)     Types: Cigarettes     Start date:      Quit date:      Years since quittin.9     Passive exposure: Past    Smokeless tobacco: Never   Vaping Use    Vaping status: Never Used   Substance and Sexual Activity    Alcohol use: Not Currently     Comment: occ    Drug use: Never    Sexual activity: Defer     Family History   Problem Relation Age of Onset    Hypertension Mother     Stroke Mother     Coronary artery disease Father     Stroke Paternal Grandfather     Coronary artery disease Other     Colon cancer Neg Hx      Immunization History   Administered Date(s) Administered    COVID-19 (PFIZER) BIVALENT 12+YRS 2022    COVID-19 (PFIZER) Purple Cap Monovalent 2021, 2021       Tobacco Use: Medium Risk (2024)    Patient History     Smoking Tobacco Use: Former     Smokeless Tobacco Use: Never     Passive Exposure: Past       Objective     Physical Exam  Well appearing patient in no acute distress on RA  Anicteric sclerae  Respirations non-labored  Awake, alert, and oriented x 4. Speech intact. No gross neurologic deficit  Appropriate mood and affect    Vitals:    24 1109   BP: 160/74   Pulse: 64   Resp: 18   Temp: 98.3 °F (36.8 °C)   TempSrc: Temporal   SpO2: 98%   Weight: 105 kg (230 lb 13.2 oz)   Height: 177.8 cm (70\")   PainSc: 0-No pain           Wt Readings from Last 3 Encounters:   10/22/24 102 kg (225 lb)   24 104 kg (229 lb)   24 104 kg (229 lb 3.2 oz)                  ECOG: (0) Fully Active - Able to Carry On All Pre-disease Performance Without Restriction  Fall Risk Assessment was completed, and patient is at low risk for falls.  PHQ-9 Total Score:         The patient is  experiencing fatigue. Fatigue score: 5    PT/OT " Functional Screening: PT fx screen: No needs identified  Speech Functional Screening: Speech fx screen: No needs identified  Rehab to be ordered: Rehab to be ordered: No needs identified        Result Review :  The following data was reviewed by: Jeremy Burgess MD on 11/21/24:  Lab Results   Component Value Date    HGB 13.5 11/01/2024    HCT 41.9 11/01/2024    MCV 93.1 11/01/2024     11/01/2024    WBC 7.61 11/01/2024    NEUTROABS 5.22 11/01/2024    LYMPHSABS 1.44 11/01/2024    MONOSABS 0.61 11/01/2024    EOSABS 0.31 11/01/2024    BASOSABS 0.01 11/01/2024     Lab Results   Component Value Date    GLUCOSE 207 (H) 05/07/2024    BUN 14 05/07/2024    CREATININE 1.18 05/07/2024     05/07/2024    K 4.5 05/07/2024     05/07/2024    CO2 23.0 05/07/2024    CALCIUM 9.3 05/07/2024    PROTEINTOT 6.3 05/07/2024    ALBUMIN 3.6 11/01/2024    BILITOT 0.3 05/07/2024    ALKPHOS 71 05/07/2024    AST 11 05/07/2024    ALT 13 05/07/2024     Lab Results   Component Value Date    Ferritin 52.88 11/01/2024    Folate >20.00 02/16/2024     Lab Results   Component Value Date    IRON 48 (L) 11/01/2024    LABIRON 12 (L) 11/01/2024    TRANSFERRIN 265 11/01/2024    TIBC 395 11/01/2024     Lab Results   Component Value Date    FERRITIN 52.88 11/01/2024    WZEZSOHD66 1,620 (H) 02/16/2024    FOLATE >20.00 02/16/2024     Lab Results   Component Value Date    PSA 0.818 12/22/2023       Labs personally reviewed and notable for stable iron labs. No anemia.     Cardiology note personally reviewed      PCP note personally reviewed         Assessment and Plan:  Diagnoses and all orders for this visit:    1. Anemia, unspecified type (Primary)  -     CBC & Differential; Future  -     Ferritin; Future  -     Iron Profile; Future    2. Folate deficiency  -     folic acid (FOLVITE) 1 MG tablet; Take 1 tablet by mouth Daily.  Dispense: 90 tablet; Refill: 1  -     Folate; Future    3. Right kidney mass    4. MGUS (monoclonal gammopathy of  unknown significance)    5. Paroxysmal atrial fibrillation        Chronic anemia for at least the last 3-4 years, but more recently seems to have worsened. Labs from 4/14/23 revealed iron, folate, and B12 deficiency. Antiparietal and intrinsic factor antibodies negative. He has never had a colonoscopy, but did have a cologard testing negative in August of 2021. He follows with cardiology for the INR checks for Coumadin. No ricardo bleeding noted. Certianly could have slow GI bleed as he is on antiplatelet as well as anticoagulant. Also likely component of anemia of chronic disease (sed rate elevated).     4/2023 started on iron orally once daily with vitamin C. Also on B12 and folate. Repeat labs in August with improved iron but as of 2/16/24, labs worse again. Anemia present with hgb 12.6. As no improvement, discussed IV iron. Patient would like to hold off if possible. Oral iron changed to every other day dosing. Iron levels have been stable since then without anemia.     B12 and folate levels 2/16/24 normal. Continue oral replacement.    Repeat labs 3 months with iron studies, CBC, B12, folate    Right kidney mass  Follows with urology. MRI abdomen 10/2023 demonstrating renal cysts    Mild light chain MGUS  Repeat testing 11/2024 negative. No further testing needed.     Anticoagulated  On warfarin for a fib. Mgmt per cardiology.         I spent 20 minutes caring for Zachariah on this date of service. This time includes time spent by me in the following activities:preparing for the visit, reviewing tests, obtaining and/or reviewing a separately obtained history, performing a medically appropriate examination and/or evaluation , counseling and educating the patient/family/caregiver, ordering medications, tests, or procedures, referring and communicating with other health care professionals , documenting information in the medical record, independently interpreting results and communicating that information with the  patient/family/caregiver and care coordination    Patient Follow Up: 3 months with labs     Patient was given instructions and counseling regarding his condition or for health maintenance advice. Please see specific information pulled into the AVS if appropriate.

## 2024-12-02 ENCOUNTER — ANTICOAGULATION VISIT (OUTPATIENT)
Dept: CARDIOLOGY | Facility: CLINIC | Age: 79
End: 2024-12-02
Payer: MEDICARE

## 2024-12-02 ENCOUNTER — LAB (OUTPATIENT)
Dept: LAB | Facility: HOSPITAL | Age: 79
End: 2024-12-02
Payer: MEDICARE

## 2024-12-02 DIAGNOSIS — I48.0 PAROXYSMAL ATRIAL FIBRILLATION: Primary | ICD-10-CM

## 2024-12-02 LAB
INR PPP: 3.65 (ref 0.86–1.15)
PROTHROMBIN TIME: 36.8 SECONDS (ref 11.8–14.9)

## 2024-12-02 RX ORDER — METOPROLOL SUCCINATE 25 MG/1
TABLET, EXTENDED RELEASE ORAL
Qty: 270 TABLET | Refills: 0 | Status: SHIPPED | OUTPATIENT
Start: 2024-12-02

## 2024-12-02 NOTE — PROGRESS NOTES
Lab Results   Component Value Date    INR 3.65 (H) 12/02/2024    INR 2.57 (H) 11/01/2024    INR 2.05 (H) 10/25/2024    PROTIME 36.8 (H) 12/02/2024    PROTIME 28.0 (H) 11/01/2024    PROTIME 23.5 (H) 10/25/2024     Dx: afib     Dose 4/5     Range: 2.0-3.0     [facility]

## 2024-12-03 ENCOUNTER — TELEPHONE (OUTPATIENT)
Dept: FAMILY MEDICINE CLINIC | Age: 79
End: 2024-12-03
Payer: MEDICARE

## 2024-12-03 DIAGNOSIS — Z00.00 ROUTINE GENERAL MEDICAL EXAMINATION AT A HEALTH CARE FACILITY: ICD-10-CM

## 2024-12-03 DIAGNOSIS — E11.9 TYPE 2 DIABETES MELLITUS WITHOUT COMPLICATION, WITHOUT LONG-TERM CURRENT USE OF INSULIN: Primary | ICD-10-CM

## 2024-12-03 NOTE — TELEPHONE ENCOUNTER
"    Caller: Zachariah Cruz \"Keith\"    Relationship: Self    Best call back number: [phone]     What orders are you requesting (i.e. lab or imaging): LAB ORDERS     In what timeframe would the patient need to come in: PRIOR TO APPOINTMENT ON 12/11/2024     Where will you receive your lab/imaging services: IN OFFICE     Additional notes: PATIENT IS CALLING REQUESTING FOR ORDERS PRIOR TO LABS ON NEXT APPOINTMENT, PLEASE CONTACT PATIENT ONCE ORDERS HAVE BEEN PLACED.   "

## 2024-12-09 ENCOUNTER — ANTICOAGULATION VISIT (OUTPATIENT)
Dept: CARDIOLOGY | Facility: CLINIC | Age: 79
End: 2024-12-09
Payer: MEDICARE

## 2024-12-09 ENCOUNTER — LAB (OUTPATIENT)
Dept: LAB | Facility: HOSPITAL | Age: 79
End: 2024-12-09
Payer: MEDICARE

## 2024-12-09 DIAGNOSIS — E11.9 TYPE 2 DIABETES MELLITUS WITHOUT COMPLICATION, WITHOUT LONG-TERM CURRENT USE OF INSULIN: ICD-10-CM

## 2024-12-09 DIAGNOSIS — I48.0 PAROXYSMAL ATRIAL FIBRILLATION: ICD-10-CM

## 2024-12-09 DIAGNOSIS — I48.0 PAROXYSMAL ATRIAL FIBRILLATION: Primary | ICD-10-CM

## 2024-12-09 DIAGNOSIS — Z00.00 ROUTINE GENERAL MEDICAL EXAMINATION AT A HEALTH CARE FACILITY: ICD-10-CM

## 2024-12-09 LAB
ALBUMIN SERPL-MCNC: 4 G/DL (ref 3.5–5.2)
ALBUMIN/GLOB SERPL: 1.5 G/DL
ALP SERPL-CCNC: 75 U/L (ref 39–117)
ALT SERPL W P-5'-P-CCNC: 16 U/L (ref 1–41)
ANION GAP SERPL CALCULATED.3IONS-SCNC: 9.7 MMOL/L (ref 5–15)
AST SERPL-CCNC: 16 U/L (ref 1–40)
BASOPHILS # BLD AUTO: 0.01 10*3/MM3 (ref 0–0.2)
BASOPHILS NFR BLD AUTO: 0.1 % (ref 0–1.5)
BILIRUB SERPL-MCNC: 0.3 MG/DL (ref 0–1.2)
BUN SERPL-MCNC: 17 MG/DL (ref 8–23)
BUN/CREAT SERPL: 16.5 (ref 7–25)
CALCIUM SPEC-SCNC: 9.7 MG/DL (ref 8.6–10.5)
CHLORIDE SERPL-SCNC: 105 MMOL/L (ref 98–107)
CHOLEST SERPL-MCNC: 122 MG/DL (ref 0–200)
CO2 SERPL-SCNC: 26.3 MMOL/L (ref 22–29)
CREAT SERPL-MCNC: 1.03 MG/DL (ref 0.76–1.27)
DEPRECATED RDW RBC AUTO: 47.7 FL (ref 37–54)
EGFRCR SERPLBLD CKD-EPI 2021: 74.8 ML/MIN/1.73
EOSINOPHIL # BLD AUTO: 0.29 10*3/MM3 (ref 0–0.4)
EOSINOPHIL NFR BLD AUTO: 3.5 % (ref 0.3–6.2)
ERYTHROCYTE [DISTWIDTH] IN BLOOD BY AUTOMATED COUNT: 13.9 % (ref 12.3–15.4)
GLOBULIN UR ELPH-MCNC: 2.7 GM/DL
GLUCOSE SERPL-MCNC: 149 MG/DL (ref 65–99)
HBA1C MFR BLD: 7.2 % (ref 4.8–5.6)
HCT VFR BLD AUTO: 41.9 % (ref 37.5–51)
HDLC SERPL-MCNC: 33 MG/DL (ref 40–60)
HGB BLD-MCNC: 13.5 G/DL (ref 13–17.7)
IMM GRANULOCYTES # BLD AUTO: 0.03 10*3/MM3 (ref 0–0.05)
IMM GRANULOCYTES NFR BLD AUTO: 0.4 % (ref 0–0.5)
INR PPP: 2.41 (ref 0.86–1.15)
LDLC SERPL CALC-MCNC: 60 MG/DL (ref 0–100)
LDLC/HDLC SERPL: 1.67 {RATIO}
LYMPHOCYTES # BLD AUTO: 1.51 10*3/MM3 (ref 0.7–3.1)
LYMPHOCYTES NFR BLD AUTO: 18.4 % (ref 19.6–45.3)
MCH RBC QN AUTO: 29.8 PG (ref 26.6–33)
MCHC RBC AUTO-ENTMCNC: 32.2 G/DL (ref 31.5–35.7)
MCV RBC AUTO: 92.5 FL (ref 79–97)
MONOCYTES # BLD AUTO: 0.63 10*3/MM3 (ref 0.1–0.9)
MONOCYTES NFR BLD AUTO: 7.7 % (ref 5–12)
NEUTROPHILS NFR BLD AUTO: 5.73 10*3/MM3 (ref 1.7–7)
NEUTROPHILS NFR BLD AUTO: 69.9 % (ref 42.7–76)
PLATELET # BLD AUTO: 259 10*3/MM3 (ref 140–450)
PMV BLD AUTO: 11.1 FL (ref 6–12)
POTASSIUM SERPL-SCNC: 4.7 MMOL/L (ref 3.5–5.2)
PROT SERPL-MCNC: 6.7 G/DL (ref 6–8.5)
PROTHROMBIN TIME: 26.6 SECONDS (ref 11.8–14.9)
RBC # BLD AUTO: 4.53 10*6/MM3 (ref 4.14–5.8)
SODIUM SERPL-SCNC: 141 MMOL/L (ref 136–145)
TRIGL SERPL-MCNC: 170 MG/DL (ref 0–150)
TSH SERPL DL<=0.05 MIU/L-ACNC: 1.37 UIU/ML (ref 0.27–4.2)
VLDLC SERPL-MCNC: 29 MG/DL (ref 5–40)
WBC NRBC COR # BLD AUTO: 8.2 10*3/MM3 (ref 3.4–10.8)

## 2024-12-09 PROCEDURE — 83036 HEMOGLOBIN GLYCOSYLATED A1C: CPT

## 2024-12-09 PROCEDURE — 85610 PROTHROMBIN TIME: CPT

## 2024-12-09 PROCEDURE — 80061 LIPID PANEL: CPT

## 2024-12-09 PROCEDURE — 80053 COMPREHEN METABOLIC PANEL: CPT

## 2024-12-09 PROCEDURE — 84443 ASSAY THYROID STIM HORMONE: CPT

## 2024-12-09 PROCEDURE — 85025 COMPLETE CBC W/AUTO DIFF WBC: CPT

## 2024-12-09 PROCEDURE — 36415 COLL VENOUS BLD VENIPUNCTURE: CPT

## 2024-12-09 NOTE — PROGRESS NOTES
Lab Results   Component Value Date    INR 2.41 (H) 12/09/2024    INR 3.65 (H) 12/02/2024    INR 2.57 (H) 11/01/2024    PROTIME 26.6 (H) 12/09/2024    PROTIME 36.8 (H) 12/02/2024    PROTIME 28.0 (H) 11/01/2024     Atrial fibrillation      Range: 2.0-3.0     5/4     [facility]

## 2024-12-11 ENCOUNTER — OFFICE VISIT (OUTPATIENT)
Dept: FAMILY MEDICINE CLINIC | Age: 79
End: 2024-12-11
Payer: MEDICARE

## 2024-12-11 VITALS
SYSTOLIC BLOOD PRESSURE: 140 MMHG | DIASTOLIC BLOOD PRESSURE: 64 MMHG | HEIGHT: 70 IN | WEIGHT: 228 LBS | TEMPERATURE: 97.6 F | HEART RATE: 62 BPM | OXYGEN SATURATION: 96 % | BODY MASS INDEX: 32.64 KG/M2

## 2024-12-11 DIAGNOSIS — K86.2 PANCREATIC CYST: ICD-10-CM

## 2024-12-11 DIAGNOSIS — D64.9 ANEMIA, UNSPECIFIED TYPE: ICD-10-CM

## 2024-12-11 DIAGNOSIS — R31.9 HEMATURIA, UNSPECIFIED TYPE: ICD-10-CM

## 2024-12-11 DIAGNOSIS — Z00.00 ROUTINE GENERAL MEDICAL EXAMINATION AT A HEALTH CARE FACILITY: ICD-10-CM

## 2024-12-11 DIAGNOSIS — R31.1 BENIGN ESSENTIAL MICROSCOPIC HEMATURIA: Primary | ICD-10-CM

## 2024-12-11 DIAGNOSIS — M1A.0790 IDIOPATHIC CHRONIC GOUT OF FOOT WITHOUT TOPHUS, UNSPECIFIED LATERALITY: ICD-10-CM

## 2024-12-11 DIAGNOSIS — E11.9 TYPE 2 DIABETES MELLITUS WITHOUT COMPLICATION, WITHOUT LONG-TERM CURRENT USE OF INSULIN: Primary | ICD-10-CM

## 2024-12-11 LAB
BACTERIA UR QL AUTO: ABNORMAL /HPF
BILIRUB UR QL STRIP: NEGATIVE
CLARITY UR: CLEAR
COLOR UR: YELLOW
GLUCOSE UR STRIP-MCNC: NEGATIVE MG/DL
HGB UR QL STRIP.AUTO: ABNORMAL
KETONES UR QL STRIP: NEGATIVE
LEUKOCYTE ESTERASE UR QL STRIP.AUTO: NEGATIVE
NITRITE UR QL STRIP: NEGATIVE
PH UR STRIP.AUTO: 5.5 [PH] (ref 5–8)
PROT UR QL STRIP: NEGATIVE
RBC # UR STRIP: ABNORMAL /HPF
REF LAB TEST METHOD: ABNORMAL
SP GR UR STRIP: 1.02 (ref 1–1.03)
SQUAMOUS #/AREA URNS HPF: ABNORMAL /HPF
UROBILINOGEN UR QL STRIP: ABNORMAL
WBC # UR STRIP: ABNORMAL /HPF

## 2024-12-11 PROCEDURE — 81001 URINALYSIS AUTO W/SCOPE: CPT | Performed by: NURSE PRACTITIONER

## 2024-12-11 PROCEDURE — 3077F SYST BP >= 140 MM HG: CPT | Performed by: NURSE PRACTITIONER

## 2024-12-11 PROCEDURE — 1170F FXNL STATUS ASSESSED: CPT | Performed by: NURSE PRACTITIONER

## 2024-12-11 PROCEDURE — 1125F AMNT PAIN NOTED PAIN PRSNT: CPT | Performed by: NURSE PRACTITIONER

## 2024-12-11 PROCEDURE — G0439 PPPS, SUBSEQ VISIT: HCPCS | Performed by: NURSE PRACTITIONER

## 2024-12-11 PROCEDURE — 3078F DIAST BP <80 MM HG: CPT | Performed by: NURSE PRACTITIONER

## 2024-12-11 RX ORDER — METFORMIN HYDROCHLORIDE 500 MG/1
1000 TABLET, EXTENDED RELEASE ORAL 2 TIMES DAILY
Qty: 360 TABLET | Refills: 1 | Status: SHIPPED | OUTPATIENT
Start: 2024-12-11

## 2024-12-11 RX ORDER — ALLOPURINOL 100 MG/1
100 TABLET ORAL DAILY
Qty: 90 TABLET | Refills: 1 | Status: SHIPPED | OUTPATIENT
Start: 2024-12-11

## 2024-12-11 NOTE — PROGRESS NOTES
Subjective   The ABCs of the Annual Wellness Visit  Medicare Wellness Visit      Zachariah Cruz is a 79 y.o. patient who presents for a Medicare Wellness Visit.    Medicare wellness HPI  Exercises regularly:yes daily   Eats healthy: yes   Last PSA:12-22-23 normal   Wears seatbelts: no  Living will:has one, he thinks we have copy  Optometrist:ELIZABETH Reynoso   Dentist:does not go   Tobacco:none   Alcohol intake:socially  Drugs:none   Falls:none   Colonoscopy: 1-25-24. Cologuard + ; cologuard negative 11-  Immunizations:declines     The following portions of the patient's history were reviewed and   updated as appropriate: allergies, current medications, past family history, past medical history, past social history, past surgical history, and problem list.    Compared to one year ago, the patient's physical   health is better.  Compared to one year ago, the patient's mental   health is the same.    Recent Hospitalizations:  He was not admitted to the hospital during the last year.     Current Medical Providers:  Patient Care Team:  Nelia Frost APRN as PCP - General (Family Medicine)  Chelsie Atkinson APRN as Nurse Practitioner (Gastroenterology)  Douglas Chawla MD as Consulting Physician (Urology)    Outpatient Medications Prior to Visit   Medication Sig Dispense Refill    amLODIPine (NORVASC) 10 MG tablet TAKE 1 TABLET BY MOUTH DAILY 90 tablet 3    aspirin 81 MG EC tablet Take 1 tablet by mouth Daily.      FeroSul 325 (65 Fe) MG tablet TAKE 1 TABLET BY MOUTH DAILY WITH BREAKFAST 90 tablet 1    folic acid (FOLVITE) 1 MG tablet Take 1 tablet by mouth Daily. 90 tablet 1    glimepiride (AMARYL) 4 MG tablet TAKE 1 TABLET BY MOUTH DAILY (Patient taking differently: Take 0.5 tablets by mouth Daily. 3 mg) 90 tablet 0    lisinopril (PRINIVIL,ZESTRIL) 20 MG tablet TAKE 1 TABLET BY MOUTH TWICE A  tablet 1    metoprolol succinate XL (TOPROL-XL) 25 MG 24 hr tablet TAKE 2 TABLETS BY MOUTH  EVERY MORNING AND TAKE ONE TABLET BY MOUTH EVERY EVENING 270 tablet 0    OneTouch Ultra test strip 1 each by Other route Daily. Check blood sugar      simvastatin (ZOCOR) 40 MG tablet TAKE 1 TABLET BY MOUTH DAILY 90 tablet 1    vitamin B-12 (CYANOCOBALAMIN) 500 MCG tablet Take 1 tablet by mouth Daily. 90 tablet 1    vitamin C (ASCORBIC ACID) 250 MG tablet Take 1 tablet by mouth Daily. 90 tablet 1    warfarin (Coumadin) 4 MG tablet Take 1 tablet by mouth daily or as directed (Patient taking differently: Take 1 tablet by mouth Every Night. 2 - 5 - 4 per patient) 90 tablet 1    allopurinol (ZYLOPRIM) 100 MG tablet TAKE 1 TABLET BY MOUTH DAILY 90 tablet 1    metFORMIN ER (GLUCOPHAGE-XR) 500 MG 24 hr tablet Take 2 tablets by mouth 2 (Two) Times a Day. 360 tablet 1    spironolactone (ALDACTONE) 25 MG tablet TAKE 1 TABLET BY MOUTH DAILY (Patient not taking: Reported on 10/22/2024) 90 tablet 1     No facility-administered medications prior to visit.     No opioid medication identified on active medication list. I have reviewed chart for other potential  high risk medication/s and harmful drug interactions in the elderly.      Aspirin is on active medication list. Aspirin use is indicated based on review of current medical condition/s. Pros and cons of this therapy have been discussed today. Benefits of this medication outweigh potential harm.  Patient has been encouraged to continue taking this medication.  .      Patient Active Problem List   Diagnosis    Atrial fibrillation    Diabetes mellitus    Essential hypertension    Mixed hyperlipidemia    Left leg swelling    Left leg pain    Uncontrolled diabetes mellitus with complications    Screening for prostate cancer    Chronic right-sided low back pain without sciatica    Personal history of COVID-19    Idiopathic chronic gout of foot without tophus    Other specific arthropathies, not elsewhere classified, multiple sites    Gross hematuria    Coronary artery disease  "involving native coronary artery of native heart without angina pectoris    Type 2 diabetes mellitus without complication, without long-term current use of insulin    Tinnitus    Routine general medical examination at a health care facility    Screening for malignant neoplasm of prostate    Absolute anemia    Acute cough    Weakness    Urinary tract infection without hematuria    Diarrhea    Arthralgia    Urinary retention    Renal mass    Benign prostatic hyperplasia with lower urinary tract symptoms    Pancreatic cyst    Dysphagia    Disease due to severe acute respiratory syndrome coronavirus 2 (SARS-CoV-2)    Clostridioides difficile infection    Epistaxis    Fall    Gout    History of vaccination    Kidney lesion    Neuropathy    RAMY on CPAP    Pain    Mild aortic valve stenosis    Screen for colon cancer    Hematuria     Advance Care Planning Advance Directive is not on file.  ACP discussion was held with the patient during this visit. Patient has an advance directive (not in EMR), copy requested.            Objective   Vitals:    12/11/24 1250   BP: 140/64   BP Location: Left arm   Patient Position: Sitting   Cuff Size: Large Adult   Pulse: 62   Temp: 97.6 °F (36.4 °C)   TempSrc: Oral   SpO2: 96%   Weight: 103 kg (228 lb)   Height: 177.8 cm (70\")   PainSc:   2   PainLoc: Knee       Estimated body mass index is 32.71 kg/m² as calculated from the following:    Height as of this encounter: 177.8 cm (70\").    Weight as of this encounter: 103 kg (228 lb).    BMI is >= 30 and <35. (Class 1 Obesity). The following options were offered after discussion;: exercise counseling/recommendations and nutrition counseling/recommendations       Does the patient have evidence of cognitive impairment? No  Lab Results   Component Value Date    TRIG 170 (H) 12/09/2024    HDL 33 (L) 12/09/2024    LDL 60 12/09/2024    VLDL 29 12/09/2024    HGBA1C 7.20 (H) 12/09/2024                                                                    "                             Health  Risk Assessment    Smoking Status:  Social History     Tobacco Use   Smoking Status Former    Current packs/day: 0.00    Average packs/day: 1 pack/day for 5.0 years (5.0 ttl pk-yrs)    Types: Cigarettes    Start date:     Quit date:     Years since quittin.9    Passive exposure: Past   Smokeless Tobacco Never     Alcohol Consumption:  Social History     Substance and Sexual Activity   Alcohol Use Not Currently    Comment: occ       Fall Risk Screen  STEADI Fall Risk Assessment was completed, and patient is at LOW risk for falls.Assessment completed on:2024    Depression Screening   Little interest or pleasure in doing things? Not at all   Feeling down, depressed, or hopeless? Not at all   PHQ-2 Total Score 0      Health Habits and Functional and Cognitive Screenin/11/2024    12:48 PM   Functional & Cognitive Status   Do you have difficulty preparing food and eating? No   Do you have difficulty bathing yourself, getting dressed or grooming yourself? No   Do you have difficulty using the toilet? No   Do you have difficulty moving around from place to place? No   Do you have trouble with steps or getting out of a bed or a chair? Yes   Current Diet Well Balanced Diet   Dental Exam Not up to date   Eye Exam Up to date   Exercise (times per week) 3 times per week   Current Exercises Include Walking   Do you need help using the phone?  No   Are you deaf or do you have serious difficulty hearing?  No   Do you need help to go to places out of walking distance? No   Do you need help shopping? No   Do you need help preparing meals?  No   Do you need help with housework?  No   Do you need help with laundry? No   Do you need help taking your medications? No   Do you need help managing money? No   Do you ever drive or ride in a car without wearing a seat belt? Yes   Have you felt unusual stress, anger or loneliness in the last month? No   Who do you live with?  Spouse   If you need help, do you have trouble finding someone available to you? No   Have you been bothered in the last four weeks by sexual problems? No   Do you have difficulty concentrating, remembering or making decisions? No           Age-appropriate Screening Schedule:  Refer to the list below for future screening recommendations based on patient's age, sex and/or medical conditions. Orders for these recommended tests are listed in the plan section. The patient has been provided with a written plan.    Health Maintenance List  Health Maintenance   Topic Date Due    DIABETIC FOOT EXAM  06/08/2023    BMI FOLLOWUP  12/11/2024    DIABETIC EYE EXAM  01/31/2025    ZOSTER VACCINE (1 of 2) 12/11/2024 (Originally 4/10/1997)    COVID-19 Vaccine (4 - 2024-25 season) 03/02/2025 (Originally 9/1/2024)    INFLUENZA VACCINE  03/31/2025 (Originally 7/1/2024)    RSV Vaccine - Adults (1 - 1-dose 75+ series) 12/11/2025 (Originally 4/10/2022)    Pneumococcal Vaccine 65+ (1 of 2 - PCV) 12/11/2025 (Originally 4/10/1951)    TDAP/TD VACCINES (1 - Tdap) 12/11/2025 (Originally [birth year]10/1964)    HEMOGLOBIN A1C  06/09/2025    LIPID PANEL  12/09/2025    ANNUAL WELLNESS VISIT  12/11/2025    HEPATITIS C SCREENING  Completed    URINE MICROALBUMIN  Discontinued    COLORECTAL CANCER SCREENING  Discontinued                                                                                                                                                Gait and Balance Evaluation: Normal  CMS Preventative Services Quick Reference  Risk Factors Identified During Encounter  Immunizations Discussed/Encouraged: Tdap, Influenza, Prevnar 20 (Pneumococcal 20-valent conjugate), Shingrix, COVID19, and RSV (Respiratory Syncytial Virus)    The above risks/problems have been discussed with the patient.  Pertinent information has been shared with the patient in the After Visit Summary.  An After Visit Summary and PPPS were made available to the patient.    Follow  Up:   Next Medicare Wellness visit to be scheduled in 1 year.          Additional E&M Note during same encounter follows:  Patient has multiple medical problems which are significant and separately identifiable that require additional work above and beyond the Medicare Wellness Visit.      Chief Complaint  Medicare Wellness-subsequent (mcw)    Zachariah Cruz is a 79 y.o. male who presents to [facility]     Diabetes:  Current medication: metformin & amaryl   Tolerating medication: Yes  Last eye exam: UTD  Last foot exam: > 1 year (he goes to a nail salon to trim his toenails)   At home BS ranges:  , 3 days ago 151  Needs metformin to [facility]  Lab Results       Component                Value               Date                       HGBA1C                   7.20 (H)            12/09/2024              History of gout, Needs allopurinol to [facility]     PAST MEDICAL HISTORY changes since 5-6-2024:       covid +     AF, sees Dr Moffett    Sleep Apnea: uses CPAP;     Renal Stones: he has undergone ECSW lithotripsy and laser       Hospitalizations:    UTI/weaknss 3-22-23  Heart cath 3-16-23    Pneumonia admitted once on 10-20-20 (COVID +)         CURRENT MEDICAL PROVIDERS:  Cardiology: [facility]    Rheumatologist: Dr. Arvizu     urology : [facility] /last seen    Sees Dr Manuel for pancreas follow up        PREVENTIVE HEALTH MAINTENANCE             COLORECTAL CANCER SCREENING:  (colonoscopy q10y; sigmoidoscopy q5y; Cologuard q3y) 1-25-24:  PREOPERATIVE DIAGNOSIS:   Positive Cologuard.    POSTOPERATIVE DIAGNOSIS:  1. Poor prep.  2. Severe sigmoid diverticulosis.    PROCEDURE PERFORMED:  Colonoscopy to cecum.      cologuard, negative  11/30/2020, Results are in chart;       Hepatitis C Medicare Screening: was last done 9-2017; negative            Surgical History:     1-25-24 CLN at [facility], Dr Dunaway   Heart cath 3-16-23 and stenting   Cystoscopy 2022    Left  "cataract     Appendectomy    Coronary Artery Bypass Graft: ;     Transurethral Resection of Prostate: ;     Cataract Removal: right; ;         Family History:        Father:  at age 91; Cause of death was CAD;  Coronary Artery Disease     Mother:  at age 88; Cause of death was CVA;  Hypertension     Paternal Grandfather: Cerebrovascular Accident         Social History:        Occupation:.   (Self-Employed) [place]      Marital Status:      Children: 3 children              Objective   Vital Signs:   Vitals:    24 1250   BP: 140/64   BP Location: Left arm   Patient Position: Sitting   Cuff Size: Large Adult   Pulse: 62   Temp: 97.6 °F (36.4 °C)   TempSrc: Oral   SpO2: 96%   Weight: 103 kg (228 lb)   Height: 177.8 cm (70\")   PainSc:   2   PainLoc: Knee     Body mass index is 32.71 kg/m².    Wt Readings from Last 3 Encounters:   24 103 kg (228 lb)   24 105 kg (230 lb 13.2 oz)   10/22/24 102 kg (225 lb)     BP Readings from Last 3 Encounters:   24 140/64   24 160/74   10/22/24 140/58       Review of Systems   Constitutional:  Negative for activity change, appetite change, chills, fatigue and fever.   HENT:  Negative for congestion and hearing loss.    Eyes:  Negative for visual disturbance.   Respiratory:  Negative for cough, shortness of breath and wheezing.    Cardiovascular:  Negative for chest pain, palpitations and leg swelling.   Gastrointestinal:  Negative for abdominal pain, constipation, diarrhea, nausea and vomiting.   Genitourinary:  Positive for flank pain (left in May 2024, had blood in urine, is concerned). Negative for hematuria.   Musculoskeletal:  Negative for arthralgias and myalgias.   Skin:  Negative for rash.   Neurological:  Negative for dizziness, weakness and numbness.   Psychiatric/Behavioral:  Negative for confusion, sleep disturbance and suicidal ideas.         Physical Exam  Vitals reviewed.   Constitutional:  "      Appearance: Normal appearance. He is well-developed.   HENT:      Head: Normocephalic and atraumatic.      Right Ear: External ear normal.      Left Ear: External ear normal.      Mouth/Throat:      Pharynx: No oropharyngeal exudate.   Eyes:      Conjunctiva/sclera: Conjunctivae normal.      Pupils: Pupils are equal, round, and reactive to light.   Neck:      Vascular: No carotid bruit.   Cardiovascular:      Rate and Rhythm: Normal rate and regular rhythm.      Pulses:           Posterior tibial pulses are 2+ on the right side and 2+ on the left side.      Heart sounds: No murmur heard.     No friction rub. No gallop.   Pulmonary:      Effort: Pulmonary effort is normal. No respiratory distress.      Breath sounds: Normal breath sounds. No wheezing or rhonchi.   Abdominal:      General: Bowel sounds are normal. There is no distension.      Palpations: Abdomen is soft.      Tenderness: There is no abdominal tenderness. There is no right CVA tenderness or left CVA tenderness.      Comments:       Musculoskeletal:         General: No swelling.      Right lower leg: Edema (trace) present.      Left lower leg: Edema (trace) present.   Feet:      Right foot:      Protective Sensation: 3 sites tested.  3 sites sensed.      Skin integrity: Skin integrity normal. No ulcer or blister.      Toenail Condition: Right toenails are abnormally thick.      Left foot:      Protective Sensation: 3 sites tested.  3 sites sensed.      Skin integrity: Skin integrity normal. No ulcer or blister.      Toenail Condition: Left toenails are abnormally thick.      Comments: Diabetic Foot Exam Performed and Monofilament Test Performed     Skin:     General: Skin is warm and dry.   Neurological:      Mental Status: He is alert and oriented to person, place, and time.      Cranial Nerves: No cranial nerve deficit.   Psychiatric:         Mood and Affect: Mood and affect normal.         Behavior: Behavior normal.         Thought Content:  Thought content normal.         Judgment: Judgment normal.         The following data was reviewed by JACIEL Tovar on 12/11/2024        Assessment & Plan   Diagnoses and all orders for this visit:    1. Type 2 diabetes mellitus without complication, without long-term current use of insulin (Primary)  Assessment & Plan:  Reviewed labs, To work on diet, regular exercise, continue to monitor sugars, check feet daily, see optometrist yearly or as directed.      Orders:  -     metFORMIN ER (GLUCOPHAGE-XR) 500 MG 24 hr tablet; Take 2 tablets by mouth 2 (Two) Times a Day.  Dispense: 360 tablet; Refill: 1    2. Routine general medical examination at a health care facility  Assessment & Plan:  Advise regular exercise, healthy eating, advised to always wear seat belts. Living will discussed, to try to find his copy, if not he can bring a copy in, fall prevention discussed.  Immunizations discussed, declines any vaccine updates.   To continue yearly optometry and advised to have yearly dental exams.          3. Pancreatic cyst  Assessment & Plan:  Reviewed last CT 5-2024      4. Anemia, unspecified type  Assessment & Plan:  Reviewd hem/onc note from last month, following his anemia, MGUS, and kidney mass, had some dg testing       5. Hematuria, unspecified type  Assessment & Plan:  Reviewed ER note, CT and last urinalysis, will repeat his urinalysis     Orders:  -     Urinalysis With Culture If Indicated -    6. Idiopathic chronic gout of foot without tophus, unspecified laterality  Assessment & Plan:  continue allopurinol     Orders:  -     allopurinol (ZYLOPRIM) 100 MG tablet; Take 1 tablet by mouth Daily.  Dispense: 90 tablet; Refill: 1          BMI is >= 30 and <35. (Class 1 Obesity). The following options were offered after discussion;: exercise counseling/recommendations and nutrition counseling/recommendations       FOLLOW UP  Return if symptoms worsen or fail to improve, for followup pending lab  results.  Patient was given instructions and counseling regarding his condition or for health maintenance advice. Please see specific information pulled into the AVS if appropriate.     Nelia Frost, APRN  12/11/24  13:28 EST

## 2024-12-11 NOTE — ASSESSMENT & PLAN NOTE
Reviewd hem/onc note from last month, following his anemia, MGUS, and kidney mass, had some dg testing

## 2024-12-11 NOTE — ASSESSMENT & PLAN NOTE
Advise regular exercise, healthy eating, advised to always wear seat belts. Living will discussed, to try to find his copy, if not he can bring a copy in, fall prevention discussed.  Immunizations discussed, declines any vaccine updates.   To continue yearly optometry and advised to have yearly dental exams.

## 2024-12-11 NOTE — ASSESSMENT & PLAN NOTE
Reviewed labs, To work on diet, regular exercise, continue to monitor sugars, check feet daily, see optometrist yearly or as directed.

## 2024-12-11 NOTE — Clinical Note
He says he brought a living will here, can you find or see one in [place] He can bring a copy if you can't find

## 2024-12-16 ENCOUNTER — ANTICOAGULATION VISIT (OUTPATIENT)
Dept: CARDIOLOGY | Facility: CLINIC | Age: 79
End: 2024-12-16
Payer: MEDICARE

## 2024-12-16 ENCOUNTER — LAB (OUTPATIENT)
Dept: LAB | Facility: HOSPITAL | Age: 79
End: 2024-12-16
Payer: MEDICARE

## 2024-12-16 DIAGNOSIS — I48.0 PAROXYSMAL ATRIAL FIBRILLATION: Primary | ICD-10-CM

## 2024-12-16 DIAGNOSIS — I48.0 PAROXYSMAL ATRIAL FIBRILLATION: ICD-10-CM

## 2024-12-16 LAB
INR PPP: 1.57 (ref 0.86–1.15)
PROTHROMBIN TIME: 19.1 SECONDS (ref 11.8–14.9)

## 2024-12-16 PROCEDURE — 85610 PROTHROMBIN TIME: CPT

## 2024-12-16 PROCEDURE — 36415 COLL VENOUS BLD VENIPUNCTURE: CPT

## 2024-12-16 NOTE — PROGRESS NOTES
Lab Results   Component Value Date    INR 1.57 (H) 12/16/2024    INR 2.41 (H) 12/09/2024    INR 3.65 (H) 12/02/2024    PROTIME 19.1 (H) 12/16/2024    PROTIME 26.6 (H) 12/09/2024    PROTIME 36.8 (H) 12/02/2024     Atrial fibrillation      Range: 2.0-3.0     5/4     [facility]

## 2024-12-16 NOTE — PROGRESS NOTES
Patient is aware, verbalized understanding. Patient was taking 4 mg daily, Maia Marin advised to change to 5/4.

## 2024-12-23 ENCOUNTER — ANTICOAGULATION VISIT (OUTPATIENT)
Dept: CARDIOLOGY | Facility: CLINIC | Age: 79
End: 2024-12-23
Payer: MEDICARE

## 2024-12-23 ENCOUNTER — LAB (OUTPATIENT)
Dept: LAB | Facility: HOSPITAL | Age: 79
End: 2024-12-23
Payer: MEDICARE

## 2024-12-23 DIAGNOSIS — I48.0 PAROXYSMAL ATRIAL FIBRILLATION: ICD-10-CM

## 2024-12-23 DIAGNOSIS — I48.0 PAROXYSMAL ATRIAL FIBRILLATION: Primary | ICD-10-CM

## 2024-12-23 LAB
INR PPP: 2.13 (ref 0.86–1.15)
PROTHROMBIN TIME: 24.2 SECONDS (ref 11.8–14.9)

## 2024-12-23 PROCEDURE — 36415 COLL VENOUS BLD VENIPUNCTURE: CPT

## 2024-12-23 PROCEDURE — 85610 PROTHROMBIN TIME: CPT

## 2024-12-23 NOTE — PROGRESS NOTES
Lab Results   Component Value Date    INR 2.13 (H) 12/23/2024    INR 1.57 (H) 12/16/2024    INR 2.41 (H) 12/09/2024    PROTIME 24.2 (H) 12/23/2024    PROTIME 19.1 (H) 12/16/2024    PROTIME 26.6 (H) 12/09/2024     Atrial fibrillation      Range: 2.0-3.0     5/4     [facility]

## 2024-12-30 ENCOUNTER — ANTICOAGULATION VISIT (OUTPATIENT)
Dept: CARDIOLOGY | Facility: CLINIC | Age: 79
End: 2024-12-30
Payer: MEDICARE

## 2024-12-30 ENCOUNTER — LAB (OUTPATIENT)
Dept: LAB | Facility: HOSPITAL | Age: 79
End: 2024-12-30
Payer: MEDICARE

## 2024-12-30 DIAGNOSIS — I48.0 PAROXYSMAL ATRIAL FIBRILLATION: Primary | ICD-10-CM

## 2024-12-30 DIAGNOSIS — I48.0 PAROXYSMAL ATRIAL FIBRILLATION: ICD-10-CM

## 2024-12-30 LAB
INR PPP: 2.8 (ref 0.86–1.15)
PROTHROMBIN TIME: 29.9 SECONDS (ref 11.8–14.9)

## 2024-12-30 PROCEDURE — 36415 COLL VENOUS BLD VENIPUNCTURE: CPT

## 2024-12-30 PROCEDURE — 85610 PROTHROMBIN TIME: CPT

## 2024-12-30 NOTE — PROGRESS NOTES
Lab Results   Component Value Date    INR 2.80 (H) 12/30/2024    INR 2.13 (H) 12/23/2024    INR 1.57 (H) 12/16/2024    PROTIME 29.9 (H) 12/30/2024    PROTIME 24.2 (H) 12/23/2024    PROTIME 19.1 (H) 12/16/2024     Atrial fibrillation      Range: 2.0-3.0     5/4     [facility]

## 2025-01-03 ENCOUNTER — OFFICE VISIT (OUTPATIENT)
Dept: UROLOGY | Age: 80
End: 2025-01-03
Payer: MEDICARE

## 2025-01-03 VITALS — BODY MASS INDEX: 32.93 KG/M2 | WEIGHT: 230 LBS | HEIGHT: 70 IN

## 2025-01-03 DIAGNOSIS — N40.1 BENIGN PROSTATIC HYPERPLASIA WITH LOWER URINARY TRACT SYMPTOMS, SYMPTOM DETAILS UNSPECIFIED: Primary | ICD-10-CM

## 2025-01-03 DIAGNOSIS — R31.0 GROSS HEMATURIA: ICD-10-CM

## 2025-01-03 PROBLEM — Z92.29 HISTORY OF VACCINATION: Status: RESOLVED | Noted: 2023-07-20 | Resolved: 2025-01-03

## 2025-01-03 PROBLEM — Z00.00 ROUTINE GENERAL MEDICAL EXAMINATION AT A HEALTH CARE FACILITY: Status: RESOLVED | Noted: 2022-12-07 | Resolved: 2025-01-03

## 2025-01-03 PROBLEM — R31.9 HEMATURIA: Status: RESOLVED | Noted: 2024-05-06 | Resolved: 2025-01-03

## 2025-01-03 PROBLEM — M12.89 OTHER SPECIFIC ARTHROPATHIES, NOT ELSEWHERE CLASSIFIED, MULTIPLE SITES: Status: RESOLVED | Noted: 2021-10-27 | Resolved: 2025-01-03

## 2025-01-03 PROBLEM — E11.9 DIABETES MELLITUS: Status: RESOLVED | Noted: 2021-07-01 | Resolved: 2025-01-03

## 2025-01-03 PROBLEM — M79.89 LEFT LEG SWELLING: Status: RESOLVED | Noted: 2021-07-01 | Resolved: 2025-01-03

## 2025-01-03 PROBLEM — M10.9 GOUT: Status: RESOLVED | Noted: 2023-07-20 | Resolved: 2025-01-03

## 2025-01-03 PROBLEM — Z12.5 SCREENING FOR MALIGNANT NEOPLASM OF PROSTATE: Status: RESOLVED | Noted: 2022-12-07 | Resolved: 2025-01-03
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
## 2025-01-03 PROBLEM — R05.1 ACUTE COUGH: Status: RESOLVED | Noted: 2023-03-22 | Resolved: 2025-01-03

## 2025-01-03 PROBLEM — N39.0 URINARY TRACT INFECTION WITHOUT HEMATURIA: Status: RESOLVED | Noted: 2023-03-30 | Resolved: 2025-01-03

## 2025-01-03 PROBLEM — IMO0002 UNCONTROLLED DIABETES MELLITUS WITH COMPLICATIONS: Status: RESOLVED | Noted: 2021-09-26 | Resolved: 2025-01-03

## 2025-01-03 PROBLEM — R53.1 WEAKNESS: Status: RESOLVED | Noted: 2023-03-22 | Resolved: 2025-01-03

## 2025-01-03 PROBLEM — U07.1 DISEASE DUE TO SEVERE ACUTE RESPIRATORY SYNDROME CORONAVIRUS 2 (SARS-COV-2): Status: RESOLVED | Noted: 2023-07-20 | Resolved: 2025-01-03

## 2025-01-03 PROBLEM — M79.605 LEFT LEG PAIN: Status: RESOLVED | Noted: 2021-07-01 | Resolved: 2025-01-03

## 2025-01-03 PROBLEM — M1A.0790 IDIOPATHIC CHRONIC GOUT OF FOOT WITHOUT TOPHUS: Status: RESOLVED | Noted: 2021-10-27 | Resolved: 2025-01-03

## 2025-01-03 PROBLEM — R33.9 URINARY RETENTION: Status: RESOLVED | Noted: 2023-04-19 | Resolved: 2025-01-03

## 2025-01-03 PROBLEM — N28.89 RENAL MASS: Status: RESOLVED | Noted: 2023-04-23 | Resolved: 2025-01-03

## 2025-01-03 PROBLEM — Z95.5 STENTED CORONARY ARTERY: Status: ACTIVE | Noted: 2025-01-03

## 2025-01-03 PROBLEM — Z12.11 SCREEN FOR COLON CANCER: Status: RESOLVED | Noted: 2023-12-11 | Resolved: 2025-01-03

## 2025-01-03 PROBLEM — Z86.16 PERSONAL HISTORY OF COVID-19: Status: RESOLVED | Noted: 2021-10-27 | Resolved: 2025-01-03

## 2025-01-03 PROBLEM — D68.9 COAGULOPATHY: Chronic | Status: ACTIVE | Noted: 2025-01-03

## 2025-01-03 PROBLEM — N28.9 KIDNEY LESION: Status: RESOLVED | Noted: 2023-07-20 | Resolved: 2025-01-03

## 2025-01-03 PROBLEM — R52 PAIN: Status: RESOLVED | Noted: 2023-07-20 | Resolved: 2025-01-03

## 2025-01-03 PROBLEM — R04.0 EPISTAXIS: Status: RESOLVED | Noted: 2023-07-20 | Resolved: 2025-01-03

## 2025-01-03 PROBLEM — A49.8 CLOSTRIDIOIDES DIFFICILE INFECTION: Status: RESOLVED | Noted: 2023-03-30 | Resolved: 2025-01-03

## 2025-01-03 PROBLEM — I25.10 CORONARY ARTERY DISEASE INVOLVING NATIVE CORONARY ARTERY OF NATIVE HEART WITHOUT ANGINA PECTORIS: Status: RESOLVED | Noted: 2022-01-15 | Resolved: 2025-01-03

## 2025-01-03 PROBLEM — Z12.5 SCREENING FOR PROSTATE CANCER: Status: RESOLVED | Noted: 2021-10-27 | Resolved: 2025-01-03

## 2025-01-03 LAB
BILIRUB BLD-MCNC: NEGATIVE MG/DL
CLARITY, POC: CLEAR
COLOR UR: YELLOW
EXPIRATION DATE: ABNORMAL
GLUCOSE UR STRIP-MCNC: NEGATIVE MG/DL
KETONES UR QL: NEGATIVE
LEUKOCYTE EST, POC: NEGATIVE
Lab: ABNORMAL
NITRITE UR-MCNC: NEGATIVE MG/ML
PH UR: 6.5 [PH] (ref 5–8)
PROT UR STRIP-MCNC: NEGATIVE MG/DL
RBC # UR STRIP: ABNORMAL /UL
SP GR UR: 1.02 (ref 1–1.03)
URINE VOLUME: NORMAL
UROBILINOGEN UR QL: ABNORMAL

## 2025-01-03 RX ORDER — FINASTERIDE 5 MG/1
5 TABLET, FILM COATED ORAL DAILY
Qty: 90 TABLET | Refills: 3 | Status: SHIPPED | OUTPATIENT
Start: 2025-01-03

## 2025-01-03 NOTE — PROGRESS NOTES
Chief Complaint: Benign Prostatic Hypertrophy (Patient states that he went to his PCP for his annual in December 2024 and they found blood in his urine. He also states on 05/07/2024 he had an episode of flank pain on his right side and he thought is was a kidney stone but the ED diagnosed him with diverticulitis but he is still curious about this. )    Subjective         History of Present Illness  Zachariah Cruz is a 79 y.o. male presents to [facility] to be seen for gross hematuria.    Patient known to myself and Dr. Douglas Chawla for gross hematuria last seen by Dr. Chawla in June of this year.    Workup for gross hematuria in the past.    Last cystoscopy in February 2022 revealed 4 cm prostate previous TAVR with some regrowth of median right lateral lobe moderate trabeculations with no pathology.    Returns today stating that he has had several episodes of gross hematuria he is asymptomatic today and does have a blood clot in the urine specimen from today.    Last CT of the abdomen and pelvis with contrast was performed in May of this year which revealed acute sigmoid diverticulitis no evidence of perforation or abscess.  13 mm low-density mass within the head of the pancreas most likely a cyst pseudocyst or IPMN which is apparently being followed by a physician in [place].     He was referred back to us 12/11/2024 after he was seen by his PCP and had a urinalysis which revealed 11-20 red blood cells per high-powered field and no other findings.    He is voiding okay with no straining and remains on no prostate medicines.    He states urine will be dark from time to time.         Previous:    Voiding okay, no straining.  No prostate meds     5/24 -gross hematuria times 2 days.  Asymptomatic.  He did have some left flank pain and went to the ED.  Treated for diverticulitis        5/7/2024 CT abdomen/pelvis with -acute sigmoid diverticulitis, cholelithiasis.  13 mm low-density  mass within the head of the pancreas likely pseudocyst needs repeat imaging in 6 to 12 months.  Delayed phase good care home down the ureter, images reviewed     Pancreatic lesion has been worked up     5/7/2024   TNTC RBCs, no bacteria  5/7/2024 urine culture-negative, 1.1, GFR 63        2010, CABG, coronary stent placed in 3/23 patient does not smoke.   on Coumadin and ASA 81        PVR     4/23   064           Previous     Flomax 0.4 - was not sure if it was helping           10/23/2023 MRI abdomen with and without - multiple Bosniak 1 and 2 renal cyst.  Similar in size compared to 2021.  Previously noted dominant left upper pole simple cyst completely collapsed/ruptured.  Cholelithiasis.  Small pancreatic cystic lesion.  MRI follow-up in 1 to 2 years to be considered        3/22/2023   Enterococcus -this was preclosed after having Kim for heart catheterization        Patient does have MD in [place] following pancreatic lesion        1/23  - 3 days of gross hematuria, asymptomatic           4/3/2023 CT uro-- 1.3 cm lesion right kidney, mild enhancement  20 HUs.  No change since 12/21.  Consider MRI.  Additional bilateral renal cyst.  Stable 1 cm cyst neck of pancreas.  Cholelithiasis.       He is referred to get this pancreatic lesion looked at     2/22 cystoscopy-4 cm prostate, previous TUR with some regrowth on the median right lateral lobe.  Minor trabeculations, no pathology.  Delayed phase okay, images reviewed        2022 episodes of asymptomatic  gross hematuria.          12/21 CT stone protocol - negative  11/21 urine culture-negative  10/21 creatinine 0.9, GFR 81     No urologic family history     Patient with acute kidney stones in the past, lithotripsy x1.        PVR     12/21   52     PSA     12/22    1.3  11/21 1.5  11/20 1.3  11/19 1.0          Objective     Past Medical History:   Diagnosis Date    Coronary artery disease     Diabetes     Hematuria     Hyperlipidemia     Hypertension        Past  Surgical History:   Procedure Laterality Date    APPENDECTOMY      CARDIAC CATHETERIZATION      CARDIAC CATHETERIZATION N/A 03/16/2023    Procedure: Left Heart Cath with possible angioplasty;  Surgeon: Vishal Denton MD;  Location: [facility] INVASIVE LOCATION;  Service: Cardiovascular;  Laterality: N/A;    CATARACT EXTRACTION      COLONOSCOPY N/A 01/25/2024    poor prep, diverticulosis    CORONARY ARTERY BYPASS GRAFT  05/21/2010    LIMA to LAD, SVG to OM, SVG to PDA    PROSTATE SURGERY      TRANSURETHRAL RESECTION OF THE PROSTATE    UPPER GASTROINTESTINAL ENDOSCOPY           Current Outpatient Medications:     allopurinol (ZYLOPRIM) 100 MG tablet, Take 1 tablet by mouth Daily., Disp: 90 tablet, Rfl: 1    amLODIPine (NORVASC) 10 MG tablet, TAKE 1 TABLET BY MOUTH DAILY, Disp: 90 tablet, Rfl: 3    aspirin 81 MG EC tablet, Take 1 tablet by mouth Daily., Disp: , Rfl:     FeroSul 325 (65 Fe) MG tablet, TAKE 1 TABLET BY MOUTH DAILY WITH BREAKFAST, Disp: 90 tablet, Rfl: 1    folic acid (FOLVITE) 1 MG tablet, Take 1 tablet by mouth Daily., Disp: 90 tablet, Rfl: 1    glimepiride (AMARYL) 4 MG tablet, TAKE 1 TABLET BY MOUTH DAILY (Patient taking differently: Take 0.5 tablets by mouth Daily. 3 mg), Disp: 90 tablet, Rfl: 0    lisinopril (PRINIVIL,ZESTRIL) 20 MG tablet, TAKE 1 TABLET BY MOUTH TWICE A DAY, Disp: 180 tablet, Rfl: 1    metFORMIN ER (GLUCOPHAGE-XR) 500 MG 24 hr tablet, Take 2 tablets by mouth 2 (Two) Times a Day., Disp: 360 tablet, Rfl: 1    metoprolol succinate XL (TOPROL-XL) 25 MG 24 hr tablet, TAKE 2 TABLETS BY MOUTH EVERY MORNING AND TAKE ONE TABLET BY MOUTH EVERY EVENING, Disp: 270 tablet, Rfl: 0    OneTouch Ultra test strip, 1 each by Other route Daily. Check blood sugar, Disp: , Rfl:     simvastatin (ZOCOR) 40 MG tablet, TAKE 1 TABLET BY MOUTH DAILY, Disp: 90 tablet, Rfl: 1    vitamin B-12 (CYANOCOBALAMIN) 500 MCG tablet, Take 1 tablet by mouth Daily., Disp: 90 tablet, Rfl: 1    vitamin C (ASCORBIC ACID)  "250 MG tablet, Take 1 tablet by mouth Daily., Disp: 90 tablet, Rfl: 1    warfarin (Coumadin) 4 MG tablet, Take 1 tablet by mouth daily or as directed (Patient taking differently: Take 1 tablet by mouth Every Night. 2 - 5 - 4 per patient), Disp: 90 tablet, Rfl: 1    finasteride (PROSCAR) 5 MG tablet, Take 1 tablet by mouth Daily., Disp: 90 tablet, Rfl: 3    No Known Allergies     Family History   Problem Relation Age of Onset    Hypertension Mother     Stroke Mother     Coronary artery disease Father     Stroke Paternal Grandfather     Coronary artery disease Other     Colon cancer Neg Hx        Social History     Socioeconomic History    Marital status:    Tobacco Use    Smoking status: Former     Current packs/day: 0.00     Average packs/day: 1 pack/day for 5.0 years (5.0 ttl pk-yrs)     Types: Cigarettes     Start date:      Quit date:      Years since quittin.0     Passive exposure: Past    Smokeless tobacco: Never   Vaping Use    Vaping status: Never Used   Substance and Sexual Activity    Alcohol use: Not Currently     Comment: occ    Drug use: Never    Sexual activity: Defer       Vital Signs:   Ht 177.8 cm (70\")   Wt 104 kg (230 lb)   BMI 33.00 kg/m²      Physical Exam     Result Review :   The following data was reviewed by: JACIEL Munoz on 2025:  Results for orders placed or performed in visit on 25   Bladder Scan    Collection Time: 25 10:48 AM   Result Value Ref Range    Urine Volume 71ml    POC Urinalysis Dipstick, Automated    Collection Time: 25 10:49 AM    Specimen: Urine   Result Value Ref Range    Color Yellow Yellow, Straw, Dark Yellow, Natasha    Clarity, UA Clear Clear    Specific Gravity  1.020 1.005 - 1.030    pH, Urine 6.5 5.0 - 8.0    Leukocytes Negative Negative    Nitrite, UA Negative Negative    Protein, POC Negative Negative mg/dL    Glucose, UA Negative Negative mg/dL    Ketones, UA Negative Negative    Urobilinogen, UA 0.2 E.U./dL " Normal, 0.2 E.U./dL    Bilirubin Negative Negative    Blood, UA Large (A) Negative    Lot Number [id number]     Expiration Date 10/2,025      *Note: Due to a large number of results and/or encounters for the requested time period, some results have not been displayed. A complete set of results can be found in Results Review.        Bladder Scan interpretation 01/03/2025    Estimation of residual urine via BVI 3000 Verathon Bladder Scan  MA/nurse performing: valeria garner Ma   Residual Urine: 71 ml  Indication: Benign prostatic hyperplasia with lower urinary tract symptoms, symptom details unspecified    Gross hematuria   Position: Supine  Examination: Incremental scanning of the suprapubic area using 2.0 MHz transducer using copious amounts of acoustic gel.   Findings: An anechoic area was demonstrated which represented the bladder, with measurement of residual urine as noted. I inspected this myself. In that the residual urine was stable or insignificant, refer to plan for treatment and plan necessary at this time.          Procedures        Assessment and Plan    Diagnoses and all orders for this visit:    1. Benign prostatic hyperplasia with lower urinary tract symptoms, symptom details unspecified (Primary)  -     POC Urinalysis Dipstick, Automated  -     Bladder Scan    2. Gross hematuria  -     Cystoscopy; Future  -     CT Abdomen Pelvis With & Without Contrast; Future  -     finasteride (PROSCAR) 5 MG tablet; Take 1 tablet by mouth Daily.  Dispense: 90 tablet; Refill: 3        Will start finasteride for recurrent gross hematurie.     Will get him set up for cysto and ct to delineate etiology  of GH.       I spent  10 minutes caring for Zachariah on this date of service. This time includes time spent by me in the following activities:reviewing tests, obtaining and/or reviewing a separately obtained history, performing a medically appropriate examination and/or evaluation , counseling and educating the  patient/family/caregiver, ordering medications, tests, or procedures, and documenting information in the medical record  Follow Up   Return for With Dr. Chawla For Cystoscopy .  Patient was given instructions and counseling regarding his condition or for health maintenance advice. Please see specific information pulled into the AVS if appropriate.         This document has been electronically signed by JACIEL Munoz  January 3, 2025 11:30 EST

## 2025-01-13 ENCOUNTER — HOSPITAL ENCOUNTER (OUTPATIENT)
Dept: CT IMAGING | Facility: HOSPITAL | Age: 80
Discharge: HOME | End: 2025-01-13
Payer: MEDICARE

## 2025-01-13 ENCOUNTER — ANTICOAGULATION VISIT (OUTPATIENT)
Dept: CARDIOLOGY | Facility: CLINIC | Age: 80
End: 2025-01-13
Payer: MEDICARE

## 2025-01-13 ENCOUNTER — LAB (OUTPATIENT)
Dept: LAB | Facility: HOSPITAL | Age: 80
End: 2025-01-13
Payer: MEDICARE

## 2025-01-13 DIAGNOSIS — R31.0 GROSS HEMATURIA: ICD-10-CM

## 2025-01-13 DIAGNOSIS — I48.0 PAROXYSMAL ATRIAL FIBRILLATION: Primary | ICD-10-CM

## 2025-01-13 DIAGNOSIS — I48.0 PAROXYSMAL ATRIAL FIBRILLATION: ICD-10-CM

## 2025-01-13 LAB
CREAT BLDA-MCNC: 1 MG/DL (ref 0.6–1.3)
EGFRCR SERPLBLD CKD-EPI 2021: 77.5 ML/MIN/1.73
INR PPP: 2.54 (ref 0.86–1.15)
PROTHROMBIN TIME: 27.7 SECONDS (ref 11.8–14.9)

## 2025-01-13 PROCEDURE — 74178 CT ABD&PLV WO CNTR FLWD CNTR: CPT

## 2025-01-13 PROCEDURE — 82565 ASSAY OF CREATININE: CPT

## 2025-01-13 PROCEDURE — 25510000001 IOPAMIDOL PER 1 ML: Performed by: NURSE PRACTITIONER

## 2025-01-13 PROCEDURE — 36415 COLL VENOUS BLD VENIPUNCTURE: CPT

## 2025-01-13 PROCEDURE — 85610 PROTHROMBIN TIME: CPT

## 2025-01-13 RX ORDER — IOPAMIDOL 755 MG/ML
100 INJECTION, SOLUTION INTRAVASCULAR
Status: COMPLETED | OUTPATIENT
Start: 2025-01-13 | End: 2025-01-13

## 2025-01-13 RX ADMIN — IOPAMIDOL 100 ML: 755 INJECTION, SOLUTION INTRAVENOUS at 11:18

## 2025-01-13 NOTE — PROGRESS NOTES
Lab Results   Component Value Date    INR 2.54 (H) 01/13/2025    INR 2.80 (H) 12/30/2024    INR 2.13 (H) 12/23/2024    PROTIME 27.7 (H) 01/13/2025    PROTIME 29.9 (H) 12/30/2024    PROTIME 24.2 (H) 12/23/2024     Atrial fibrillation      Range: 2.0-3.0     5/4     [facility]

## 2025-01-28 ENCOUNTER — TELEPHONE (OUTPATIENT)
Dept: CARDIOLOGY | Facility: CLINIC | Age: 80
End: 2025-01-28
Payer: MEDICARE

## 2025-01-28 DIAGNOSIS — I10 ESSENTIAL HYPERTENSION: Primary | ICD-10-CM

## 2025-01-28 NOTE — TELEPHONE ENCOUNTER
Patient called stating he has been having dizziness since having CT scan on 01/03/25, PCP encouraged patient tor reach out to cardiology as they do not feel it could be due to CT scan.  Patient states he is having dizziness usually occurs upon waking most mornings. Patient reports it will come on after waking up and can take up to an hour to resolve. Patient BP readings have been normal.     Urology started patient on finastride, unsure if this could contribute.     Sometimes in the evening when laying down patient has had a sensation of his heart pounding, but does not know what his heart rate is at the time as he is in bed.    Please advise.

## 2025-01-29 ENCOUNTER — TELEPHONE (OUTPATIENT)
Dept: UROLOGY | Age: 80
End: 2025-01-29
Payer: MEDICARE

## 2025-01-29 NOTE — TELEPHONE ENCOUNTER
GURMEET patient. Went over recommendations and labs. Patient verbalized understanding and appreciation. Lab order has been placed.

## 2025-01-29 NOTE — TELEPHONE ENCOUNTER
Recommend checking a CBC and  BMP .  Finasteride is often associated with orthostatic hypotension, and that could be the cause of his symptoms, would recommend he discuss with urology, and see if he could hold this since this is clearly his symptoms.  If this does not help, then we can do further workup.

## 2025-01-29 NOTE — TELEPHONE ENCOUNTER
PT WAS STARTED ON FINASTERIDE, LAST WEEK HE STARTED HAVING DIZZY SPELLS WHEN HE WAKES UP IN THE MORNING. HIS PCP AND CARDIOLOGIST TOLD HIM THAT IT MAY BE THE FINASTERIDE AND ADVISED HIM TO CALL YOU.

## 2025-01-31 ENCOUNTER — LAB (OUTPATIENT)
Dept: LAB | Facility: HOSPITAL | Age: 80
End: 2025-01-31
Payer: MEDICARE

## 2025-01-31 ENCOUNTER — ANTICOAGULATION VISIT (OUTPATIENT)
Dept: CARDIOLOGY | Facility: CLINIC | Age: 80
End: 2025-01-31
Payer: MEDICARE

## 2025-01-31 DIAGNOSIS — E53.8 FOLATE DEFICIENCY: ICD-10-CM

## 2025-01-31 DIAGNOSIS — I48.0 PAROXYSMAL ATRIAL FIBRILLATION: ICD-10-CM

## 2025-01-31 DIAGNOSIS — D64.9 ANEMIA, UNSPECIFIED TYPE: ICD-10-CM

## 2025-01-31 DIAGNOSIS — I48.0 PAROXYSMAL ATRIAL FIBRILLATION: Primary | ICD-10-CM

## 2025-01-31 DIAGNOSIS — I10 ESSENTIAL HYPERTENSION: ICD-10-CM

## 2025-01-31 LAB
ANION GAP SERPL CALCULATED.3IONS-SCNC: 12 MMOL/L (ref 5–15)
BASOPHILS # BLD AUTO: 0.02 10*3/MM3 (ref 0–0.2)
BASOPHILS NFR BLD AUTO: 0.2 % (ref 0–1.5)
BUN SERPL-MCNC: 16 MG/DL (ref 8–23)
BUN/CREAT SERPL: 13.7 (ref 7–25)
CALCIUM SPEC-SCNC: 9.7 MG/DL (ref 8.6–10.5)
CHLORIDE SERPL-SCNC: 106 MMOL/L (ref 98–107)
CO2 SERPL-SCNC: 22 MMOL/L (ref 22–29)
CREAT SERPL-MCNC: 1.17 MG/DL (ref 0.76–1.27)
DEPRECATED RDW RBC AUTO: 48.2 FL (ref 37–54)
EGFRCR SERPLBLD CKD-EPI 2021: 64.2 ML/MIN/1.73
EOSINOPHIL # BLD AUTO: 0.26 10*3/MM3 (ref 0–0.4)
EOSINOPHIL NFR BLD AUTO: 3.1 % (ref 0.3–6.2)
ERYTHROCYTE [DISTWIDTH] IN BLOOD BY AUTOMATED COUNT: 14.3 % (ref 12.3–15.4)
FERRITIN SERPL-MCNC: 55.29 NG/ML (ref 30–400)
FOLATE SERPL-MCNC: >20 NG/ML (ref 4.78–24.2)
GLUCOSE SERPL-MCNC: 133 MG/DL (ref 65–99)
HCT VFR BLD AUTO: 42.5 % (ref 37.5–51)
HGB BLD-MCNC: 13.8 G/DL (ref 13–17.7)
IMM GRANULOCYTES # BLD AUTO: 0.01 10*3/MM3 (ref 0–0.05)
IMM GRANULOCYTES NFR BLD AUTO: 0.1 % (ref 0–0.5)
INR PPP: 2.84 (ref 0.86–1.15)
IRON 24H UR-MRATE: 57 MCG/DL (ref 59–158)
IRON SATN MFR SERPL: 16 % (ref 20–50)
LYMPHOCYTES # BLD AUTO: 1.34 10*3/MM3 (ref 0.7–3.1)
LYMPHOCYTES NFR BLD AUTO: 16.1 % (ref 19.6–45.3)
MCH RBC QN AUTO: 29.6 PG (ref 26.6–33)
MCHC RBC AUTO-ENTMCNC: 32.5 G/DL (ref 31.5–35.7)
MCV RBC AUTO: 91.2 FL (ref 79–97)
MONOCYTES # BLD AUTO: 0.59 10*3/MM3 (ref 0.1–0.9)
MONOCYTES NFR BLD AUTO: 7.1 % (ref 5–12)
NEUTROPHILS NFR BLD AUTO: 6.12 10*3/MM3 (ref 1.7–7)
NEUTROPHILS NFR BLD AUTO: 73.4 % (ref 42.7–76)
PLATELET # BLD AUTO: 247 10*3/MM3 (ref 140–450)
PMV BLD AUTO: 11.3 FL (ref 6–12)
POTASSIUM SERPL-SCNC: 4.6 MMOL/L (ref 3.5–5.2)
PROTHROMBIN TIME: 30.3 SECONDS (ref 11.8–14.9)
RBC # BLD AUTO: 4.66 10*6/MM3 (ref 4.14–5.8)
SODIUM SERPL-SCNC: 140 MMOL/L (ref 136–145)
TIBC SERPL-MCNC: 364 MCG/DL (ref 298–536)
TRANSFERRIN SERPL-MCNC: 244 MG/DL (ref 200–360)
WBC NRBC COR # BLD AUTO: 8.34 10*3/MM3 (ref 3.4–10.8)

## 2025-01-31 PROCEDURE — 82746 ASSAY OF FOLIC ACID SERUM: CPT

## 2025-01-31 PROCEDURE — 80048 BASIC METABOLIC PNL TOTAL CA: CPT

## 2025-01-31 PROCEDURE — 83540 ASSAY OF IRON: CPT

## 2025-01-31 PROCEDURE — 36415 COLL VENOUS BLD VENIPUNCTURE: CPT

## 2025-01-31 PROCEDURE — 82728 ASSAY OF FERRITIN: CPT

## 2025-01-31 PROCEDURE — 84466 ASSAY OF TRANSFERRIN: CPT

## 2025-01-31 PROCEDURE — 85025 COMPLETE CBC W/AUTO DIFF WBC: CPT

## 2025-01-31 PROCEDURE — 85610 PROTHROMBIN TIME: CPT

## 2025-01-31 NOTE — PROGRESS NOTES
Lab Results   Component Value Date    INR 2.84 (H) 01/31/2025    INR 2.54 (H) 01/13/2025    INR 2.80 (H) 12/30/2024    PROTIME 30.3 (H) 01/31/2025    PROTIME 27.7 (H) 01/13/2025    PROTIME 29.9 (H) 12/30/2024      Atrial fibrillation      Range: 2.0-3.0     5/4     [facility]

## 2025-02-03 RX ORDER — LISINOPRIL 20 MG/1
20 TABLET ORAL 2 TIMES DAILY
Qty: 180 TABLET | Refills: 1 | Status: SHIPPED | OUTPATIENT
Start: 2025-02-03

## 2025-02-06 ENCOUNTER — TELEPHONE (OUTPATIENT)
Dept: CARDIOLOGY | Facility: CLINIC | Age: 80
End: 2025-02-06
Payer: MEDICARE

## 2025-02-06 DIAGNOSIS — R42 DIZZINESS: Primary | ICD-10-CM

## 2025-02-06 DIAGNOSIS — R55 POSTURAL DIZZINESS WITH PRESYNCOPE: ICD-10-CM

## 2025-02-06 DIAGNOSIS — R42 POSTURAL DIZZINESS WITH PRESYNCOPE: ICD-10-CM

## 2025-02-06 NOTE — TELEPHONE ENCOUNTER
Patient called with concern regarding re-occurring dizzy spells. Normally they occur in am, goes away quickly. BP/HR within normal limits. Patient states this all started happening after the CT scan and starting new medication finasteride.   Patient states Saturday it happened in the morning but lasted almost all day. Patient states he bent over and about fell out that afternoon.     APRN suggested patient should contact urology and see if his new medication finasteride may be contributing to the symptoms. Patient did consult and was instructed by urology to stop the finasteride. Medication was stopped on- 1/28, patient is still having spells and when the spells are real bad he feels his heart pounding hard.     Please advise

## 2025-02-06 NOTE — TELEPHONE ENCOUNTER
Events noted, persisting dizziness in spite of stopping finasteride.    We will do a 48-hour Holter monitor study at the earliest, which can be done at [facility] or [facility] office  Echocardiogram to assess the LV function and valvular function, better to do we need to our office to have an earlier appointment within a week  Diagnosis : dizziness with presyncope    Also recommend to increase fluid intake      Electronically signed by Vishal Denton MD, 02/06/25, 3:56 PM EST.

## 2025-02-19 ENCOUNTER — TELEPHONE (OUTPATIENT)
Dept: CARDIOLOGY | Facility: CLINIC | Age: 80
End: 2025-02-19
Payer: MEDICARE

## 2025-02-19 NOTE — TELEPHONE ENCOUNTER
"----- Message from Vishal Denton sent at 2/18/2025  5:00 PM EST -----  The Holter monitor study showed no major findings.  In general, heart rate was on the slower side.  However there were no long pauses.  There were occasional ectopy/extra heartbeats.  These findings does not explain severe dizziness.    He had an echocardiogram scheduled on 2/13 , but marked does \"no show\".  If he has persisting dizziness, would encourage to have the echocardiogram rescheduled.    Follow-up and [place] [facility] over the next 2 to 3 weeks if symptoms still persist.  May be scheduled with JACIEL Haynes in [place] [facility] on Wednesday, after 11 AM    Electronically signed by Vishal Denton MD, 02/18/25, 5:00 PM EST.    "

## 2025-02-19 NOTE — TELEPHONE ENCOUNTER
GURMEET patient. Went over results and recommendation to reschedule the ECHO. Patient states the dizziness only last about 1 minute after waking and it's not that worrisome. Patient will let us know if he wants to f/u with ECHO. Patient is agreeable to seeing JACIEL Valencia on 03/05/25 at 1115. Per Dr Denton.

## 2025-02-22 NOTE — PROGRESS NOTES
Procedures       Urinalysis was checked today and was negative for signs of infection      Cytoscopy Procedure:     Procedure: Flexible cytoscope was passed per urethra into the bladder without difficulty after proper consent. The bladder was inspected in a systematic meridian fashion.     3 cm prostate, 1 cm median lobe above this.  Previous TUR but complete regrowth    Minor to moderate trabeculation.  No pathology in the bladder    There were no tumors, lesions, stones, or other abnormalities noted within the bladder. Of note, there was no increased vascularity as well. Both ureteral orifices were identified and were normal in appearance. The flexible cytoscope was removed. The patient tolerated the procedure well.           BPH  Gross hematuria  Complex renal cysts      1/25 NP started finasteride for recurrent GH  1/25 CT uro-- interval enlargement of right renal lesion 3.4 cm.  Imaging characteristics most likely compatible with proteinaceous cyst.  Stable 1.5 cm right renal lesion compatible with proteinaceous cyst.  Stable 1 cm cystic lesion neck of pancreas.  Colonic diverticulosis  1/25 1.1, GFR 64, UA - large blood    TURP in 2008    Patient has been having trouble with lightheadedness in the morning.  PCP and cardiology working on this.  This did start about when he started tried so he has stopped this medication      BPH/gross hematuria    Gross hematuria has cleared, cystoscopy negative patient given reassurance .  He is going to go ahead stop finasteride 5 mg daily.  If he has more bleeding we will keep him off this              Complex renal cyst      No further workup needed.  Patient given reassurance        NP in 1 year

## 2025-02-24 ENCOUNTER — PROCEDURE VISIT (OUTPATIENT)
Dept: UROLOGY | Age: 80
End: 2025-02-24
Payer: MEDICARE

## 2025-02-24 VITALS — WEIGHT: 229.28 LBS | BODY MASS INDEX: 32.82 KG/M2 | RESPIRATION RATE: 12 BRPM | HEIGHT: 70 IN

## 2025-02-24 DIAGNOSIS — N40.1 BENIGN PROSTATIC HYPERPLASIA WITH LOWER URINARY TRACT SYMPTOMS, SYMPTOM DETAILS UNSPECIFIED: Primary | ICD-10-CM

## 2025-02-24 DIAGNOSIS — R31.0 GROSS HEMATURIA: ICD-10-CM

## 2025-02-24 PROCEDURE — 52000 CYSTOURETHROSCOPY: CPT | Performed by: UROLOGY

## 2025-02-25 ENCOUNTER — LAB (OUTPATIENT)
Dept: LAB | Facility: HOSPITAL | Age: 80
End: 2025-02-25
Payer: MEDICARE

## 2025-02-25 ENCOUNTER — ANTICOAGULATION VISIT (OUTPATIENT)
Dept: CARDIOLOGY | Facility: CLINIC | Age: 80
End: 2025-02-25
Payer: MEDICARE

## 2025-02-25 DIAGNOSIS — I48.0 PAROXYSMAL ATRIAL FIBRILLATION: Primary | ICD-10-CM

## 2025-02-25 DIAGNOSIS — I48.0 PAROXYSMAL ATRIAL FIBRILLATION: ICD-10-CM

## 2025-02-25 LAB
INR PPP: 2.39 (ref 0.86–1.15)
PROTHROMBIN TIME: 27.3 SECONDS (ref 11.8–14.9)

## 2025-02-25 PROCEDURE — 85610 PROTHROMBIN TIME: CPT

## 2025-02-25 PROCEDURE — 36415 COLL VENOUS BLD VENIPUNCTURE: CPT

## 2025-02-25 NOTE — PROGRESS NOTES
Called pt. To report results and informed pt. That his therapeutic and he can try again in one month with the same dose.

## 2025-02-27 ENCOUNTER — OFFICE VISIT (OUTPATIENT)
Dept: ONCOLOGY | Facility: HOSPITAL | Age: 80
End: 2025-02-27
Payer: MEDICARE

## 2025-02-27 VITALS
WEIGHT: 229.6 LBS | DIASTOLIC BLOOD PRESSURE: 69 MMHG | SYSTOLIC BLOOD PRESSURE: 138 MMHG | TEMPERATURE: 98 F | BODY MASS INDEX: 32.87 KG/M2 | HEIGHT: 70 IN | OXYGEN SATURATION: 96 % | HEART RATE: 63 BPM | RESPIRATION RATE: 16 BRPM

## 2025-02-27 DIAGNOSIS — D64.9 ANEMIA, UNSPECIFIED TYPE: Primary | ICD-10-CM

## 2025-02-27 PROCEDURE — G0463 HOSPITAL OUTPT CLINIC VISIT: HCPCS | Performed by: NURSE PRACTITIONER

## 2025-02-27 RX ORDER — SPIRONOLACTONE 25 MG/1
25 TABLET ORAL DAILY
COMMUNITY

## 2025-02-27 NOTE — PROGRESS NOTES
Chief Complaint/Reason for Referral:  anemia    Nelia Frost, *  Nelia Frost, APRN    Records Obtained:  Records of the patients history including those obtained from [facility] and patient information were reviewed and summarized in detail.    Subjective    History of Present Illness    Mr. Keith Cruz presents with his wife for follow up for anemia.      PMH of RAMY (uses CPAP nightly), hypertension, type II DM, hyperlipidemia, MI with angioplasty / stent, anemia, gout. On coumadin for a fib.  He reports 2 episodes last 6 months of blood in urine.  Denies any flank pain.  Primarily short-lived.  He chalks it up to being on warfarin.  He has been taking oral iron as well as B12 and folate.  On oral iron every other day. Tolerating this well.  Denies other bleeding.  Energy level stable.  Iron labs stable.  Hemoglobin normal.  Results discussed.  Plan to continue oral iron     Hematology History     He has had chronic normocytic anemia dating back to at least 3-4 years in the range 9.0 to 13.30. More recently, has had 2 lab with thrombocytosis up to 509K. Iron studies on 4/4/2023 with iron of 39, iron sat of 10%, TIBC of 389. Never had c-scope.      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     4/4/23: iron 39, iron sat 10%, TIBC 389, WBC 7.96, Hemoglobin 9.8, MCV 84, platelet count 465. Diff: with increase in neutrophil and lymphocyte % on recent lab work. Absolute values were normal.      4/14/23: Hgb 10.4, MCV 83.3, plt 309, WBC 7.21. Iron 29, ferritin 48.8, iron sat 7%, folate low at 3.31, B12 low at 155. Sed rate 53. Epo increased to 20.0. SPEP without M-spike, FLACA without monoclonality, Kappa FLC increased to 40.9 with kappa/lambda ratio of 1.84. Antiparietal and intrinsic factor antibodies both negative.      4/28/23: OP heme visit: Oral iron, B12, folate  daily started.      5/5/23: B12 513, folate 16.4, iron sat 8%, ferritin 59.3, iron 31     7/18/23: Hgb 12.3, plt 246, WBC 8.03     2/16/24: Iron sat 12%, b12 1620, folate >20, ferritin 58, hgb 12.6. Change to every other day iron dosing.     11/1/24: SPEP/FLACA with normal kappa/kris ratio, no monoclonality on FLACA, no M-spike. Hgb 13.5, plt 250, MCV 93.1, WBC 7.61, iron sat 12%, ferritin 52                   2/27/2025 interval follow up: The patient presents for a 4-month follow-up for iron deficiency anemia.     He reports experiencing episodes of dizziness, the cause of which remains unknown to him. These episodes began following a CT scan performed on 01/13/2025 in [place]. Despite wearing a heart monitor for 3 days under the supervision of Dr. MELLO, no abnormalities were detected. Concurrently with the CT scan, he was prescribed finasteride by his urologist, which he subsequently discontinued due to concerns about potential side effects. He has experienced severe episodes of dizziness, one of which occurred three Saturdays ago and persisted throughout the day, and another that occurred a week ago on Thursday, lasting until approximately 10:00 AM. However, he reports no such episode this morning. He describes his dizziness as lightheadedness, necessitating caution in his movements and a need to hold onto objects for stability. He has not sought evaluation for his ears.   He also reports sinus congestion. He reports no numbness or tingling in his fingertips, toes, hands, or feet, and no headaches. He monitors his blood pressure at home daily, noting fluctuations between 160 and 140, which he attributes to medication intake. His blood pressure has been consistently elevated for the past 3 to 4 years, typically ranging between 130 and 140 post-medication. He reports no weight loss and maintains a stable weight, although he expresses a desire to lose approximately 20 pounds. He ensures adequate hydration by consuming  water throughout the day.    He has been experiencing intermittent tinnitus since his COVID-19 infection, which he finds bothersome.    He has been experiencing mild neuropathy in his feet, with no associated tingling in his toes. He reports transient pain in his left big toe upon donning his shoes in the morning. He is a diabetic. He has found some relief from trimming his toenails.    He reports no presence of blood in his stool. He has observed occasional hematuria, characterized by brown urine and sporadic passage of small clots. A cystoscopy performed by his urologist revealed no abnormalities.    MEDICATIONS  Current: Finasteride, folic acid.    Results  Laboratory Studies  Blood work from January showed slightly elevated glucose. Iron levels improved from 48 to 57. Ferritin levels improved from 52 to 55, now in the normal range. Iron saturation improved from 12 to 16. Folate levels were good. B12 levels were good last year. CBC was normal, no anemia.    Imaging  CT scan performed on 01/13/2025 in [place].       Oncology/Hematology History    No history exists.       Review of Systems   Constitutional:  Positive for fatigue.   HENT: Negative.     Eyes: Negative.    Respiratory: Negative.     Cardiovascular: Negative.    Gastrointestinal: Negative.    Endocrine: Negative.    Genitourinary: Negative.    Musculoskeletal: Negative.    Skin: Negative.    Allergic/Immunologic: Negative.    Neurological:  Positive for dizziness (in the mornings).   Hematological: Negative.    Psychiatric/Behavioral: Negative.         Current Outpatient Medications on File Prior to Visit   Medication Sig Dispense Refill    allopurinol (ZYLOPRIM) 100 MG tablet Take 1 tablet by mouth Daily. 90 tablet 1    amLODIPine (NORVASC) 10 MG tablet TAKE 1 TABLET BY MOUTH DAILY 90 tablet 3    aspirin 81 MG EC tablet Take 1 tablet by mouth Daily.      FeroSul 325 (65 Fe) MG tablet TAKE 1 TABLET BY MOUTH DAILY WITH BREAKFAST 90 tablet 1     finasteride (PROSCAR) 5 MG tablet Take 1 tablet by mouth Daily. (Patient not taking: Reported on 2/24/2025) 90 tablet 3    folic acid (FOLVITE) 1 MG tablet Take 1 tablet by mouth Daily. 90 tablet 1    glimepiride (AMARYL) 4 MG tablet TAKE 1 TABLET BY MOUTH DAILY (Patient taking differently: Take 0.5 tablets by mouth Daily. 3 mg) 90 tablet 0    lisinopril (PRINIVIL,ZESTRIL) 20 MG tablet TAKE 1 TABLET BY MOUTH TWICE A  tablet 1    metFORMIN ER (GLUCOPHAGE-XR) 500 MG 24 hr tablet Take 2 tablets by mouth 2 (Two) Times a Day. 360 tablet 1    metoprolol succinate XL (TOPROL-XL) 25 MG 24 hr tablet TAKE 2 TABLETS BY MOUTH EVERY MORNING AND TAKE ONE TABLET BY MOUTH EVERY EVENING 270 tablet 0    OneTouch Ultra test strip 1 each by Other route Daily. Check blood sugar      simvastatin (ZOCOR) 40 MG tablet TAKE 1 TABLET BY MOUTH DAILY 90 tablet 1    vitamin B-12 (CYANOCOBALAMIN) 500 MCG tablet Take 1 tablet by mouth Daily. 90 tablet 1    vitamin C (ASCORBIC ACID) 250 MG tablet Take 1 tablet by mouth Daily. 90 tablet 1    warfarin (Coumadin) 4 MG tablet Take 1 tablet by mouth daily or as directed (Patient taking differently: Take 1 tablet by mouth Every Night. 2 - 5 - 4 per patient) 90 tablet 1     No current facility-administered medications on file prior to visit.       No Known Allergies  Past Medical History:   Diagnosis Date    Coronary artery disease     Diabetes     Hematuria     Hyperlipidemia     Hypertension      Past Surgical History:   Procedure Laterality Date    APPENDECTOMY      CARDIAC CATHETERIZATION      CARDIAC CATHETERIZATION N/A 03/16/2023    Procedure: Left Heart Cath with possible angioplasty;  Surgeon: Vishal Denton MD;  Location: [facility] CATH INVASIVE LOCATION;  Service: Cardiovascular;  Laterality: N/A;    CATARACT EXTRACTION      COLONOSCOPY N/A 01/25/2024    poor prep, diverticulosis    CORONARY ARTERY BYPASS GRAFT  05/21/2010    LIMA to LAD, SVG to OM, SVG to PDA    PROSTATE SURGERY       TRANSURETHRAL RESECTION OF THE PROSTATE    UPPER GASTROINTESTINAL ENDOSCOPY       Social History     Socioeconomic History    Marital status:    Tobacco Use    Smoking status: Former     Current packs/day: 0.00     Average packs/day: 1 pack/day for 5.0 years (5.0 ttl pk-yrs)     Types: Cigarettes     Start date:      Quit date:      Years since quittin.1     Passive exposure: Past    Smokeless tobacco: Never   Vaping Use    Vaping status: Never Used   Substance and Sexual Activity    Alcohol use: Not Currently     Comment: occ    Drug use: Never    Sexual activity: Defer     Family History   Problem Relation Age of Onset    Hypertension Mother     Stroke Mother     Coronary artery disease Father     Stroke Paternal Grandfather     Coronary artery disease Other     Colon cancer Neg Hx      Immunization History   Administered Date(s) Administered    COVID-19 (PFIZER) BIVALENT 12+YRS 2022    COVID-19 (PFIZER) Purple Cap Monovalent 2021, 2021       Tobacco Use: Medium Risk (2025)    Patient History     Smoking Tobacco Use: Former     Smokeless Tobacco Use: Never     Passive Exposure: Past       Objective     Physical Exam  Vitals and nursing note reviewed.   Constitutional:       Appearance: Normal appearance.   HENT:      Head: Normocephalic.      Nose: Nose normal.      Mouth/Throat:      Mouth: Mucous membranes are moist.   Eyes:      Extraocular Movements: Extraocular movements intact.   Cardiovascular:      Rate and Rhythm: Normal rate.      Pulses: Normal pulses.   Pulmonary:      Effort: Pulmonary effort is normal. No respiratory distress.   Musculoskeletal:         General: Normal range of motion.      Cervical back: Normal range of motion and neck supple.   Skin:     General: Skin is warm and dry.   Neurological:      General: No focal deficit present.      Mental Status: He is alert and oriented to person, place, and time.   Psychiatric:         Mood and Affect: Mood  normal.         Behavior: Behavior normal.         Thought Content: Thought content normal.         Judgment: Judgment normal.         There were no vitals filed for this visit.    Wt Readings from Last 3 Encounters:   02/24/25 104 kg (229 lb 4.5 oz)   01/03/25 104 kg (230 lb)   12/11/24 103 kg (228 lb)                 ECOG: (0) Fully Active - Able to Carry On All Pre-disease Performance Without Restriction  Fall Risk Assessment was completed, and patient is at low risk for falls.  PHQ-9 Total Score:         The patient is  experiencing fatigue. Fatigue score: 1    PT/OT Functional Screening: PT fx screen : No needs identified  Speech Functional Screening: Speech fx screen : No needs identified  Rehab to be ordered: Rehab to be ordered : No needs identified        Result Review :  The following data was reviewed by: JACIEL Millard on 02/27/2025:  Lab Results   Component Value Date    HGB 13.8 01/31/2025    HCT 42.5 01/31/2025    MCV 91.2 01/31/2025     01/31/2025    WBC 8.34 01/31/2025    NEUTROABS 6.12 01/31/2025    LYMPHSABS 1.34 01/31/2025    MONOSABS 0.59 01/31/2025    EOSABS 0.26 01/31/2025    BASOSABS 0.02 01/31/2025     Lab Results   Component Value Date    GLUCOSE 133 (H) 01/31/2025    BUN 16 01/31/2025    CREATININE 1.17 01/31/2025     01/31/2025    K 4.6 01/31/2025     01/31/2025    CO2 22.0 01/31/2025    CALCIUM 9.7 01/31/2025    PROTEINTOT 6.7 12/09/2024    ALBUMIN 4.0 12/09/2024    BILITOT 0.3 12/09/2024    ALKPHOS 75 12/09/2024    AST 16 12/09/2024    ALT 16 12/09/2024     Lab Results   Component Value Date    Ferritin 55.29 01/31/2025    Folate >20.00 01/31/2025     Lab Results   Component Value Date    IRON 57 (L) 01/31/2025    LABIRON 16 (L) 01/31/2025    TRANSFERRIN 244 01/31/2025    TIBC 364 01/31/2025     Lab Results   Component Value Date    FERRITIN 55.29 01/31/2025    LDULPZOB01 1,620 (H) 02/16/2024    FOLATE >20.00 01/31/2025     Lab Results   Component Value  Date    PSA 0.818 12/22/2023     Assessment & Plan    --Iron deficiency anemia.  Chronic anemia for at least the last 3-4 years, but more recently seems to have worsened. Labs from 4/14/23 revealed iron, folate, and B12 deficiency. Antiparietal and intrinsic factor antibodies negative. He has never had a colonoscopy, but did have a cologard testing negative in August of 2021. He follows with cardiology for the INR checks for Coumadin. No ricardo bleeding noted. Certianly could have slow GI bleed as he is on antiplatelet as well as anticoagulant. Also likely component of anemia of chronic disease (sed rate elevated).   2/27/2025: His iron levels have shown improvement, with a current level of 57 compared to 48 in November 2024. Ferritin levels have also increased from 52 to 55, now within the normal range. The iron saturation has improved from 12 to 16. Folate levels are satisfactory, and B12 levels were previously within the normal range. The complete blood count (CBC) is normal, indicating no anemia. The dizziness is not attributed to anemia. He will continue his current iron supplementation regimen of 2 tab QD. He can stop the Folic acid  due to satisfactory folate levels. A re-evaluation of his levels will be conducted in 4 months, with the addition of a B12 test.    2. Dizziness.  He reports experiencing dizzy spells, described as lightheadedness without room spinning. Episodes have been severe at times, lasting all day or several hours. He has not had his ears checked, but there is some fluid behind one eardrum. He has been advised to follow up with his primary care provider, Nelia Frost, for further evaluation. The possibility of vertigo or a medication side effect was discussed. He is advised to stay hydrated and monitor his symptoms.      Follow-up  The patient will follow up in 4 months with labs prior. CBC, iron profile, ferritin, B-12    I spent 30 minutes caring for Zachariah on this date of service. This  time includes time spent by me in the following activities:preparing for the visit, reviewing tests, obtaining and/or reviewing a separately obtained history, counseling and educating the patient/family/caregiver, ordering medications, tests, or procedures, referring and communicating with other health care professionals , documenting information in the medical record, and independently interpreting results and communicating that information with the patient/family/caregiver    Patient Follow Up: 4 months with NP or MD.     Patient was given instructions and counseling regarding his condition or for health maintenance advice. Please see specific information pulled into the AVS if appropriate.     JACIEL Millard    2/27/2025    .tob    Patient or patient representative verbalized consent for the use of Ambient Listening during the visit with  JACIEL Millard for chart documentation. 2/27/2025  12:58 EST

## 2025-02-28 RX ORDER — WARFARIN SODIUM 4 MG/1
TABLET ORAL
Qty: 90 TABLET | Refills: 1 | Status: SHIPPED | OUTPATIENT
Start: 2025-02-28

## 2025-02-28 RX ORDER — METOPROLOL SUCCINATE 25 MG/1
TABLET, EXTENDED RELEASE ORAL
Qty: 270 TABLET | Refills: 0 | Status: SHIPPED | OUTPATIENT
Start: 2025-02-28

## 2025-02-28 RX ORDER — GLIMEPIRIDE 4 MG/1
4 TABLET ORAL DAILY
Qty: 90 TABLET | Refills: 0 | Status: SHIPPED | OUTPATIENT
Start: 2025-02-28

## 2025-03-05 ENCOUNTER — OFFICE VISIT (OUTPATIENT)
Age: 80
End: 2025-03-05
Payer: MEDICARE

## 2025-03-05 VITALS
HEIGHT: 70 IN | SYSTOLIC BLOOD PRESSURE: 156 MMHG | BODY MASS INDEX: 31.92 KG/M2 | DIASTOLIC BLOOD PRESSURE: 83 MMHG | HEART RATE: 63 BPM | WEIGHT: 223 LBS

## 2025-03-05 DIAGNOSIS — I10 ESSENTIAL HYPERTENSION: ICD-10-CM

## 2025-03-05 DIAGNOSIS — I25.10 CORONARY ARTERY DISEASE INVOLVING NATIVE CORONARY ARTERY OF NATIVE HEART WITHOUT ANGINA PECTORIS: Primary | ICD-10-CM

## 2025-03-05 DIAGNOSIS — E78.2 MIXED HYPERLIPIDEMIA: ICD-10-CM

## 2025-03-05 DIAGNOSIS — I35.0 MILD AORTIC VALVE STENOSIS: ICD-10-CM

## 2025-03-05 DIAGNOSIS — R60.0 BILATERAL LEG EDEMA: ICD-10-CM

## 2025-03-05 DIAGNOSIS — I48.0 PAROXYSMAL ATRIAL FIBRILLATION: ICD-10-CM

## 2025-03-05 DIAGNOSIS — R42 DIZZINESS: ICD-10-CM

## 2025-03-05 NOTE — ASSESSMENT & PLAN NOTE
Previous echocardiogram shows mild aortic valve stenosis, he does not have any shortness of breath but has been complaining of dizzy episodes.  Will go ahead and repeat echocardiogram.

## 2025-03-05 NOTE — ASSESSMENT & PLAN NOTE
Encouraged to wear compression stockings, patient has declined due to his neuropathy reports causes him more feet pain.  Encouraged to elevate legs when sitting at home and low-sodium diet.

## 2025-03-05 NOTE — ASSESSMENT & PLAN NOTE
Recent lipid panel 12/9/2024 LDL 60, HDL 33, and triglycerides 170.  Cholesterol level is controlled LDL is at goal.  Triglycerides have improved since last lipid panel.  Continue simvastatin 40 mg daily.

## 2025-03-05 NOTE — ASSESSMENT & PLAN NOTE
Patient has had recurring dizzy episodes most recent was 2 weeks ago.  Holter monitor 2/13/2025 showed a normal sinus rhythm averaging heart rate 60, occasional isolated PVCs but there were no arrhythmias or long pauses.  Clinic is to get patient scheduled for his echo that was ordered in February.

## 2025-03-05 NOTE — ASSESSMENT & PLAN NOTE
Blood pressure slightly elevated at today's visit .Patient reports his blood pressure is running 130/140 systolic at home.  Continue amlodipine 10 mg daily, lisinopril 20 mg twice daily, spironolactone 25 mg daily, and metoprolol succinate XL 25 mg 2 tablets in a.m. and 1 pm.  Encouraged to continue to monitor blood pressure at home and low-sodium diet.

## 2025-03-05 NOTE — ASSESSMENT & PLAN NOTE
Patient is in normal rhythm today without any complaints of palpitations continue him metoprolol for rate and rhythm control.  His most recent INRs have been therapeutic.  Will continue warfarin for anticoagulation.

## 2025-03-05 NOTE — PROGRESS NOTES
Chief Complaint  Follow-up (Coronary artery disease involving native coronary artery of native heart without angina pectoris//Pt states still having dizziness when getting up in mornings. Has had several episodes that lasted most of the day.)    Subjective        History of Present Illness  Zachariah Cruz presents to [facility] CARDIOLOGY     Mr. Cruz is a 79-year-old male here for follow-up on recent episodes of dizziness.  Patient reports he had a CT scan on 1/3/2025 with and without contrast states he was having dizziness that was occurring when he was waking up most mornings and that sometimes when he was bending over tying shoes. Patient did try to stop the finasteride but the dizziness had continued he saw his urologist recently who restarted him on the medication.  Recent 48-hour Holter monitor 2/13/2025 showed a normal sinus rhythm average 60 occasional isolated PVCs there was no arrhythmias or long pauses.  He reports the dizziness has slightly improved and not having episodes like he was but he did have 1 episode a couple weeks ago and none since. He did not have the echocardiogram done reports no one called him with an appointment he was able to get that scheduled for tomorrow morning.  He does not drink much caffeine may drink 1 soda here and there but drinks water all day long.  He reports he is very active at work, he gets over 5,000 steps a day.  He does report that he has some bilateral lower leg edema but reports that it is normal for him.He does not wear compression stockings but does elevate his legs when he is at home . Denies any chest pain, palpitations ,dyspnea, or syncope.     Past History:     1) Coronary artery disease status post 3-vessel coronary artery bypass grafting on 05/21/2010 by Dr. Garcia at [facility] (LIMA graft to the LAD, SVG graft to OM, SVG graft to posterior descending branch of RCA); cardiac cath done on 3/16/2023 showed patent LIMA to LAD,  95% stenosis of proximal segment of SVG to OM branch and 50% stenosis of SVG to RCA at anastomosis.  He underwent angioplasty and stent placement to SVG to OM branch.  2) Diabetes mellitus; 3) Hypertension; 4) Hyperlipidemia; 5) Gout; 6) Obstructive sleep apnea, on CPAP; 7) Paroxysmal atrial flutter, detected on 24-hour Holter monitor study done in October 2019.     Past Medical History:   Diagnosis Date    Coronary artery disease     Diabetes     Hematuria     Hyperlipidemia     Hypertension        No Known Allergies     Past Surgical History:   Procedure Laterality Date    APPENDECTOMY      CARDIAC CATHETERIZATION      CARDIAC CATHETERIZATION N/A 03/16/2023    Procedure: Left Heart Cath with possible angioplasty;  Surgeon: Vishal Denton MD;  Location: [facility] CATH INVASIVE LOC[facility];  Service: Cardiovascular;  Laterality: N/A;    CATARACT EXTRACTION      COLONOSCOPY N/A 01/25/2024    poor prep, diverticulosis    CORONARY ARTERY BYPASS GRAFT  05/21/2010    LIMA to LAD, SVG to OM, SVG to PDA    PROSTATE SURGERY      TRANSURETHRAL RESECTION OF THE PROSTATE    UPPER GASTROINTESTINAL ENDOSCOPY          Social History  He  reports that he quit smoking about 56 years ago. His smoking use included cigarettes. He started smoking about 61 years ago. He has a 5 pack-year smoking history. He has been exposed to tobacco smoke. He has never used smokeless tobacco. He reports that he does not currently use alcohol. He reports that he does not use drugs.    Family History  His family history includes Coronary artery disease in his father and another family member; Hypertension in his mother; Stroke in his mother and paternal grandfather.       Current Outpatient Medications on File Prior to Visit   Medication Sig    allopurinol (ZYLOPRIM) 100 MG tablet Take 1 tablet by mouth Daily.    amLODIPine (NORVASC) 10 MG tablet TAKE 1 TABLET BY MOUTH DAILY    aspirin 81 MG EC tablet Take 1 tablet by mouth Daily.    FeroSul 325 (65 Fe)  "MG tablet TAKE 1 TABLET BY MOUTH DAILY WITH BREAKFAST    finasteride (PROSCAR) 5 MG tablet Take 1 tablet by mouth Daily.    glimepiride (AMARYL) 4 MG tablet TAKE 1 TABLET BY MOUTH DAILY    lisinopril (PRINIVIL,ZESTRIL) 20 MG tablet TAKE 1 TABLET BY MOUTH TWICE A DAY    metFORMIN ER (GLUCOPHAGE-XR) 500 MG 24 hr tablet Take 2 tablets by mouth 2 (Two) Times a Day.    metoprolol succinate XL (TOPROL-XL) 25 MG 24 hr tablet TAKE 2 TABLETS BY MOUTH EVERY MORNING AND TAKE ONE TABLET BY MOUTH EVERY EVENING    OneTouch Ultra test strip 1 each by Other route Daily. Check blood sugar    simvastatin (ZOCOR) 40 MG tablet TAKE 1 TABLET BY MOUTH DAILY    spironolactone (ALDACTONE) 25 MG tablet Take 1 tablet by mouth Daily.    vitamin B-12 (CYANOCOBALAMIN) 500 MCG tablet Take 1 tablet by mouth Daily.    vitamin C (ASCORBIC ACID) 250 MG tablet Take 1 tablet by mouth Daily.    warfarin (COUMADIN) 4 MG tablet TAKE 1 TABLET BY MOUTH DAILY OR AS DIRECTED    folic acid (FOLVITE) 1 MG tablet Take 1 tablet by mouth Daily. (Patient not taking: Reported on 3/5/2025)     No current facility-administered medications on file prior to visit.         Review of Systems   Respiratory:  Negative for chest tightness and shortness of breath.    Cardiovascular:  Positive for leg swelling. Negative for chest pain and palpitations.   Neurological:  Positive for dizziness. Negative for syncope and light-headedness.        Objective   Vitals:    03/05/25 1100   BP: 156/83   Pulse: 63   Weight: 101 kg (223 lb)   Height: 177.8 cm (70\")         Physical Exam  General : Alert, awake, no acute distress  Neck : Supple, no carotid bruit, no jugular venous distention  CVS : Regular rate and rhythm, no murmur, no rubs or gallops  Lungs: Clear to auscultation bilaterally, no crackles or rhonchi  Abdomen: Soft, nontender, bowel sounds active  Extremities: Warm, well-perfused, bilateral leg edema non pitting     Result Review     The following data was reviewed by " "Annie Rosa, APRN  No results found for: \"PROBNP\"  CMP          12/9/2024    07:12 1/13/2025    11:00 1/31/2025    07:33   CMP   Glucose 149   133    BUN 17   16    Creatinine 1.03  1.00  1.17    EGFR 74.8  77.5  64.2    Sodium 141   140    Potassium 4.7   4.6    Chloride 105   106    Calcium 9.7   9.7    Total Protein 6.7      Albumin 4.0      Globulin 2.7      Total Bilirubin 0.3      Alkaline Phosphatase 75      AST (SGOT) 16      ALT (SGPT) 16      Albumin/Globulin Ratio 1.5      BUN/Creatinine Ratio 16.5   13.7    Anion Gap 9.7   12.0      CBC w/diff          11/1/2024    07:15 12/9/2024    07:12 1/31/2025    07:33   CBC w/Diff   WBC 7.61  8.20  8.34    RBC 4.50  4.53  4.66    Hemoglobin 13.5  13.5  13.8    Hematocrit 41.9  41.9  42.5    MCV 93.1  92.5  91.2    MCH 30.0  29.8  29.6    MCHC 32.2  32.2  32.5    RDW 14.4  13.9  14.3    Platelets 250  259  247    Neutrophil Rel % 68.6  69.9  73.4    Immature Granulocyte Rel % 0.3  0.4  0.1    Lymphocyte Rel % 18.9  18.4  16.1    Monocyte Rel % 8.0  7.7  7.1    Eosinophil Rel % 4.1  3.5  3.1    Basophil Rel % 0.1  0.1  0.2       Lab Results   Component Value Date    TSH 1.370 12/09/2024      No results found for: \"FREET4\"   No results found for: \"DDIMERQUANT\"  Magnesium   Date Value Ref Range Status   03/25/2023 1.7 1.6 - 2.4 mg/dL Final      No results found for: \"DIGOXIN\"   Lab Results   Component Value Date    TROPONINT 61 (C) 03/22/2023           Lipid Panel          4/26/2024    07:18 12/9/2024    07:12   Lipid Panel   Total Cholesterol 113  122    Triglycerides 262  170    HDL Cholesterol 31  33    VLDL Cholesterol 41  29    LDL Cholesterol  41  60    LDL/HDL Ratio 0.95  1.67          Results for orders placed during the hospital encounter of 03/16/23    Adult Transthoracic Echo Complete w/ Color, Spectral and Contrast if necessary per protocol    Interpretation Summary    Overall LV ejection fraction is 50 to 55% with mild inferolateral wall hypokinesis.    " Left ventricular wall thickness is consistent with mild concentric hypertrophy.    Left ventricular diastolic function is consistent with (grade I) impaired relaxation.    Aortic valve is mildly calcified.  Mild aortic valve stenosis is present.    Results for orders placed during the hospital encounter of 02/22/23    Stress Test With Myocardial Perfusion One Day    Interpretation Summary    There is a moderate sized infarct in the basal-mid inferolateral walls with a small-moderate amount of duglas-infarct ischemia..    Left ventricular ejection fraction is moderately reduced (Calculated EF = 38%).    Abnormal LV wall motion consistent with severe hypokinesis of the lateral wall.    Impressions are consistent with an intermediate risk study.    Findings consistent with an indeterminate ECG stress test.           Assessment and Plan   Diagnoses and all orders for this visit:    1. Coronary artery disease involving native coronary artery of native heart without angina pectoris (Primary)    2. Paroxysmal atrial fibrillation  Assessment & Plan:  Patient is in normal rhythm today without any complaints of palpitations continue him metoprolol for rate and rhythm control.  His most recent INRs have been therapeutic.  Will continue warfarin for anticoagulation.      3. Essential hypertension  Assessment & Plan:  Blood pressure slightly elevated at today's visit .Patient reports his blood pressure is running 130/140 systolic at home.  Continue amlodipine 10 mg daily, lisinopril 20 mg twice daily, spironolactone 25 mg daily, and metoprolol succinate XL 25 mg 2 tablets in a.m. and 1 pm.  Encouraged to continue to monitor blood pressure at home and low-sodium diet.      4. Mild aortic valve stenosis  Assessment & Plan:  Previous echocardiogram shows mild aortic valve stenosis, he does not have any shortness of breath but has been complaining of dizzy episodes.  Will go ahead and repeat echocardiogram.      5. Mixed  hyperlipidemia  Assessment & Plan:   Recent lipid panel 12/9/2024 LDL 60, HDL 33, and triglycerides 170.  Cholesterol level is controlled LDL is at goal.  Triglycerides have improved since last lipid panel.  Continue simvastatin 40 mg daily.      6. Dizziness  Assessment & Plan:  Patient has had recurring dizzy episodes most recent was 2 weeks ago.  Holter monitor 2/13/2025 showed a normal sinus rhythm averaging heart rate 60, occasional isolated PVCs but there were no arrhythmias or long pauses.  Clinic is to get patient scheduled for his echo that was ordered in February.      7. Bilateral leg edema  Assessment & Plan:  Encouraged to wear compression stockings, patient has declined due to his neuropathy reports causes him more feet pain.  Encouraged to elevate legs when sitting at home and low-sodium diet.          [id number] Score: 5         Follow Up   Return in about 3 months (around 6/5/2025) for keep appt in June with Dr Denton  , will schedule after testing.             Patient was given instructions and counseling regarding his condition or for health maintenance advice. Please see specific information pulled into the AVS if appropriate.     Signed,  Annie Rosa, APRN  03/05/2025     Dictated Utilizing Dragon Dictation: Please note that portions of this note were completed with a voice recognition program.  Part of this note may be an electronic transcription/translation of spoken language to printed text using the Dragon Dictation System.

## 2025-03-11 ENCOUNTER — RESULTS FOLLOW-UP (OUTPATIENT)
Dept: CARDIOLOGY | Facility: CLINIC | Age: 80
End: 2025-03-11
Payer: MEDICARE

## 2025-03-11 NOTE — PROGRESS NOTES
Echocardiogram showed normal heart function.  There is mild stenosis/narrowing of the aortic valve.  These findings are unchanged from previous study done in 2023.    Please call us back for any recurrent symptoms.  Otherwise follow-up as scheduled earlier.      Electronically signed by Vishal Denton MD, 03/11/25, 4:14 PM EDT.

## 2025-03-12 ENCOUNTER — TELEPHONE (OUTPATIENT)
Dept: CARDIOLOGY | Facility: CLINIC | Age: 80
End: 2025-03-12
Payer: MEDICARE

## 2025-03-12 ENCOUNTER — OFFICE VISIT (OUTPATIENT)
Dept: FAMILY MEDICINE CLINIC | Age: 80
End: 2025-03-12
Payer: MEDICARE

## 2025-03-12 VITALS
SYSTOLIC BLOOD PRESSURE: 158 MMHG | OXYGEN SATURATION: 96 % | DIASTOLIC BLOOD PRESSURE: 89 MMHG | HEART RATE: 69 BPM | HEIGHT: 70 IN | WEIGHT: 226.2 LBS | TEMPERATURE: 97.8 F | BODY MASS INDEX: 32.38 KG/M2

## 2025-03-12 DIAGNOSIS — R42 EPISODIC LIGHTHEADEDNESS: Primary | ICD-10-CM

## 2025-03-12 DIAGNOSIS — H65.93 MIDDLE EAR EFFUSION, BILATERAL: ICD-10-CM

## 2025-03-12 DIAGNOSIS — H93.13 TINNITUS OF BOTH EARS: ICD-10-CM

## 2025-03-12 DIAGNOSIS — E11.9 TYPE 2 DIABETES MELLITUS WITHOUT COMPLICATION, WITHOUT LONG-TERM CURRENT USE OF INSULIN: ICD-10-CM

## 2025-03-12 DIAGNOSIS — I10 ESSENTIAL HYPERTENSION: ICD-10-CM

## 2025-03-12 RX ORDER — FLUTICASONE PROPIONATE 50 MCG
2 SPRAY, SUSPENSION (ML) NASAL DAILY
Qty: 16 G | Refills: 0 | Status: SHIPPED | OUTPATIENT
Start: 2025-03-12

## 2025-03-12 NOTE — TELEPHONE ENCOUNTER
Patient called stating he is again having dizziness that seems to be lasting throughout today, normally stops in am.     Patient has since last episode went back to urology and has been restarted on his finasteride as they didn't feel this would contribute to the issue.     Please advise

## 2025-03-12 NOTE — TELEPHONE ENCOUNTER
GURMEET patient. Went over results and recommendations. Patient verbalized understanding and appreciation.

## 2025-03-12 NOTE — PROGRESS NOTES
Subjective     CHIEF COMPLAINT    Chief Complaint   Patient presents with    Dizziness     X 2 months     History of Present Illness:  Zachariah Cruz is a 79 y.o. male who presents to [facility] with acute complaint of lightheadedness. He report symptoms have been ongoing for about 2 months. He will experience symptoms particularly when he stands up from a sitting position, or if he bends over and stands up. He reports this started after getting a CT scan of abdomen and pelvis. Denies any chest pain, shortness of breath, cough, congestion. No associated neurologic deficits. He has not fallen recently, has not hit his head. Denies headaches. He does not experience any real spinning sensation. Reports he has always has issues with his sinuses.     He has been seen and evaluated by cardiology for this, has had a holter monitor completed which showed no arrhythmias but occasional PVC. Also had ECHO recently that showed normal heart function.     Only recent medication change was addition of finasteride by urology, but he stopped that for a month to see if that helped his symptoms, and he didn't see much change being off the medication.     He follows with hematology/oncology for anemia. Discussed his symptoms with them as well and they suggested may be due to inner ear issues.     He has diabetes, he does monitor his BS at home and reports fasting sugars typically run 110-115, will occasionally have high blood sugar around 176.    He monitors his BP at home, which typically runs 140-160/70.    He does note tinnitus, which has been present for several years.         Review of Systems   Constitutional:  Negative for chills and fever.   HENT:  Positive for sinus pressure and tinnitus.    Respiratory:  Negative for shortness of breath and wheezing.    Cardiovascular:  Negative for chest pain and palpitations.   Gastrointestinal:  Negative for abdominal pain, constipation, diarrhea,  nausea and vomiting.   Neurological:  Positive for light-headedness. Negative for seizures, syncope, speech difficulty, weakness, numbness and headaches.         Past Medical History:   Diagnosis Date    Coronary artery disease     Diabetes     Hematuria     Hyperlipidemia     Hypertension          Past Surgical History:   Procedure Laterality Date    APPENDECTOMY      CARDIAC CATHETERIZATION      CARDIAC CATHETERIZATION N/A 2023    Procedure: Left Heart Cath with possible angioplasty;  Surgeon: Vishal Denton MD;  Location: [facility] CATH INVASIVE LOCATION;  Service: Cardiovascular;  Laterality: N/A;    CATARACT EXTRACTION      COLONOSCOPY N/A 2024    poor prep, diverticulosis    CORONARY ARTERY BYPASS GRAFT  2010    LIMA to LAD, SVG to OM, SVG to PDA    PROSTATE SURGERY      TRANSURETHRAL RESECTION OF THE PROSTATE    UPPER GASTROINTESTINAL ENDOSCOPY           Family History   Problem Relation Age of Onset    Hypertension Mother     Stroke Mother     Coronary artery disease Father     Stroke Paternal Grandfather     Coronary artery disease Other     Colon cancer Neg Hx          Social History     Socioeconomic History    Marital status:    Tobacco Use    Smoking status: Former     Current packs/day: 0.00     Average packs/day: 1 pack/day for 5.0 years (5.0 ttl pk-yrs)     Types: Cigarettes     Start date:      Quit date:      Years since quittin.2     Passive exposure: Past    Smokeless tobacco: Never   Vaping Use    Vaping status: Never Used   Substance and Sexual Activity    Alcohol use: Not Currently     Comment: occ    Drug use: Never    Sexual activity: Defer         No Known Allergies       Current Outpatient Medications on File Prior to Visit   Medication Sig Dispense Refill    allopurinol (ZYLOPRIM) 100 MG tablet Take 1 tablet by mouth Daily. 90 tablet 1    amLODIPine (NORVASC) 10 MG tablet TAKE 1 TABLET BY MOUTH DAILY 90 tablet 3    aspirin 81 MG EC tablet Take 1  "tablet by mouth Daily.      FeroSul 325 (65 Fe) MG tablet TAKE 1 TABLET BY MOUTH DAILY WITH BREAKFAST 90 tablet 1    glimepiride (AMARYL) 4 MG tablet TAKE 1 TABLET BY MOUTH DAILY 90 tablet 0    lisinopril (PRINIVIL,ZESTRIL) 20 MG tablet TAKE 1 TABLET BY MOUTH TWICE A  tablet 1    metFORMIN ER (GLUCOPHAGE-XR) 500 MG 24 hr tablet Take 2 tablets by mouth 2 (Two) Times a Day. 360 tablet 1    metoprolol succinate XL (TOPROL-XL) 25 MG 24 hr tablet TAKE 2 TABLETS BY MOUTH EVERY MORNING AND TAKE ONE TABLET BY MOUTH EVERY EVENING 270 tablet 0    OneTouch Ultra test strip 1 each by Other route Daily. Check blood sugar      simvastatin (ZOCOR) 40 MG tablet TAKE 1 TABLET BY MOUTH DAILY 90 tablet 1    spironolactone (ALDACTONE) 25 MG tablet Take 1 tablet by mouth Daily.      vitamin B-12 (CYANOCOBALAMIN) 500 MCG tablet Take 1 tablet by mouth Daily. 90 tablet 1    vitamin C (ASCORBIC ACID) 250 MG tablet Take 1 tablet by mouth Daily. 90 tablet 1    warfarin (COUMADIN) 4 MG tablet TAKE 1 TABLET BY MOUTH DAILY OR AS DIRECTED 90 tablet 1    finasteride (PROSCAR) 5 MG tablet Take 1 tablet by mouth Daily. (Patient not taking: Reported on 3/12/2025) 90 tablet 3     No current facility-administered medications on file prior to visit.          /89 Comment: STANDING  Pulse 69   Temp 97.8 °F (36.6 °C) (Oral)   Ht 177.8 cm (70\")   Wt 103 kg (226 lb 3.2 oz)   SpO2 96% Comment: room air  BMI 32.46 kg/m²     Objective     Physical Exam  Vitals and nursing note reviewed.   Constitutional:       General: He is not in acute distress.     Appearance: Normal appearance. He is not ill-appearing.   HENT:      Head: Normocephalic.      Right Ear: Tympanic membrane, ear canal and external ear normal. A middle ear effusion is present.      Left Ear: Tympanic membrane, ear canal and external ear normal. A middle ear effusion is present.      Ears:      Comments: No evidence of infection to BL ears.      Nose: Nose normal.      " Mouth/Throat:      Lips: Pink.      Mouth: Mucous membranes are moist.      Pharynx: Oropharynx is clear.   Eyes:      Extraocular Movements: Extraocular movements intact.      Pupils: Pupils are equal, round, and reactive to light.   Cardiovascular:      Rate and Rhythm: Normal rate and regular rhythm.      Heart sounds: Normal heart sounds. No murmur heard.  Pulmonary:      Effort: Pulmonary effort is normal. No accessory muscle usage or respiratory distress.      Breath sounds: Normal breath sounds. No wheezing or rhonchi.   Musculoskeletal:      Cervical back: Normal range of motion. No rigidity.   Lymphadenopathy:      Cervical: No cervical adenopathy.   Skin:     General: Skin is warm and dry.   Neurological:      General: No focal deficit present.      Mental Status: He is alert and oriented to person, place, and time.      Cranial Nerves: No facial asymmetry.      Sensory: Sensation is intact.      Motor: No weakness.      Coordination: Coordination is intact. Finger-Nose-Finger Test normal. Rapid alternating movements normal.      Gait: Gait is intact.   Psychiatric:         Mood and Affect: Mood and affect normal.         Behavior: Behavior normal.           The following data was reviewed for today's visit:  Office Visit with Annie Rosa APRN (03/05/2025)    Office Visit with Josy Cotton APRN (02/27/2025)    TSH (12/09/2024 07:12)    CBC & Differential (01/31/2025 07:33)    Ferritin (01/31/2025 07:33)    Iron Profile (01/31/2025 07:33)    Folate (01/31/2025 07:33)    Basic Metabolic Panel (01/31/2025 07:33)    Protime-INR (02/25/2025 07:17)    Assessment & Plan  Episodic lightheadedness  Reviewed recent labs and notes as above. Differential is broad. Orthostatics completed in office. Exam today is not acutely concerning, neurologic exam is intact. He reports he always has issues with his sinuses, and he does have fluid on his ears today. I will send In Flonase for him. We did discuss other  possible workup to include CT of the head, but he declines today. He would like to move forward with treatment of the ear effusion, and discuss further with PCP. Appointment scheduled to f/u. He was advised on strict ER precautions and he voiced understanding. Continue to closely monitor blood pressure, blood sugar. Stay well hydrated.        Tinnitus of both ears         Middle ear effusion, bilateral    Orders:    fluticasone (FLONASE) 50 MCG/ACT nasal spray; Administer 2 sprays into the nostril(s) as directed by provider Daily.    Type 2 diabetes mellitus without complication, without long-term current use of insulin           Essential hypertension  Blood pressure elevated today, but he reports runs in better range at home. Continue to monitor, follow up appt scheduled with PCP.                Follow up:  Return in about 2 weeks (around 3/26/2025) for Recheck with PCP.  Patient was given instructions and counseling regarding his condition or for health maintenance advice. Please see specific information pulled into the AVS if appropriate.

## 2025-03-13 ENCOUNTER — TELEPHONE (OUTPATIENT)
Age: 80
End: 2025-03-13
Payer: MEDICARE

## 2025-03-13 DIAGNOSIS — R42 DIZZINESS: Primary | ICD-10-CM

## 2025-03-13 NOTE — ASSESSMENT & PLAN NOTE
Blood pressure elevated today, but he reports runs in better range at home. Continue to monitor, follow up appt scheduled with PCP.

## 2025-03-13 NOTE — TELEPHONE ENCOUNTER
GURMEET patient. Went over recommendations. Patient will have lab work drawn early next week. Patient verbalized understanding and appreciation.

## 2025-03-17 ENCOUNTER — LAB (OUTPATIENT)
Dept: LAB | Facility: HOSPITAL | Age: 80
End: 2025-03-17
Payer: MEDICARE

## 2025-03-17 ENCOUNTER — ANTICOAGULATION VISIT (OUTPATIENT)
Dept: CARDIOLOGY | Facility: CLINIC | Age: 80
End: 2025-03-17
Payer: MEDICARE

## 2025-03-17 DIAGNOSIS — I48.0 PAROXYSMAL ATRIAL FIBRILLATION: Primary | ICD-10-CM

## 2025-03-17 DIAGNOSIS — R42 DIZZINESS: ICD-10-CM

## 2025-03-17 DIAGNOSIS — I48.0 PAROXYSMAL ATRIAL FIBRILLATION: ICD-10-CM

## 2025-03-17 LAB
ALBUMIN SERPL-MCNC: 3.8 G/DL (ref 3.5–5.2)
ALBUMIN/GLOB SERPL: 1.4 G/DL
ALP SERPL-CCNC: 80 U/L (ref 39–117)
ALT SERPL W P-5'-P-CCNC: 17 U/L (ref 1–41)
ANION GAP SERPL CALCULATED.3IONS-SCNC: 9.9 MMOL/L (ref 5–15)
AST SERPL-CCNC: 14 U/L (ref 1–40)
BILIRUB SERPL-MCNC: 0.4 MG/DL (ref 0–1.2)
BUN SERPL-MCNC: 14 MG/DL (ref 8–23)
BUN/CREAT SERPL: 13.5 (ref 7–25)
CALCIUM SPEC-SCNC: 9.8 MG/DL (ref 8.6–10.5)
CHLORIDE SERPL-SCNC: 106 MMOL/L (ref 98–107)
CO2 SERPL-SCNC: 24.1 MMOL/L (ref 22–29)
CREAT SERPL-MCNC: 1.04 MG/DL (ref 0.76–1.27)
EGFRCR SERPLBLD CKD-EPI 2021: 74 ML/MIN/1.73
GLOBULIN UR ELPH-MCNC: 2.8 GM/DL
GLUCOSE SERPL-MCNC: 167 MG/DL (ref 65–99)
INR PPP: 1.99 (ref 0.86–1.15)
POTASSIUM SERPL-SCNC: 4.5 MMOL/L (ref 3.5–5.2)
PROT SERPL-MCNC: 6.6 G/DL (ref 6–8.5)
PROTHROMBIN TIME: 23.6 SECONDS (ref 11.8–14.9)
SODIUM SERPL-SCNC: 140 MMOL/L (ref 136–145)

## 2025-03-17 PROCEDURE — 85610 PROTHROMBIN TIME: CPT

## 2025-03-17 PROCEDURE — 80053 COMPREHEN METABOLIC PANEL: CPT

## 2025-03-17 PROCEDURE — 36415 COLL VENOUS BLD VENIPUNCTURE: CPT

## 2025-03-17 NOTE — PROGRESS NOTES
Lab Results   Component Value Date    INR 1.99 (H) 03/17/2025    INR 2.39 (H) 02/25/2025    INR 2.84 (H) 01/31/2025    PROTIME 23.6 (H) 03/17/2025    PROTIME 27.3 (H) 02/25/2025    PROTIME 30.3 (H) 01/31/2025      Atrial fibrillation      Range: 2.0-3.0     5/4     [facility]

## 2025-03-17 NOTE — PROGRESS NOTES
His level is essentially at goal, I would recommend he continue his current dose warfarin, and recheck in 1 week.

## 2025-03-19 ENCOUNTER — OFFICE VISIT (OUTPATIENT)
Dept: FAMILY MEDICINE CLINIC | Age: 80
End: 2025-03-19
Payer: MEDICARE

## 2025-03-19 VITALS
HEIGHT: 70 IN | TEMPERATURE: 97.7 F | SYSTOLIC BLOOD PRESSURE: 173 MMHG | BODY MASS INDEX: 32.58 KG/M2 | WEIGHT: 227.6 LBS | OXYGEN SATURATION: 98 % | DIASTOLIC BLOOD PRESSURE: 78 MMHG | HEART RATE: 66 BPM

## 2025-03-19 DIAGNOSIS — I10 ESSENTIAL HYPERTENSION: ICD-10-CM

## 2025-03-19 DIAGNOSIS — R42 DIZZINESS: Primary | ICD-10-CM

## 2025-03-19 PROCEDURE — 3078F DIAST BP <80 MM HG: CPT | Performed by: NURSE PRACTITIONER

## 2025-03-19 PROCEDURE — 1159F MED LIST DOCD IN RCRD: CPT | Performed by: NURSE PRACTITIONER

## 2025-03-19 PROCEDURE — 1160F RVW MEDS BY RX/DR IN RCRD: CPT | Performed by: NURSE PRACTITIONER

## 2025-03-19 PROCEDURE — 3077F SYST BP >= 140 MM HG: CPT | Performed by: NURSE PRACTITIONER

## 2025-03-19 PROCEDURE — 1126F AMNT PAIN NOTED NONE PRSNT: CPT | Performed by: NURSE PRACTITIONER

## 2025-03-19 PROCEDURE — 99214 OFFICE O/P EST MOD 30 MIN: CPT | Performed by: NURSE PRACTITIONER

## 2025-03-19 RX ORDER — MECLIZINE HYDROCHLORIDE 25 MG/1
25 TABLET ORAL 3 TIMES DAILY PRN
Qty: 30 TABLET | Refills: 1 | Status: SHIPPED | OUTPATIENT
Start: 2025-03-19

## 2025-03-19 NOTE — PROGRESS NOTES
Chief Complaint  Dizziness (Follow up from 3-12-25 visit) and Hypertension (BP has been running high)    Subjective          Zachariah Cruz presents to [facility]    History of Present Illness  Dizziness  Seen recently here by acute provider and given flonase    Symptoms started: after CT 1-  Description of symptoms: light headed in the am's early, occ more severe episodes, not often   Associated symptoms: worse with bending over, tinnitus, chronic issue, ears don't feel stopped up   Treatment tried: flonase (rest helps)     Cardiology note from 3-13-25:   Echocardiogram showed normal heart function.  There is mild stenosis/narrowing of the aortic valve.  These findings are unchanged from previous study done in 2023.    Saw hem 2-27-25:  2/27/2025: His iron levels have shown improvement, with a current level of 57 compared to 48 in November 2024. Ferritin levels have also increased from 52 to 55, now within the normal range. The iron saturation has improved from 12 to 16. Folate levels are satisfactory, and B12 levels were previously within the normal range. The complete blood count (CBC) is normal, indicating no anemia. The dizziness is not attributed to anemia. He will continue his current iron supplementation regimen of 2 tab QD. He can stop the Folic acid  due to satisfactory folate levels. A re-evaluation of his levels will be conducted in 4 months, with the addition of a B12 test.     2. Dizziness.  He reports experiencing dizzy spells, described as lightheadedness without room spinning. Episodes have been severe at times, lasting all day or several hours. He has not had his ears checked, but there is some fluid behind one eardrum. He has been advised to follow up with his primary care provider, Nelia Frost, for further evaluation. The possibility of vertigo or a medication side effect was discussed. He is advised to stay hydrated and monitor his symptoms.         Follow-up  The patient will follow up in 4 months with labs prior. CBC, iron profile, ferritin, B-12    Hypertension:  Current medication: norvasc, lisinopril, toprol and diuretic   Tolerating Medication: Yes  Checking BP at home and it is: 140's / 60's  Needs refills: no  Labs:  Lab Results       Component                Value               Date                       GLUCOSE                  167 (H)             03/17/2025                 BUN                      14                  03/17/2025                 CREATININE               1.04                03/17/2025                 EGFRIFNONA               75                  02/22/2022                 BCR                      13.5                03/17/2025                 K                        4.5                 03/17/2025                 CO2                      24.1                03/17/2025                 CALCIUM                  9.8                 03/17/2025                 ALBUMIN                  3.8                 03/17/2025                 AST                      14                  03/17/2025                 ALT                      17                  03/17/2025              PAST MEDICAL HISTORY changes since 12-:       covid +     AF, sees Dr Moffett    Sleep Apnea: uses CPAP;     Renal Stones: he has undergone ECSW lithotripsy and laser       Hospitalizations:     UTI/weaknss 3-22-23  Heart cath 3-16-23    Pneumonia admitted once on 10-20-20 (COVID +)         CURRENT MEDICAL PROVIDERS:  Cardiology: Cherrie    Orthopedist    Rheumatologist: Dr. Arvizu     urology : [facility] /last seen    Sees Dr Manuel for pancreas follow up        PREVENTIVE HEALTH MAINTENANCE             COLORECTAL CANCER SCREENING:  (colonoscopy q10y; sigmoidoscopy q5y; Cologuard q3y) 1-25-24:  PREOPERATIVE DIAGNOSIS:   Positive Cologuard.     POSTOPERATIVE DIAGNOSIS:  1. Poor prep.  2. Severe sigmoid diverticulosis.     PROCEDURE  PERFORMED:  Colonoscopy to cecum.       kera, negative  2020, Results are in chart;       Hepatitis C Medicare Screening: was last done ; negative            Surgical History:      24 CLN at [facility], Dr Dunaway   Heart cath 3-16-23 and stenting   Cystoscopy     Left cataract     Appendectomy    Coronary Artery Bypass Graft: ;     Transurethral Resection of Prostate: ;     Cataract Removal: right; ;         Family History:        Father:  at age 91; Cause of death was CAD;  Coronary Artery Disease     Mother:  at age 88; Cause of death was CVA;  Hypertension     Paternal Grandfather: Cerebrovascular Accident         Social History:        Occupation:.   (Self-Employed) [employer]     Marital Status:      Children: 3 children           Past Medical History:   Diagnosis Date    Coronary artery disease     Diabetes     Hematuria     Hyperlipidemia     Hypertension        No Known Allergies     Past Surgical History:   Procedure Laterality Date    APPENDECTOMY      CARDIAC CATHETERIZATION      CARDIAC CATHETERIZATION N/A 2023    Procedure: Left Heart Cath with possible angioplasty;  Surgeon: Vishal Denton MD;  Location: [facility] INVASIVE LOC[facility];  Service: Cardiovascular;  Laterality: N/A;    CATARACT EXTRACTION      COLONOSCOPY N/A 2024    poor prep, diverticulosis    CORONARY ARTERY BYPASS GRAFT  2010    LIMA to LAD, SVG to OM, SVG to PDA    PROSTATE SURGERY      TRANSURETHRAL RESECTION OF THE PROSTATE    UPPER GASTROINTESTINAL ENDOSCOPY          Social History     Tobacco Use    Smoking status: Former     Current packs/day: 0.00     Average packs/day: 1 pack/day for 5.0 years (5.0 ttl pk-yrs)     Types: Cigarettes     Start date:      Quit date:      Years since quittin.2     Passive exposure: Past    Smokeless tobacco: Never   Substance Use Topics    Alcohol use: Not Currently     Comment: occ        Family History   Problem Relation Age of Onset    Hypertension Mother     Stroke Mother     Coronary artery disease Father     Stroke Paternal Grandfather     Coronary artery disease Other     Colon cancer Neg Hx         Health Maintenance Due   Topic Date Due    ZOSTER VACCINE (1 of 2) Never done    COVID-19 Vaccine (4 - 2024-25 season) 09/01/2024    URINE MICROALBUMIN-CREATININE RATIO (uACR)  11/28/2024    HEMOGLOBIN A1C  06/09/2025        Current Outpatient Medications on File Prior to Visit   Medication Sig    allopurinol (ZYLOPRIM) 100 MG tablet Take 1 tablet by mouth Daily.    amLODIPine (NORVASC) 10 MG tablet TAKE 1 TABLET BY MOUTH DAILY    aspirin 81 MG EC tablet Take 1 tablet by mouth Daily.    FeroSul 325 (65 Fe) MG tablet TAKE 1 TABLET BY MOUTH DAILY WITH BREAKFAST    finasteride (PROSCAR) 5 MG tablet Take 1 tablet by mouth Daily.    fluticasone (FLONASE) 50 MCG/ACT nasal spray Administer 2 sprays into the nostril(s) as directed by provider Daily.    glimepiride (AMARYL) 4 MG tablet TAKE 1 TABLET BY MOUTH DAILY    lisinopril (PRINIVIL,ZESTRIL) 20 MG tablet TAKE 1 TABLET BY MOUTH TWICE A DAY    metFORMIN ER (GLUCOPHAGE-XR) 500 MG 24 hr tablet Take 2 tablets by mouth 2 (Two) Times a Day.    metoprolol succinate XL (TOPROL-XL) 25 MG 24 hr tablet TAKE 2 TABLETS BY MOUTH EVERY MORNING AND TAKE ONE TABLET BY MOUTH EVERY EVENING    OneTouch Ultra test strip 1 each by Other route Daily. Check blood sugar    simvastatin (ZOCOR) 40 MG tablet TAKE 1 TABLET BY MOUTH DAILY    spironolactone (ALDACTONE) 25 MG tablet Take 1 tablet by mouth Daily.    vitamin B-12 (CYANOCOBALAMIN) 500 MCG tablet Take 1 tablet by mouth Daily.    vitamin C (ASCORBIC ACID) 250 MG tablet Take 1 tablet by mouth Daily.    warfarin (COUMADIN) 4 MG tablet TAKE 1 TABLET BY MOUTH DAILY OR AS DIRECTED     No current facility-administered medications on file prior to visit.       Immunization History   Administered Date(s) Administered     "COVID-19 (PFIZER) BIVALENT 12+YRS 12/07/2022    COVID-19 (PFIZER) Purple Cap Monovalent 02/09/2021, 03/02/2021       Review of Systems   Constitutional:  Negative for fatigue and fever.   Eyes:         Issue with his left eye, sees eye specialist    Respiratory:  Negative for cough and shortness of breath.    Cardiovascular:  Positive for leg swelling (chronic issue, L>R, better recently). Negative for chest pain and palpitations.   Gastrointestinal:  Positive for vomiting (X 1 with an episode of dizziness a week ago).        Objective     Vitals:    03/19/25 0954   BP: 173/78   BP Location: Left arm   Patient Position: Sitting   Pulse: 66   Temp: 97.7 °F (36.5 °C)   TempSrc: Oral   SpO2: 98%  Comment: on room air   Weight: 103 kg (227 lb 9.6 oz)   Height: 177.8 cm (70\")        Vitals:    03/19/25 0954 03/19/25 1029 03/19/25 1032 03/19/25 1033   Orthostatic BP:  152/68 159/70 155/74   Orthostatic Pulse:  63 63 66   Patient Position: Sitting Lying Sitting Standing           Physical Exam  Vitals reviewed.   Constitutional:       General: He is not in acute distress.     Appearance: Normal appearance.   HENT:      Right Ear: Tympanic membrane normal.      Left Ear: Tympanic membrane normal.   Eyes:      Pupils: Pupils are equal, round, and reactive to light.   Neck:      Vascular: No carotid bruit.   Cardiovascular:      Rate and Rhythm: Normal rate and regular rhythm.      Heart sounds: Normal heart sounds. No murmur heard.  Pulmonary:      Effort: Pulmonary effort is normal. No respiratory distress.      Breath sounds: Normal breath sounds.   Musculoskeletal:      Right lower leg: No edema.      Left lower leg: Edema (trace) present.   Neurological:      General: No focal deficit present.      Mental Status: He is alert.   Psychiatric:         Mood and Affect: Mood normal.         Behavior: Behavior normal.         Result Review :     The following data was reviewed by: JACIEL Tovar on " 03/19/2025:                       Assessment and Plan      Diagnoses and all orders for this visit:    1. Dizziness (Primary)  Assessment & Plan:  Reviewed his chart, Will send for PT and to try meclizine prn if he has a more severe episode, to stay adequately hydrated daily; he can continue flonase      Orders:  -     meclizine (ANTIVERT) 25 MG tablet; Take 1 tablet by mouth 3 (Three) Times a Day As Needed for Dizziness.  Dispense: 30 tablet; Refill: 1  -     Ambulatory Referral to Physical Therapy for Evaluation & Treatment    2. Essential hypertension  Assessment & Plan:  Continue to monitor BP at home. Continue current meds. Continue to modify diet and lifestyle. Follow up with his cardiologist as directed   Discussed compression hose and elevating his lower ext                      Follow Up     Return if symptoms worsen or fail to improve.    Patient was given instructions and counseling regarding his condition or for health maintenance advice. Please see specific information pulled into the AVS if appropriate.

## 2025-03-19 NOTE — ASSESSMENT & PLAN NOTE
Reviewed his chart, Will send for PT and to try meclizine prn if he has a more severe episode, to stay adequately hydrated daily; he can continue flonase

## 2025-03-19 NOTE — ASSESSMENT & PLAN NOTE
Continue to monitor BP at home. Continue current meds. Continue to modify diet and lifestyle. Follow up with his cardiologist as directed   Discussed compression hose and elevating his lower ext

## 2025-03-20 DIAGNOSIS — E11.9 TYPE 2 DIABETES MELLITUS WITHOUT COMPLICATION, WITHOUT LONG-TERM CURRENT USE OF INSULIN: Primary | ICD-10-CM

## 2025-03-20 RX ORDER — BLOOD SUGAR DIAGNOSTIC
1 STRIP MISCELLANEOUS DAILY
Qty: 90 EACH | Refills: 3 | Status: SHIPPED | OUTPATIENT
Start: 2025-03-20

## 2025-03-20 NOTE — TELEPHONE ENCOUNTER
Rx Refill Note  Requested Prescriptions     Pending Prescriptions Disp Refills    OneTouch Ultra test strip [Pharmacy Med Name: ONE TOUCH ULTRA BLUE TESTST(NEW)100]       Sig: CHECK BLOOD SUGAR ONCE DAILY      Last office visit with prescribing clinician: 3/19/2025   Last telemedicine visit with prescribing clinician: Visit date not found   Next office visit with prescribing clinician: Visit date not found      Marissa Oliveira LPN  03/20/25, 09:32 EDT

## 2025-04-07 ENCOUNTER — LAB (OUTPATIENT)
Dept: LAB | Facility: HOSPITAL | Age: 80
End: 2025-04-07
Payer: MEDICARE

## 2025-04-07 ENCOUNTER — ANTICOAGULATION VISIT (OUTPATIENT)
Dept: CARDIOLOGY | Facility: CLINIC | Age: 80
End: 2025-04-07
Payer: MEDICARE

## 2025-04-07 DIAGNOSIS — I48.0 PAROXYSMAL ATRIAL FIBRILLATION: ICD-10-CM

## 2025-04-07 DIAGNOSIS — I48.0 PAROXYSMAL ATRIAL FIBRILLATION: Primary | ICD-10-CM

## 2025-04-07 LAB
INR PPP: 2.37 (ref 0.86–1.15)
PROTHROMBIN TIME: 27.2 SECONDS (ref 11.8–14.9)

## 2025-04-07 PROCEDURE — 85610 PROTHROMBIN TIME: CPT

## 2025-04-07 PROCEDURE — 36415 COLL VENOUS BLD VENIPUNCTURE: CPT

## 2025-04-07 NOTE — PROGRESS NOTES
Lab Results   Component Value Date    INR 2.37 (H) 04/07/2025    INR 1.99 (H) 03/17/2025    INR 2.39 (H) 02/25/2025    PROTIME 27.2 (H) 04/07/2025    PROTIME 23.6 (H) 03/17/2025    PROTIME 27.3 (H) 02/25/2025      Atrial fibrillation      Range: 2.0-3.0     5/4     [facility]

## 2025-04-15 RX ORDER — SIMVASTATIN 40 MG
40 TABLET ORAL DAILY
Qty: 90 TABLET | Refills: 1 | Status: SHIPPED | OUTPATIENT
Start: 2025-04-15

## 2025-05-05 ENCOUNTER — LAB (OUTPATIENT)
Dept: LAB | Facility: HOSPITAL | Age: 80
End: 2025-05-05
Payer: MEDICARE

## 2025-05-05 ENCOUNTER — ANTICOAGULATION VISIT (OUTPATIENT)
Dept: CARDIOLOGY | Facility: CLINIC | Age: 80
End: 2025-05-05
Payer: MEDICARE

## 2025-05-05 DIAGNOSIS — I48.0 PAROXYSMAL ATRIAL FIBRILLATION: Primary | ICD-10-CM

## 2025-05-05 DIAGNOSIS — I48.0 PAROXYSMAL ATRIAL FIBRILLATION: ICD-10-CM

## 2025-05-05 LAB
INR PPP: 3.53 (ref 0.86–1.15)
PROTHROMBIN TIME: 37.3 SECONDS (ref 11.8–14.9)

## 2025-05-05 PROCEDURE — 36415 COLL VENOUS BLD VENIPUNCTURE: CPT

## 2025-05-05 PROCEDURE — 85610 PROTHROMBIN TIME: CPT

## 2025-05-05 NOTE — PROGRESS NOTES
Level is too high, hold dose today, and resume at 4 mg daily, recheck INR on Monday.   REFERRAL PLACED.

## 2025-05-05 NOTE — PROGRESS NOTES
Lab Results   Component Value Date    INR 3.53 (H) 05/05/2025    INR 2.37 (H) 04/07/2025    INR 1.99 (H) 03/17/2025    PROTIME 37.3 (H) 05/05/2025    PROTIME 27.2 (H) 04/07/2025    PROTIME 23.6 (H) 03/17/2025     Atrial fibrillation      Range: 2.0-3.0     5/4     [facility]

## 2025-05-12 ENCOUNTER — LAB (OUTPATIENT)
Dept: LAB | Facility: HOSPITAL | Age: 80
End: 2025-05-12
Payer: MEDICARE

## 2025-05-12 ENCOUNTER — ANTICOAGULATION VISIT (OUTPATIENT)
Age: 80
End: 2025-05-12
Payer: MEDICARE

## 2025-05-12 DIAGNOSIS — I48.0 PAROXYSMAL ATRIAL FIBRILLATION: ICD-10-CM

## 2025-05-12 DIAGNOSIS — I48.0 PAROXYSMAL ATRIAL FIBRILLATION: Primary | ICD-10-CM

## 2025-05-12 LAB
INR PPP: 1.27 (ref 0.86–1.15)
PROTHROMBIN TIME: 16.4 SECONDS (ref 11.8–14.9)

## 2025-05-12 PROCEDURE — 85610 PROTHROMBIN TIME: CPT

## 2025-05-12 PROCEDURE — 36415 COLL VENOUS BLD VENIPUNCTURE: CPT

## 2025-05-12 NOTE — PROGRESS NOTES
Lab Results   Component Value Date    INR 1.27 (H) 05/12/2025    INR 3.53 (H) 05/05/2025    INR 2.37 (H) 04/07/2025    PROTIME 16.4 (H) 05/12/2025    PROTIME 37.3 (H) 05/05/2025    PROTIME 27.2 (H) 04/07/2025          Patient takes 4 mg warfarin daily with  Therapy Normal Range     0.86-1.15     [facility]    Spoke to patient, voiced understanding to take 6 mg today (5/12/25) then resume taking 4 mg and retest on Friday (5/16/25). Patient stated he has had a cold for the past week.

## 2025-05-16 ENCOUNTER — ANTICOAGULATION VISIT (OUTPATIENT)
Dept: CARDIOLOGY | Facility: CLINIC | Age: 80
End: 2025-05-16
Payer: MEDICARE

## 2025-05-16 ENCOUNTER — LAB (OUTPATIENT)
Dept: LAB | Facility: HOSPITAL | Age: 80
End: 2025-05-16
Payer: MEDICARE

## 2025-05-16 DIAGNOSIS — I48.0 PAROXYSMAL ATRIAL FIBRILLATION: ICD-10-CM

## 2025-05-16 DIAGNOSIS — I48.0 PAROXYSMAL ATRIAL FIBRILLATION: Primary | ICD-10-CM

## 2025-05-16 DIAGNOSIS — D64.9 ANEMIA, UNSPECIFIED TYPE: ICD-10-CM

## 2025-05-16 LAB
BASOPHILS # BLD AUTO: 0.01 10*3/MM3 (ref 0–0.2)
BASOPHILS NFR BLD AUTO: 0.1 % (ref 0–1.5)
DEPRECATED RDW RBC AUTO: 48.8 FL (ref 37–54)
EOSINOPHIL # BLD AUTO: 0.16 10*3/MM3 (ref 0–0.4)
EOSINOPHIL NFR BLD AUTO: 1.9 % (ref 0.3–6.2)
ERYTHROCYTE [DISTWIDTH] IN BLOOD BY AUTOMATED COUNT: 14.1 % (ref 12.3–15.4)
FERRITIN SERPL-MCNC: 124 NG/ML (ref 30–400)
HCT VFR BLD AUTO: 40.2 % (ref 37.5–51)
HGB BLD-MCNC: 12.7 G/DL (ref 13–17.7)
IMM GRANULOCYTES # BLD AUTO: 0.04 10*3/MM3 (ref 0–0.05)
IMM GRANULOCYTES NFR BLD AUTO: 0.5 % (ref 0–0.5)
INR PPP: 1.31 (ref 0.86–1.15)
IRON 24H UR-MRATE: 47 MCG/DL (ref 59–158)
IRON SATN MFR SERPL: 14 % (ref 20–50)
LYMPHOCYTES # BLD AUTO: 1.24 10*3/MM3 (ref 0.7–3.1)
LYMPHOCYTES NFR BLD AUTO: 14.8 % (ref 19.6–45.3)
MCH RBC QN AUTO: 29.3 PG (ref 26.6–33)
MCHC RBC AUTO-ENTMCNC: 31.6 G/DL (ref 31.5–35.7)
MCV RBC AUTO: 92.6 FL (ref 79–97)
MONOCYTES # BLD AUTO: 0.52 10*3/MM3 (ref 0.1–0.9)
MONOCYTES NFR BLD AUTO: 6.2 % (ref 5–12)
NEUTROPHILS NFR BLD AUTO: 6.42 10*3/MM3 (ref 1.7–7)
NEUTROPHILS NFR BLD AUTO: 76.5 % (ref 42.7–76)
PLATELET # BLD AUTO: 247 10*3/MM3 (ref 140–450)
PMV BLD AUTO: 10.2 FL (ref 6–12)
PROTHROMBIN TIME: 16.8 SECONDS (ref 11.8–14.9)
RBC # BLD AUTO: 4.34 10*6/MM3 (ref 4.14–5.8)
TIBC SERPL-MCNC: 328 MCG/DL (ref 298–536)
TRANSFERRIN SERPL-MCNC: 220 MG/DL (ref 200–360)
VIT B12 BLD-MCNC: >2000 PG/ML (ref 211–946)
WBC NRBC COR # BLD AUTO: 8.39 10*3/MM3 (ref 3.4–10.8)

## 2025-05-16 PROCEDURE — 36415 COLL VENOUS BLD VENIPUNCTURE: CPT

## 2025-05-16 PROCEDURE — 82728 ASSAY OF FERRITIN: CPT

## 2025-05-16 PROCEDURE — 84466 ASSAY OF TRANSFERRIN: CPT

## 2025-05-16 PROCEDURE — 82607 VITAMIN B-12: CPT

## 2025-05-16 PROCEDURE — 85025 COMPLETE CBC W/AUTO DIFF WBC: CPT

## 2025-05-16 PROCEDURE — 83540 ASSAY OF IRON: CPT

## 2025-05-16 PROCEDURE — 85610 PROTHROMBIN TIME: CPT

## 2025-05-16 NOTE — PROGRESS NOTES
Lab Results   Component Value Date    INR 1.31 (H) 05/16/2025    INR 1.27 (H) 05/12/2025    INR 3.53 (H) 05/05/2025    PROTIME 16.8 (H) 05/16/2025    PROTIME 16.4 (H) 05/12/2025    PROTIME 37.3 (H) 05/05/2025     Atrial fibrillation      Range: 2.0-3.0     5/4     [facility]    Pt. Taking cold medication

## 2025-05-16 NOTE — PROGRESS NOTES
Level is still too low, take 6 mg today and tomorrow, and then increase to 5 mg daily.  Recheck INR on Wednesday.

## 2025-05-27 ENCOUNTER — ANTICOAGULATION VISIT (OUTPATIENT)
Dept: CARDIOLOGY | Facility: CLINIC | Age: 80
End: 2025-05-27
Payer: MEDICARE

## 2025-05-27 ENCOUNTER — LAB (OUTPATIENT)
Dept: LAB | Facility: HOSPITAL | Age: 80
End: 2025-05-27
Payer: MEDICARE

## 2025-05-27 DIAGNOSIS — I48.0 PAROXYSMAL ATRIAL FIBRILLATION: Primary | ICD-10-CM

## 2025-05-27 DIAGNOSIS — I48.0 PAROXYSMAL ATRIAL FIBRILLATION: ICD-10-CM

## 2025-05-27 LAB
INR PPP: 2.27 (ref 0.86–1.15)
PROTHROMBIN TIME: 26.2 SECONDS (ref 11.8–14.9)

## 2025-05-27 PROCEDURE — 36415 COLL VENOUS BLD VENIPUNCTURE: CPT

## 2025-05-27 PROCEDURE — 85610 PROTHROMBIN TIME: CPT

## 2025-05-27 NOTE — PROGRESS NOTES
Lab Results   Component Value Date    INR 2.27 (H) 05/27/2025    INR 1.31 (H) 05/16/2025    INR 1.27 (H) 05/12/2025    PROTIME 26.2 (H) 05/27/2025    PROTIME 16.8 (H) 05/16/2025    PROTIME 16.4 (H) 05/12/2025     Atrial fibrillation      Range: 2.0-3.0     4mg     [facility]

## 2025-05-29 RX ORDER — METOPROLOL SUCCINATE 25 MG/1
TABLET, EXTENDED RELEASE ORAL
Qty: 270 TABLET | Refills: 3 | Status: SHIPPED | OUTPATIENT
Start: 2025-05-29

## 2025-05-29 NOTE — TELEPHONE ENCOUNTER
Rx Refill Note  Requested Prescriptions     Pending Prescriptions Disp Refills    metoprolol succinate XL (TOPROL-XL) 25 MG 24 hr tablet [Pharmacy Med Name: METOPROLOL SUCC ER 25 MG TAB] 270 tablet 0     Sig: TAKE 2 TABLETS BY MOUTH EVERY MORNING AND TAKE ONE TABLET BY MOUTH EVERY EVENING        LAST OFFICE VISIT:  03/05/2025     NEXT OFFICE VISIT:  06/18/2025     Does the medication requests match the last office note:    [x] Yes   [] No    Does this refill request meet protocol details for MA to approve:     [] Yes   [x] No   [] No Protocols Provided   BP under 130/80 in past year and patient has diabetes, CAD or PVD     PER JACIEL JEFFERSON OKAY TO REFILL.

## 2025-06-02 ENCOUNTER — TELEPHONE (OUTPATIENT)
Dept: CARDIOLOGY | Facility: CLINIC | Age: 80
End: 2025-06-02
Payer: MEDICARE

## 2025-06-02 ENCOUNTER — LAB (OUTPATIENT)
Dept: LAB | Facility: HOSPITAL | Age: 80
End: 2025-06-02
Payer: MEDICARE

## 2025-06-02 ENCOUNTER — ANTICOAGULATION VISIT (OUTPATIENT)
Dept: CARDIOLOGY | Facility: CLINIC | Age: 80
End: 2025-06-02
Payer: MEDICARE

## 2025-06-02 DIAGNOSIS — I48.0 PAROXYSMAL ATRIAL FIBRILLATION: Primary | ICD-10-CM

## 2025-06-02 DIAGNOSIS — I48.0 PAROXYSMAL ATRIAL FIBRILLATION: ICD-10-CM

## 2025-06-02 LAB
INR PPP: 2.15 (ref 0.86–1.15)
PROTHROMBIN TIME: 25.1 SECONDS (ref 11.8–14.9)

## 2025-06-02 PROCEDURE — 36415 COLL VENOUS BLD VENIPUNCTURE: CPT

## 2025-06-02 PROCEDURE — 85610 PROTHROMBIN TIME: CPT

## 2025-06-02 NOTE — PROGRESS NOTES
Lab Results   Component Value Date    INR 2.15 (H) 06/02/2025    INR 2.27 (H) 05/27/2025    INR 1.31 (H) 05/16/2025    PROTIME 25.1 (H) 06/02/2025    PROTIME 26.2 (H) 05/27/2025    PROTIME 16.8 (H) 05/16/2025     Atrial fibrillation      Range: 2.0-3.0     4mg     [facility]

## 2025-06-12 DIAGNOSIS — M1A.0790 IDIOPATHIC CHRONIC GOUT OF FOOT WITHOUT TOPHUS, UNSPECIFIED LATERALITY: ICD-10-CM

## 2025-06-12 DIAGNOSIS — E11.9 TYPE 2 DIABETES MELLITUS WITHOUT COMPLICATION, WITHOUT LONG-TERM CURRENT USE OF INSULIN: ICD-10-CM

## 2025-06-12 RX ORDER — METFORMIN HYDROCHLORIDE 500 MG/1
1000 TABLET, EXTENDED RELEASE ORAL 2 TIMES DAILY
Qty: 360 TABLET | Refills: 0 | Status: SHIPPED | OUTPATIENT
Start: 2025-06-12

## 2025-06-12 RX ORDER — ALLOPURINOL 100 MG/1
100 TABLET ORAL DAILY
Qty: 90 TABLET | Refills: 0 | Status: SHIPPED | OUTPATIENT
Start: 2025-06-12

## 2025-06-12 NOTE — TELEPHONE ENCOUNTER
Rx Refill Note  Requested Prescriptions     Pending Prescriptions Disp Refills    allopurinol (ZYLOPRIM) 100 MG tablet [Pharmacy Med Name: ALLOPURINOL 100 MG TABLET] 90 tablet 1     Sig: TAKE 1 TABLET BY MOUTH DAILY    metFORMIN ER (GLUCOPHAGE-XR) 500 MG 24 hr tablet [Pharmacy Med Name: METFORMIN HCL  MG TABLET] 360 tablet 1     Sig: TAKE 2 TABLETS BY MOUTH TWICE A DAY      Last office visit with prescribing clinician: 3/19/2025   Last telemedicine visit with prescribing clinician: Visit date not found   Next office visit with prescribing clinician: Visit date not found  Gout Agent Protocol Failed     Antihyperglycemics Protocol Failed        Marissa Oliveira LPN  06/12/25, 10:17 EDT

## 2025-06-16 ENCOUNTER — ANTICOAGULATION VISIT (OUTPATIENT)
Age: 80
End: 2025-06-16
Payer: MEDICARE

## 2025-06-16 ENCOUNTER — LAB (OUTPATIENT)
Dept: LAB | Facility: HOSPITAL | Age: 80
End: 2025-06-16
Payer: MEDICARE

## 2025-06-16 DIAGNOSIS — I48.0 PAROXYSMAL ATRIAL FIBRILLATION: ICD-10-CM

## 2025-06-16 DIAGNOSIS — I48.0 PAROXYSMAL ATRIAL FIBRILLATION: Primary | ICD-10-CM

## 2025-06-16 LAB
INR PPP: 2.16 (ref 0.86–1.15)
PROTHROMBIN TIME: 25.1 SECONDS (ref 11.8–14.9)

## 2025-06-16 PROCEDURE — 85610 PROTHROMBIN TIME: CPT

## 2025-06-16 PROCEDURE — 36415 COLL VENOUS BLD VENIPUNCTURE: CPT

## 2025-06-16 NOTE — PROGRESS NOTES
Lab Results   Component Value Date    INR 2.16 (H) 06/16/2025    INR 2.15 (H) 06/02/2025    INR 2.27 (H) 05/27/2025    PROTIME 25.1 (H) 06/16/2025    PROTIME 25.1 (H) 06/02/2025    PROTIME 26.2 (H) 05/27/2025        Atrial fibrillation      Range: 2.0-3.0     4mg     [facility]

## 2025-06-18 ENCOUNTER — OFFICE VISIT (OUTPATIENT)
Age: 80
End: 2025-06-18
Payer: MEDICARE

## 2025-06-18 VITALS
HEART RATE: 74 BPM | WEIGHT: 217.6 LBS | SYSTOLIC BLOOD PRESSURE: 124 MMHG | HEIGHT: 70 IN | BODY MASS INDEX: 31.15 KG/M2 | DIASTOLIC BLOOD PRESSURE: 71 MMHG

## 2025-06-18 DIAGNOSIS — E78.2 MIXED HYPERLIPIDEMIA: ICD-10-CM

## 2025-06-18 DIAGNOSIS — I48.0 PAROXYSMAL ATRIAL FIBRILLATION: ICD-10-CM

## 2025-06-18 DIAGNOSIS — I10 ESSENTIAL HYPERTENSION: ICD-10-CM

## 2025-06-18 DIAGNOSIS — R60.0 BILATERAL LEG EDEMA: ICD-10-CM

## 2025-06-18 DIAGNOSIS — I35.0 MILD AORTIC VALVE STENOSIS: ICD-10-CM

## 2025-06-18 DIAGNOSIS — I25.10 CORONARY ARTERY DISEASE INVOLVING NATIVE CORONARY ARTERY OF NATIVE HEART WITHOUT ANGINA PECTORIS: Primary | ICD-10-CM

## 2025-06-18 NOTE — ASSESSMENT & PLAN NOTE
Patient denies any angina or anginal-like symptoms. Continue aspirin, beta-blocker and statin therapy.  Advised patient if he starts to have any exertional chest pain or dyspnea that is relieved by rest to contact the office.

## 2025-06-18 NOTE — ASSESSMENT & PLAN NOTE
Recent lipid panel 12/9/2024 LDL 60, HDL 33, triglycerides 170.  LDL is below goal and well-controlled.  Continue simvastatin 40 mg daily

## 2025-06-18 NOTE — PROGRESS NOTES
SPW relayed the following message per JACIEL Haynes:    INR within range.  Continue same dose and recheck in 1 week     Patient requested to extend time between INR draws. I spoke with  and she agreed to change to 2 weeks. Patient voiced understanding.

## 2025-06-18 NOTE — ASSESSMENT & PLAN NOTE
Patient is in normal sinus rhythm today on his auscultation.  He denies any complaints of heart fluttering/palpitations.  Patient is on warfarin for anticoagulation, he denies any bleeding issues.  Patient is on metoprolol for rate and rhythm control.  Most recent INRs have been therapeutic patient may continue INR's every 2 weeks if stable make go to every 4 weeks.  Continue warfarin as directed and metoprolol succinate XL 25 mg 2 tablets in the a.m. 1 in the PM

## 2025-06-18 NOTE — ASSESSMENT & PLAN NOTE
Patient stable chronic bilateral leg edema.  Today he has a trace of edema in bilateral legs.  Patient declines wearing compression stockings due to his neuropathy they cause him more pain.  Encourage patient to elevate legs when sitting at home along with low-sodium diet.  Continue spironolactone

## 2025-06-18 NOTE — ASSESSMENT & PLAN NOTE
Patient denies any dyspnea, trace pedal edema on exam.  Recent echocardiogram 3/6/2025 showed LV ejection fraction 56 to 60%, aortic valve mild calcified, mild aortic valve stenosis.  Will continue to monitor for symptoms

## 2025-06-18 NOTE — ASSESSMENT & PLAN NOTE
His blood pressure is stable and controlled.  Encouraged patient to monitor blood pressure at home along with low-sodium diet.  Continue amlodipine 10 mg daily, lisinopril 20 mg twice daily, metoprolol succinate XL 25 mg 2 AM 1 PM, and spironolactone 25 mg daily.

## 2025-06-18 NOTE — PROGRESS NOTES
Chief Complaint  Follow-up and Coronary Artery Disease    Subjective        Zachariah Cruz presents to [facility] CARDIOLOGY     History of Present Illness     Mr. Cruz is a 80-year-old male here for 3-month cardiac follow-up.  Patient reports since his last visit his dizziness has subsided.  He has not had any further episodes.  He reports from a cardiac standpoint he is doing well.  He does have stable chronic edema in bilateral legs, left is worse than the right.  Patient reports he is very active at work gets over 5000 steps a day.  He denies having any exertional chest pain or dyspnea.  He denies any palpitations, dizziness, or syncope.     Past History:     1) Coronary artery disease status post 3-vessel coronary artery bypass grafting on 05/21/2010 by Dr. Garcia at [facility] (LIMA graft to the LAD, SVG graft to OM, SVG graft to posterior descending branch of RCA); cardiac cath done on 3/16/2023 showed patent LIMA to LAD, 95% stenosis of proximal segment of SVG to OM branch and 50% stenosis of SVG to RCA at anastomosis.  He underwent angioplasty and stent placement to SVG to OM branch.  2) Diabetes mellitus; 3) Hypertension; 4) Hyperlipidemia; 5) Gout; 6) Obstructive sleep apnea, on CPAP; 7) Paroxysmal atrial flutter, detected on 24-hour Holter monitor study done in October 2019.         Past Medical History:   Diagnosis Date    Coronary artery disease     Diabetes     Hematuria     Hyperlipidemia     Hypertension        No Known Allergies     Past Surgical History:   Procedure Laterality Date    APPENDECTOMY      CARDIAC CATHETERIZATION      CARDIAC CATHETERIZATION N/A 03/16/2023    Procedure: Left Heart Cath with possible angioplasty;  Surgeon: Vishal Denton MD;  Location: [facility] INVASIVE LOCATION;  Service: Cardiovascular;  Laterality: N/A;    CATARACT EXTRACTION      COLONOSCOPY N/A 01/25/2024    poor prep, diverticulosis    CORONARY ARTERY BYPASS GRAFT  05/21/2010     LIMA to LAD, SVG to OM, SVG to PDA    PROSTATE SURGERY      TRANSURETHRAL RESECTION OF THE PROSTATE    UPPER GASTROINTESTINAL ENDOSCOPY          Social History  He  reports that he quit smoking about 56 years ago. His smoking use included cigarettes. He started smoking about 61 years ago. He has a 5 pack-year smoking history. He has been exposed to tobacco smoke. He has never used smokeless tobacco. He reports that he does not currently use alcohol. He reports that he does not use drugs.    Family History  His family history includes Coronary artery disease in his father and another family member; Hypertension in his mother; Stroke in his mother and paternal grandfather.       Current Outpatient Medications on File Prior to Visit   Medication Sig    allopurinol (ZYLOPRIM) 100 MG tablet TAKE 1 TABLET BY MOUTH DAILY    amLODIPine (NORVASC) 10 MG tablet TAKE 1 TABLET BY MOUTH DAILY    aspirin 81 MG EC tablet Take 1 tablet by mouth Daily.    FeroSul 325 (65 Fe) MG tablet TAKE 1 TABLET BY MOUTH DAILY WITH BREAKFAST    finasteride (PROSCAR) 5 MG tablet Take 1 tablet by mouth Daily.    glimepiride (AMARYL) 4 MG tablet TAKE 1 TABLET BY MOUTH DAILY    lisinopril (PRINIVIL,ZESTRIL) 20 MG tablet TAKE 1 TABLET BY MOUTH TWICE A DAY    metFORMIN ER (GLUCOPHAGE-XR) 500 MG 24 hr tablet TAKE 2 TABLETS BY MOUTH TWICE A DAY    metoprolol succinate XL (TOPROL-XL) 25 MG 24 hr tablet TAKE 2 TABLETS BY MOUTH EVERY MORNING AND TAKE ONE TABLET BY MOUTH EVERY EVENING    OneTouch Ultra test strip CHECK BLOOD SUGAR ONCE DAILY    simvastatin (ZOCOR) 40 MG tablet TAKE 1 TABLET BY MOUTH DAILY    spironolactone (ALDACTONE) 25 MG tablet Take 1 tablet by mouth Daily.    vitamin B-12 (CYANOCOBALAMIN) 500 MCG tablet Take 1 tablet by mouth Daily.    vitamin C (ASCORBIC ACID) 250 MG tablet Take 1 tablet by mouth Daily.    warfarin (COUMADIN) 4 MG tablet TAKE 1 TABLET BY MOUTH DAILY OR AS DIRECTED    fluticasone (FLONASE) 50 MCG/ACT nasal spray  "Administer 2 sprays into the nostril(s) as directed by provider Daily. (Patient not taking: Reported on 6/18/2025)    meclizine (ANTIVERT) 25 MG tablet Take 1 tablet by mouth 3 (Three) Times a Day As Needed for Dizziness. (Patient not taking: Reported on 6/18/2025)     No current facility-administered medications on file prior to visit.         Review of Systems   Respiratory:  Negative for chest tightness and shortness of breath.    Cardiovascular:  Negative for chest pain, palpitations and leg swelling.   Gastrointestinal:  Negative for nausea, vomiting and indigestion.   Neurological:  Negative for dizziness, syncope and light-headedness.        Objective   Vitals:    06/18/25 1253   BP: 124/71   Pulse: 74   Weight: 98.7 kg (217 lb 9.6 oz)   Height: 177.8 cm (70\")         Physical Exam   General : Alert, awake, no acute distress  Neck : Supple, no carotid bruit, no jugular venous distention  CVS : Regular rate and rhythm, no murmur, no rubs or gallops  Lungs: Clear to auscultation bilaterally, no crackles or rhonchi  Abdomen: Soft, nontender, bowel sounds active  Extremities: Warm, well-perfused, no pedal edema      Result Review     The following data was reviewed by JACIEL Haynes  No results found for: \"PROBNP\"  CMP          1/13/2025    11:00 1/31/2025    07:33 3/17/2025    07:16   CMP   Glucose  133  167    BUN  16  14    Creatinine 1.00  1.17  1.04    EGFR 77.5  64.2  74.0    Sodium  140  140    Potassium  4.6  4.5    Chloride  106  106    Calcium  9.7  9.8    Total Protein   6.6    Albumin   3.8    Globulin   2.8    Total Bilirubin   0.4    Alkaline Phosphatase   80    AST (SGOT)   14    ALT (SGPT)   17    Albumin/Globulin Ratio   1.4    BUN/Creatinine Ratio  13.7  13.5    Anion Gap  12.0  9.9      CBC w/diff          12/9/2024    07:12 1/31/2025    07:33 5/16/2025    07:40   CBC w/Diff   WBC 8.20  8.34  8.39    RBC 4.53  4.66  4.34    Hemoglobin 13.5  13.8  12.7    Hematocrit 41.9  42.5  40.2    MCV " "92.5  91.2  92.6    MCH 29.8  29.6  29.3    MCHC 32.2  32.5  31.6    RDW 13.9  14.3  14.1    Platelets 259  247  247    Neutrophil Rel % 69.9  73.4  76.5    Immature Granulocyte Rel % 0.4  0.1  0.5    Lymphocyte Rel % 18.4  16.1  14.8    Monocyte Rel % 7.7  7.1  6.2    Eosinophil Rel % 3.5  3.1  1.9    Basophil Rel % 0.1  0.2  0.1       Lab Results   Component Value Date    TSH 1.370 12/09/2024      No results found for: \"FREET4\"   No results found for: \"DDIMERQUANT\"  Magnesium   Date Value Ref Range Status   03/25/2023 1.7 1.6 - 2.4 mg/dL Final      No results found for: \"DIGOXIN\"   Lab Results   Component Value Date    TROPONINT 61 (C) 03/22/2023           Lipid Panel          12/9/2024    07:12   Lipid Panel   Total Cholesterol 122    Triglycerides 170    HDL Cholesterol 33    VLDL Cholesterol 29    LDL Cholesterol  60    LDL/HDL Ratio 1.67          Results for orders placed in visit on 03/06/25    Adult Transthoracic Echo Complete w/ Color, Spectral and Contrast if necessary per protocol    Interpretation Summary    Left ventricular systolic function is normal. Left ventricular ejection fraction appears to be 56 - 60%.    Left ventricular diastolic function is consistent with (grade I) impaired relaxation.    Aortic valve is mildly calcified.  Mild aortic valve stenosis is present.    Estimated right ventricular systolic pressure from tricuspid regurgitation is normal (<35 mmHg).    Results for orders placed during the hospital encounter of 02/22/23    Stress Test With Myocardial Perfusion One Day    Interpretation Summary    There is a moderate sized infarct in the basal-mid inferolateral walls with a small-moderate amount of dugals-infarct ischemia..    Left ventricular ejection fraction is moderately reduced (Calculated EF = 38%).    Abnormal LV wall motion consistent with severe hypokinesis of the lateral wall.    Impressions are consistent with an intermediate risk study.    Findings consistent with an " indeterminate ECG stress test.        Procedures        Assessment and Plan   Diagnoses and all orders for this visit:    1. Coronary artery disease involving native coronary artery of native heart without angina pectoris (Primary)  Assessment & Plan:  Patient denies any angina or anginal-like symptoms. Continue aspirin, beta-blocker and statin therapy.  Advised patient if he starts to have any exertional chest pain or dyspnea that is relieved by rest to contact the office.      2. Paroxysmal atrial fibrillation  Assessment & Plan:  Patient is in normal sinus rhythm today on his auscultation.  He denies any complaints of heart fluttering/palpitations.  Patient is on warfarin for anticoagulation, he denies any bleeding issues.  Patient is on metoprolol for rate and rhythm control.  Most recent INRs have been therapeutic patient may continue INR's every 2 weeks if stable make go to every 4 weeks.  Continue warfarin as directed and metoprolol succinate XL 25 mg 2 tablets in the a.m. 1 in the PM      3. Essential hypertension  Assessment & Plan:  His blood pressure is stable and controlled.  Encouraged patient to monitor blood pressure at home along with low-sodium diet.  Continue amlodipine 10 mg daily, lisinopril 20 mg twice daily, metoprolol succinate XL 25 mg 2 AM 1 PM, and spironolactone 25 mg daily.      4. Mild aortic valve stenosis  Assessment & Plan:  Patient denies any dyspnea, trace pedal edema on exam.  Recent echocardiogram 3/6/2025 showed LV ejection fraction 56 to 60%, aortic valve mild calcified, mild aortic valve stenosis.  Will continue to monitor for symptoms      5. Mixed hyperlipidemia  Assessment & Plan:  Recent lipid panel 12/9/2024 LDL 60, HDL 33, triglycerides 170.  LDL is below goal and well-controlled.  Continue simvastatin 40 mg daily      6. Bilateral leg edema  Assessment & Plan:  Patient stable chronic bilateral leg edema.  Today he has a trace of edema in bilateral legs.  Patient declines  wearing compression stockings due to his neuropathy they cause him more pain.  Encourage patient to elevate legs when sitting at home along with low-sodium diet.  Continue spironolactone                  Follow Up   Return in about 9 months (around 3/18/2026) for Dr Denton.    Zachariah VESTA Cruz  reports that he quit smoking about 56 years ago. His smoking use included cigarettes. He started smoking about 61 years ago. He has a 5 pack-year smoking history. He has been exposed to tobacco smoke. He has never used smokeless tobacco. I have educated him on the risk of diseases from using tobacco products such as cancer, COPD, and heart disease.                  Patient was given instructions and counseling regarding his condition or for health maintenance advice. Please see specific information pulled into the AVS if appropriate.     Signed,  Annie Rosa, APRN  06/18/2025     Dictated Utilizing Dragon Dictation: Please note that portions of this note were completed with a voice recognition program.  Part of this note may be an electronic transcription/translation of spoken language to printed text using the Dragon Dictation System.

## 2025-06-30 ENCOUNTER — LAB (OUTPATIENT)
Dept: LAB | Facility: HOSPITAL | Age: 80
End: 2025-06-30
Payer: MEDICARE

## 2025-06-30 ENCOUNTER — ANTICOAGULATION VISIT (OUTPATIENT)
Dept: CARDIOLOGY | Facility: CLINIC | Age: 80
End: 2025-06-30
Payer: MEDICARE

## 2025-06-30 DIAGNOSIS — I48.0 PAROXYSMAL ATRIAL FIBRILLATION: Primary | ICD-10-CM

## 2025-06-30 DIAGNOSIS — I48.0 PAROXYSMAL ATRIAL FIBRILLATION: ICD-10-CM

## 2025-06-30 LAB
INR PPP: 1.65 (ref 0.86–1.15)
PROTHROMBIN TIME: 20.3 SECONDS (ref 11.8–14.9)

## 2025-06-30 PROCEDURE — 36415 COLL VENOUS BLD VENIPUNCTURE: CPT

## 2025-06-30 PROCEDURE — 85610 PROTHROMBIN TIME: CPT

## 2025-06-30 NOTE — PROGRESS NOTES
Lab Results   Component Value Date    INR 1.65 (H) 06/30/2025    INR 2.16 (H) 06/16/2025    INR 2.15 (H) 06/02/2025    PROTIME 20.3 (H) 06/30/2025    PROTIME 25.1 (H) 06/16/2025    PROTIME 25.1 (H) 06/02/2025       Atrial fibrillation      Range: 2.0-3.0     4mg     [facility]

## 2025-07-07 ENCOUNTER — LAB (OUTPATIENT)
Dept: LAB | Facility: HOSPITAL | Age: 80
End: 2025-07-07
Payer: MEDICARE

## 2025-07-07 DIAGNOSIS — I48.0 PAROXYSMAL ATRIAL FIBRILLATION: ICD-10-CM

## 2025-07-07 LAB
INR PPP: 2.72 (ref 0.86–1.15)
PROTHROMBIN TIME: 30.2 SECONDS (ref 11.8–14.9)

## 2025-07-07 PROCEDURE — 36415 COLL VENOUS BLD VENIPUNCTURE: CPT

## 2025-07-07 PROCEDURE — 85610 PROTHROMBIN TIME: CPT

## 2025-07-07 NOTE — TELEPHONE ENCOUNTER
Rx Refill Note  Requested Prescriptions     Pending Prescriptions Disp Refills    glimepiride (AMARYL) 4 MG tablet [Pharmacy Med Name: GLIMEPIRIDE 4 MG TABLET] 90 tablet 0     Sig: TAKE 1 TABLET BY MOUTH DAILY      Last office visit with prescribing clinician: 3/19/2025   Last telemedicine visit with prescribing clinician: Visit date not found   Next office visit with prescribing clinician: 9/8/2025  Antihyperglycemics Protocol Failed     Marissa Oliveira LPN  07/07/25, 14:33 EDT

## 2025-07-08 ENCOUNTER — ANTICOAGULATION VISIT (OUTPATIENT)
Age: 80
End: 2025-07-08
Payer: MEDICARE

## 2025-07-08 DIAGNOSIS — I48.0 PAROXYSMAL ATRIAL FIBRILLATION: Primary | ICD-10-CM

## 2025-07-08 NOTE — PROGRESS NOTES
Spoke with patient voiced understanding of the following message:    INR within therapeutic range , may continue same dose and recheck in 2 weeks    Patient requested recheck in 1 month, I asked JACIEL Haynes - she agreed with the stipulation if he is not in range at that time he will need to go back to the 2 week recheck. Patient agreed.

## 2025-07-08 NOTE — PROGRESS NOTES
Lab Results   Component Value Date    INR 2.72 (H) 07/07/2025    INR 1.65 (H) 06/30/2025    INR 2.16 (H) 06/16/2025    PROTIME 30.2 (H) 07/07/2025    PROTIME 20.3 (H) 06/30/2025    PROTIME 25.1 (H) 06/16/2025        Atrial fibrillation      Range: 2.0-3.0     5 mg     [facility]

## 2025-07-09 DIAGNOSIS — E78.2 MIXED HYPERLIPIDEMIA: Primary | ICD-10-CM

## 2025-07-09 DIAGNOSIS — E11.9 TYPE 2 DIABETES MELLITUS WITHOUT COMPLICATION, WITHOUT LONG-TERM CURRENT USE OF INSULIN: ICD-10-CM

## 2025-07-09 RX ORDER — GLIMEPIRIDE 4 MG/1
4 TABLET ORAL DAILY
Qty: 90 TABLET | Refills: 0 | Status: SHIPPED | OUTPATIENT
Start: 2025-07-09

## 2025-07-31 ENCOUNTER — TELEPHONE (OUTPATIENT)
Dept: FAMILY MEDICINE CLINIC | Age: 80
End: 2025-07-31
Payer: MEDICARE

## 2025-08-01 ENCOUNTER — OFFICE VISIT (OUTPATIENT)
Dept: FAMILY MEDICINE CLINIC | Age: 80
End: 2025-08-01
Payer: MEDICARE

## 2025-08-01 VITALS
OXYGEN SATURATION: 97 % | BODY MASS INDEX: 30.69 KG/M2 | WEIGHT: 214.4 LBS | DIASTOLIC BLOOD PRESSURE: 74 MMHG | TEMPERATURE: 98.9 F | HEART RATE: 72 BPM | SYSTOLIC BLOOD PRESSURE: 135 MMHG | HEIGHT: 70 IN

## 2025-08-01 DIAGNOSIS — L03.116 LEFT LEG CELLULITIS: ICD-10-CM

## 2025-08-01 DIAGNOSIS — M79.89 LEFT LEG SWELLING: Primary | ICD-10-CM

## 2025-08-01 PROCEDURE — 1160F RVW MEDS BY RX/DR IN RCRD: CPT

## 2025-08-01 PROCEDURE — 1126F AMNT PAIN NOTED NONE PRSNT: CPT

## 2025-08-01 PROCEDURE — 3075F SYST BP GE 130 - 139MM HG: CPT

## 2025-08-01 PROCEDURE — 3078F DIAST BP <80 MM HG: CPT

## 2025-08-01 PROCEDURE — 99214 OFFICE O/P EST MOD 30 MIN: CPT

## 2025-08-01 PROCEDURE — 1159F MED LIST DOCD IN RCRD: CPT

## 2025-08-01 RX ORDER — LISINOPRIL 20 MG/1
20 TABLET ORAL 2 TIMES DAILY
Qty: 90 TABLET | Refills: 0 | Status: SHIPPED | OUTPATIENT
Start: 2025-08-01

## 2025-08-01 NOTE — PROGRESS NOTES
Subjective     CHIEF COMPLAINT    Chief Complaint   Patient presents with    Leg Injury     Dropped board on leg x 6 days ago - red and swollen - was more active yesterday and seemed to aggravate unjury    Chills     Last night       History of Present Illness:  Zachariah Cruz is a 80 y.o. male who presents to [facility] with acute complaint of wound to his left lower leg.  He states that he dropped a board on his leg about 6 days ago.  Since then, the area has become more red and swollen as well as tender.  He did have chills last night but did not check his temperature.  He has diabetes reports blood sugar has been well-controlled.  He has been using Neosporin on the area.  He does not typically have as much swelling in his leg as he does now.      Review of Systems   Constitutional:  Negative for chills and fever.   Respiratory:  Negative for shortness of breath and wheezing.    Cardiovascular:  Positive for leg swelling. Negative for chest pain.   Musculoskeletal:  Positive for arthralgias.   Skin:  Positive for color change and wound.         Past Medical History:   Diagnosis Date    Coronary artery disease     Diabetes     Hematuria     Hyperlipidemia     Hypertension          Past Surgical History:   Procedure Laterality Date    APPENDECTOMY      CARDIAC CATHETERIZATION      CARDIAC CATHETERIZATION N/A 03/16/2023    Procedure: Left Heart Cath with possible angioplasty;  Surgeon: Vishal Denton MD;  Location: [facility] CATH INVASIVE LOCATION;  Service: Cardiovascular;  Laterality: N/A;    CATARACT EXTRACTION      COLONOSCOPY N/A 01/25/2024    poor prep, diverticulosis    CORONARY ARTERY BYPASS GRAFT  05/21/2010    LIMA to LAD, SVG to OM, SVG to PDA    PROSTATE SURGERY      TRANSURETHRAL RESECTION OF THE PROSTATE    UPPER GASTROINTESTINAL ENDOSCOPY           Family History   Problem Relation Age of Onset    Hypertension Mother     Stroke Mother     Coronary artery disease  Father     Stroke Paternal Grandfather     Coronary artery disease Other     Colon cancer Neg Hx          Social History     Socioeconomic History    Marital status:    Tobacco Use    Smoking status: Former     Current packs/day: 0.00     Average packs/day: 1 pack/day for 5.0 years (5.0 ttl pk-yrs)     Types: Cigarettes     Start date:      Quit date:      Years since quittin.6     Passive exposure: Past    Smokeless tobacco: Never   Vaping Use    Vaping status: Never Used   Substance and Sexual Activity    Alcohol use: Not Currently     Comment: occ    Drug use: Never    Sexual activity: Defer         No Known Allergies       Current Outpatient Medications on File Prior to Visit   Medication Sig Dispense Refill    allopurinol (ZYLOPRIM) 100 MG tablet TAKE 1 TABLET BY MOUTH DAILY 90 tablet 0    amLODIPine (NORVASC) 10 MG tablet TAKE 1 TABLET BY MOUTH DAILY 90 tablet 3    aspirin 81 MG EC tablet Take 1 tablet by mouth Every Other Day.      FeroSul 325 (65 Fe) MG tablet TAKE 1 TABLET BY MOUTH DAILY WITH BREAKFAST 90 tablet 1    finasteride (PROSCAR) 5 MG tablet Take 1 tablet by mouth Daily. 90 tablet 3    glimepiride (AMARYL) 4 MG tablet TAKE 1 TABLET BY MOUTH DAILY (Patient taking differently: Take 3 mg by mouth Daily.) 90 tablet 0    metFORMIN ER (GLUCOPHAGE-XR) 500 MG 24 hr tablet TAKE 2 TABLETS BY MOUTH TWICE A  tablet 0    metoprolol succinate XL (TOPROL-XL) 25 MG 24 hr tablet TAKE 2 TABLETS BY MOUTH EVERY MORNING AND TAKE ONE TABLET BY MOUTH EVERY EVENING 270 tablet 3    OneTouch Ultra test strip CHECK BLOOD SUGAR ONCE DAILY 90 each 3    simvastatin (ZOCOR) 40 MG tablet TAKE 1 TABLET BY MOUTH DAILY 90 tablet 1    vitamin B-12 (CYANOCOBALAMIN) 500 MCG tablet Take 1 tablet by mouth Daily. 90 tablet 1    vitamin C (ASCORBIC ACID) 250 MG tablet Take 1 tablet by mouth Daily. 90 tablet 1    warfarin (COUMADIN) 4 MG tablet TAKE 1 TABLET BY MOUTH DAILY OR AS DIRECTED 90 tablet 1    meclizine  "(ANTIVERT) 25 MG tablet Take 1 tablet by mouth 3 (Three) Times a Day As Needed for Dizziness. (Patient not taking: Reported on 8/1/2025) 30 tablet 1    spironolactone (ALDACTONE) 25 MG tablet Take 1 tablet by mouth Daily. (Patient not taking: Reported on 8/1/2025)      [DISCONTINUED] lisinopril (PRINIVIL,ZESTRIL) 20 MG tablet TAKE 1 TABLET BY MOUTH TWICE A  tablet 1     No current facility-administered medications on file prior to visit.          /74   Pulse 72   Temp 98.9 °F (37.2 °C) (Oral)   Ht 177.8 cm (70\")   Wt 97.3 kg (214 lb 6.4 oz)   SpO2 97%   BMI 30.76 kg/m²       Objective     Physical Exam  Vitals and nursing note reviewed.   Constitutional:       General: He is not in acute distress.     Appearance: Normal appearance. He is not ill-appearing.   Musculoskeletal:      Right lower leg: No edema.      Left lower leg: Tenderness present. No bony tenderness. 2+ Pitting Edema present.        Legs:       Comments: Wound to left lower leg approximately in area noted above. Scant yellow drainage noted. Significant surrounding erythema and 2+ pitting edema noted to left lower leg. Warmth noted.    Skin:     General: Skin is warm and dry.      Capillary Refill: Capillary refill takes less than 2 seconds.   Neurological:      General: No focal deficit present.      Mental Status: He is alert and oriented to person, place, and time.   Psychiatric:         Mood and Affect: Mood and affect normal.         Behavior: Behavior normal.           Assessment & Plan  Left leg swelling         Left leg cellulitis            Concern today regarding degree of erythema, warmth and swelling of the left lower extremity in the context of the wound that is present. He also reports having chills last night. Given exam findings, recommend proceeding to ER for further evaluation and management. He may require IV antibiotic therapy. Patient agreeable to plan, reports he will be going to [place] for further evaluation. "       Follow up:  No follow-ups on file.  Patient was given instructions and counseling regarding his condition or for health maintenance advice. Please see specific information pulled into the AVS if appropriate.

## 2025-08-04 ENCOUNTER — ANTICOAGULATION VISIT (OUTPATIENT)
Age: 80
End: 2025-08-04
Payer: MEDICARE

## 2025-08-04 ENCOUNTER — LAB (OUTPATIENT)
Dept: LAB | Facility: HOSPITAL | Age: 80
End: 2025-08-04
Payer: MEDICARE

## 2025-08-04 DIAGNOSIS — E78.2 MIXED HYPERLIPIDEMIA: ICD-10-CM

## 2025-08-04 DIAGNOSIS — I48.0 PAROXYSMAL ATRIAL FIBRILLATION: Primary | ICD-10-CM

## 2025-08-04 DIAGNOSIS — E11.9 TYPE 2 DIABETES MELLITUS WITHOUT COMPLICATION, WITHOUT LONG-TERM CURRENT USE OF INSULIN: ICD-10-CM

## 2025-08-04 DIAGNOSIS — I48.0 PAROXYSMAL ATRIAL FIBRILLATION: ICD-10-CM

## 2025-08-04 LAB
ALBUMIN SERPL-MCNC: 3.6 G/DL (ref 3.5–5.2)
ALBUMIN UR-MCNC: 4.9 MG/DL
ALBUMIN/GLOB SERPL: 1.2 G/DL
ALP SERPL-CCNC: 72 U/L (ref 39–117)
ALT SERPL W P-5'-P-CCNC: 13 U/L (ref 1–41)
ANION GAP SERPL CALCULATED.3IONS-SCNC: 11.4 MMOL/L (ref 5–15)
AST SERPL-CCNC: 13 U/L (ref 1–40)
BILIRUB SERPL-MCNC: 0.5 MG/DL (ref 0–1.2)
BUN SERPL-MCNC: 14 MG/DL (ref 8–23)
BUN/CREAT SERPL: 14.9 (ref 7–25)
CALCIUM SPEC-SCNC: 9.7 MG/DL (ref 8.6–10.5)
CHLORIDE SERPL-SCNC: 106 MMOL/L (ref 98–107)
CHOLEST SERPL-MCNC: 114 MG/DL (ref 0–200)
CO2 SERPL-SCNC: 24.6 MMOL/L (ref 22–29)
CREAT SERPL-MCNC: 0.94 MG/DL (ref 0.76–1.27)
CREAT UR-MCNC: 91.7 MG/DL
EGFRCR SERPLBLD CKD-EPI 2021: 83 ML/MIN/1.73
GLOBULIN UR ELPH-MCNC: 3.1 GM/DL
GLUCOSE SERPL-MCNC: 141 MG/DL (ref 65–99)
HBA1C MFR BLD: 6.3 % (ref 4.8–5.6)
HDLC SERPL-MCNC: 44 MG/DL (ref 40–60)
INR PPP: 3.03 (ref 0.86–1.15)
LDLC SERPL CALC-MCNC: 49 MG/DL (ref 0–100)
LDLC/HDLC SERPL: 1.05 {RATIO}
MICROALBUMIN/CREAT UR: 53.4 MG/G (ref 0–29)
POTASSIUM SERPL-SCNC: 4.8 MMOL/L (ref 3.5–5.2)
PROT SERPL-MCNC: 6.7 G/DL (ref 6–8.5)
PROTHROMBIN TIME: 33 SECONDS (ref 11.8–14.9)
SODIUM SERPL-SCNC: 142 MMOL/L (ref 136–145)
TRIGL SERPL-MCNC: 118 MG/DL (ref 0–150)
VLDLC SERPL-MCNC: 21 MG/DL (ref 5–40)

## 2025-08-04 PROCEDURE — 82570 ASSAY OF URINE CREATININE: CPT

## 2025-08-04 PROCEDURE — 36415 COLL VENOUS BLD VENIPUNCTURE: CPT

## 2025-08-04 PROCEDURE — 85610 PROTHROMBIN TIME: CPT

## 2025-08-04 PROCEDURE — 80053 COMPREHEN METABOLIC PANEL: CPT

## 2025-08-04 PROCEDURE — 83036 HEMOGLOBIN GLYCOSYLATED A1C: CPT

## 2025-08-04 PROCEDURE — 82043 UR ALBUMIN QUANTITATIVE: CPT

## 2025-08-04 PROCEDURE — 80061 LIPID PANEL: CPT

## 2025-08-05 ENCOUNTER — RESULTS FOLLOW-UP (OUTPATIENT)
Dept: FAMILY MEDICINE CLINIC | Age: 80
End: 2025-08-05
Payer: MEDICARE

## 2025-08-13 ENCOUNTER — OFFICE VISIT (OUTPATIENT)
Dept: FAMILY MEDICINE CLINIC | Age: 80
End: 2025-08-13
Payer: MEDICARE

## 2025-08-13 VITALS
BODY MASS INDEX: 30.64 KG/M2 | WEIGHT: 214 LBS | HEART RATE: 68 BPM | SYSTOLIC BLOOD PRESSURE: 138 MMHG | DIASTOLIC BLOOD PRESSURE: 76 MMHG | TEMPERATURE: 97.9 F | HEIGHT: 70 IN | OXYGEN SATURATION: 97 %

## 2025-08-13 DIAGNOSIS — E11.29 TYPE 2 DIABETES MELLITUS WITH ALBUMINURIA: ICD-10-CM

## 2025-08-13 DIAGNOSIS — E11.9 TYPE 2 DIABETES MELLITUS WITHOUT COMPLICATION, WITHOUT LONG-TERM CURRENT USE OF INSULIN: Primary | ICD-10-CM

## 2025-08-13 DIAGNOSIS — L03.116 LEFT LEG CELLULITIS: ICD-10-CM

## 2025-08-13 DIAGNOSIS — R80.9 TYPE 2 DIABETES MELLITUS WITH ALBUMINURIA: ICD-10-CM

## 2025-08-13 DIAGNOSIS — I10 ESSENTIAL HYPERTENSION: ICD-10-CM

## 2025-08-13 RX ORDER — FOLIC ACID 1 MG/1
1 TABLET ORAL DAILY
COMMUNITY

## 2025-08-13 RX ORDER — CEPHALEXIN 500 MG/1
500 CAPSULE ORAL 2 TIMES DAILY
COMMUNITY
Start: 2025-08-01 | End: 2025-08-13

## 2025-08-25 ENCOUNTER — DOCUMENTATION (OUTPATIENT)
Dept: CARDIOLOGY | Facility: CLINIC | Age: 80
End: 2025-08-25
Payer: MEDICARE

## 2025-08-25 DIAGNOSIS — I48.0 PAROXYSMAL ATRIAL FIBRILLATION: Primary | ICD-10-CM

## 2025-08-27 ENCOUNTER — LAB (OUTPATIENT)
Dept: LAB | Facility: HOSPITAL | Age: 80
End: 2025-08-27
Payer: MEDICARE

## 2025-08-27 ENCOUNTER — ANTICOAGULATION VISIT (OUTPATIENT)
Age: 80
End: 2025-08-27
Payer: MEDICARE

## 2025-08-27 DIAGNOSIS — I48.0 PAROXYSMAL ATRIAL FIBRILLATION: Primary | ICD-10-CM

## 2025-08-27 DIAGNOSIS — I48.0 PAROXYSMAL ATRIAL FIBRILLATION: ICD-10-CM

## 2025-08-27 LAB
INR PPP: 2.21 (ref 0.86–1.15)
PROTHROMBIN TIME: 25.6 SECONDS (ref 11.8–14.9)

## 2025-08-27 PROCEDURE — 85610 PROTHROMBIN TIME: CPT

## 2025-08-27 PROCEDURE — 36415 COLL VENOUS BLD VENIPUNCTURE: CPT

## (undated) DEVICE — CATH F4 INF JL 4 100CM: Brand: INFINITI

## (undated) DEVICE — SI AVANTI+ 6F STD W/GW  NO OBT: Brand: AVANTI

## (undated) DEVICE — PTCA DILATATION CATHETER: Brand: NC QUANTUM APEX™

## (undated) DEVICE — INTRO SHEATH PRELUDE PRO .035 4F11X50 LF

## (undated) DEVICE — CATH F4 INF JL 5 100CM: Brand: INFINITI

## (undated) DEVICE — HI-TORQUE BALANCE MIDDLEWEIGHT UNIVERSAL GUIDE WIRE .014 STRAIGHT TIP 3.0 CM X 190 CM: Brand: HI-TORQUE BALANCE MIDDLEWEIGHT UNIVERSAL

## (undated) DEVICE — RADIFOCUS GLIDEWIRE: Brand: GLIDEWIRE

## (undated) DEVICE — CATH F4 INF JR 4 100CM: Brand: INFINITI

## (undated) DEVICE — 6F .070 JR 4 SH 100CM: Brand: VISTA BRITE TIP

## (undated) DEVICE — CATH 4F INF PIG 145Â° 110 CM: Brand: INFINITI